# Patient Record
Sex: MALE | Race: WHITE | NOT HISPANIC OR LATINO | Employment: FULL TIME | URBAN - METROPOLITAN AREA
[De-identification: names, ages, dates, MRNs, and addresses within clinical notes are randomized per-mention and may not be internally consistent; named-entity substitution may affect disease eponyms.]

---

## 2017-09-06 ENCOUNTER — RECORDS - HEALTHEAST (OUTPATIENT)
Dept: LAB | Facility: CLINIC | Age: 57
End: 2017-09-06

## 2017-09-06 LAB — HBV SURFACE AB SERPL IA-ACNC: NEGATIVE M[IU]/ML

## 2017-10-23 ENCOUNTER — RECORDS - HEALTHEAST (OUTPATIENT)
Dept: LAB | Facility: CLINIC | Age: 57
End: 2017-10-23

## 2017-10-23 LAB — HBV SURFACE AB SERPL IA-ACNC: POSITIVE M[IU]/ML

## 2018-01-08 ENCOUNTER — OFFICE VISIT - HEALTHEAST (OUTPATIENT)
Dept: FAMILY MEDICINE | Facility: CLINIC | Age: 58
End: 2018-01-08

## 2018-01-08 ENCOUNTER — COMMUNICATION - HEALTHEAST (OUTPATIENT)
Dept: SCHEDULING | Facility: CLINIC | Age: 58
End: 2018-01-08

## 2018-01-08 DIAGNOSIS — G62.9 PERIPHERAL NEUROPATHY: ICD-10-CM

## 2018-01-08 DIAGNOSIS — Z12.11 COLON CANCER SCREENING: ICD-10-CM

## 2018-01-08 LAB
ALBUMIN SERPL-MCNC: 4.1 G/DL (ref 3.5–5)
ALP SERPL-CCNC: 95 U/L (ref 45–120)
ALT SERPL W P-5'-P-CCNC: 25 U/L (ref 0–45)
ANION GAP SERPL CALCULATED.3IONS-SCNC: 13 MMOL/L (ref 5–18)
AST SERPL W P-5'-P-CCNC: 18 U/L (ref 0–40)
BILIRUB SERPL-MCNC: 0.8 MG/DL (ref 0–1)
BUN SERPL-MCNC: 20 MG/DL (ref 8–22)
CALCIUM SERPL-MCNC: 10.1 MG/DL (ref 8.5–10.5)
CHLORIDE BLD-SCNC: 96 MMOL/L (ref 98–107)
CO2 SERPL-SCNC: 27 MMOL/L (ref 22–31)
CREAT SERPL-MCNC: 1.5 MG/DL (ref 0.7–1.3)
ERYTHROCYTE [DISTWIDTH] IN BLOOD BY AUTOMATED COUNT: 11.8 % (ref 11–14.5)
GFR SERPL CREATININE-BSD FRML MDRD: 48 ML/MIN/1.73M2
GLUCOSE BLD-MCNC: 461 MG/DL (ref 70–125)
HBA1C MFR BLD: >14 % (ref 3.5–6)
HCT VFR BLD AUTO: 50.3 % (ref 40–54)
HGB BLD-MCNC: 17.2 G/DL (ref 14–18)
MCH RBC QN AUTO: 30.4 PG (ref 27–34)
MCHC RBC AUTO-ENTMCNC: 34.1 G/DL (ref 32–36)
MCV RBC AUTO: 89 FL (ref 80–100)
PLATELET # BLD AUTO: 215 THOU/UL (ref 140–440)
PMV BLD AUTO: 8.3 FL (ref 7–10)
POTASSIUM BLD-SCNC: 4.6 MMOL/L (ref 3.5–5)
PROT SERPL-MCNC: 7.5 G/DL (ref 6–8)
RBC # BLD AUTO: 5.65 MILL/UL (ref 4.4–6.2)
SODIUM SERPL-SCNC: 136 MMOL/L (ref 136–145)
TSH SERPL DL<=0.005 MIU/L-ACNC: 0.54 UIU/ML (ref 0.3–5)
VIT B12 SERPL-MCNC: 1501 PG/ML (ref 213–816)
WBC: 6 THOU/UL (ref 4–11)

## 2018-01-08 ASSESSMENT — MIFFLIN-ST. JEOR: SCORE: 1602.32

## 2018-01-09 ENCOUNTER — COMMUNICATION - HEALTHEAST (OUTPATIENT)
Dept: NURSING | Facility: CLINIC | Age: 58
End: 2018-01-09

## 2018-01-09 ENCOUNTER — OFFICE VISIT - HEALTHEAST (OUTPATIENT)
Dept: FAMILY MEDICINE | Facility: CLINIC | Age: 58
End: 2018-01-09

## 2018-01-09 ENCOUNTER — OFFICE VISIT - HEALTHEAST (OUTPATIENT)
Dept: NURSING | Facility: CLINIC | Age: 58
End: 2018-01-09

## 2018-01-09 DIAGNOSIS — E11.22: ICD-10-CM

## 2018-01-09 LAB
CHOLEST SERPL-MCNC: 219 MG/DL
CREAT UR-MCNC: 50 MG/DL
FASTING STATUS PATIENT QL REPORTED: NO
HDLC SERPL-MCNC: 58 MG/DL
LDLC SERPL CALC-MCNC: 114 MG/DL
MICROALBUMIN UR-MCNC: 0.87 MG/DL (ref 0–1.99)
MICROALBUMIN/CREAT UR: 17.4 MG/G
TRIGL SERPL-MCNC: 237 MG/DL

## 2018-01-11 ENCOUNTER — COMMUNICATION - HEALTHEAST (OUTPATIENT)
Dept: FAMILY MEDICINE | Facility: CLINIC | Age: 58
End: 2018-01-11

## 2018-01-15 ENCOUNTER — RECORDS - HEALTHEAST (OUTPATIENT)
Dept: ADMINISTRATIVE | Facility: OTHER | Age: 58
End: 2018-01-15

## 2018-01-16 ENCOUNTER — RECORDS - HEALTHEAST (OUTPATIENT)
Dept: ADMINISTRATIVE | Facility: OTHER | Age: 58
End: 2018-01-16

## 2018-01-23 ENCOUNTER — OFFICE VISIT - HEALTHEAST (OUTPATIENT)
Dept: NURSING | Facility: CLINIC | Age: 58
End: 2018-01-23

## 2018-01-31 ENCOUNTER — COMMUNICATION - HEALTHEAST (OUTPATIENT)
Dept: NURSING | Facility: CLINIC | Age: 58
End: 2018-01-31

## 2018-02-02 ENCOUNTER — AMBULATORY - HEALTHEAST (OUTPATIENT)
Dept: LAB | Facility: CLINIC | Age: 58
End: 2018-02-02

## 2018-02-02 DIAGNOSIS — Z12.11 COLON CANCER SCREENING: ICD-10-CM

## 2018-02-05 ENCOUNTER — OFFICE VISIT - HEALTHEAST (OUTPATIENT)
Dept: EDUCATION SERVICES | Facility: CLINIC | Age: 58
End: 2018-02-05

## 2018-02-06 LAB
HEMOCCULT SP1 STL QL: NEGATIVE
HEMOCCULT SP2 STL QL: NEGATIVE
HEMOCCULT SP3 STL QL: NEGATIVE

## 2018-02-07 ENCOUNTER — COMMUNICATION - HEALTHEAST (OUTPATIENT)
Dept: NURSING | Facility: CLINIC | Age: 58
End: 2018-02-07

## 2018-02-12 ENCOUNTER — COMMUNICATION - HEALTHEAST (OUTPATIENT)
Dept: NURSING | Facility: CLINIC | Age: 58
End: 2018-02-12

## 2018-03-03 ENCOUNTER — COMMUNICATION - HEALTHEAST (OUTPATIENT)
Dept: NURSING | Facility: CLINIC | Age: 58
End: 2018-03-03

## 2018-03-07 ENCOUNTER — AMBULATORY - HEALTHEAST (OUTPATIENT)
Dept: NURSING | Facility: CLINIC | Age: 58
End: 2018-03-07

## 2018-03-18 ENCOUNTER — COMMUNICATION - HEALTHEAST (OUTPATIENT)
Dept: NURSING | Facility: CLINIC | Age: 58
End: 2018-03-18

## 2018-05-15 ENCOUNTER — COMMUNICATION - HEALTHEAST (OUTPATIENT)
Dept: FAMILY MEDICINE | Facility: CLINIC | Age: 58
End: 2018-05-15

## 2018-06-02 ENCOUNTER — COMMUNICATION - HEALTHEAST (OUTPATIENT)
Dept: NURSING | Facility: CLINIC | Age: 58
End: 2018-06-02

## 2018-06-07 ENCOUNTER — COMMUNICATION - HEALTHEAST (OUTPATIENT)
Dept: NURSING | Facility: CLINIC | Age: 58
End: 2018-06-07

## 2018-07-11 ENCOUNTER — COMMUNICATION - HEALTHEAST (OUTPATIENT)
Dept: FAMILY MEDICINE | Facility: CLINIC | Age: 58
End: 2018-07-11

## 2018-07-25 ENCOUNTER — AMBULATORY - HEALTHEAST (OUTPATIENT)
Dept: FAMILY MEDICINE | Facility: CLINIC | Age: 58
End: 2018-07-25

## 2018-07-25 ENCOUNTER — OFFICE VISIT - HEALTHEAST (OUTPATIENT)
Dept: FAMILY MEDICINE | Facility: CLINIC | Age: 58
End: 2018-07-25

## 2018-07-25 ENCOUNTER — COMMUNICATION - HEALTHEAST (OUTPATIENT)
Dept: FAMILY MEDICINE | Facility: CLINIC | Age: 58
End: 2018-07-25

## 2018-07-25 DIAGNOSIS — E11.9 DIABETES MELLITUS, TYPE 2 (H): ICD-10-CM

## 2018-07-25 DIAGNOSIS — R03.0 ELEVATED BLOOD PRESSURE READING WITHOUT DIAGNOSIS OF HYPERTENSION: ICD-10-CM

## 2018-07-25 LAB
ANION GAP SERPL CALCULATED.3IONS-SCNC: 9 MMOL/L (ref 5–18)
BUN SERPL-MCNC: 26 MG/DL (ref 8–22)
CALCIUM SERPL-MCNC: 9.8 MG/DL (ref 8.5–10.5)
CHLORIDE BLD-SCNC: 102 MMOL/L (ref 98–107)
CO2 SERPL-SCNC: 28 MMOL/L (ref 22–31)
CREAT SERPL-MCNC: 1.43 MG/DL (ref 0.7–1.3)
GFR SERPL CREATININE-BSD FRML MDRD: 51 ML/MIN/1.73M2
GLUCOSE BLD-MCNC: 136 MG/DL (ref 70–125)
HBA1C MFR BLD: 7.3 % (ref 3.5–6)
POTASSIUM BLD-SCNC: 4.4 MMOL/L (ref 3.5–5)
SODIUM SERPL-SCNC: 139 MMOL/L (ref 136–145)

## 2018-07-25 RX ORDER — GLUCOSAMINE HCL/CHONDROITIN SU 500-400 MG
CAPSULE ORAL
Qty: 200 STRIP | Refills: 11 | Status: SHIPPED | OUTPATIENT
Start: 2018-07-25 | End: 2022-07-05

## 2018-07-27 ENCOUNTER — COMMUNICATION - HEALTHEAST (OUTPATIENT)
Dept: FAMILY MEDICINE | Facility: CLINIC | Age: 58
End: 2018-07-27

## 2018-07-31 ENCOUNTER — COMMUNICATION - HEALTHEAST (OUTPATIENT)
Dept: FAMILY MEDICINE | Facility: CLINIC | Age: 58
End: 2018-07-31

## 2018-08-06 ENCOUNTER — COMMUNICATION - HEALTHEAST (OUTPATIENT)
Dept: FAMILY MEDICINE | Facility: CLINIC | Age: 58
End: 2018-08-06

## 2018-08-13 ENCOUNTER — COMMUNICATION - HEALTHEAST (OUTPATIENT)
Dept: FAMILY MEDICINE | Facility: CLINIC | Age: 58
End: 2018-08-13

## 2018-08-20 ENCOUNTER — COMMUNICATION - HEALTHEAST (OUTPATIENT)
Dept: FAMILY MEDICINE | Facility: CLINIC | Age: 58
End: 2018-08-20

## 2018-08-22 ENCOUNTER — COMMUNICATION - HEALTHEAST (OUTPATIENT)
Dept: FAMILY MEDICINE | Facility: CLINIC | Age: 58
End: 2018-08-22

## 2018-08-31 ENCOUNTER — COMMUNICATION - HEALTHEAST (OUTPATIENT)
Dept: FAMILY MEDICINE | Facility: CLINIC | Age: 58
End: 2018-08-31

## 2018-09-02 ENCOUNTER — COMMUNICATION - HEALTHEAST (OUTPATIENT)
Dept: FAMILY MEDICINE | Facility: CLINIC | Age: 58
End: 2018-09-02

## 2018-09-12 ENCOUNTER — COMMUNICATION - HEALTHEAST (OUTPATIENT)
Dept: NURSING | Facility: CLINIC | Age: 58
End: 2018-09-12

## 2018-10-03 ENCOUNTER — COMMUNICATION - HEALTHEAST (OUTPATIENT)
Dept: FAMILY MEDICINE | Facility: CLINIC | Age: 58
End: 2018-10-03

## 2018-10-03 DIAGNOSIS — E11.9 DIABETES MELLITUS, TYPE 2 (H): ICD-10-CM

## 2018-12-10 ENCOUNTER — COMMUNICATION - HEALTHEAST (OUTPATIENT)
Dept: FAMILY MEDICINE | Facility: CLINIC | Age: 58
End: 2018-12-10

## 2019-01-19 ENCOUNTER — COMMUNICATION - HEALTHEAST (OUTPATIENT)
Dept: NURSING | Facility: CLINIC | Age: 59
End: 2019-01-19

## 2019-03-23 ENCOUNTER — COMMUNICATION - HEALTHEAST (OUTPATIENT)
Dept: FAMILY MEDICINE | Facility: CLINIC | Age: 59
End: 2019-03-23

## 2019-03-23 DIAGNOSIS — E11.9 DIABETES MELLITUS, TYPE 2 (H): ICD-10-CM

## 2019-03-28 ENCOUNTER — COMMUNICATION - HEALTHEAST (OUTPATIENT)
Dept: FAMILY MEDICINE | Facility: CLINIC | Age: 59
End: 2019-03-28

## 2019-04-15 ENCOUNTER — OFFICE VISIT - HEALTHEAST (OUTPATIENT)
Dept: FAMILY MEDICINE | Facility: CLINIC | Age: 59
End: 2019-04-15

## 2019-04-15 ENCOUNTER — AMBULATORY - HEALTHEAST (OUTPATIENT)
Dept: FAMILY MEDICINE | Facility: CLINIC | Age: 59
End: 2019-04-15

## 2019-04-15 DIAGNOSIS — Z12.11 SCREEN FOR COLON CANCER: ICD-10-CM

## 2019-04-15 LAB
ALBUMIN SERPL-MCNC: 4.5 G/DL (ref 3.5–5)
ALP SERPL-CCNC: 75 U/L (ref 45–120)
ALT SERPL W P-5'-P-CCNC: 38 U/L (ref 0–45)
ANION GAP SERPL CALCULATED.3IONS-SCNC: 10 MMOL/L (ref 5–18)
AST SERPL W P-5'-P-CCNC: 29 U/L (ref 0–40)
BILIRUB SERPL-MCNC: 0.6 MG/DL (ref 0–1)
BUN SERPL-MCNC: 21 MG/DL (ref 8–22)
CALCIUM SERPL-MCNC: 10 MG/DL (ref 8.5–10.5)
CHLORIDE BLD-SCNC: 100 MMOL/L (ref 98–107)
CO2 SERPL-SCNC: 28 MMOL/L (ref 22–31)
CREAT SERPL-MCNC: 1.44 MG/DL (ref 0.7–1.3)
CREAT UR-MCNC: 147.5 MG/DL
GFR SERPL CREATININE-BSD FRML MDRD: 50 ML/MIN/1.73M2
GLUCOSE BLD-MCNC: 163 MG/DL (ref 70–125)
HBA1C MFR BLD: 7.7 % (ref 3.5–6)
MICROALBUMIN UR-MCNC: 1.4 MG/DL (ref 0–1.99)
MICROALBUMIN/CREAT UR: 9.5 MG/G
POTASSIUM BLD-SCNC: 4.4 MMOL/L (ref 3.5–5)
PROT SERPL-MCNC: 7.5 G/DL (ref 6–8)
SODIUM SERPL-SCNC: 138 MMOL/L (ref 136–145)

## 2019-04-30 ENCOUNTER — RECORDS - HEALTHEAST (OUTPATIENT)
Dept: ADMINISTRATIVE | Facility: OTHER | Age: 59
End: 2019-04-30

## 2019-05-06 ENCOUNTER — AMBULATORY - HEALTHEAST (OUTPATIENT)
Dept: LAB | Facility: CLINIC | Age: 59
End: 2019-05-06

## 2019-05-06 ENCOUNTER — RECORDS - HEALTHEAST (OUTPATIENT)
Dept: HEALTH INFORMATION MANAGEMENT | Facility: CLINIC | Age: 59
End: 2019-05-06

## 2019-05-06 DIAGNOSIS — Z12.11 SCREEN FOR COLON CANCER: ICD-10-CM

## 2019-05-07 LAB
HEMOCCULT SP1 STL QL: NEGATIVE
HEMOCCULT SP2 STL QL: NEGATIVE
HEMOCCULT SP3 STL QL: NEGATIVE

## 2019-07-02 ENCOUNTER — COMMUNICATION - HEALTHEAST (OUTPATIENT)
Dept: FAMILY MEDICINE | Facility: CLINIC | Age: 59
End: 2019-07-02

## 2019-07-27 ENCOUNTER — COMMUNICATION - HEALTHEAST (OUTPATIENT)
Dept: FAMILY MEDICINE | Facility: CLINIC | Age: 59
End: 2019-07-27

## 2019-07-27 DIAGNOSIS — E11.9 DIABETES MELLITUS, TYPE 2 (H): ICD-10-CM

## 2019-07-29 ENCOUNTER — AMBULATORY - HEALTHEAST (OUTPATIENT)
Dept: FAMILY MEDICINE | Facility: CLINIC | Age: 59
End: 2019-07-29

## 2019-07-29 ENCOUNTER — OFFICE VISIT - HEALTHEAST (OUTPATIENT)
Dept: FAMILY MEDICINE | Facility: CLINIC | Age: 59
End: 2019-07-29

## 2019-07-29 LAB
ANION GAP SERPL CALCULATED.3IONS-SCNC: 12 MMOL/L (ref 5–18)
BUN SERPL-MCNC: 24 MG/DL (ref 8–22)
CALCIUM SERPL-MCNC: 9.7 MG/DL (ref 8.5–10.5)
CHLORIDE BLD-SCNC: 102 MMOL/L (ref 98–107)
CO2 SERPL-SCNC: 24 MMOL/L (ref 22–31)
CREAT SERPL-MCNC: 1.37 MG/DL (ref 0.7–1.3)
GFR SERPL CREATININE-BSD FRML MDRD: 53 ML/MIN/1.73M2
GLUCOSE BLD-MCNC: 152 MG/DL (ref 70–125)
HBA1C MFR BLD: 8 % (ref 3.5–6)
POTASSIUM BLD-SCNC: 4.6 MMOL/L (ref 3.5–5)
SODIUM SERPL-SCNC: 138 MMOL/L (ref 136–145)

## 2019-07-29 ASSESSMENT — MIFFLIN-ST. JEOR: SCORE: 1739.53

## 2019-08-21 ENCOUNTER — COMMUNICATION - HEALTHEAST (OUTPATIENT)
Dept: FAMILY MEDICINE | Facility: CLINIC | Age: 59
End: 2019-08-21

## 2019-09-24 ENCOUNTER — COMMUNICATION - HEALTHEAST (OUTPATIENT)
Dept: FAMILY MEDICINE | Facility: CLINIC | Age: 59
End: 2019-09-24

## 2019-10-01 ENCOUNTER — COMMUNICATION - HEALTHEAST (OUTPATIENT)
Dept: FAMILY MEDICINE | Facility: CLINIC | Age: 59
End: 2019-10-01

## 2019-10-30 ENCOUNTER — OFFICE VISIT - HEALTHEAST (OUTPATIENT)
Dept: FAMILY MEDICINE | Facility: CLINIC | Age: 59
End: 2019-10-30

## 2019-10-30 DIAGNOSIS — E78.5 HYPERLIPIDEMIA LDL GOAL <70: ICD-10-CM

## 2019-10-30 DIAGNOSIS — R03.0 ELEVATED BLOOD PRESSURE READING WITHOUT DIAGNOSIS OF HYPERTENSION: ICD-10-CM

## 2019-10-30 LAB
ANION GAP SERPL CALCULATED.3IONS-SCNC: 8 MMOL/L (ref 5–18)
BUN SERPL-MCNC: 21 MG/DL (ref 8–22)
CALCIUM SERPL-MCNC: 9.6 MG/DL (ref 8.5–10.5)
CHLORIDE BLD-SCNC: 102 MMOL/L (ref 98–107)
CHOLEST SERPL-MCNC: 141 MG/DL
CO2 SERPL-SCNC: 29 MMOL/L (ref 22–31)
CREAT SERPL-MCNC: 1.39 MG/DL (ref 0.7–1.3)
FASTING STATUS PATIENT QL REPORTED: NO
GFR SERPL CREATININE-BSD FRML MDRD: 52 ML/MIN/1.73M2
GLUCOSE BLD-MCNC: 139 MG/DL (ref 70–125)
HBA1C MFR BLD: 7.1 % (ref 3.5–6)
HDLC SERPL-MCNC: 56 MG/DL
LDLC SERPL CALC-MCNC: 72 MG/DL
POTASSIUM BLD-SCNC: 4.3 MMOL/L (ref 3.5–5)
SODIUM SERPL-SCNC: 139 MMOL/L (ref 136–145)
TRIGL SERPL-MCNC: 67 MG/DL

## 2019-10-30 ASSESSMENT — MIFFLIN-ST. JEOR: SCORE: 1737.26

## 2019-11-04 ENCOUNTER — COMMUNICATION - HEALTHEAST (OUTPATIENT)
Dept: FAMILY MEDICINE | Facility: CLINIC | Age: 59
End: 2019-11-04

## 2019-11-23 ENCOUNTER — COMMUNICATION - HEALTHEAST (OUTPATIENT)
Dept: FAMILY MEDICINE | Facility: CLINIC | Age: 59
End: 2019-11-23

## 2019-11-23 RX ORDER — LANCETS
EACH MISCELLANEOUS
Qty: 300 EACH | Refills: 3 | Status: SHIPPED | OUTPATIENT
Start: 2019-11-23 | End: 2022-04-21

## 2020-02-09 ENCOUNTER — COMMUNICATION - HEALTHEAST (OUTPATIENT)
Dept: FAMILY MEDICINE | Facility: CLINIC | Age: 60
End: 2020-02-09

## 2020-02-17 ENCOUNTER — COMMUNICATION - HEALTHEAST (OUTPATIENT)
Dept: FAMILY MEDICINE | Facility: CLINIC | Age: 60
End: 2020-02-17

## 2020-03-16 ENCOUNTER — VIRTUAL VISIT (OUTPATIENT)
Dept: FAMILY MEDICINE | Facility: OTHER | Age: 60
End: 2020-03-16

## 2020-03-17 NOTE — PROGRESS NOTES
"Date: 2020 19:27:54  Clinician: Joel Wegener  Clinician NPI: [id number]  Patient: Ned Moore  Patient : 1960  Patient Address: [address] [place] [address]  Patient Phone: [phone]  Visit Protocol: URI  Patient Summary:  Ned is a 59 year old ( : 1960 ) male who initiated a Visit for COVID-19 (Coronavirus) evaluation and screening. When asked the question \"Please sign me up to receive news, health information and promotions from [employer].\", Ned responded \"No\".    Ned states his symptoms started gradually 3-6 days ago.   His symptoms consist of rhinitis and a cough.   Symptom details     Nasal secretions: The color of his mucus is clear.    Cough: Ned coughs a few times an hour and his cough is not more bothersome at night. Phlegm does not come into his throat when he coughs. He believes his cough is caused by post-nasal drip.      Ned denies having ear pain, malaise, headache, enlarged lymph nodes, facial pain or pressure, myalgias, wheezing, sore throat, nasal congestion, teeth pain, fever, and chills. He also denies having recent facial or sinus surgery in the past 60 days, double sickening (worsening symptoms after initial improvement), and taking antibiotic medication for the symptoms. He is not experiencing dyspnea.   Precipitating events  He has not recently been exposed to someone with influenza. eNd has not been in close contact with any high risk individuals.   Pertinent COVID-19 (Coronavirus) information  Ned has not traveled internationally or to the areas where COVID-19 (Coronavirus) is widespread in the last 14 days before the start of his symptoms.   Ned has not had close contact with a suspected or laboratory-confirmed COVID-19 patient within 14 days of symptom onset.   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   Pertinent medical history  Ned does not need a return to work/school note.   Weight: 180 lbs   Ned does not smoke " or use smokeless tobacco.   Weight: 180 lbs    MEDICATIONS: lisinopril-hydrochlorothiazide oral, glipizide oral, metformin oral, ALLERGIES: NKDA  Clinician Response:  Dear Ned,   Based on the information you have provided, it is recommended that you go to one of our designated Coronavirus (Covid-19) testing centers to get a test done from your car.  We are offering testing from your car in [facility], [place], [facility] and [place].   To schedule a visit at [facility], [facility] or [place], please call [phone] to find out when the next available testing window is. Testing will be done in 1 hour blocks so that you can wait at home until we are available to more quickly perform your testing from the car.   To complete testing in [place] ONLY, follow the instructions below:    Go as soon as possible during the hours noted to [facility] [address], [place] [address]. Hours: M-F 7:30am-5pm    What to expect:   When you arrive please come park in the parking lot.   Be prepared to present photo ID   Call [phone] and let them know you have arrived.    They will ask for information to get you registered for a visit and will ask for the description of your car and where you are parked.    They will add you to the queue to get your test (you will stay in your car the entire time).   You will then be met by a provider who will perform a brief assessment in your car and collect samples to test for coronavirus and possibly influenza or RSV.    Isolate yourself while traveling.  Do Not allow any visitors within 6 feet.  Do Not go to work or school.  Do Not go to Muslim,  centers, shopping, or other public places.  Do Not shake hands.  Avoid close contact with others (hugging, kissing).Protect Others:     Cover Your Mouth and Nose with a mask, disposable tissue or wash cloth  to avoid spreading germs to others.  Wash your hands and face frequently with soap and water   Fever Medicines:    For fever relief, take acetaminophen or ibuprofen.  Treat fevers above 101deg F (38.3deg C) to lower fevers and make you more comfortable.   Acetaminophen (e.g., Tylenol): Take 650 mg (two 325 mg pills) by mouth every 4-6 hours as needed of regular strength Tylenol or 1,000 mg (two 500 mg pills) every 8 hours as needed of Extra Strength Tylenol.   Ibuprofen (e.g., Motrin, Advil): Take 400 mg (two 200 mg pills) by mouth every 6 hours as needed.   Acetaminophen is thought to be safer than ibuprofen or naproxen for people over 65 years old. Acetaminophen is in many OTC and prescription medicines. It might be in more than one medicine that you are taking. You need to be careful and not take an overdose. Before taking any medicine, read all the instructions on the package.  Caution -NSAIDs (e.g., ibuprofen, naproxen): Do not take nonsteroidal anti-inflammatory drugs (NSAIDs) if you have stomach problems, kidney disease, heart failure, or other contraindications to using this type of medicine. Do not take NSAID medicines for over 7 days without consulting your PCP. Do not take NSAID medicines if you are pregnant. Do not take NSAID medicines if you are also taking blood thinners.    Call or submit a new visit if: Breathing difficulty develops or you become worse.  Thank you for limiting contact with others, wearing a simple mask to cover your cough, practice good hand hygiene habits and accessing our virtual services where possible to limit the spread of this virus.  For more information about COVID19 and options for caring for yourself at home, please visit the CDC website at https://www.cdc.gov/coronavirus/2019-ncov/about/steps-when-sick.html  For more options for care at [facility], please visit our website at [email or web address]    Diagnosis: Cough  Diagnosis ICD: R05

## 2020-06-05 ENCOUNTER — COMMUNICATION - HEALTHEAST (OUTPATIENT)
Dept: FAMILY MEDICINE | Facility: CLINIC | Age: 60
End: 2020-06-05

## 2020-06-08 ENCOUNTER — COMMUNICATION - HEALTHEAST (OUTPATIENT)
Dept: NURSING | Facility: CLINIC | Age: 60
End: 2020-06-08

## 2020-06-10 RX ORDER — GLUCOSAMINE HCL/CHONDROITIN SU 500-400 MG
CAPSULE ORAL
Qty: 200 STRIP | Refills: 3 | Status: SHIPPED | OUTPATIENT
Start: 2020-06-10

## 2020-10-03 ENCOUNTER — COMMUNICATION - HEALTHEAST (OUTPATIENT)
Dept: FAMILY MEDICINE | Facility: CLINIC | Age: 60
End: 2020-10-03

## 2020-11-13 ENCOUNTER — COMMUNICATION - HEALTHEAST (OUTPATIENT)
Dept: FAMILY MEDICINE | Facility: CLINIC | Age: 60
End: 2020-11-13

## 2020-12-13 ENCOUNTER — COMMUNICATION - HEALTHEAST (OUTPATIENT)
Dept: FAMILY MEDICINE | Facility: CLINIC | Age: 60
End: 2020-12-13

## 2021-01-05 ENCOUNTER — COMMUNICATION - HEALTHEAST (OUTPATIENT)
Dept: FAMILY MEDICINE | Facility: CLINIC | Age: 61
End: 2021-01-05

## 2021-01-27 ENCOUNTER — OFFICE VISIT - HEALTHEAST (OUTPATIENT)
Dept: FAMILY MEDICINE | Facility: CLINIC | Age: 61
End: 2021-01-27

## 2021-01-27 ENCOUNTER — HOSPITAL ENCOUNTER (OUTPATIENT)
Dept: ULTRASOUND IMAGING | Facility: CLINIC | Age: 61
Discharge: HOME | End: 2021-01-27
Attending: NURSE PRACTITIONER

## 2021-01-27 DIAGNOSIS — Z12.11 COLON CANCER SCREENING: ICD-10-CM

## 2021-01-27 DIAGNOSIS — E11.21 DIABETIC NEPHROPATHY ASSOCIATED WITH TYPE 2 DIABETES MELLITUS (H): ICD-10-CM

## 2021-01-27 DIAGNOSIS — R60.0 EDEMA OF LEFT LOWER EXTREMITY: ICD-10-CM

## 2021-01-27 DIAGNOSIS — E78.5 HYPERLIPIDEMIA LDL GOAL <70: ICD-10-CM

## 2021-01-27 DIAGNOSIS — E11.42 DIABETIC POLYNEUROPATHY ASSOCIATED WITH TYPE 2 DIABETES MELLITUS (H): ICD-10-CM

## 2021-01-27 DIAGNOSIS — N18.31 STAGE 3A CHRONIC KIDNEY DISEASE (H): ICD-10-CM

## 2021-01-27 LAB
ALBUMIN SERPL-MCNC: 4.6 G/DL (ref 3.5–5)
ALP SERPL-CCNC: 75 U/L (ref 45–120)
ALT SERPL W P-5'-P-CCNC: 31 U/L (ref 0–45)
ANION GAP SERPL CALCULATED.3IONS-SCNC: 11 MMOL/L (ref 5–18)
AST SERPL W P-5'-P-CCNC: 22 U/L (ref 0–40)
BILIRUB SERPL-MCNC: 0.7 MG/DL (ref 0–1)
BUN SERPL-MCNC: 24 MG/DL (ref 8–22)
CALCIUM SERPL-MCNC: 9.4 MG/DL (ref 8.5–10.5)
CHLORIDE BLD-SCNC: 102 MMOL/L (ref 98–107)
CHOLEST SERPL-MCNC: 156 MG/DL
CO2 SERPL-SCNC: 27 MMOL/L (ref 22–31)
CREAT SERPL-MCNC: 1.41 MG/DL (ref 0.7–1.3)
CREAT UR-MCNC: 146.5 MG/DL
FASTING STATUS PATIENT QL REPORTED: YES
GFR SERPL CREATININE-BSD FRML MDRD: 51 ML/MIN/1.73M2
GLUCOSE BLD-MCNC: 118 MG/DL (ref 70–125)
HBA1C MFR BLD: 7.9 %
HDLC SERPL-MCNC: 57 MG/DL
LDLC SERPL CALC-MCNC: 74 MG/DL
MICROALBUMIN UR-MCNC: 1.69 MG/DL (ref 0–1.99)
MICROALBUMIN/CREAT UR: 11.5 MG/G
POTASSIUM BLD-SCNC: 4.5 MMOL/L (ref 3.5–5)
PROT SERPL-MCNC: 7.6 G/DL (ref 6–8)
SODIUM SERPL-SCNC: 140 MMOL/L (ref 136–145)
TRIGL SERPL-MCNC: 123 MG/DL

## 2021-01-27 RX ORDER — GLIPIZIDE 10 MG/1
10 TABLET, FILM COATED, EXTENDED RELEASE ORAL DAILY
Qty: 90 TABLET | Refills: 1 | Status: SHIPPED | OUTPATIENT
Start: 2021-01-27 | End: 2021-07-20

## 2021-01-27 RX ORDER — ATORVASTATIN CALCIUM 20 MG/1
20 TABLET, FILM COATED ORAL EVERY EVENING
Qty: 90 TABLET | Refills: 3 | Status: SHIPPED | OUTPATIENT
Start: 2021-01-27 | End: 2022-01-09

## 2021-01-29 ENCOUNTER — COMMUNICATION - HEALTHEAST (OUTPATIENT)
Dept: FAMILY MEDICINE | Facility: CLINIC | Age: 61
End: 2021-01-29

## 2021-01-29 RX ORDER — METFORMIN HCL 500 MG
TABLET, EXTENDED RELEASE 24 HR ORAL
Qty: 360 TABLET | Refills: 3 | Status: SHIPPED | OUTPATIENT
Start: 2021-01-29 | End: 2022-02-07

## 2021-02-01 ENCOUNTER — COMMUNICATION - HEALTHEAST (OUTPATIENT)
Dept: FAMILY MEDICINE | Facility: CLINIC | Age: 61
End: 2021-02-01

## 2021-02-01 RX ORDER — LANCETS
EACH MISCELLANEOUS
Qty: 300 EACH | Refills: 3 | Status: SHIPPED | OUTPATIENT
Start: 2021-02-01 | End: 2024-07-09

## 2021-02-22 ENCOUNTER — COMMUNICATION - HEALTHEAST (OUTPATIENT)
Dept: FAMILY MEDICINE | Facility: CLINIC | Age: 61
End: 2021-02-22

## 2021-03-10 ENCOUNTER — AMBULATORY - HEALTHEAST (OUTPATIENT)
Dept: MULTI SPECIALTY CLINIC | Facility: CLINIC | Age: 61
End: 2021-03-10

## 2021-03-10 LAB — COLOGUARD-ABSTRACT: NEGATIVE

## 2021-05-06 ENCOUNTER — RECORDS - HEALTHEAST (OUTPATIENT)
Dept: TELEHEALTH | Facility: CLINIC | Age: 61
End: 2021-05-06

## 2021-05-06 ENCOUNTER — COMMUNICATION - HEALTHEAST (OUTPATIENT)
Dept: FAMILY MEDICINE | Facility: CLINIC | Age: 61
End: 2021-05-06

## 2021-05-27 NOTE — TELEPHONE ENCOUNTER
Spoke with patient and informed him that he was scheduled for his DM f/u appt incorrectly.  Cancelled the appointment that was made on the nurse only schedule and advised pt to call back to schedule an appt with Anuradha for his diabetes.

## 2021-05-27 NOTE — PROGRESS NOTES
Assessment & Plan   1. Type 2 diabetes, uncontrolled, with neuropathy (H)  A1C just above goal.  He is quite concerned about hypoglycemia and hesitant to make changes though agrees to increase metformin to 1500mg after discussion of low risk of hypoglycemia with this.  Recheck in three months and adjust as needed.  Referral for eye exam.  PPSV23.  Neuropathy quite advanced though he is not bothered by this so declines medication options.  Continue to strive for tight blood glucose control.    - Glycosylated Hemoglobin A1c  - [facility] RED  - Comprehensive Metabolic Panel  - Microalbumin, Random Urine  - Ambulatory referral to Ophthalmology  - Pneumococcal polysaccharide vaccine 23-valent 1 yo or older, subq/IM  - metFORMIN (GLUCOPHAGE-XR) 500 MG 24 hr tablet; TAKE 3 TABLETS BY MOUTH EVERY DAY WITH BREAKFAST  Dispense: 90 tablet; Refill: 2    2. Screen for colon cancer  - Occult Blood(ICT); Future    Anuradha Dunn CNP    Subjective   Chief Complaint:  Diabetes    HPI:   Ned Moore is a 58 y.o. male who presents for diabetes.     DM:  He has not been seen since last July.  At that time was on Lantus which we weaned down after starting Metformin (renal function improved).  Started glipizide as well.  Now currently on Metformin XR 1000mg and glipizide 5mg. A1c today 7.7.  Up from 7.3 at last visit.  He does not bring a log of his blood sugars the reports fasting typically 110-130.  Bedtime 140-180.  Is not checking any other postprandials.  Denies any symptoms of hypoglycemia.  Neuropathy symptoms have been stable.  Is not experiencing tingling no numbness on a daily basis.  Does daily foot exams.  Due for eye exam.      Allergies:  has No Known Allergies.    SH/FH:  Social History and Family History reviewed and updated.   Tobacco Status:  He  reports that he has never smoked. He has never used smokeless tobacco.    Review of Systems:  A complete head to toe ROS is negative unless otherwise noted in HPI    Objective      Vitals:    04/15/19 1036   BP: 144/86   Patient Site: Right Arm   Patient Position: Sitting   Cuff Size: Adult Large   Pulse: 72   Weight: 192 lb 12 oz (87.4 kg)       Physical Exam:  GENERAL: Alert, well-appearing male .   CV: Regular rate and rhythm without murmurs, rubs or gallops.  RESP: Lung sounds clear, symmetric excursion.   PV : LEs warm, pink, symmetric hair distribution. No edema.  Pedal pulses 2+  SKIN:  No ulcerations or lesions on feet  NEURO:  Sensation not intact to monofilament testing at all locations.  Is intact to light touch.

## 2021-05-27 NOTE — TELEPHONE ENCOUNTER
RN cannot approve Refill Request    RN can NOT refill this medication PCP messaged that patient is overdue for Labs.     Last office visit: 7/25/2018 Anuradha Dunn CNP Last Physical: Visit date not found Last MTM visit: Visit date not found Last visit same specialty: 7/25/2018 Anuradha Dunn CNP.  Next visit within 3 mo: Visit date not found  Next physical within 3 mo: Visit date not found      [facility] Connection Triage/Med Refill 3/23/2019    Requested Prescriptions   Pending Prescriptions Disp Refills     metFORMIN (GLUCOPHAGE-XR) 500 MG 24 hr tablet [Pharmacy Med Name: METFORMIN HCL  MG TABLET] 60 tablet 3     Sig: TAKE 2 TABLETS BY MOUTH EVERY DAY WITH BREAKFAST    Metformin Refill Protocol Failed - 3/23/2019  8:23 AM       Failed - LFT or AST or ALT in last 12 months    Albumin   Date Value Ref Range Status   01/08/2018 4.1 3.5 - 5.0 g/dL Final     Bilirubin, Total   Date Value Ref Range Status   01/08/2018 0.8 0.0 - 1.0 mg/dL Final     Alkaline Phosphatase   Date Value Ref Range Status   01/08/2018 95 45 - 120 U/L Final     AST   Date Value Ref Range Status   01/08/2018 18 0 - 40 U/L Final     ALT   Date Value Ref Range Status   01/08/2018 25 0 - 45 U/L Final     Protein, Total   Date Value Ref Range Status   01/08/2018 7.5 6.0 - 8.0 g/dL Final               Failed - Visit with PCP or prescribing provider visit in last 6 months or next 3 months    Last office visit with prescriber/PCP: Visit date not found OR same dept: 7/25/2018 Anuradha Dunn CNP OR same specialty: 7/25/2018 Anuradha Dunn CNP Last physical: Visit date not found Last MTM visit: Visit date not found         Next appt within 3 mo: Visit date not found  Next physical within 3 mo: Visit date not found  Prescriber OR PCP: Anuradha Dunn CNP  Last diagnosis associated with med order: 1. Diabetes mellitus, type 2 (H)  - metFORMIN (GLUCOPHAGE-XR) 500 MG 24 hr tablet [Pharmacy Med Name: METFORMIN HCL  MG TABLET]; TAKE 2  TABLETS BY MOUTH EVERY DAY WITH BREAKFAST  Dispense: 60 tablet; Refill: 3     If protocol passes may refill for 12 months if within 3 months of last provider visit (or a total of 15 months).          Failed - A1C in last 6 months    Hemoglobin A1c   Date Value Ref Range Status   07/25/2018 7.3 (H) 3.5 - 6.0 % Final              Failed - Microalbumin in last year     Microalbumin, Random Urine   Date Value Ref Range Status   01/09/2018 0.87 0.00 - 1.99 mg/dL Final                 Passed - Blood pressure in last 12 months    BP Readings from Last 1 Encounters:   07/25/18 138/84            Passed - GFR or Serum Creatinine in last 6 months    GFR MDRD Non Af Amer   Date Value Ref Range Status   07/25/2018 51 (L) >60 mL/min/1.73m2 Final     GFR MDRD Af Amer   Date Value Ref Range Status   07/25/2018 >60 >60 mL/min/1.73m2 Final

## 2021-05-27 NOTE — TELEPHONE ENCOUNTER
Left message to call back for: Patient  Information to relay to patient:  Please see message below and relay to pt when he calls back. Thanks!

## 2021-05-27 NOTE — TELEPHONE ENCOUNTER
Patient Returning Call  Reason for call:  Patient  Information relayed to patient:  Patient informed he is due for a DM check  Patient has additional questions:  No  If YES, what are your questions/concerns:  Transferred to scheduling to make an appointment  Okay to leave a detailed message?: No call back needed

## 2021-05-27 NOTE — PATIENT INSTRUCTIONS - HE
Recommendations from today's visit                                                       1. We will increase your metformin to 1500mg daily or three tablets in the morning.     2. I placed a referral for eye exam, I recommend doing this annually with a diagnosis of diabetes.      3. [facility] will [facility] the results of your other labs and let you know if we need to make any other changes.     Next appointment: three months     To reschedule your appointment, please call the clinic directly at [phone].   It was a pleasure seeing you today! I look forward to seeing you again.

## 2021-05-30 NOTE — TELEPHONE ENCOUNTER
Refill Approved    Rx renewed per Medication Renewal Policy. Medication was last renewed on 4/15/19.    Gladis Lucia, [facility] Triage/Med Refill 7/2/2019     Requested Prescriptions   Pending Prescriptions Disp Refills     metFORMIN (GLUCOPHAGE-XR) 500 MG 24 hr tablet [Pharmacy Med Name: METFORMIN HCL  MG TABLET] 90 tablet 2     Sig: TAKE 3 TABLETS BY MOUTH EVERY DAY WITH BREAKFAST       Metformin Refill Protocol Passed - 7/2/2019  1:19 AM        Passed - Blood pressure in last 12 months     BP Readings from Last 1 Encounters:   04/15/19 138/76             Passed - LFT or AST or ALT in last 12 months     Albumin   Date Value Ref Range Status   04/15/2019 4.5 3.5 - 5.0 g/dL Final     Bilirubin, Total   Date Value Ref Range Status   04/15/2019 0.6 0.0 - 1.0 mg/dL Final     Alkaline Phosphatase   Date Value Ref Range Status   04/15/2019 75 45 - 120 U/L Final     AST   Date Value Ref Range Status   04/15/2019 29 0 - 40 U/L Final     ALT   Date Value Ref Range Status   04/15/2019 38 0 - 45 U/L Final     Protein, Total   Date Value Ref Range Status   04/15/2019 7.5 6.0 - 8.0 g/dL Final                Passed - GFR or Serum Creatinine in last 6 months     GFR MDRD Non Af Amer   Date Value Ref Range Status   04/15/2019 50 (L) >60 mL/min/1.73m2 Final     GFR MDRD Af Amer   Date Value Ref Range Status   04/15/2019 >60 >60 mL/min/1.73m2 Final             Passed - Visit with PCP or prescribing provider visit in last 6 months or next 3 months     Last office visit with prescriber/PCP: 4/15/2019 OR same dept: 4/15/2019 Anuradha Dunn CNP OR same specialty: 4/15/2019 Anuradha Dunn CNP Last physical: Visit date not found Last MTM visit: Visit date not found         Next appt within 3 mo: Visit date not found  Next physical within 3 mo: Visit date not found  Prescriber OR PCP: Anuradha Dunn CNP  Last diagnosis associated with med order: 1. Type 2 diabetes, uncontrolled, with neuropathy (H)  - metFORMIN (GLUCOPHAGE-XR)  500 MG 24 hr tablet [Pharmacy Med Name: METFORMIN HCL  MG TABLET]; TAKE 3 TABLETS BY MOUTH EVERY DAY WITH BREAKFAST  Dispense: 90 tablet; Refill: 2     If protocol passes may refill for 12 months if within 3 months of last provider visit (or a total of 15 months).           Passed - A1C in last 6 months     Hemoglobin A1c   Date Value Ref Range Status   04/15/2019 7.7 (H) 3.5 - 6.0 % Final               Passed - Microalbumin in last year      Microalbumin, Random Urine   Date Value Ref Range Status   04/15/2019 1.40 0.00 - 1.99 mg/dL Final

## 2021-05-30 NOTE — PROGRESS NOTES
Assessment & Plan   1. Type 2 diabetes, uncontrolled, with neuropathy (H)  A1c continues to rise, now 8.0.  We discussed the relation to his diet and he has been successful at making dietary changes over the last 3 weeks that have been reflected in his morning blood sugars.  I encouraged him to continue with monitoring his blood sugar on a daily basis as this seems to set him up for a good mindset for the day.  He is able to see a direct feedback of his food choices.  He is agreeable to this.  We will also increase metformin to 2000 mg pending his renal function which we will recheck today.  Plan for med check in 3 months.  - Glycosylated Hemoglobin A1c  - [facility] RED  - Basic Metabolic Panel  - metFORMIN (GLUCOPHAGE-XR) 500 MG 24 hr tablet; TAKE 4 TABLETS BY MOUTH EVERY DAY WITH BREAKFAST  Dispense: 360 tablet; Refill: 0    Anuradha uDnn CNP    Subjective   Chief Complaint:  Diabetes    HPI:   Ned Moore is a 59 y.o. male who presents for diabetes check.      A1C elevated to 7.7 three months ago so did increase Metformin to 1500mg.  Continues on glipizide 5mg.  He states that he was not monitoring home blood sugars until approximately 3 weeks ago when he was remind appointment.  He began checking in the morning and receive numbers in the range of 170-180.  He states that he realized that he had not been watching his diet as well as he should and made some changes.  His numbers slowly started to decline to the 140s and at this morning was 124.  He is not doing any formal exercise outside of work though he is quite active in his job and does a lot of housework and yard work on the weekends and evenings.  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  He states his schedule is erratic and he does not have the opportunity to sit down for a meal most often.    Neuropathy: Continues to struggle with bilateral neuropathy though does  "not feel this has necessarily worsened.  He has not noted any changes in gait or any difficulty with falls or stumbling.  He denies any sores on the feet and does check his feet nightly.  Wears protective shoes at work.    Just got two new kittens that he is excited about.       Allergies:  has No Known Allergies.    SH/FH:  Social History and Family History reviewed and updated.   Tobacco Status:  He  reports that he has never smoked. He has never used smokeless tobacco.    Review of Systems:  A complete head to toe ROS is negative unless otherwise noted in HPI    Objective     Vitals:    07/29/19 0855   BP: 132/82   Patient Site: Right Arm   Patient Position: Sitting   Cuff Size: Adult Large   Pulse: 68   Weight: 195 lb 4 oz (88.6 kg)   Height: 6' 1\" (1.854 m)       Physical Exam:  GENERAL: Alert, well-appearing male .   PSYCH: Pleasant mood, affect appropriate.   SKIN: No lesions, erythema, edema.   CV: Regular rate and rhythm without murmurs, rubs or gallops.  RESP: Lung sounds clear  PV : LEs warm, pink, symmetric hair distribution. No edema, tenderness or varicosities. Pedal pulses 2+  NEURO: Intact to monofilament testing at heels, soles of feet bilaterally.  Sensation at toes intact only at second toes, otherwise no sensation to monofilament.             "

## 2021-05-30 NOTE — PATIENT INSTRUCTIONS - HE
Recommendations from today's visit                                                       1. You A1C is elevated today at 8.0.  Continue to check morning blood sugars and use this as motivation for your eating habits.  We will increase your Metformin to 4 tablets daily and recheck in three months.      2.  Please fast (8 hours) for your next office visit     Next appointment: three months    To reschedule your appointment, please call the clinic directly at [phone].   It was a pleasure seeing you today! I look forward to seeing you again.

## 2021-05-30 NOTE — TELEPHONE ENCOUNTER
Refill Approved    Rx renewed per Medication Renewal Policy. Medication was last renewed on 7/2/19.    Karol Gloria, [facility] Triage/Med Refill 7/27/2019     Requested Prescriptions   Pending Prescriptions Disp Refills     metFORMIN (GLUCOPHAGE-XR) 500 MG 24 hr tablet [Pharmacy Med Name: METFORMIN HCL  MG TABLET] 60 tablet 1     Sig: TAKE 2 TABLETS BY MOUTH EVERY DAY WITH BREAKFAST       Metformin Refill Protocol Passed - 7/27/2019  8:33 AM        Passed - Blood pressure in last 12 months     BP Readings from Last 1 Encounters:   04/15/19 138/76             Passed - LFT or AST or ALT in last 12 months     Albumin   Date Value Ref Range Status   04/15/2019 4.5 3.5 - 5.0 g/dL Final     Bilirubin, Total   Date Value Ref Range Status   04/15/2019 0.6 0.0 - 1.0 mg/dL Final     Alkaline Phosphatase   Date Value Ref Range Status   04/15/2019 75 45 - 120 U/L Final     AST   Date Value Ref Range Status   04/15/2019 29 0 - 40 U/L Final     ALT   Date Value Ref Range Status   04/15/2019 38 0 - 45 U/L Final     Protein, Total   Date Value Ref Range Status   04/15/2019 7.5 6.0 - 8.0 g/dL Final                Passed - GFR or Serum Creatinine in last 6 months     GFR MDRD Non Af Amer   Date Value Ref Range Status   04/15/2019 50 (L) >60 mL/min/1.73m2 Final     GFR MDRD Af Amer   Date Value Ref Range Status   04/15/2019 >60 >60 mL/min/1.73m2 Final             Passed - Visit with PCP or prescribing provider visit in last 6 months or next 3 months     Last office visit with prescriber/PCP: 4/15/2019 OR same dept: 4/15/2019 Anuradha Dunn CNP OR same specialty: 4/15/2019 Anuradha Dunn CNP Last physical: Visit date not found Last MTM visit: Visit date not found         Next appt within 3 mo: Visit date not found  Next physical within 3 mo: Visit date not found  Prescriber OR PCP: Anuradha Dunn CNP  Last diagnosis associated with med order: 1. Diabetes mellitus, type 2 (H)  - metFORMIN (GLUCOPHAGE-XR) 500 MG 24 hr  tablet [Pharmacy Med Name: METFORMIN HCL  MG TABLET]; TAKE 2 TABLETS BY MOUTH EVERY DAY WITH BREAKFAST  Dispense: 60 tablet; Refill: 1     If protocol passes may refill for 12 months if within 3 months of last provider visit (or a total of 15 months).           Passed - A1C in last 6 months     Hemoglobin A1c   Date Value Ref Range Status   04/15/2019 7.7 (H) 3.5 - 6.0 % Final               Passed - Microalbumin in last year      Microalbumin, Random Urine   Date Value Ref Range Status   04/15/2019 1.40 0.00 - 1.99 mg/dL Final

## 2021-05-31 VITALS — HEIGHT: 73 IN | BODY MASS INDEX: 21.87 KG/M2 | WEIGHT: 165 LBS

## 2021-05-31 VITALS — BODY MASS INDEX: 22.33 KG/M2 | WEIGHT: 169.25 LBS

## 2021-05-31 NOTE — TELEPHONE ENCOUNTER
Refill Approved    Rx renewed per Medication Renewal Policy. Medication was last renewed on 8/22/18.    Gladis Lucia, [facility] Triage/Med Refill 8/21/2019     Requested Prescriptions   Pending Prescriptions Disp Refills     glipiZIDE (GLUCOTROL XL) 5 MG 24 hr tablet [Pharmacy Med Name: GLIPIZIDE ER 5 MG TABLET] 30 tablet 2     Sig: TAKE 1 TABLET BY MOUTH EVERY DAY       Oral Hypoglycemics Refill Protocol Passed - 8/21/2019  1:03 AM        Passed - Visit with PCP or prescribing provider visit in last 6 months       Last office visit with prescriber/PCP: 7/29/2019 OR same dept: 7/29/2019 Anuradha Dunn CNP OR same specialty: 7/29/2019 Anuradha Dunn CNP Last physical: Visit date not found Last MTM visit: Visit date not found         Next appt within 3 mo: Visit date not found  Next physical within 3 mo: Visit date not found  Prescriber OR PCP: Anuradha Dunn CNP  Last diagnosis associated with med order: 1. Type 2 diabetes, uncontrolled, with neuropathy (H)  - glipiZIDE (GLUCOTROL XL) 5 MG 24 hr tablet [Pharmacy Med Name: GLIPIZIDE ER 5 MG TABLET]; TAKE 1 TABLET BY MOUTH EVERY DAY  Dispense: 30 tablet; Refill: 2     If protocol passes may refill for 12 months if within 3 months of last provider visit (or a total of 15 months).           Passed - A1C in last 6 months     Hemoglobin A1c   Date Value Ref Range Status   07/29/2019 8.0 (H) 3.5 - 6.0 % Final               Passed - Microalbumin in last year      Microalbumin, Random Urine   Date Value Ref Range Status   04/15/2019 1.40 0.00 - 1.99 mg/dL Final                  Passed - Blood pressure in last year     BP Readings from Last 1 Encounters:   07/29/19 132/82             Passed - Serum creatinine in last year     Creatinine   Date Value Ref Range Status   07/29/2019 1.37 (H) 0.70 - 1.30 mg/dL Final

## 2021-06-01 VITALS — WEIGHT: 170.5 LBS | BODY MASS INDEX: 22.49 KG/M2

## 2021-06-01 NOTE — TELEPHONE ENCOUNTER
Refill Approved    Rx renewed per Medication Renewal Policy. Medication was last renewed on 12/11/18.    Gladis Lucia, [facility] Triage/Med Refill 9/24/2019     Requested Prescriptions   Pending Prescriptions Disp Refills     atorvastatin (LIPITOR) 20 MG tablet [Pharmacy Med Name: ATORVASTATIN 20 MG TABLET] 90 tablet 2     Sig: TAKE 1 TABLET BY MOUTH AT BEDTIME.       Statins Refill Protocol (Hmg CoA Reductase Inhibitors) Passed - 9/24/2019  1:40 AM        Passed - PCP or prescribing provider visit in past 12 months      Last office visit with prescriber/PCP: 7/29/2019 Anuradha Dunn CNP OR same dept: 7/29/2019 Anuradha Dunn CNP OR same specialty: 7/29/2019 Anuradha Dunn CNP  Last physical: Visit date not found Last MTM visit: Visit date not found   Next visit within 3 mo: Visit date not found  Next physical within 3 mo: Visit date not found  Prescriber OR PCP: Anuradha Dunn CNP  Last diagnosis associated with med order: 1. Type 2 diabetes, uncontrolled, with neuropathy (H)  - atorvastatin (LIPITOR) 20 MG tablet [Pharmacy Med Name: ATORVASTATIN 20 MG TABLET]; TAKE 1 TABLET BY MOUTH AT BEDTIME.  Dispense: 90 tablet; Refill: 2    If protocol passes may refill for 12 months if within 3 months of last provider visit (or a total of 15 months).

## 2021-06-01 NOTE — TELEPHONE ENCOUNTER
Refill Approved    Rx renewed per Medication Renewal Policy. Medication was last renewed on 7/29/19.    Gladis Lucia, [facility] Triage/Med Refill 10/2/2019     Requested Prescriptions   Pending Prescriptions Disp Refills     metFORMIN (GLUCOPHAGE-XR) 500 MG 24 hr tablet 360 tablet 0     Sig: TAKE 4 TABLETS BY MOUTH EVERY DAY WITH BREAKFAST       Metformin Refill Protocol Passed - 10/1/2019  2:38 PM        Passed - Blood pressure in last 12 months     BP Readings from Last 1 Encounters:   07/29/19 132/82             Passed - LFT or AST or ALT in last 12 months     Albumin   Date Value Ref Range Status   04/15/2019 4.5 3.5 - 5.0 g/dL Final     Bilirubin, Total   Date Value Ref Range Status   04/15/2019 0.6 0.0 - 1.0 mg/dL Final     Alkaline Phosphatase   Date Value Ref Range Status   04/15/2019 75 45 - 120 U/L Final     AST   Date Value Ref Range Status   04/15/2019 29 0 - 40 U/L Final     ALT   Date Value Ref Range Status   04/15/2019 38 0 - 45 U/L Final     Protein, Total   Date Value Ref Range Status   04/15/2019 7.5 6.0 - 8.0 g/dL Final                Passed - GFR or Serum Creatinine in last 6 months     GFR MDRD Non Af Amer   Date Value Ref Range Status   07/29/2019 53 (L) >60 mL/min/1.73m2 Final     GFR MDRD Af Amer   Date Value Ref Range Status   07/29/2019 >60 >60 mL/min/1.73m2 Final             Passed - Visit with PCP or prescribing provider visit in last 6 months or next 3 months     Last office visit with prescriber/PCP: 7/29/2019 OR same dept: 7/29/2019 Anuradha Dunn CNP OR same specialty: 7/29/2019 Anuradha Dunn CNP Last physical: Visit date not found Last MTM visit: Visit date not found         Next appt within 3 mo: Visit date not found  Next physical within 3 mo: Visit date not found  Prescriber OR PCP: Anuradha Dunn CNP  Last diagnosis associated with med order: 1. Type 2 diabetes, uncontrolled, with neuropathy (H)  - metFORMIN (GLUCOPHAGE-XR) 500 MG 24 hr tablet; TAKE 4 TABLETS BY MOUTH  EVERY DAY WITH BREAKFAST  Dispense: 360 tablet; Refill: 0     If protocol passes may refill for 12 months if within 3 months of last provider visit (or a total of 15 months).           Passed - A1C in last 6 months     Hemoglobin A1c   Date Value Ref Range Status   07/29/2019 8.0 (H) 3.5 - 6.0 % Final               Passed - Microalbumin in last year      Microalbumin, Random Urine   Date Value Ref Range Status   04/15/2019 1.40 0.00 - 1.99 mg/dL Final

## 2021-06-02 VITALS — BODY MASS INDEX: 25.43 KG/M2 | WEIGHT: 192.75 LBS

## 2021-06-02 NOTE — PROGRESS NOTES
"Assessment & Plan   1. Type 2 diabetes, uncontrolled, with neuropathy (H)  A1C remains just above goal at 7.1.  He is not doing any regular exercise and feels there is still room for improvement in his diet so will work on these things first.  If A1C remains above goal in three months will increase glipizide dose to 10mg.  He is agreeable to this plan.   - JIC RED  - Glycosylated Hemoglobin A1c  - Lipid Cascade  - Basic Metabolic Panel    2. Hyperlipidemia LDL goal <70  - Lipid Cascade    3. Elevated blood pressure reading without diagnosis of hypertension  Blood pressure mildly elevated today though remains in goal range on home cuff readings. Recheck three months.     Anuradha Dunn CNP    Subjective   Chief Complaint:  Diabetes    HPI:   Ned Moore is a 59 y.o. male who presents for diabetes check.      A1C today 7.1 from 8.0 three months ago. At that visit Metformin increased to 2000mg. Continues on glipizide 5mg. Morning blood sugars average 124, 94-140s.  During the day 110-140.  He has not been doing any regular exercise.  He states there is still room for improvement in his diet.     Blood pressure slightly elevated today. 142/78.  He states home reading this morning 130/75 which it has been consistent when checking it weekly.        Allergies:  has No Known Allergies.    SH/FH:  Social History and Family History reviewed and updated.   Tobacco Status:  He  reports that he has never smoked. He has never used smokeless tobacco.    Review of Systems:  A complete head to toe ROS is negative unless otherwise noted in HPI    Objective     Vitals:    10/30/19 0839   BP: 142/78   Patient Site: Right Arm   Patient Position: Sitting   Cuff Size: Adult Large   Pulse: 64   Weight: 194 lb 12 oz (88.3 kg)   Height: 6' 1\" (1.854 m)       Physical Exam:  GENERAL: Alert, well-appearing male   PSYCH: Pleasant mood, affect appropriate.  SKIN: Area of erythema on left fifth toe, no ulceration.   CV: Regular rate and rhythm " without murmurs, rubs or gallops.  RESP: Lung sounds clear  PV : LEs warm, pink, symmetric hair distribution. No edema, tenderness or varicosities. Pedal pulses 2+

## 2021-06-02 NOTE — PATIENT INSTRUCTIONS - HE
Recommendations from today's visit                                                       1. Diabetes is improved but still just above our goal for you with A1C of 7.1.  Lets work on incorporating some regular exercise with a goal of at least once every 48 hours.  If your A1C is still above goal in three months we will likely adjust the glipizide    Next appointment: 3 months    To reschedule your appointment, please call the clinic directly at [phone].   It was a pleasure seeing you today! I look forward to seeing you again.

## 2021-06-03 VITALS
SYSTOLIC BLOOD PRESSURE: 142 MMHG | DIASTOLIC BLOOD PRESSURE: 78 MMHG | BODY MASS INDEX: 25.81 KG/M2 | WEIGHT: 194.75 LBS | HEIGHT: 73 IN | HEART RATE: 64 BPM

## 2021-06-03 VITALS — WEIGHT: 195.25 LBS | BODY MASS INDEX: 25.88 KG/M2 | HEIGHT: 73 IN

## 2021-06-03 NOTE — TELEPHONE ENCOUNTER
Refill Approved    Rx renewed per Medication Renewal Policy. Medication was last renewed on 7/25/18.    Karol Gloria, [facility] Triage/Med Refill 11/4/2019     Requested Prescriptions   Pending Prescriptions Disp Refills     ACCU-CHEK FASTCLIX LANCET DRUM 102 each 11     Sig: TEST BLOOD SUGARS 3 TIMES DAILY       Diabetic Supplies Refill Protocol Passed - 11/4/2019  4:17 PM        Passed - Visit with PCP or prescribing provider visit in last 6 months     Last office visit with prescriber/PCP: 10/30/2019 Anuradha Dunn CNP OR same dept: 10/30/2019 Anuradha Dunn CNP OR same specialty: 10/30/2019 Anuradha Dunn CNP  Last physical: Visit date not found Last MTM visit: Visit date not found   Next visit within 3 mo: Visit date not found  Next physical within 3 mo: Visit date not found  Prescriber OR PCP: Anuradha Dunn CNP  Last diagnosis associated with med order: 1. Type 2 diabetes, uncontrolled, with neuropathy (H)  - ACCU-CHEK FASTCLIX LANCET DRUM; TEST BLOOD SUGARS 3 TIMES DAILY  Dispense: 102 each; Refill: 11    If protocol passes may refill for 12 months if within 3 months of last provider visit (or a total of 15 months).             Passed - A1C in last 6 months     Hemoglobin A1c   Date Value Ref Range Status   10/30/2019 7.1 (H) 3.5 - 6.0 % Final

## 2021-06-03 NOTE — TELEPHONE ENCOUNTER
Refill Approved    Rx renewed per Medication Renewal Policy. Medication was last renewed on 11/4/19 .    Karol Gloria, [facility] Triage/Med Refill 11/23/2019     Requested Prescriptions   Pending Prescriptions Disp Refills     ACCU-CHEK FASTCLIX LANCET DRUM 102 each 11     Sig: TEST BLOOD SUGARS 3 TIMES DAILY       Diabetic Supplies Refill Protocol Passed - 11/23/2019  1:26 PM        Passed - Visit with PCP or prescribing provider visit in last 6 months     Last office visit with prescriber/PCP: 10/30/2019 Anuradha Dunn CNP OR same dept: 10/30/2019 Anuradha Dunn CNP OR same specialty: 10/30/2019 Anuradha Dunn CNP  Last physical: Visit date not found Last MTM visit: Visit date not found   Next visit within 3 mo: Visit date not found  Next physical within 3 mo: Visit date not found  Prescriber OR PCP: Anuradha Dunn CNP  Last diagnosis associated with med order: 1. Type 2 diabetes, uncontrolled, with neuropathy (H)  - ACCU-CHEK FASTCLIX LANCET DRUM; TEST BLOOD SUGARS 3 TIMES DAILY  Dispense: 102 each; Refill: 11    If protocol passes may refill for 12 months if within 3 months of last provider visit (or a total of 15 months).             Passed - A1C in last 6 months     Hemoglobin A1c   Date Value Ref Range Status   10/30/2019 7.1 (H) 3.5 - 6.0 % Final

## 2021-06-05 VITALS
DIASTOLIC BLOOD PRESSURE: 78 MMHG | SYSTOLIC BLOOD PRESSURE: 140 MMHG | HEART RATE: 72 BPM | BODY MASS INDEX: 25.07 KG/M2 | OXYGEN SATURATION: 97 % | WEIGHT: 190 LBS

## 2021-06-06 NOTE — TELEPHONE ENCOUNTER
Refill Approved    Rx renewed per Medication Renewal Policy. Medication was last renewed on 8/21/19.    Stephanie Freeman, [facility] Triage/Med Refill 2/13/2020     Requested Prescriptions   Pending Prescriptions Disp Refills     glipiZIDE (GLUCOTROL XL) 5 MG 24 hr tablet [Pharmacy Med Name: GLIPIZIDE ER 5 MG TABLET] 90 tablet 1     Sig: TAKE 1 TABLET BY MOUTH EVERY DAY       Oral Hypoglycemics Refill Protocol Passed - 2/9/2020  9:35 AM        Passed - Visit with PCP or prescribing provider visit in last 6 months       Last office visit with prescriber/PCP: 10/30/2019 OR same dept: 10/30/2019 Anuradha Dunn CNP OR same specialty: 10/30/2019 Anuradha Dunn CNP Last physical: Visit date not found Last MTM visit: Visit date not found         Next appt within 3 mo: Visit date not found  Next physical within 3 mo: Visit date not found  Prescriber OR PCP: Anuradha Dunn CNP  Last diagnosis associated with med order: 1. Type 2 diabetes, uncontrolled, with neuropathy (H)  - glipiZIDE (GLUCOTROL XL) 5 MG 24 hr tablet [Pharmacy Med Name: GLIPIZIDE ER 5 MG TABLET]; TAKE 1 TABLET BY MOUTH EVERY DAY  Dispense: 90 tablet; Refill: 1     If protocol passes may refill for 12 months if within 3 months of last provider visit (or a total of 15 months).           Passed - A1C in last 6 months     Hemoglobin A1c   Date Value Ref Range Status   10/30/2019 7.1 (H) 3.5 - 6.0 % Final               Passed - Microalbumin in last year      Microalbumin, Random Urine   Date Value Ref Range Status   04/15/2019 1.40 0.00 - 1.99 mg/dL Final                  Passed - Blood pressure in last year     BP Readings from Last 1 Encounters:   10/30/19 142/78             Passed - Serum creatinine in last year     Creatinine   Date Value Ref Range Status   10/30/2019 1.39 (H) 0.70 - 1.30 mg/dL Final

## 2021-06-08 NOTE — TELEPHONE ENCOUNTER
RN cannot approve Refill Request    RN can NOT refill this medication Protocol failed and NO refill given.      Gladis Lucia, [facility] Triage/Med Refill 6/9/2020    Requested Prescriptions   Pending Prescriptions Disp Refills     glipiZIDE (GLUCOTROL XL) 5 MG 24 hr tablet [Pharmacy Med Name: GLIPIZIDE ER 5 MG TABLET] 90 tablet 3     Sig: TAKE 1 TABLET BY MOUTH EVERY DAY       Oral Hypoglycemics Refill Protocol Failed - 6/5/2020 12:20 AM        Failed - Visit with PCP or prescribing provider visit in last 6 months       Last office visit with prescriber/PCP: Visit date not found OR same dept: 10/30/2019 Anuradha Dunn CNP OR same specialty: 10/30/2019 Anuradha Dunn CNP Last physical: Visit date not found Last MTM visit: Visit date not found         Next appt within 3 mo: Visit date not found  Next physical within 3 mo: Visit date not found  Prescriber OR PCP: Anuradha Dunn CNP  Last diagnosis associated with med order: 1. Type 2 diabetes, uncontrolled, with neuropathy (H)  - glipiZIDE (GLUCOTROL XL) 5 MG 24 hr tablet [Pharmacy Med Name: GLIPIZIDE ER 5 MG TABLET]; TAKE 1 TABLET BY MOUTH EVERY DAY  Dispense: 30 tablet; Refill: 2  - metFORMIN (GLUCOPHAGE-XR) 500 MG 24 hr tablet [Pharmacy Med Name: METFORMIN HCL  MG TABLET]; TAKE 4 TABLETS BY MOUTH EVERY DAY WITH BREAKFAST  Dispense: 360 tablet; Refill: 1     If protocol passes may refill for 12 months if within 3 months of last provider visit (or a total of 15 months).           Failed - A1C in last 6 months     Hemoglobin A1c   Date Value Ref Range Status   10/30/2019 7.1 (H) 3.5 - 6.0 % Final               Failed - Microalbumin in last year      Microalbumin, Random Urine   Date Value Ref Range Status   04/15/2019 1.40 0.00 - 1.99 mg/dL Final                  Passed - Blood pressure in last year     BP Readings from Last 1 Encounters:   10/30/19 142/78             Passed - Serum creatinine in last year     Creatinine   Date Value Ref Range Status    10/30/2019 1.39 (H) 0.70 - 1.30 mg/dL Final                metFORMIN (GLUCOPHAGE-XR) 500 MG 24 hr tablet [Pharmacy Med Name: METFORMIN HCL  MG TABLET] 360 tablet 3     Sig: TAKE 4 TABLETS BY MOUTH EVERY DAY WITH BREAKFAST       Metformin Refill Protocol Failed - 6/5/2020 12:20 AM        Failed - LFT or AST or ALT in last 12 months     Albumin   Date Value Ref Range Status   04/15/2019 4.5 3.5 - 5.0 g/dL Final     Bilirubin, Total   Date Value Ref Range Status   04/15/2019 0.6 0.0 - 1.0 mg/dL Final     Alkaline Phosphatase   Date Value Ref Range Status   04/15/2019 75 45 - 120 U/L Final     AST   Date Value Ref Range Status   04/15/2019 29 0 - 40 U/L Final     ALT   Date Value Ref Range Status   04/15/2019 38 0 - 45 U/L Final     Protein, Total   Date Value Ref Range Status   04/15/2019 7.5 6.0 - 8.0 g/dL Final                Failed - Visit with PCP or prescribing provider visit in last 6 months or next 3 months     Last office visit with prescriber/PCP: Visit date not found OR same dept: 10/30/2019 Anuradha Dunn CNP OR same specialty: 10/30/2019 Anuradha Dunn CNP Last physical: Visit date not found Last MT visit: Visit date not found         Next appt within 3 mo: Visit date not found  Next physical within 3 mo: Visit date not found  Prescriber OR PCP: Anuradha Dunn CNP  Last diagnosis associated with med order: 1. Type 2 diabetes, uncontrolled, with neuropathy (H)  - glipiZIDE (GLUCOTROL XL) 5 MG 24 hr tablet [Pharmacy Med Name: GLIPIZIDE ER 5 MG TABLET]; TAKE 1 TABLET BY MOUTH EVERY DAY  Dispense: 30 tablet; Refill: 2  - metFORMIN (GLUCOPHAGE-XR) 500 MG 24 hr tablet [Pharmacy Med Name: METFORMIN HCL  MG TABLET]; TAKE 4 TABLETS BY MOUTH EVERY DAY WITH BREAKFAST  Dispense: 360 tablet; Refill: 1     If protocol passes may refill for 12 months if within 3 months of last provider visit (or a total of 15 months).           Failed - A1C in last 6 months     Hemoglobin A1c   Date Value Ref Range Status    10/30/2019 7.1 (H) 3.5 - 6.0 % Final               Failed - Microalbumin in last year      Microalbumin, Random Urine   Date Value Ref Range Status   04/15/2019 1.40 0.00 - 1.99 mg/dL Final                  Passed - Blood pressure in last 12 months     BP Readings from Last 1 Encounters:   10/30/19 142/78             Passed - GFR or Serum Creatinine in last 6 months     GFR MDRD Non Af Amer   Date Value Ref Range Status   10/30/2019 52 (L) >60 mL/min/1.73m2 Final     GFR MDRD Af Amer   Date Value Ref Range Status   10/30/2019 >60 >60 mL/min/1.73m2 Final

## 2021-06-13 NOTE — TELEPHONE ENCOUNTER
RN cannot approve Refill Request    RN can NOT refill this medication PCP messaged that patient is overdue for Labs. Last office visit: 10/30/2019 Anuradha Dunn CNP Last Physical: Visit date not found Last MTM visit: Visit date not found Last visit same specialty: 10/30/2019 Anuradha Dunn CNP.  Next visit within 3 mo: Visit date not found  Next physical within 3 mo: Visit date not found      Mindy Rivers, [facility] Triage/Med Refill 12/14/2020    Requested Prescriptions   Pending Prescriptions Disp Refills     ACCU-CHEK FASTCLIX LANCET DRUM 102 each 11     Sig: TEST BLOOD SUGARS 3 TIMES DAILY       Diabetic Supplies Refill Protocol Failed - 12/13/2020  7:01 PM        Failed - Visit with PCP or prescribing provider visit in last 6 months     Last office visit with prescriber/PCP: 10/30/2019 Anuradha Dunn CNP OR same dept: Visit date not found OR same specialty: 10/30/2019 Anuradha Dunn CNP  Last physical: Visit date not found Last MTM visit: Visit date not found   Next visit within 3 mo: Visit date not found  Next physical within 3 mo: Visit date not found  Prescriber OR PCP: Anuradha Dunn CNP  Last diagnosis associated with med order: 1. Type 2 diabetes, uncontrolled, with neuropathy (H)  - ACCU-CHEK FASTCLIX LANCET DRUM; TEST BLOOD SUGARS 3 TIMES DAILY  Dispense: 102 each; Refill: 11  - atorvastatin (LIPITOR) 20 MG tablet [Pharmacy Med Name: ATORVASTATIN 20 MG TABLET]; TAKE 1 TABLET BY MOUTH EVERYDAY AT BEDTIME  Dispense: 30 tablet; Refill: 2    If protocol passes may refill for 12 months if within 3 months of last provider visit (or a total of 15 months).             Failed - A1C in last 6 months     Hemoglobin A1c   Date Value Ref Range Status   10/30/2019 7.1 (H) 3.5 - 6.0 % Final                  atorvastatin (LIPITOR) 20 MG tablet [Pharmacy Med Name: ATORVASTATIN 20 MG TABLET] 30 tablet 2     Sig: TAKE 1 TABLET BY MOUTH EVERYDAY AT BEDTIME       Statins Refill Protocol (Hmg CoA Reductase  Inhibitors) Failed - 12/13/2020  7:01 PM        Failed - PCP or prescribing provider visit in past 12 months      Last office visit with prescriber/PCP: 10/30/2019 Anuradha Dunn CNP OR same dept: Visit date not found OR same specialty: 10/30/2019 Anuradha Dunn CNP  Last physical: Visit date not found Last MTM visit: Visit date not found   Next visit within 3 mo: Visit date not found  Next physical within 3 mo: Visit date not found  Prescriber OR PCP: Anuradha Dunn CNP  Last diagnosis associated with med order: 1. Type 2 diabetes, uncontrolled, with neuropathy (H)  - ACCU-CHEK FASTCLIX LANCET DRUM; TEST BLOOD SUGARS 3 TIMES DAILY  Dispense: 102 each; Refill: 11  - atorvastatin (LIPITOR) 20 MG tablet [Pharmacy Med Name: ATORVASTATIN 20 MG TABLET]; TAKE 1 TABLET BY MOUTH EVERYDAY AT BEDTIME  Dispense: 30 tablet; Refill: 2    If protocol passes may refill for 12 months if within 3 months of last provider visit (or a total of 15 months).

## 2021-06-13 NOTE — TELEPHONE ENCOUNTER
RN cannot approve Refill Request    RN can NOT refill this medication PCP messaged that patient is overdue for Office Visit. Last office visit: 10/30/2019 Anuradha Dunn CNP Last Physical: Visit date not found Last MTM visit: Visit date not found Last visit same specialty: 10/30/2019 Anuradha Dunn CNP.  Next visit within 3 mo: Visit date not found  Next physical within 3 mo: Visit date not found      Mindy Rivers, [facility] Triage/Med Refill 11/14/2020    Requested Prescriptions   Pending Prescriptions Disp Refills     atorvastatin (LIPITOR) 20 MG tablet [Pharmacy Med Name: ATORVASTATIN 20 MG TABLET] 30 tablet 2     Sig: TAKE 1 TABLET BY MOUTH EVERYDAY AT BEDTIME       Statins Refill Protocol (Hmg CoA Reductase Inhibitors) Failed - 11/13/2020 10:39 AM        Failed - PCP or prescribing provider visit in past 12 months      Last office visit with prescriber/PCP: 10/30/2019 Anuradha Dunn CNP OR same dept: Visit date not found OR same specialty: 10/30/2019 Anuradha Dunn CNP  Last physical: Visit date not found Last MTM visit: Visit date not found   Next visit within 3 mo: Visit date not found  Next physical within 3 mo: Visit date not found  Prescriber OR PCP: Anuradha Dunn CNP  Last diagnosis associated with med order: 1. Type 2 diabetes, uncontrolled, with neuropathy (H)  - atorvastatin (LIPITOR) 20 MG tablet [Pharmacy Med Name: ATORVASTATIN 20 MG TABLET]; TAKE 1 TABLET BY MOUTH EVERYDAY AT BEDTIME  Dispense: 30 tablet; Refill: 2    If protocol passes may refill for 12 months if within 3 months of last provider visit (or a total of 15 months).

## 2021-06-14 NOTE — PROGRESS NOTES
Assessment & Plan   1. Type 2 diabetes, uncontrolled, with neuropathy (H)  A1C above goal at 7.9. Room for improvement in diet and exercise.  Discussed options for medication management. Recommended consideration for GLP1 or SGLT2 as this would offer some renal protection as well for him without the risk of hypoglycemia.  He prefers to stay on glipizide and increase to 10mg.  Counseled on risks, side effects.  He will reach out if he experiences any further lows.  Due for eye exam. Recheck three months.    - Glycosylated Hemoglobin A1c  - Comprehensive Metabolic Panel  - Microalbumin, Random Urine  - [facility] RED  - atorvastatin (LIPITOR) 20 MG tablet; Take 1 tablet (20 mg total) by mouth every evening.  Dispense: 90 tablet; Refill: 3  - glipiZIDE (GLUCOTROL XL) 10 MG 24 hr tablet; Take 1 tablet (10 mg total) by mouth daily.  Dispense: 90 tablet; Refill: 1  - metFORMIN (GLUCOPHAGE-XR) 500 MG 24 hr tablet; Keep on file  Dispense: 360 tablet; Refill: 3    2. Edema of left lower extremity  Recurrent (twice yearly) episodes of generalized edema of the foot and ankle.  No erythema, warmth, pain (though he as very little sensation in his feet).  Will check US. Likely some venous insufficiency on this side.  Consider stress fracture in the foot as well given his limited sensation, however it tends to occur when he is sedentary rather than active.    - US Venous Leg Left; Future    3. Diabetic nephropathy associated with type 2 diabetes mellitus (H)  Monitor, limit nephrotoxic meds. GFR baseline around 50. No adjustment in Metformin yet needed.     4. Diabetic polyneuropathy associated with type 2 diabetes mellitus (H)  Not bothersome to him, no change. Discussed frequent monitoring of feet    5. Colon cancer screening  - Cologuard    6. Hyperlipidemia LDL goal <70  - Lipid Cascade    7. Stage 3a chronic kidney disease      Anuradha Dunn CNP    Subjective   Chief Complaint:  Diabetes    HPI:   Ned Moore is a 60 y.o. male  who presents for diabetes check     He has been well. 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  Has avoided COVID and received both of his vaccines.      Diabetes:  A1C today 7.9. Last visit a year ago 7.1. Numbers were well controlled with fasting in the 120s. Then over the holidays creeped into 130s. This month have been 140s.  Not checking post meal.  One episode of low to 72 after taking glipizide. No identified cause for this. He eats essentially the same thing every day for breakfast and lunch to try to keep his intake very stable.  He is not doing any exercise outside of work. Daughter joined a fitness club and he is thinking of going with her.  Due for eye exam.      Neuropathy:  Fairly advanced. Very little sensation in feet.  No ulcerations, sores.  No pain with this.  It does not bother him much.     Blood pressure:  Initially elevated today.  Recheck 140/78.  He states he checks at work once every couple of weeks and is consistently 130s/70s.  No chest pain, shortness of breath.  No prior h/o HTN.      LLE:  Edema of foot, ankle.  Generalized.  No pain, redness, warmth.  States this occurs approximately twice a year when he has been more sedentary.  Lasts for a few days and then resolves.  No h/o trauma or surgery in this leg.       Allergies:  has No Known Allergies.    SH/FH:  Social History and Family History reviewed and updated.   Tobacco Status:  He  reports that he has never smoked. He has never used smokeless tobacco.    Review of Systems:  A complete head to toe ROS is negative unless otherwise noted in HPI    Objective     Vitals:    01/27/21 1012   BP: 152/80   Pulse: 72   SpO2: 97%   Weight: 190 lb (86.2 kg)       Physical Exam:  GENERAL: Alert, well-appearing  PSYCH: Pleasant mood, affect appropriate.  SKIN: No lesions. Skin is clean, dry, intact.   CV: Regular rate and rhythm without murmurs, rubs or gallops.  RESP: Lung sounds clear  PV :  Generalized 2+ non-pitting edema to left foot and ankle.  No erythema, warmth. No tenderness to palpation.    MSK:  Full ROM of ankle, foot without pain.    NEURO: Intact sensation at only one monofilament site on each foot, third toes.

## 2021-06-14 NOTE — TELEPHONE ENCOUNTER
Refill Approved    Rx renewed per Medication Renewal Policy. Medication was last renewed on 12/14/20.    Ang Roberts, [facility] Triage/Med Refill 2/1/2021     Requested Prescriptions   Pending Prescriptions Disp Refills     ACCU-CHEK FASTCLIX LANCET DRUM 102 each 0     Sig: TEST BLOOD SUGARS 3 TIMES DAILY       Diabetic Supplies Refill Protocol Passed - 2/1/2021  4:14 AM        Passed - Visit with PCP or prescribing provider visit in last 6 months     Last office visit with prescriber/PCP: 1/27/2021 Anuradha Dunn CNP OR same dept: Visit date not found OR same specialty: 1/27/2021 Anuradha Dunn CNP  Last physical: Visit date not found Last MTM visit: Visit date not found   Next visit within 3 mo: Visit date not found  Next physical within 3 mo: Visit date not found  Prescriber OR PCP: Anuradha Dunn CNP  Last diagnosis associated with med order: 1. Type 2 diabetes, uncontrolled, with neuropathy (H)  - ACCU-CHEK FASTCLIX LANCET DRUM; TEST BLOOD SUGARS 3 TIMES DAILY  Dispense: 102 each; Refill: 0    If protocol passes may refill for 12 months if within 3 months of last provider visit (or a total of 15 months).             Passed - A1C in last 6 months     Hemoglobin A1c   Date Value Ref Range Status   01/27/2021 7.9 (H) <=5.6 % Final

## 2021-06-14 NOTE — TELEPHONE ENCOUNTER
RN cannot approve Refill Request    RN can NOT refill this medication PCP messaged that patient is overdue for Office Visit and Protocol failed and NO refill given. Last office visit: 10/30/2019 Anuradha Dunn CNP Last Physical: Visit date not found Last MTM visit: Visit date not found Last visit same specialty: 10/30/2019 Anuradha Dunn CNP.  Next visit within 3 mo: Visit date not found  Next physical within 3 mo: Visit date not found      Miroslava Davis, [facility] Triage/Med Refill 1/5/2021    Requested Prescriptions   Pending Prescriptions Disp Refills     atorvastatin (LIPITOR) 20 MG tablet [Pharmacy Med Name: ATORVASTATIN 20 MG TABLET] 30 tablet 0     Sig: TAKE 1 TABLET BY MOUTH EVERYDAY AT BEDTIME       Statins Refill Protocol (Hmg CoA Reductase Inhibitors) Failed - 1/5/2021 12:25 AM        Failed - PCP or prescribing provider visit in past 12 months      Last office visit with prescriber/PCP: 10/30/2019 Anuradha Dunn CNP OR same dept: Visit date not found OR same specialty: 10/30/2019 Anuradha Dunn CNP  Last physical: Visit date not found Last MTM visit: Visit date not found   Next visit within 3 mo: Visit date not found  Next physical within 3 mo: Visit date not found  Prescriber OR PCP: Anuradha Dunn CNP  Last diagnosis associated with med order: 1. Type 2 diabetes, uncontrolled, with neuropathy (H)  - atorvastatin (LIPITOR) 20 MG tablet [Pharmacy Med Name: ATORVASTATIN 20 MG TABLET]; TAKE 1 TABLET BY MOUTH EVERYDAY AT BEDTIME  Dispense: 30 tablet; Refill: 0    If protocol passes may refill for 12 months if within 3 months of last provider visit (or a total of 15 months).

## 2021-06-14 NOTE — TELEPHONE ENCOUNTER
Please contact patient, he needs an appointment scheduled to get a 30 day supply.  Will refill for a year if he attends appointment.  He is very overdue, has not been seen since 2019.

## 2021-06-15 NOTE — PROGRESS NOTES
Assessment & Plan   1. Type 2 diabetes, uncontrolled, with neuropathy:  Reviewed labs and discussed diagnosis of diabetes including epidemiology, progression of the disease, treatment options and monitoring.  Given his high A1C will start on basal insulin and attempt to obtain coverage for GLP1 for mealtime coverage.  Metformin contraindicated given high creatinine.  He will meet with MTM today for insulin start, meter teaching and will continue to follow for titration.  Referral to diabetes education for dietary education as well.  Briefly discussed today.  Will start daily aspirin, statin.  Will schedule a follow up office visit in one month to recheck.    - Lipid Cascade; Future  - Microalbumin, Random Urine  - atorvastatin (LIPITOR) 20 MG tablet; Take 1 tablet (20 mg total) by mouth at bedtime.  Dispense: 90 tablet; Refill: 3  - aspirin 81 MG EC tablet; Take 1 tablet (81 mg total) by mouth daily.  Dispense: 90 tablet; Refill: 3  - Ambulatory referral to Diabetes Education (New Diagnosis)  - Lipid Cascade      2. Chronic kidney disease due to diabetes mellitus: Check Umar, discussed starting ACEI or ARB    Anuradha Dunn CNP     Total Time: 30 minutes.  Coordination of Care Time: 30 minutes spent including:  History, exam, discussion of possible diagnoses, testing today, next steps and follow up.    Subjective   Chief Complaint:  Diabetes (f/u for DM discuss A1c)    HPI:   Ned Moore is a 57 y.o. male who presents for new diagnosis of diabetes.      He was seen yesterday for peripheral neuropathy symptoms present for the past two years.  Initial workup returned with A1C >14.  He is here today to discuss diabetes diagnosis and management.     He has no previous diagnosis of diabetes.  Has not been told blood sugar elevated in the past.  Last labs five years ago, glucose not checked at that time.  He did experience a significant amount of weight loss two years ago, approximately sixty pounds. He denies  polydipsia, polyuria.  Wife states he has felt fatigued though also does not sleep well at night.  He otherwise feels well.  Does not exercise.  Diet is 'high sugar' according to his wife.  Typically sandwiches for lunch and dinner at work.  Frequently eats candy, baked goods.  He does take a daily aspirin.  Prior history of moderate hyperlipidemia though no treatment.     PMH:   Patient Active Problem List   Diagnosis     Diabetic nephropathy associated with type 2 diabetes mellitus     Type 2 diabetes, uncontrolled, with neuropathy       No past medical history on file.    Current Medications:   No current outpatient prescriptions on file prior to visit.     No current facility-administered medications on file prior to visit.        Allergies:  has No Known Allergies.    SH/FH:  Social History and Family History reviewed and updated.   Tobacco Status:  He  reports that he has never smoked. He has never used smokeless tobacco.    Review of Systems:  A complete head to toe ROS is negative unless otherwise noted in HPI    Objective     Vitals:    01/09/18 1050   BP: 118/82   Patient Site: Left Arm   Patient Position: Sitting   Cuff Size: Adult Regular   Pulse: 100   Resp: 16   SpO2: 100%   Weight: 169 lb 4 oz (76.8 kg)     Wt Readings from Last 3 Encounters:   01/09/18 169 lb 4 oz (76.8 kg)   01/08/18 165 lb (74.8 kg)       Physical Exam:  GENERAL: Alert, well-appearing male.   CV: Regular rate and rhythm without murmurs, rubs or gallops.  RESP: Lung sounds clear    Labs:    Recent Results (from the past 168 hour(s))   Thyroid Cascade   Result Value Ref Range    TSH 0.54 0.30 - 5.00 uIU/mL   HM2(CBC w/o Differential)   Result Value Ref Range    WBC 6.0 4.0 - 11.0 thou/uL    RBC 5.65 4.40 - 6.20 mill/uL    Hemoglobin 17.2 14.0 - 18.0 g/dL    Hematocrit 50.3 40.0 - 54.0 %    MCV 89 80 - 100 fL    MCH 30.4 27.0 - 34.0 pg    MCHC 34.1 32.0 - 36.0 g/dL    RDW 11.8 11.0 - 14.5 %    Platelets 215 140 - 440 thou/uL    MPV  8.3 7.0 - 10.0 fL   Vitamin B12   Result Value Ref Range    Vitamin B-12 1501 (H) 213 - 816 pg/mL   Glycosylated Hemoglobin A1c   Result Value Ref Range    Hemoglobin A1c >14.0 (H) 3.5 - 6.0 %   Comprehensive Metabolic Panel   Result Value Ref Range    Sodium 136 136 - 145 mmol/L    Potassium 4.6 3.5 - 5.0 mmol/L    Chloride 96 (L) 98 - 107 mmol/L    CO2 27 22 - 31 mmol/L    Anion Gap, Calculation 13 5 - 18 mmol/L    Glucose 461 (HH) 70 - 125 mg/dL    BUN 20 8 - 22 mg/dL    Creatinine 1.50 (H) 0.70 - 1.30 mg/dL    GFR MDRD Af Amer 58 (L) >60 mL/min/1.73m2    GFR MDRD Non Af Amer 48 (L) >60 mL/min/1.73m2    Bilirubin, Total 0.8 0.0 - 1.0 mg/dL    Calcium 10.1 8.5 - 10.5 mg/dL    Protein, Total 7.5 6.0 - 8.0 g/dL    Albumin 4.1 3.5 - 5.0 g/dL    Alkaline Phosphatase 95 45 - 120 U/L    AST 18 0 - 40 U/L    ALT 25 0 - 45 U/L

## 2021-06-15 NOTE — PROGRESS NOTES
Assessment & Plan   1. Peripheral neuropathy : Notably decreased sensation on exam though strength intact.  Initial workup for neuropathy. If no cause established, referral to neurology for further evaluation.    - Thyroid Cascade  - HM2(CBC w/o Differential)  - Vitamin B12  - Glycosylated Hemoglobin A1c  - Comprehensive Metabolic Panel    2. Colon cancer screening  - Occult Blood(ICT); Future    Anuradha Dunn CNP    Subjective   Chief Complaint:  Cold Extremity (Tingling bilateral legs x 1.5 years worse when feet are cold-still has some sensation) and Establish Care    HPI:   Ned Moore is a 57 y.o. male who presents for establish care.    He has been seen at this clinic in the distant past, not for many years.  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  Wife is also a patient.     He states his main concern is decreased sensation in his lower extremities which began 1.5 years ago.  First noted bilaterally in ankles and has spread proximally to just below knees.  Typically not uncomfortable though when exposed to cold does have some discomfort.  Has noted weakness though no difficulty with gait or performing his very physical job.  He denies h/o low back pain or injury.  No decreased sensation elsewhere on body.      He notes two years ago his weight was 230.  He dropped down to 165 within the course of a year though has been stable at this weight for the past year.  He attributes his weight loss to not eating regularly at work.  He began eating regularly again and weight stabilized.  He denies abdominal discomfort, diarrhea.  He has felt well.  No fevers, night sweats.  No fatigue.      FMH notable for diabetes and CHF in mother, otherwise negative.      He drinks one glass of wine a week at most.  Does not exercise outside of his job.  Admits not particularly watchful of healthy diet.      PMH:   There is no problem list on file for this patient.      No past medical history on file.    Current Medications:   No current  "outpatient prescriptions on file prior to visit.     No current facility-administered medications on file prior to visit.        Allergies:  has No Known Allergies.    SH/FH:  Social History and Family History reviewed and updated.   Tobacco Status:  He  reports that he has never smoked. He has never used smokeless tobacco.    Review of Systems:  A complete head to toe ROS is negative unless otherwise noted in HPI    Objective     Vitals:    01/08/18 1024   BP: 120/78   Pulse: 76   Weight: 165 lb (74.8 kg)   Height: 6' 1\" (1.854 m)     Wt Readings from Last 3 Encounters:   01/08/18 165 lb (74.8 kg)       Physical Exam:  GENERAL: Alert, well-appearing male  CV: Regular rate and rhythm without murmurs, rubs or gallops.  RESP: Lung sounds clear  PV : LEs warm, pink, symmetric hair distribution. No edema. Superficial varicosities.  Pedal and dorsalis tibial pulses 2+.  Cap refill < 3s  NEURO: DTRs 2/4. Monofilament testing with sensation in feet at 1/5 sites, no sensation in ankle, distal calf  MSK:  5/5 knee extension and flexion, ankle dorsiflexion, plantar flexion and great toe dorsiflexion    Labs:    No results found for this or any previous visit (from the past 168 hour(s)).        "

## 2021-06-15 NOTE — PROGRESS NOTES
MTM Initial Encounter  ASSESSMENT AND PLAN  Type 2 Diabetes:  porly controlled, untreated, newly diagnosed. A1C significantly elevated. Will start patient on insulin today since it will lower blood sugars the fastest. Lantus Tier 3 per his insurance (Tresiba Tier 4). Will start at 20 units and titrate up -- I will contact him via [email or web address] in 1 week and he will RTC in 2 weeks. Reviewed how to administer -- he was able to demonstrate back and was comfortable. He is aware of hypoglycemia symptoms.   Anuradha recommended a GLP-1 agonist for meal time coverage, but his insurance requires step therapy for Byetta, Bydureon, Trulicity, and Victoza. Will contact his insurance to verify what step therapy involves.   Demonstrated how to use AccuChek Mayela meter. Recommended to check fasting AM and 2 hours post prandial/bedtime/before meals alternating.   Renal function is currently borderline -- if improves and remains stable, could consider metformin in the future.   PLAN:   1. Start Lantus 20 units QHS.   2. Check blood sugars twice daily using AccuChek Mayela meter    Per [employer], step therapy for GLP-1 is:   History of ONE of the following: metformin, metformin ER, glipizidemetformin,  glyburide-metformin, OR pioglitazone-metformin.  Will contact insurance and let them know about his renal dysfunction.     MTM FOLLOW UP  1 week [facility]  2 weeks in person     SUBJECTIVE AND OBJECTIVE  Ned Moore is a 57 y.o. male here for an initial medication therapy management (MTM) appointment. Referred from Anuradha Dunn CNP for diabetes medication initiation. Ned's wife joins us today. 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     Type 2 Diabetes: Currently taking no medication - was diagnosed yesterday after having an A1c of >14%.   Hyperglycemia symptoms: denies polyuria or polydipsia. Reports being a little tired, but he has had other things going on.   Microalbumin checked never  Started on atorvastatin 20 mg and aspirin 81 today by  Anuradha.   Has assisted in administering an insulin pen to someone else.    Is somewhat familiar with checking his blood sugars.   BG today = 353 (6 hours after eating breakfast)          Ned's medication list was reviewed with them, discussing reason for use, directions for use, and potential side effects of each medication as needed. Indication, safety, efficacy, and convenience was assessed for all medications addressed above.  No environmental factors were noted currently affecting patient.  This care plan was communicated via EMR with his primary care provider, No Primary Care Provider, who is the authorizing prescriber for this visit.  Direct supervision was available by either the patient's PCP or other available provider.    Time and complexity billing metrics are included in the docflowsheet linked to this visit    Time spent: 30 min  Analy Shepherd, Pharm.D., BCACP  MTM Pharmacist at [facility]

## 2021-06-15 NOTE — PROGRESS NOTES
MTM Follow Up Encounter  ASSESSMENT AND PLAN  Type 2 Diabetes:  improved. A1C was not at goal of <7%, but will likely improve when rechecked in 3 months.  FBP at goal  mg/dL therefore will continue Lantus at 20 units. Evening blood sugars are higher. Reviewed to target post meal blood sugars, Trulicity could be an option, but at this point he would like to gain weight and overeating does not seem like an issue. Therefore, will start low dose glipizide - discussed mechanism of action. Reviewed that we will likely need to reduce his insulin dose therefore he will send me his blood sugars in the next few weeks. If GFR improves in the future, could consider addition of metformin and possibly eliminate Lantus.   Discussed that if neuropathy does not improve after evening blood sugars are lowering, will initiate bedtime gabapentin.   PLAN:   1. Start glipizide XL 5 mg QAM.     MTM FOLLOW UP  1-2 weeks MyChart -- send blood sugars  A1c + Scr in 3 months    SUBJECTIVE AND OBJECTIVE  Ned Moore is a 57 y.o. male here for a follow-up medication therapy management (MTM) appointment.     Medication Concern(s)/Question(s): Diabetes follow up    Type 2 Diabetes: Currently taking Lantus 20 units HS. Overall feels less tired. Continues to have neuropathy every 2-3 days, not nightly. Wonders if he needs to start on a medication, but would prefer as needed. Neuropathy only bothersome at night.   PA for Trulicity was approved.   Tests BG 2 times daily - Fasting AM and bedtime. Second BG check is about 1 hour after eating. Reports blood sugars:   Fasting AM = 143 (morning after starting Lantus), 143, 125, 180, 93, 108, 93, 144, 110, 121, 98, 131, 106, 102.  HS (~1 hour after eating) = 425, 310, 175, 316, 193, 157, 292, 199, 262, 202, 197, 250, 251, 188   Last A1c checked 1/8/18 >14%.   Hypoglycemia none    Microalbumin checked 1/9/18  Last GFR 48 ml/min on 1/9/18  Planning on meeting with DME. Started to eat less sugar,  pastries. Plans on starting to exercise again. Would like to be off insulin at some point.   Repots that he does not eat much. Stomach doesnt need a lot to feel full. Does not eat large portions.  Would like to gain weight.           Ned's medication list was reviewed with them, discussing reason for use, directions for use, and potential side effects of each medication as needed. Indication, safety, efficacy, and convenience was assessed for all medications addressed above.  No environmental factors were noted currently affecting patient.  This care plan was communicated via EMR with his primary care provider, No Primary Care Provider, who is the authorizing prescriber for this visit.  Direct supervision was available by either the patient's PCP or other available provider.    Time and complexity billing metrics are included in the docflowsheet linked to this visit    Time spent: 15 min  Analy Shepherd, Pharm.D., BCACP   MTM Pharmacist at [facility]

## 2021-06-16 PROBLEM — E11.42 DIABETIC POLYNEUROPATHY ASSOCIATED WITH TYPE 2 DIABETES MELLITUS (H): Status: ACTIVE | Noted: 2021-01-28

## 2021-06-16 PROBLEM — N18.30 CHRONIC KIDNEY DISEASE, STAGE 3 (H): Status: ACTIVE | Noted: 2021-01-26

## 2021-06-16 PROBLEM — E11.21 DIABETIC NEPHROPATHY ASSOCIATED WITH TYPE 2 DIABETES MELLITUS (H): Status: ACTIVE | Noted: 2018-01-09

## 2021-06-16 PROBLEM — E78.5 HYPERLIPIDEMIA LDL GOAL <70: Status: ACTIVE | Noted: 2019-10-30

## 2021-06-17 NOTE — TELEPHONE ENCOUNTER
Telephone Encounter by Julieta Phillips MD at 5/6/2021  1:34 PM     Author: Julieta Phillips MD Service: -- Author Type: Physician    Filed: 5/6/2021  1:34 PM Encounter Date: 5/6/2021 Status: Signed    : Julieta Phillips MD (Physician)    From: Ned Moore  Sent: 4/23/2021  5:15 AM CDT  To: Julieta Phillips MD  Subject: RE:schedule your covid vaccination appointment now    Hi Dr Phillips. I have had my Covid vaccine shots through work. Can my record   be updated or do you need the dates as well? Thanks    BP

## 2021-06-19 NOTE — PROGRESS NOTES
Assessment & Plan   1. Type 2 diabetes, uncontrolled, with neuropathy (H)  Great improvement in A1c over the past 6 months from greater than 12 to 7.3 today.  Congratulated on improvement in diet.  He continues to experience a.m. lows despite only taking his Lantus 50% of the time.  Plan to discontinue Lantus.  Will recheck renal function today to determine if metformin is now an option for him.  If not consider GLP-1.  Will follow up with results and advise on next steps.  Neuropathy symptoms improved however poor sensation on monofilament testing today.  Advised on risks of diabetic foot with his poor sensation.  Will recheck in 3 months.  At this point he does not wish to consider medication for neuropathy.  - Glycosylated Hemoglobin A1c  - [facility] RED  - blood glucose test (ACCU-CHEK SMARTVIEW TEST STRIP) strips; Use to check blood sugars three times daily. Dispense brand per patient's insurance at pharmacy discretion.  Dispense: 200 strip; Refill: 11  - generic lancets (ACCU-CHEK FASTCLIX LANCING DEV); Use to check blood sugars three times daily. Dispense brand per patient's insurance at pharmacy discretion.  Dispense: 102 each; Refill: 11  - Basic Metabolic Panel    2. Elevated blood pressure reading without diagnosis of hypertension  Mildly elevated today though has previously been well controlled.  He will check at home consistently and if blood pressures greater than 140/90 return for recheck earlier otherwise plan for 3 months.      Anuradha Dunn CNP    Subjective   Chief Complaint:  Diabetes    HPI:   Ned Moore is a 58 y.o. male who presents for diabetes check.      He has not been seen since his diagnosis in January of this year. A1C >12 at that time. He was started on Lantus and glipizide. Held off on Metformin due to renal function at that time.  He was wary of a GLP1 as he did not want to lose weight.      A1C today 7.3.  Currently doing Lantus 12u at bedtime.  He brings in his log of blood sugars  from home.  A.m. readings from the past week: 67 112 107 69 98 65.  P.m. readings from the past week: 108, 184, 178, 221, 167.  In reviewing the past month he does have several p.m. readings over 200.  He states that he is taking the Lantus less than 50% of the time.  On nights when blood sugar is lower in the evening he is admitting this due to fear of a.m. hypoglycemia.  Blood sugar this morning 67 after not taking Lantus last evening.  When blood sugars are below 80 he does experience shaking, sweating.  He does have glucose tabs at home and at work that he is able to take.    Has made significant dietary modification since meeting with the diabetes educator.  He states he feels as long as he is working it will be difficult for him to eat regularly however.  Breakfast typically consists of yogurt or whole grain muffin.  Additionally eats a boiled egg at work.  Lunch is typically a sandwich.  Dinner the largest meal of the day and variable.  He acknowledges that this is typically the medial where he overeats carbohydrates.    Neuropathy: He feels that this has improved slightly since blood sugar has been more controlled.  Seems to be more intermittent.  Described a sensation of burning on the skin.  Not necessarily painful but very sensitive.  No ulcers or skin lesions.      Allergies:  has No Known Allergies.    SH/FH:  Social History and Family History reviewed and updated.   Tobacco Status:  He  reports that he has never smoked. He has never used smokeless tobacco.    Review of Systems:  A complete head to toe ROS is negative unless otherwise noted in HPI    Objective     Vitals:    07/25/18 1442   BP: 144/88   Patient Site: Right Arm   Patient Position: Sitting   Cuff Size: Adult Regular   Pulse: 80   SpO2: 99%   Weight: 170 lb 8 oz (77.3 kg)       Physical Exam:  GENERAL: Alert, well-appearing male  SKIN: No lesions, ulcerations   CV: Regular rate and rhythm without murmurs, rubs or gallops.  RESP: Lung  sounds clear. No pedal edema  NEURO: Monofilament testing with sensation intact at one location on ball of feet bilaterally.  No recognition of sensation on toes.

## 2021-06-23 ENCOUNTER — COMMUNICATION - HEALTHEAST (OUTPATIENT)
Dept: NURSING | Facility: CLINIC | Age: 61
End: 2021-06-23

## 2021-06-23 RX ORDER — BLOOD SUGAR DIAGNOSTIC
STRIP MISCELLANEOUS
Qty: 200 STRIP | Refills: 3 | Status: SHIPPED | OUTPATIENT
Start: 2021-06-23 | End: 2022-07-05

## 2021-06-23 NOTE — TELEPHONE ENCOUNTER
Refill Approved    Rx renewed per Medication Renewal Policy. Medication was last renewed on 7/25/18.    Gladis Lucia, [facility] Triage/Med Refill 1/21/2019     Requested Prescriptions   Pending Prescriptions Disp Refills     blood glucose test (ACCU-CHEK SMARTVIEW TEST STRIP) strips [Pharmacy Med Name: ACCU-CHEK SMARTVIEW TEST STRIP] 100 strip 7     Sig: USE TO CHECK BLOOD SUGARS TWICE DAILY.    Diabetic Supplies Refill Protocol Passed - 1/19/2019  8:36 AM       Passed - Visit with PCP or prescribing provider visit in last 6 months    Last office visit with prescriber/PCP: 7/25/2018 Anuradha Dunn CNP OR same dept: Visit date not found OR same specialty: Visit date not found  Last physical: Visit date not found Last MTM visit: 1/23/2018 Analy Shepherd, PharmD   Next visit within 3 mo: Visit date not found  Next physical within 3 mo: Visit date not found  Prescriber OR PCP: Anuradha Dunn CNP  Last diagnosis associated with med order: There are no diagnoses linked to this encounter.  If protocol passes may refill for 12 months if within 3 months of last provider visit (or a total of 15 months).            Passed - A1C in last 6 months    Hemoglobin A1c   Date Value Ref Range Status   07/25/2018 7.3 (H) 3.5 - 6.0 % Final

## 2021-06-26 NOTE — PROGRESS NOTES
Progress Notes by Maria T Nieves RN at 2/5/2018 11:00 AM     Author: Maria T Nieves RN Service: -- Author Type: Registered Nurse    Filed: 2/5/2018  9:14 PM Encounter Date: 2/5/2018 Status: Attested    : Maria T Nieves RN (Registered Nurse) Cosigner: Anuradha Dunn CNP at 2/10/2018  9:03 PM    Attestation signed by Anuradha Dunn CNP at 2/10/2018  9:03 PM    Agree with plan                Assessment: Ned is here today for education regarding newly diagnosed diabetes and for nutritional counseling. He arrived today unaccompanied and did not have his meter or a log book with him. Since he received diagnosis in early January of this year, Ned has met with [facility] PharmD- Analy to assist him with medications to achieve better glycemic control. Writer learned today Ned has been communicating blood sugars to Analy via My Chart for review and dose adjustments- this seems to be working well. Per his report Ned currently has been checking his blood sugars twice daily : am fasting and post dinner/ HS. Based upon his recall fasting am blood sugars have become more stable - typically in the 80's to low 100's and his pm numbers are in the upper 100's. Overall glycemic control has been very good.  Reviewed basic pathophysiology of diabetes with Ned and education today was focused mostly on nutrition. Topics covered included: carbohydrate identification, portions and ADA recommendations for intake for meals/ snacks. Label reading and meal planning. Alcohol, fats and benefits of fiber in diet. Together we reviewed Ned's current intake - suggestions and recommendations were given to help with better glycemic control- also provided to him today were several pieces of literature for him to use as a resource at home.  Recommended to Ned that he work on creating balance in all meals and to make sure he is not having too few carbs in his meals    Plan: Recommend Ned continue to check his blood sugars  2-3 times each day. Monitor carbohydrate intake for all meals and snacks following parameters discussed today from ADA guidelines. Analy stated she will be continuing to follow Ned , monitor his BG and adjust medications as needed. He was encouraged to remain active daily and call with any questions/concerns.    Subjective and Objective:      Ned MARIO Moore is referred by Anuradha Dunn CNP for Diabetes Education.     Lab Results   Component Value Date    HGBA1C >14.0 (H) 01/08/2018         Current diabetes medications:  Glipizide XL 5 mg PO QD, Lantus 18 units subq QD    Goals     ? Medications            Take diabetes medications as prescribed   2/5/2018          ? Nutrition             Eat 3 balanced meals each day - Monitor carb intake and limit to 4-5 choices (60-75 grams) per meal    Do not wait longer than 4-5 hours to eat something  Snacks  1-2 choices  ( 15 - 30 grams)   2/5/2018              Follow up:   Primary care visit for A1c check in April      Education:     Monitoring   Meter (per above goals): Assessed and Discussed  Monitoring: Assessed, Discussed and Competent  BG goals: Assessed and Discussed    Nutrition Management  Nutrition Management: Assessed, Discussed and Literature provided  Weight: Assessed and Discussed  Portions/Balance: Assessed, Discussed and Literature provided  Carb ID/Count: Assessed, Discussed and Literature provided  Label Reading: Assessed, Discussed and Literature provided  Heart Healthy Fats: Assessed, Discussed and Literature provided  Menu Planning: Assessed, Discussed and Literature provided  Dining Out: Discussed  Physical Activity: Assessed and Discussed  Medications: Assessed and Discussed  Orals: Assessed and Discussed  Injected Medications: Assessed, Discussed and Needs instruction/review at follow-up   Storage/Exp:Discussed   Site Rotation: Discussed   Sites Assessed: no    Diabetes Disease Process: Assessed and Discussed    Acute Complications: Prevent, Detect,  Treat:  Hypoglycemia: Assessed and Discussed  Hyperglycemia: Assessed and Discussed  Sick Days: Discussed  Driving: Needs instruction/review at follow-up    Chronic Complications  Foot Care:Not addressed  Skin Care: Not addressed  Eye: Discussed  ABC: Discussed  Teeth:Discussed  Goal Setting and Problem Solving: Assessed and Discussed  Barriers: Assessed and Discussed  Psychosocial Adjustments: Assessed and Discussed      Time spent with the patient: 60 minutes for diabetes education and counseling.   Previous Education: has seen MTM  Visit Type:DSMT      Maria T Nieves RN CDE  Diabetes education  2/5/2018

## 2021-06-26 NOTE — TELEPHONE ENCOUNTER
Refill Approved    Rx renewed per Medication Renewal Policy. Medication was last renewed on 07/25/2018    Leela Pedersen, [facility] Triage/Med Refill 6/23/2021     Requested Prescriptions   Pending Prescriptions Disp Refills     blood glucose test (ACCU-CHEK SMARTVIEW TEST STRIP) strips [Pharmacy Med Name: ACCU-CHEK SMARTVIEW TEST STRIP] 200 strip 3     Sig: USE TO CHECK BLOOD SUGARS TWICE DAILY.       Diabetic Supplies Refill Protocol Passed - 6/22/2021  2:43 PM        Passed - Visit with PCP or prescribing provider visit in last 6 months     Last office visit with prescriber/PCP: 1/27/2021 Anuradha Dunn CNP OR same dept: Visit date not found OR same specialty: Visit date not found  Last physical: Visit date not found Last MTM visit: 1/23/2018 Analy Shepherd, PharmD   Next visit within 3 mo: Visit date not found  Next physical within 3 mo: Visit date not found  Prescriber OR PCP: Anuradha Dunn CNP  Last diagnosis associated with med order: 1. Type 2 diabetes, uncontrolled, with neuropathy (H)  - ACCU-CHEK SMARTVIEW TEST STRIP strips [Pharmacy Med Name: ACCU-CHEK SMARTVIEW TEST STRIP]; USE TO CHECK BLOOD SUGARS TWICE DAILY.  Dispense: 200 strip; Refill: 3    If protocol passes may refill for 12 months if within 3 months of last provider visit (or a total of 15 months).             Passed - A1C in last 6 months     Hemoglobin A1c   Date Value Ref Range Status   01/27/2021 7.9 (H) <=5.6 % Final

## 2021-06-27 ENCOUNTER — HEALTH MAINTENANCE LETTER (OUTPATIENT)
Age: 61
End: 2021-06-27

## 2021-08-16 RX ORDER — GLIPIZIDE 10 MG/1
TABLET, FILM COATED, EXTENDED RELEASE ORAL
Qty: 30 TABLET | Refills: 0 | Status: SHIPPED | OUTPATIENT
Start: 2021-08-16 | End: 2021-09-11

## 2021-08-22 ENCOUNTER — HEALTH MAINTENANCE LETTER (OUTPATIENT)
Age: 61
End: 2021-08-22

## 2021-08-23 ENCOUNTER — OFFICE VISIT (OUTPATIENT)
Dept: FAMILY MEDICINE | Facility: CLINIC | Age: 61
End: 2021-08-23
Payer: COMMERCIAL

## 2021-08-23 VITALS
DIASTOLIC BLOOD PRESSURE: 70 MMHG | SYSTOLIC BLOOD PRESSURE: 144 MMHG | WEIGHT: 186 LBS | OXYGEN SATURATION: 98 % | HEART RATE: 74 BPM | BODY MASS INDEX: 24.54 KG/M2

## 2021-08-23 DIAGNOSIS — E11.42 DIABETIC POLYNEUROPATHY ASSOCIATED WITH TYPE 2 DIABETES MELLITUS (H): Primary | ICD-10-CM

## 2021-08-23 DIAGNOSIS — E11.21 DIABETIC NEPHROPATHY ASSOCIATED WITH TYPE 2 DIABETES MELLITUS (H): ICD-10-CM

## 2021-08-23 LAB
ANION GAP SERPL CALCULATED.3IONS-SCNC: 12 MMOL/L (ref 5–18)
BUN SERPL-MCNC: 23 MG/DL (ref 8–22)
CALCIUM SERPL-MCNC: 10.2 MG/DL (ref 8.5–10.5)
CHLORIDE BLD-SCNC: 103 MMOL/L (ref 98–107)
CO2 SERPL-SCNC: 26 MMOL/L (ref 22–31)
CREAT SERPL-MCNC: 1.37 MG/DL (ref 0.7–1.3)
CREAT UR-MCNC: 228 MG/DL
GFR SERPL CREATININE-BSD FRML MDRD: 55 ML/MIN/1.73M2
GLUCOSE BLD-MCNC: 85 MG/DL (ref 70–125)
HBA1C MFR BLD: 7.4 % (ref 0–5.6)
MICROALBUMIN UR-MCNC: 2.78 MG/DL (ref 0–1.99)
MICROALBUMIN/CREAT UR: 12.2 MG/G CR
POTASSIUM BLD-SCNC: 4.3 MMOL/L (ref 3.5–5)
SODIUM SERPL-SCNC: 141 MMOL/L (ref 136–145)

## 2021-08-23 PROCEDURE — 36415 COLL VENOUS BLD VENIPUNCTURE: CPT | Performed by: NURSE PRACTITIONER

## 2021-08-23 PROCEDURE — 82043 UR ALBUMIN QUANTITATIVE: CPT | Performed by: NURSE PRACTITIONER

## 2021-08-23 PROCEDURE — 99214 OFFICE O/P EST MOD 30 MIN: CPT | Performed by: NURSE PRACTITIONER

## 2021-08-23 PROCEDURE — 80048 BASIC METABOLIC PNL TOTAL CA: CPT | Performed by: NURSE PRACTITIONER

## 2021-08-23 PROCEDURE — 83036 HEMOGLOBIN GLYCOSYLATED A1C: CPT | Performed by: NURSE PRACTITIONER

## 2021-08-23 RX ORDER — DAPAGLIFLOZIN 5 MG/1
5 TABLET, FILM COATED ORAL DAILY
Qty: 90 TABLET | Refills: 0 | Status: SHIPPED | OUTPATIENT
Start: 2021-08-23 | End: 2021-09-20

## 2021-08-23 NOTE — PROGRESS NOTES
Assessment & Plan   1. Type 2 diabetes, uncontrolled, with neuropathy (H)  A1C remains uncontrolled at 7.4.  He is tolerating the higher dose of glipizide.  Still averse to any injectable medications.  We discussed SGLT2s which would have the added benefit of renal protection for him. Counseled on side effects and risks.  Will start on Farxiga at 5mg and recheck one month with labs at that time.   - Albumin Random Urine Quantitative with Creat Ratio  - Basic metabolic panel  (Ca, Cl, CO2, Creat, Gluc, K, Na, BUN)  - Hemoglobin A1c  - dapagliflozin (FARXIGA) 5 MG TABS tablet; Take 1 tablet (5 mg) by mouth daily  Dispense: 90 tablet; Refill: 0    2. Diabetic polyneuropathy associated with type 2 diabetes mellitus (H)  Very little sensation in feet.  He is not bothered by this. Wearing protective shoewear and checks feet nightly.      3. Diabetic nephropathy associated with type 2 diabetes mellitus (H)  Renal dosing.     Anuradha Dunn, CNP    Subjective   Chief Complaint:  Diabetes check     HPI:   Ned Moore is a 61 year old male who presents for follow up.     A1C elevated in January at 7.9.  glipizide increased to 10mg.  Today 7.4. He states he has been monitoring fasting blood sugars and for the most part  though occasionally up to 130 if he ate more the night before. He has not monitored post meal or bedtime.  He denies any episodes of hypoglycemia or symptoms of hypoglycemia.      Neuropathy:  Fairly advanced.  Little feeling in feet or ankles.  Not bothered by this. Checking feet nightly.       Allergies:  has No Known Allergies.    SH/FH:  Social History and Family History reviewed and updated.   Tobacco Status:  He  reports that he has never smoked. He has never used smokeless tobacco.    Review of Systems:  A complete head to toe ROS is negative unless otherwise noted in HPI    Objective     Vitals:    08/23/21 1143 08/23/21 1144   BP:  (!) 144/70   Pulse:  74   SpO2:  98%   Weight: 84.4 kg  (186 lb)        Physical Exam:  GENERAL: Alert, well-appearing    SKIN: No lesions or ulcerations  CV: Regular rate and rhythm without murmurs, rubs or gallops.  RESP: Lung sounds clear  PV : LEs warm. No edema.  Pedal pulses 2+  NEURO:  Intact to monofilament testing at lateral side of foot bilaterally, otherwise no sensation.

## 2021-09-11 RX ORDER — GLIPIZIDE 10 MG/1
TABLET, FILM COATED, EXTENDED RELEASE ORAL
Qty: 90 TABLET | Refills: 3 | Status: SHIPPED | OUTPATIENT
Start: 2021-09-11 | End: 2022-09-09

## 2021-09-12 NOTE — TELEPHONE ENCOUNTER
"Routing refill request to provider for review/approval because:  Labs not current:  Cr    Last Written Prescription Date:  8/16/21  Last Fill Quantity: 30,  # refills: 0   Last office visit provider:  1/27/21     Requested Prescriptions   Pending Prescriptions Disp Refills     glipiZIDE (GLUCOTROL XL) 10 MG 24 hr tablet [Pharmacy Med Name: GLIPIZIDE ER 10 MG TABLET] 30 tablet 0     Sig: TAKE 1 TABLET BY MOUTH EVERY DAY       Sulfonylurea Agents Failed - 9/11/2021  7:35 AM        Failed - Patient has a recent creatinine (normal) within the past 12 mos.     Recent Labs   Lab Test 08/23/21  1152   CR 1.37*       Ok to refill medication if creatinine is low          Passed - Patient has documented A1c within the specified period of time.     If HgbA1C is 8 or greater, it needs to be on file within the past 3 months.  If less than 8, must be on file within the past 6 months.     Recent Labs   Lab Test 08/23/21  1152   A1C 7.4*             Passed - Medication is active on med list        Passed - Patient is age 18 or older        Passed - Recent (6 mo) or future (30 days) visit within the authorizing provider's specialty     Patient had office visit in the last 6 months or has a visit in the next 30 days with authorizing provider or within the authorizing provider's specialty.  See \"Patient Info\" tab in inbasket, or \"Choose Columns\" in Meds & Orders section of the refill encounter.                 lazara tong RN 09/11/21 8:40 PM  "

## 2021-09-20 ENCOUNTER — OFFICE VISIT (OUTPATIENT)
Dept: FAMILY MEDICINE | Facility: CLINIC | Age: 61
End: 2021-09-20
Payer: COMMERCIAL

## 2021-09-20 VITALS
HEART RATE: 67 BPM | DIASTOLIC BLOOD PRESSURE: 68 MMHG | OXYGEN SATURATION: 98 % | WEIGHT: 184 LBS | BODY MASS INDEX: 24.28 KG/M2 | SYSTOLIC BLOOD PRESSURE: 120 MMHG

## 2021-09-20 DIAGNOSIS — Z11.4 SCREENING FOR HIV (HUMAN IMMUNODEFICIENCY VIRUS): ICD-10-CM

## 2021-09-20 DIAGNOSIS — Z11.59 NEED FOR HEPATITIS C SCREENING TEST: ICD-10-CM

## 2021-09-20 LAB
ANION GAP SERPL CALCULATED.3IONS-SCNC: 14 MMOL/L (ref 5–18)
BUN SERPL-MCNC: 21 MG/DL (ref 8–22)
CALCIUM SERPL-MCNC: 9.7 MG/DL (ref 8.5–10.5)
CHLORIDE BLD-SCNC: 103 MMOL/L (ref 98–107)
CO2 SERPL-SCNC: 25 MMOL/L (ref 22–31)
CREAT SERPL-MCNC: 1.35 MG/DL (ref 0.7–1.3)
GFR SERPL CREATININE-BSD FRML MDRD: 56 ML/MIN/1.73M2
GLUCOSE BLD-MCNC: 74 MG/DL (ref 70–125)
HIV 1+2 AB+HIV1 P24 AG SERPL QL IA: NEGATIVE
POTASSIUM BLD-SCNC: 4.6 MMOL/L (ref 3.5–5)
SODIUM SERPL-SCNC: 142 MMOL/L (ref 136–145)

## 2021-09-20 PROCEDURE — 36415 COLL VENOUS BLD VENIPUNCTURE: CPT | Performed by: NURSE PRACTITIONER

## 2021-09-20 PROCEDURE — 99213 OFFICE O/P EST LOW 20 MIN: CPT | Performed by: NURSE PRACTITIONER

## 2021-09-20 PROCEDURE — 87389 HIV-1 AG W/HIV-1&-2 AB AG IA: CPT | Performed by: NURSE PRACTITIONER

## 2021-09-20 PROCEDURE — 80048 BASIC METABOLIC PNL TOTAL CA: CPT | Performed by: NURSE PRACTITIONER

## 2021-09-20 PROCEDURE — 86803 HEPATITIS C AB TEST: CPT | Performed by: NURSE PRACTITIONER

## 2021-09-20 RX ORDER — DAPAGLIFLOZIN 5 MG/1
5 TABLET, FILM COATED ORAL DAILY
Qty: 90 TABLET | Refills: 1 | Status: SHIPPED | OUTPATIENT
Start: 2021-09-20 | End: 2022-05-19

## 2021-09-20 NOTE — PROGRESS NOTES
Assessment & Plan   1. Type 2 diabetes, uncontrolled, with neuropathy (H)  Last A1C just above goal at 7.2.  Farxiga added and he is tolerating this well.  Recheck renal function today.  If normal will plan to continue and repeat A1C two months.   - Basic metabolic panel  (Ca, Cl, CO2, Creat, Gluc, K, Na, BUN); Future  - dapagliflozin (FARXIGA) 5 MG TABS tablet; Take 1 tablet (5 mg) by mouth daily  Dispense: 90 tablet; Refill: 1    2. Screening for HIV (human immunodeficiency virus)  - HIV Antigen Antibody Combo; Future    3. Need for hepatitis C screening test  - Hepatitis C Screen Reflex to HCV RNA Quant and Genotype; Future    Anuradha Dunn CNP    Subjective   Chief Complaint:  followup    HPI:   Ned Moore is a 61 year old male who presents for followup    Following up today for diabetes.  Last visit A1C 7.4 despite Metformin and glipizide.  He was started on Farxiga 5mg. He states he has been tolerating this well.  AM blood sugars today almost all within goal range.  Two elevated though he states this was after his daughter's birthday party (chinese food). He has not been checking post meal blood sugars.  He denies side effects.       Allergies:  has No Known Allergies.    SH/FH:  Social History and Family History reviewed and updated.   Tobacco Status:  He  reports that he has never smoked. He has never used smokeless tobacco.    Review of Systems:  A complete head to toe ROS is negative unless otherwise noted in HPI    Objective     Vitals:    09/20/21 1011   BP: 120/68   Pulse: 67   SpO2: 98%   Weight: 83.5 kg (184 lb)       Physical Exam:  GENERAL: Alert, well-appearing  PSYCH: Pleasant mood, affect appropriate.

## 2021-09-21 LAB — HCV AB SERPL QL IA: NONREACTIVE

## 2021-10-17 ENCOUNTER — HEALTH MAINTENANCE LETTER (OUTPATIENT)
Age: 61
End: 2021-10-17

## 2022-01-09 RX ORDER — ATORVASTATIN CALCIUM 20 MG/1
TABLET, FILM COATED ORAL
Qty: 90 TABLET | Refills: 2 | Status: SHIPPED | OUTPATIENT
Start: 2022-01-09 | End: 2022-10-06

## 2022-01-09 NOTE — TELEPHONE ENCOUNTER
"Last Written Prescription Date:  01/27/2021   Last Fill Quantity: 90,  # refills: 3   Last office visit provider:   09/20/2021 with Anuradha Dunn CNP    Requested Prescriptions   Pending Prescriptions Disp Refills     atorvastatin (LIPITOR) 20 MG tablet [Pharmacy Med Name: ATORVASTATIN 20 MG TABLET] 30 tablet 11     Sig: TAKE 1 TABLET BY MOUTH EVERY DAY IN THE EVENING       Statins Protocol Passed - 1/6/2022 12:38 AM        Passed - LDL on file in past 12 months     Recent Labs   Lab Test 01/27/21  1047   LDL 74             Passed - No abnormal creatine kinase in past 12 months     No lab results found.             Passed - Recent (12 mo) or future (30 days) visit within the authorizing provider's specialty     Patient has had an office visit with the authorizing provider or a provider within the authorizing providers department within the previous 12 mos or has a future within next 30 days. See \"Patient Info\" tab in inbasket, or \"Choose Columns\" in Meds & Orders section of the refill encounter.              Passed - Medication is active on med list        Passed - Patient is age 18 or older             Miroslava Davis 01/09/22 12:34 AM  "

## 2022-01-23 ENCOUNTER — APPOINTMENT (OUTPATIENT)
Dept: URGENT CARE | Facility: CLINIC | Age: 62
End: 2022-01-23
Payer: COMMERCIAL

## 2022-01-24 ENCOUNTER — VIRTUAL VISIT (OUTPATIENT)
Dept: FAMILY MEDICINE | Facility: CLINIC | Age: 62
End: 2022-01-24
Payer: COMMERCIAL

## 2022-01-24 DIAGNOSIS — R06.2 WHEEZING: Primary | ICD-10-CM

## 2022-01-24 DIAGNOSIS — N18.31 STAGE 3A CHRONIC KIDNEY DISEASE (H): ICD-10-CM

## 2022-01-24 PROCEDURE — 99214 OFFICE O/P EST MOD 30 MIN: CPT | Mod: 95 | Performed by: FAMILY MEDICINE

## 2022-01-24 RX ORDER — AZITHROMYCIN 250 MG/1
TABLET, FILM COATED ORAL
Qty: 6 TABLET | Refills: 0 | Status: SHIPPED | OUTPATIENT
Start: 2022-01-24 | End: 2022-01-29

## 2022-01-24 RX ORDER — PREDNISONE 20 MG/1
40 TABLET ORAL DAILY
Qty: 20 TABLET | Refills: 0 | Status: SHIPPED | OUTPATIENT
Start: 2022-01-24 | End: 2022-05-30

## 2022-01-24 NOTE — PROGRESS NOTES
"Ned is a 61 year old who is being evaluated via a billable telephone visit.      What phone number would you like to be contacted at? [phone]  How would you like to obtain your AVS? [facility]    Assessment & Plan   Problem List Items Addressed This Visit     Chronic kidney disease, stage 3 (H)     GFR Estimate   Date Value Ref Range Status   09/20/2021 56 (L) >60 mL/min/1.73m2 Final     Comment:     As of July 11, 2021, eGFR is calculated by the CKD-EPI creatinine equation, without race adjustment. eGFR can be influenced by muscle mass, exercise, and diet. The reported eGFR is an estimation only and is only applicable if the renal function is stable.   01/27/2021 51 (L) >60 mL/min/1.73m2 Final              Type 2 diabetes, uncontrolled, with neuropathy (H)     Hemoglobin A1C   Date Value Ref Range Status   08/23/2021 7.4 (H) 0.0 - 5.6 % Final     Comment:     Normal <5.7%   Prediabetes 5.7-6.4%    Diabetes 6.5% or higher     Note: Adopted from ADA consensus guidelines.     Due for assessment Feb         Relevant Medications    predniSONE (DELTASONE) 20 MG tablet    Wheezing - Primary     Pos FHx, Dx (X1) in childhood. Every winter \"cold\" lasts 1 mo. This is longer, wheezing is new. No fevers         Relevant Medications    azithromycin (ZITHROMAX) 250 MG tablet    predniSONE (DELTASONE) 20 MG tablet             Disadvise decongestants.  Dextromethorphan OK  Neg COVID tests           Return in about 4 weeks (around 2/21/2022) for PCP.    Declan Chacon MD  [facility]    Lupe Marino is a 61 year old who presents for the following health issues     History of Present Illness   He consumes 0 sweetened beverage(s) daily.He exercises with enough effort to increase his heart rate 10 to 19 minutes per day.  He exercises with enough effort to increase his heart rate 3 or less days per week.   He is taking medications regularly.       Acute Illness  Acute illness concerns: " "Congestion  Onset/Duration: around ángela  Symptoms:  Fever: no  Chills/Sweats: no  Headache (location?): no  Sinus Pressure: no  Conjunctivitis:  no  Ear Pain: no  Rhinorrhea: YES  Congestion: YES  Sore Throat: no  Cough: YES-non-productive  Wheeze: YES  Decreased Appetite: no  Nausea: no  Vomiting: no  Diarrhea: no  Dysuria/Freq.: no  Dysuria or Hematuria: no  Fatigue/Achiness: no  Sick/Strep Exposure: no  Therapies tried and outcome: otc cold medicine      Review of Systems   No HB fever CP      Objective    Vitals - Patient Reported  Weight (Patient Reported): 83.9 kg (185 lb)  Height (Patient Reported): 185.4 cm (6' 1\")  BMI (Based on Pt Reported Ht/Wt): 24.41      Vitals:  No vitals were obtained today due to virtual visit.    Physical Exam     PSYCH: Alert and oriented times 3; coherent speech, normal   rate and volume, able to articulate logical thoughts, able   to abstract reason, no tangential thoughts, no hallucinations   or delusions  His affect is   RESP: No cough, no audible wheezing, able to talk in full sentences  Remainder of exam unable to be completed due to telephone visits                Phone call duration:6 minutes    Declan Chacon MD    "

## 2022-01-24 NOTE — ASSESSMENT & PLAN NOTE
Hemoglobin A1C   Date Value Ref Range Status   08/23/2021 7.4 (H) 0.0 - 5.6 % Final     Comment:     Normal <5.7%   Prediabetes 5.7-6.4%    Diabetes 6.5% or higher     Note: Adopted from ADA consensus guidelines.     Due for assessment Feb

## 2022-01-24 NOTE — ASSESSMENT & PLAN NOTE
"Pos FHx, Dx (X1) in childhood. Every winter \"cold\" lasts 1 mo. This is longer, wheezing is new. No fevers  "

## 2022-01-24 NOTE — ASSESSMENT & PLAN NOTE
GFR Estimate   Date Value Ref Range Status   09/20/2021 56 (L) >60 mL/min/1.73m2 Final     Comment:     As of July 11, 2021, eGFR is calculated by the CKD-EPI creatinine equation, without race adjustment. eGFR can be influenced by muscle mass, exercise, and diet. The reported eGFR is an estimation only and is only applicable if the renal function is stable.   01/27/2021 51 (L) >60 mL/min/1.73m2 Final

## 2022-02-07 RX ORDER — METFORMIN HCL 500 MG
TABLET, EXTENDED RELEASE 24 HR ORAL
Qty: 360 TABLET | Refills: 1 | Status: SHIPPED | OUTPATIENT
Start: 2022-02-07 | End: 2022-08-08

## 2022-02-07 NOTE — TELEPHONE ENCOUNTER
"Last Written Prescription Date:  1/29/21  Last Fill Quantity: 360,  # refills: 3   Last office visit provider:  1/24/22     Requested Prescriptions   Pending Prescriptions Disp Refills     metFORMIN (GLUCOPHAGE-XR) 500 MG 24 hr tablet [Pharmacy Med Name: METFORMIN HCL  MG TABLET] 120 tablet 11     Sig: TAKE FOUR TABLETS BY MOUTH EVERY DAY WITH BREAKFAST       Biguanide Agents Passed - 2/6/2022  7:28 AM        Passed - Patient is age 10 or older        Passed - Patient has documented A1c within the specified period of time.     If HgbA1C is 8 or greater, it needs to be on file within the past 3 months.  If less than 8, must be on file within the past 6 months.     Recent Labs   Lab Test 08/23/21  1152   A1C 7.4*             Passed - Patient's CR is NOT>1.4 OR Patient's EGFR is NOT<45 within past 12 mos.     Recent Labs   Lab Test 09/20/21  1044 08/23/21  1152 01/27/21  1047   GFRESTIMATED 56*   < > 51*   GFRESTBLACK  --   --  >60    < > = values in this interval not displayed.       Recent Labs   Lab Test 09/20/21  1044   CR 1.35*             Passed - Patient does NOT have a diagnosis of CHF.        Passed - Medication is active on med list        Passed - Recent (6 mo) or future (30 days) visit within the authorizing provider's specialty     Patient had office visit in the last 6 months or has a visit in the next 30 days with authorizing provider or within the authorizing provider's specialty.  See \"Patient Info\" tab in inbasket, or \"Choose Columns\" in Meds & Orders section of the refill encounter.                 Shama De León RN 02/07/22 2:41 PM  "

## 2022-02-22 ENCOUNTER — VIRTUAL VISIT (OUTPATIENT)
Dept: FAMILY MEDICINE | Facility: CLINIC | Age: 62
End: 2022-02-22
Payer: COMMERCIAL

## 2022-02-22 ENCOUNTER — E-VISIT (OUTPATIENT)
Dept: FAMILY MEDICINE | Facility: CLINIC | Age: 62
End: 2022-02-22
Payer: COMMERCIAL

## 2022-02-22 DIAGNOSIS — R60.0 EDEMA OF BOTH FEET: ICD-10-CM

## 2022-02-22 DIAGNOSIS — Z53.9 ERRONEOUS ENCOUNTER--DISREGARD: Primary | ICD-10-CM

## 2022-02-22 DIAGNOSIS — T25.222A PARTIAL THICKNESS BURN OF LEFT FOOT, INITIAL ENCOUNTER: ICD-10-CM

## 2022-02-22 DIAGNOSIS — T25.221A PARTIAL THICKNESS BURN OF RIGHT FOOT, INITIAL ENCOUNTER: ICD-10-CM

## 2022-02-22 PROCEDURE — 99214 OFFICE O/P EST MOD 30 MIN: CPT | Mod: 95 | Performed by: NURSE PRACTITIONER

## 2022-02-22 RX ORDER — SILVER SULFADIAZINE 10 MG/G
CREAM TOPICAL DAILY
Qty: 400 G | Refills: 1 | Status: SHIPPED | OUTPATIENT
Start: 2022-02-22 | End: 2022-05-30

## 2022-02-22 NOTE — PROGRESS NOTES
Ned is a 61 year old who is being evaluated via a billable video visit.      How would you like to obtain your AVS? MyChart  If the video visit is dropped, the invitation should be resent by: Text to cell phone: [phone]-Use [employer]  Will anyone else be joining your video visit? No      Video Start Time: 4:49 PM    Assessment & Plan     Type 2 diabetes, uncontrolled, with neuropathy (H)  Discussed complications for foot wounds and close monitoring.     Partial thickness burn of left foot, initial encounter  Partial thickness burn of right foot, initial encounter  Appearing 2nd degree burns per video. Discussed wound healing, complications, infection. 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 Will plan for two times per day Silvadene with Telfa and Ace wraps. Elevate feet as much as possible. Leave open to air with silvadene when able to not be on feet.   Will plan to trial work; will follow-up for note if not able to mange wounds at work.   Follow-up 1 week; sooner as needed.   - silver sulfADIAZINE (SILVADENE) 1 % external cream  Dispense: 400 g; Refill: 1    Edema of both feet  As above; consider furosemide trial if edema not controlled with compression and elevation.                    Return in about 6 days (around 2/28/2022) for wound follow up, Video Visit.    ROSA Lora [facility]    Lupe Marino is a 61 year old who presents for the following health issues     HPI     Concern - Wound on bilateral feet  Onset: 2/13/22  Description: bilateral feet heel and toes, from heating pad, has been cleaning and drying, no redness, weeping in the beginning, does have swelling in the feet  Intensity: moderate  Progression of Symptoms:  improving  Accompanying Signs & Symptoms: look like blisters, does not look infected  Previous history of similar problem: is a diabetic  Precipitating factors:        Worsened by: did  wrap in coban not sure if this made the wounds worse  Therapies tried and outcome: antibiotic ointment      Has a history of intermittent with edema to left foot. Has been worked up and assumed as dependent edema.   Works as a Paramedic.     Review of Systems   Constitutional, HEENT, cardiovascular, pulmonary, gi and gu systems are negative, except as otherwise noted.      Objective           Vitals:  No vitals were obtained today due to virtual visit.    Physical Exam   GENERAL: Healthy, alert and no distress  EYES: Eyes grossly normal to inspection.  No discharge or erythema, or obvious scleral/conjunctival abnormalities.  RESP: No audible wheeze, cough, or visible cyanosis.  No visible retractions or increased work of breathing.    SKIN: bilateral lateral forefoot with 2nd degree appearing wound. Wound bed appearing pink and moist.   NEURO: Cranial nerves grossly intact.  Mentation and speech appropriate for age.  PSYCH: Mentation appears normal, affect normal/bright, judgement and insight intact, normal speech and appearance well-groomed.                Video-Visit Details    Type of service:  Video Visit    Video End Time:5:09 PM    Originating Location (pt. Location): Home    Distant Location (provider location):  [facility] [place]     Platform used for Video Visit: WIB

## 2022-02-22 NOTE — TELEPHONE ENCOUNTER
Please call patient to help schedule telephone visit today with me.   Thank you.  ROSA Lora CNP on 2/22/2022 at 1:28 PM

## 2022-02-28 ENCOUNTER — VIRTUAL VISIT (OUTPATIENT)
Dept: FAMILY MEDICINE | Facility: CLINIC | Age: 62
End: 2022-02-28
Payer: COMMERCIAL

## 2022-02-28 DIAGNOSIS — T25.222D PARTIAL THICKNESS BURN OF LEFT FOOT, SUBSEQUENT ENCOUNTER: Primary | ICD-10-CM

## 2022-02-28 DIAGNOSIS — T25.221D PARTIAL THICKNESS BURN OF RIGHT FOOT, SUBSEQUENT ENCOUNTER: ICD-10-CM

## 2022-02-28 PROCEDURE — 99213 OFFICE O/P EST LOW 20 MIN: CPT | Mod: 95 | Performed by: NURSE PRACTITIONER

## 2022-02-28 NOTE — PROGRESS NOTES
Ned is a 61 year old who is being evaluated via a billable video visit.      How would you like to obtain your AVS? MyChart  If the video visit is dropped, the invitation should be resent by: Text to cell phone: [phone]  Will anyone else be joining your video visit? No      Video Start Time: 11:14 AM    Assessment & Plan     Partial thickness burn of left foot, subsequent encounter  Partial thickness burn of right foot, subsequent encounter  Improving subjectively per patient, as well as appearance per video quality.   Will have patient continue with silvadene one additional week.   Plan to follow-up with PCP for diabetic follow-up visit and recheck of foot wounds.   Follow-up sooner as needed.       Return in about 2 weeks (around 3/14/2022) for Diabetes, Follow-up foot burns.    ROSA Lora CNP  [facility]    Subjective   Ned is a 61 year old who presents for the following health issues     HPI     Follow-up to bilateral foot wounds.   History of DMII.   Has been treating with silvadene and protective bandaging since visit 2/22/2022.   Has had improvements of wounds.   Mild discomfort, manageable.     Review of Systems   Constitutional, HEENT, cardiovascular, pulmonary, gi and gu systems are negative, except as otherwise noted.      Objective           Vitals:  No vitals were obtained today due to virtual visit.    Physical Exam   GENERAL: Healthy, alert and no distress  SKIN: bilateral forefoot and heel with 2nd degree appearing wounds. Wound edges and wound bed pink and moist  PSYCH: Mentation appears normal, affect normal/bright, judgement and insight intact, normal speech and appearance well-groomed.                Video-Visit Details    Type of service:  Video Visit    Video End Time:11:20 AM    Originating Location (pt. Location): Home    Distant Location (provider location):  [facility]     Platform used for Video Visit:  AmWell    Answers for HPI/ROS submitted by the patient on 2/28/2022  On average, how many sweetened beverages do you drink each day (Examples: soda, juice, sweet tea, etc.  Do NOT count diet or artificially sweetened beverages)?: 0  How many minutes a day do you exercise enough to make your heart beat faster?: 10 to 19  How many days a week do you exercise enough to make your heart beat faster?: 4  How many days per week do you miss taking your medication?: 0  What is the reason for your visit today?: minor foot burns  When did your symptoms begin?: 3-7 days ago  What are your symptoms?: none  How would you describe these symptoms?: Mild  Is there anything that makes you feel worse?: no  Is there anything that makes you feel better?: no

## 2022-03-02 ENCOUNTER — E-VISIT (OUTPATIENT)
Dept: FAMILY MEDICINE | Facility: CLINIC | Age: 62
End: 2022-03-02
Payer: COMMERCIAL

## 2022-03-02 DIAGNOSIS — R05.9 COUGH: Primary | ICD-10-CM

## 2022-03-02 DIAGNOSIS — R06.2 WHEEZE: ICD-10-CM

## 2022-03-02 PROCEDURE — 99421 OL DIG E/M SVC 5-10 MIN: CPT | Performed by: NURSE PRACTITIONER

## 2022-03-02 RX ORDER — PREDNISONE 20 MG/1
40 TABLET ORAL DAILY
Qty: 10 TABLET | Refills: 0 | Status: SHIPPED | OUTPATIENT
Start: 2022-03-02 | End: 2022-03-07

## 2022-03-02 RX ORDER — LEVOFLOXACIN 750 MG/1
750 TABLET, FILM COATED ORAL DAILY
Qty: 5 TABLET | Refills: 0 | Status: SHIPPED | OUTPATIENT
Start: 2022-03-02 | End: 2022-03-07

## 2022-03-19 ENCOUNTER — MYC MEDICAL ADVICE (OUTPATIENT)
Dept: FAMILY MEDICINE | Facility: CLINIC | Age: 62
End: 2022-03-19

## 2022-03-19 ENCOUNTER — E-VISIT (OUTPATIENT)
Dept: FAMILY MEDICINE | Facility: CLINIC | Age: 62
End: 2022-03-19
Payer: COMMERCIAL

## 2022-03-19 DIAGNOSIS — R06.2 WHEEZING: Primary | ICD-10-CM

## 2022-03-19 PROCEDURE — 99421 OL DIG E/M SVC 5-10 MIN: CPT | Performed by: FAMILY MEDICINE

## 2022-03-21 ENCOUNTER — ANCILLARY PROCEDURE (OUTPATIENT)
Dept: GENERAL RADIOLOGY | Facility: CLINIC | Age: 62
End: 2022-03-21
Attending: INTERNAL MEDICINE
Payer: COMMERCIAL

## 2022-03-21 ENCOUNTER — OFFICE VISIT (OUTPATIENT)
Dept: INTERNAL MEDICINE | Facility: CLINIC | Age: 62
End: 2022-03-21
Payer: COMMERCIAL

## 2022-03-21 VITALS
WEIGHT: 172.9 LBS | BODY MASS INDEX: 22.81 KG/M2 | DIASTOLIC BLOOD PRESSURE: 87 MMHG | OXYGEN SATURATION: 95 % | HEART RATE: 94 BPM | SYSTOLIC BLOOD PRESSURE: 150 MMHG

## 2022-03-21 DIAGNOSIS — E11.42 DIABETIC POLYNEUROPATHY ASSOCIATED WITH TYPE 2 DIABETES MELLITUS (H): ICD-10-CM

## 2022-03-21 DIAGNOSIS — R06.02 SOB (SHORTNESS OF BREATH): ICD-10-CM

## 2022-03-21 DIAGNOSIS — R06.02 SOB (SHORTNESS OF BREATH): Primary | ICD-10-CM

## 2022-03-21 DIAGNOSIS — J45.41 MODERATE PERSISTENT REACTIVE AIRWAY DISEASE WITH ACUTE EXACERBATION: ICD-10-CM

## 2022-03-21 LAB
ALBUMIN SERPL-MCNC: 3.9 G/DL (ref 3.5–5)
ALP SERPL-CCNC: 98 U/L (ref 45–120)
ALT SERPL W P-5'-P-CCNC: 19 U/L (ref 0–45)
ANION GAP SERPL CALCULATED.3IONS-SCNC: 13 MMOL/L (ref 5–18)
AST SERPL W P-5'-P-CCNC: 17 U/L (ref 0–40)
BASOPHILS # BLD AUTO: 0.1 10E3/UL (ref 0–0.2)
BASOPHILS NFR BLD AUTO: 2 %
BILIRUB SERPL-MCNC: 0.5 MG/DL (ref 0–1)
BUN SERPL-MCNC: 18 MG/DL (ref 8–22)
CALCIUM SERPL-MCNC: 9.9 MG/DL (ref 8.5–10.5)
CHLORIDE BLD-SCNC: 102 MMOL/L (ref 98–107)
CO2 SERPL-SCNC: 25 MMOL/L (ref 22–31)
CREAT SERPL-MCNC: 1.51 MG/DL (ref 0.7–1.3)
EOSINOPHIL # BLD AUTO: 1.7 10E3/UL (ref 0–0.7)
EOSINOPHIL NFR BLD AUTO: 21 %
ERYTHROCYTE [DISTWIDTH] IN BLOOD BY AUTOMATED COUNT: 12.7 % (ref 10–15)
GFR SERPL CREATININE-BSD FRML MDRD: 52 ML/MIN/1.73M2
GLUCOSE BLD-MCNC: 193 MG/DL (ref 70–125)
HBA1C MFR BLD: 8.2 % (ref 0–5.6)
HCT VFR BLD AUTO: 50.7 % (ref 40–53)
HGB BLD-MCNC: 16.7 G/DL (ref 13.3–17.7)
IMM GRANULOCYTES # BLD: 0 10E3/UL
IMM GRANULOCYTES NFR BLD: 0 %
LYMPHOCYTES # BLD AUTO: 1.4 10E3/UL (ref 0.8–5.3)
LYMPHOCYTES NFR BLD AUTO: 18 %
MCH RBC QN AUTO: 28.7 PG (ref 26.5–33)
MCHC RBC AUTO-ENTMCNC: 32.9 G/DL (ref 31.5–36.5)
MCV RBC AUTO: 87 FL (ref 78–100)
MONOCYTES # BLD AUTO: 0.7 10E3/UL (ref 0–1.3)
MONOCYTES NFR BLD AUTO: 8 %
NEUTROPHILS # BLD AUTO: 4 10E3/UL (ref 1.6–8.3)
NEUTROPHILS NFR BLD AUTO: 51 %
NT-PROBNP SERPL-MCNC: 61 PG/ML (ref 0–125)
PLATELET # BLD AUTO: 216 10E3/UL (ref 150–450)
POTASSIUM BLD-SCNC: 4.5 MMOL/L (ref 3.5–5)
PROT SERPL-MCNC: 7.1 G/DL (ref 6–8)
RBC # BLD AUTO: 5.81 10E6/UL (ref 4.4–5.9)
SODIUM SERPL-SCNC: 140 MMOL/L (ref 136–145)
TSH SERPL DL<=0.005 MIU/L-ACNC: 0.61 UIU/ML (ref 0.3–5)
WBC # BLD AUTO: 7.9 10E3/UL (ref 4–11)

## 2022-03-21 PROCEDURE — 36415 COLL VENOUS BLD VENIPUNCTURE: CPT | Performed by: INTERNAL MEDICINE

## 2022-03-21 PROCEDURE — 93010 ELECTROCARDIOGRAM REPORT: CPT | Performed by: INTERNAL MEDICINE

## 2022-03-21 PROCEDURE — 93005 ELECTROCARDIOGRAM TRACING: CPT | Performed by: INTERNAL MEDICINE

## 2022-03-21 PROCEDURE — 99214 OFFICE O/P EST MOD 30 MIN: CPT | Performed by: INTERNAL MEDICINE

## 2022-03-21 PROCEDURE — 83880 ASSAY OF NATRIURETIC PEPTIDE: CPT | Performed by: INTERNAL MEDICINE

## 2022-03-21 PROCEDURE — 80050 GENERAL HEALTH PANEL: CPT | Performed by: INTERNAL MEDICINE

## 2022-03-21 PROCEDURE — 83036 HEMOGLOBIN GLYCOSYLATED A1C: CPT | Performed by: INTERNAL MEDICINE

## 2022-03-21 PROCEDURE — 71046 X-RAY EXAM CHEST 2 VIEWS: CPT | Mod: TC | Performed by: RADIOLOGY

## 2022-03-21 RX ORDER — PREDNISONE 10 MG/1
TABLET ORAL
Qty: 18 TABLET | Refills: 0 | Status: SHIPPED | OUTPATIENT
Start: 2022-03-21 | End: 2022-05-30

## 2022-03-21 RX ORDER — ALBUTEROL SULFATE 90 UG/1
2 AEROSOL, METERED RESPIRATORY (INHALATION) EVERY 6 HOURS
Qty: 18 G | Refills: 3 | Status: SHIPPED | OUTPATIENT
Start: 2022-03-21 | End: 2023-03-21

## 2022-03-21 RX ORDER — FLUTICASONE PROPIONATE 50 MCG
1 SPRAY, SUSPENSION (ML) NASAL DAILY
Qty: 16 G | Refills: 3 | Status: SHIPPED | OUTPATIENT
Start: 2022-03-21 | End: 2022-05-14

## 2022-03-21 NOTE — PATIENT INSTRUCTIONS
1. Labs today    2. Chest XR and EKG today    3. Sounds like ongoing asthma condition: Restart Prdnisone , increase inhaler to 2 puffs a day.   Albuterol    4. Follow up with PCP in 2 weeks

## 2022-03-21 NOTE — PROGRESS NOTES
Answers for HPI/ROS submitted by the patient on 3/21/2022  How many servings of fruits and vegetables do you eat daily?: 0-1  On average, how many sweetened beverages do you drink each day (Examples: soda, juice, sweet tea, etc.  Do NOT count diet or artificially sweetened beverages)?: 0  How many minutes a day do you exercise enough to make your heart beat faster?: 20 to 29  How many days a week do you exercise enough to make your heart beat faster?: 3 or less  How many days per week do you miss taking your medication?: 0  What is the reason for your visit today?: recurring asthma like shortness of breath, each time complete resolution of symptoms after antibiotics and prednisone but this is the third reappearance.  When did your symptoms begin?: 3-7 days ago  What are your symptoms?: SOB on exertion with stridor, no fever  How would you describe these symptoms?: Moderate  Have you had these symptoms before?: Yes  Is there anything that makes you feel worse?: any exertion  Is there anything that makes you feel better?: stop whatever I was doing and the meds cleared up everything

## 2022-03-21 NOTE — TELEPHONE ENCOUNTER
See E-Visit.  Mikki Tinoco, RN    March 20, 2022    Declan Chacon MD  to Ned Moore      6:54 AM  Yes, I think an in person visit is appropriate. In the meantime, I have sent an inhaler to the [facility] in [employer] [address].  Use this once a day.  It is automatic, so all you need to do is suck it into your lungs and the valves take care of the rest.  Once a day.  Rinse your mouth out with water and take a drink after you use it, or you will get a sore throat.  I will have my staff assist you in getting an appointment.  They come in Monday morning  Declan Chacon MD    Last read by Ned Moore at 4:32 PM on 3/20/2022.

## 2022-03-21 NOTE — PROGRESS NOTES
[facility] Follow Up Note    Assessment/Plan:    1. SOB (shortness of breath) with exertion.  Based on exam suspect ongoing bronchospasm.  Patient does not have any history of asthma and symptoms started in January of this year, possibly after a viral infection at that time.    Due to persistent symptoms, will check chest x-ray.    Given his diabetic history will check BNP, EKG today did not show any acute changes.    She did improve with Brio Ellipta inhaler but may need a little bit more of a kick start with an oral prednisone still.  We will do a more gradual prednisone taper.  He will continue Breo Ellipta 2 puffs a day and will follow-up with his primary provider in 2 weeks.    - XR Chest 2 Views; Future  -- EKG 12-lead, tracing only  - fluticasone (FLONASE) 50 MCG/ACT nasal spray; Spray 1 spray into both nostrils daily  Dispense: 16 g; Refill: 3  - predniSONE (DELTASONE) 10 MG tablet; Take 3 tabs by mouth for 3 days, then 2 tabs for 3 days, the 1 tab for 3 days then stop.  Dispense: 18 tablet; Refill: 0  - albuterol (PROAIR HFA/PROVENTIL HFA/VENTOLIN HFA) 108 (90 Base) MCG/ACT inhaler; Inhale 2 puffs into the lungs every 6 hours  Dispense: 18 g; Refill: 3  - BNP-N terminal pro  - CBC with platelets and differential  - TSH with free T4 reflex  - Comprehensive metabolic panel    2. Moderate persistent reactive airway disease with acute exacerbation  As above  - fluticasone (FLONASE) 50 MCG/ACT nasal spray; Spray 1 spray into both nostrils daily  Dispense: 16 g; Refill: 3  - predniSONE (DELTASONE) 10 MG tablet; Take 3 tabs by mouth for 3 days, then 2 tabs for 3 days, the 1 tab for 3 days then stop.  Dispense: 18 tablet; Refill: 0  - albuterol (PROAIR HFA/PROVENTIL HFA/VENTOLIN HFA) 108 (90 Base) MCG/ACT inhaler; Inhale 2 puffs into the lungs every 6 hours  Dispense: 18 g; Refill: 3    3. Diabetic polyneuropathy associated with type 2 diabetes mellitus (H)  Previous A1c was around 7.7.  He is on  glipizide and Farxiga.  He will monitor his sugars while on prednisone.  - HEMOGLOBIN A1C  - CBC with platelets and differential  - TSH with free T4 reflex  - Comprehensive metabolic panel      Patient Instructions   1. Labs today    2. Chest XR and EKG today    3. Sounds like ongoing asthma condition: Restart Prdnisone , increase inhaler to 2 puffs a day.   Albuterol    4. Follow up with PCP in 2 weeks        Eli Crabtree MD, MD    Chief Complaint:    Chief Complaint   Patient presents with     Clinic Care Coordination - Follow-up     breathing concerns       History of Present Illness:  Ned is a 61 year old male with h/o DM, HTN, hyperlipidemia who is currently here for follow-up due to recurrent episodes of wheezing, shortness of breath with exertion and cough.    Patient reports that his symptoms started in January after a mild cold.  At that time he was treated with antibiotic and prednisone and symptoms promptly resolve but in February symptoms recurred again.  Once again he was treated with antibiotic and prednisone taper and symptoms resolved for 2 weeks only to recur again.  His PCP did order Breo Ellipta inhaler and he started using it yesterday.  Currently he feels 50% improvement in his breathing but still has significant shortness of breath with exertion and persistent cough.    He denies any fevers, chills or recent upper respiratory infections this year.  He does have postnasal drainage.  He does not come up with any triggers which could have triggered this bronchospasm.  She has no history of smoking or asthma.  She does work as a paramedic.    He does not have any heart history.  No chest pains, occasionally he has palpitations but not persistent, no leg swelling.    He denies any blood in the stool or urine.    No pain on deep inspiration, no calf tenderness.    Review of Systems:  A comprehensive review of systems was performed and was otherwise negative    PFSH:  Social History:  Reviewed  History   Smoking Status     Never Smoker   Smokeless Tobacco     Never Used     Social History     Social History Narrative     Not on file       Past History: Reviewed  Current Outpatient Medications   Medication Sig Dispense Refill     ACCU-CHEK FASTCLIX LANCET DRUM [ACCU-CHEK FASTCLIX LANCET DRUM] TEST BLOOD SUGARS 3 TIMES DAILY 300 each 3     ACCU-CHEK FASTCLIX LANCET DRUM [ACCU-CHEK FASTCLIX LANCET DRUM] TEST BLOOD SUGARS 3 TIMES DAILY 300 each 3     albuterol (PROAIR HFA/PROVENTIL HFA/VENTOLIN HFA) 108 (90 Base) MCG/ACT inhaler Inhale 2 puffs into the lungs every 6 hours 18 g 3     aspirin 81 MG EC tablet [ASPIRIN 81 MG EC TABLET] Take 1 tablet (81 mg total) by mouth daily. 90 tablet 3     atorvastatin (LIPITOR) 20 MG tablet TAKE 1 TABLET BY MOUTH EVERY DAY IN THE EVENING 90 tablet 2     blood glucose test (ACCU-CHEK SMARTVIEW TEST STRIP) strips [BLOOD GLUCOSE TEST (ACCU-CHEK SMARTVIEW TEST STRIP) STRIPS] USE TO CHECK BLOOD SUGARS TWICE DAILY. 200 strip 3     blood glucose test (ACCU-CHEK SMARTVIEW TEST STRIP) strips [BLOOD GLUCOSE TEST (ACCU-CHEK SMARTVIEW TEST STRIP) STRIPS] USE TO CHECK BLOOD SUGARS TWICE DAILY. 200 strip 3     blood glucose test (ACCU-CHEK SMARTVIEW TEST STRIP) strips [BLOOD GLUCOSE TEST (ACCU-CHEK SMARTVIEW TEST STRIP) STRIPS] Use to check blood sugars three times daily. Dispense brand per patient's insurance at pharmacy discretion. 200 strip 11     dapagliflozin (FARXIGA) 5 MG TABS tablet Take 1 tablet (5 mg) by mouth daily 90 tablet 1     fluticasone (FLONASE) 50 MCG/ACT nasal spray Spray 1 spray into both nostrils daily 16 g 3     fluticasone-vilanterol (BREO ELLIPTA) 100-25 MCG/INH inhaler Inhale 1 puff into the lungs daily 1 each 1     glipiZIDE (GLUCOTROL XL) 10 MG 24 hr tablet TAKE 1 TABLET BY MOUTH EVERY DAY 90 tablet 3     metFORMIN (GLUCOPHAGE-XR) 500 MG 24 hr tablet TAKE FOUR TABLETS BY MOUTH EVERY DAY WITH BREAKFAST 360 tablet 1     predniSONE (DELTASONE) 10 MG  tablet Take 3 tabs by mouth for 3 days, then 2 tabs for 3 days, the 1 tab for 3 days then stop. 18 tablet 0     predniSONE (DELTASONE) 20 MG tablet Take 2 tablets (40 mg) by mouth daily 20 tablet 0     silver sulfADIAZINE (SILVADENE) 1 % external cream Apply topically daily 400 g 1       Family History: Reviewed    Physical Exam:    Vitals:    03/21/22 1017   BP: (!) 150/87   BP Location: Left arm   Patient Position: Sitting   Cuff Size: Adult Regular   Pulse: 94   SpO2: 95%   Weight: 78.4 kg (172 lb 14.4 oz)     Wt Readings from Last 3 Encounters:   03/21/22 78.4 kg (172 lb 14.4 oz)   09/20/21 83.5 kg (184 lb)   08/23/21 84.4 kg (186 lb)     Body mass index is 22.81 kg/m .    Constitutional:  Reveals a pleasant male in no acute distress, no wheezing or shortness of breath at rest.  After walking around, his oxygen continues to be at 95%, he denies any shortness of breath while walking on a flat surface..  Vitals:  Per nursing notes.  HEENT:No cervical LAD, no thyromegaly,  conjunctiva is pink, no scleral icterus, TMs are visualized and normal bl, oropharynx is clear, no exudates,   Cardiac:  Regular rate and rhythm,no murmurs, rubs, or gallops.  Legs without edema. Palpation of the radial pulse regular.  Lungs:   Respiratory effort normal.  He does have mild rhonchi/wheezing at the end of inspiration, when he coughs there is an audible bronchospasm and tightness in his airway with mild mucus impaction.  Abdomen:positive BS, soft, nontender, nondistended.  No hepato-splenomagaly  Skin:   Without rash, bruise, or palpable lesions.  Rheumatologic: Normal joints and nails of the hands.  Neurologic:  Cranial nerves II-XII intact.     Psychiatric: affect appropriate, memory intact.     Data Review:    Analysis and Summary of Old Records (2): yes      Records Requested (1): no      Other History Summarized (from other people in the room) (2): no    Radiology Tests Summarized (XRAY/CT/MRI/DXA) (1): no    Labs Reviewed (1):  yes    Medicine Tests Reviewed (EKG/ECHO/COLONOSCOPY/EGD) (1): no    Independent Review of EKG or X-RAY (2): ekg      Answers for HPI/ROS submitted by the patient on 3/21/2022  How many servings of fruits and vegetables do you eat daily?: 0-1  On average, how many sweetened beverages do you drink each day (Examples: soda, juice, sweet tea, etc.  Do NOT count diet or artificially sweetened beverages)?: 0  How many minutes a day do you exercise enough to make your heart beat faster?: 20 to 29  How many days a week do you exercise enough to make your heart beat faster?: 3 or less  How many days per week do you miss taking your medication?: 0  What is the reason for your visit today?: recurring asthma like shortness of breath, each time complete resolution of symptoms after antibiotics and prednisone but this is the third reappearance.  When did your symptoms begin?: 3-7 days ago  What are your symptoms?: SOB on exertion with stridor, no fever  How would you describe these symptoms?: Moderate  Have you had these symptoms before?: Yes  Is there anything that makes you feel worse?: any exertion  Is there anything that makes you feel better?: stop whatever I was doing and the meds cleared up everything

## 2022-03-22 LAB
ATRIAL RATE - MUSE: 87 BPM
DIASTOLIC BLOOD PRESSURE - MUSE: NORMAL MMHG
INTERPRETATION ECG - MUSE: NORMAL
P AXIS - MUSE: 69 DEGREES
PR INTERVAL - MUSE: 158 MS
QRS DURATION - MUSE: 80 MS
QT - MUSE: 370 MS
QTC - MUSE: 445 MS
R AXIS - MUSE: 24 DEGREES
SYSTOLIC BLOOD PRESSURE - MUSE: NORMAL MMHG
T AXIS - MUSE: 80 DEGREES
VENTRICULAR RATE- MUSE: 87 BPM

## 2022-03-23 DIAGNOSIS — R06.2 WHEEZING: ICD-10-CM

## 2022-03-23 DIAGNOSIS — R06.2 WHEEZE: ICD-10-CM

## 2022-03-23 DIAGNOSIS — R05.9 COUGH: ICD-10-CM

## 2022-03-24 RX ORDER — PREDNISONE 20 MG/1
40 TABLET ORAL DAILY
Qty: 20 TABLET | Refills: 0 | OUTPATIENT
Start: 2022-03-24

## 2022-03-24 RX ORDER — LEVOFLOXACIN 750 MG/1
750 TABLET, FILM COATED ORAL DAILY
Qty: 5 TABLET | Refills: 0 | OUTPATIENT
Start: 2022-03-24

## 2022-03-24 NOTE — TELEPHONE ENCOUNTER
Routing refill request to provider for review/approval because:  Drug not on the [facility] refill protocol     Gladis Kelly RN on 3/24/2022 at 7:47 AM

## 2022-03-25 NOTE — TELEPHONE ENCOUNTER
Unable to reach pt,     Please see who is requesting refills on Prednisone and Levaquin? Pharmacy or pt?    I prescribed prednisone recently for asthma exacerbation. Chest XR was negative for PNA. Pt was instructed to f/u with PCP in 2 weeks.

## 2022-03-25 NOTE — TELEPHONE ENCOUNTER
Seen 3/21/22:    1. SOB (shortness of breath) with exertion.  Based on exam suspect ongoing bronchospasm.  Patient does not have any history of asthma and symptoms started in January of this year, possibly after a viral infection at that time.     Due to persistent symptoms, will check chest x-ray.     Given his diabetic history will check BNP, EKG today did not show any acute changes.     She did improve with Brio Ellipta inhaler but may need a little bit more of a kick start with an oral prednisone still.  We will do a more gradual prednisone taper.  He will continue Breo Ellipta 2 puffs a day and will follow-up with his primary provider in 2 weeks.     - XR Chest 2 Views; Future  -- EKG 12-lead, tracing only  - fluticasone (FLONASE) 50 MCG/ACT nasal spray; Spray 1 spray into both nostrils daily  Dispense: 16 g; Refill: 3  - predniSONE (DELTASONE) 10 MG tablet; Take 3 tabs by mouth for 3 days, then 2 tabs for 3 days, the 1 tab for 3 days then stop.  Dispense: 18 tablet; Refill: 0  - albuterol (PROAIR HFA/PROVENTIL HFA/VENTOLIN HFA) 108 (90 Base) MCG/ACT inhaler; Inhale 2 puffs into the lungs every 6 hours  Dispense: 18 g; Refill: 3  - BNP-N terminal pro  - CBC with platelets and differential  - TSH with free T4 reflex  - Comprehensive metabolic panel    ---    I do not feel comfortable refilling this given that he was just seen and had a good plan above - will route to MD who saw him for perspective

## 2022-04-21 NOTE — TELEPHONE ENCOUNTER
Refill Request  Medication name:DANIELA-PERI FASTCLIX LANCET DRUM   Who prescribed the medication: PCP  Last refill on medication: 2/1/21  Requested Pharmacy: [facility]  Last appointment with PCP: 3/21/22  Next appointment: Not due

## 2022-04-24 RX ORDER — LANCETS
EACH MISCELLANEOUS
Qty: 300 EACH | Refills: 3 | Status: SHIPPED | OUTPATIENT
Start: 2022-04-24 | End: 2022-07-05

## 2022-04-24 NOTE — TELEPHONE ENCOUNTER
"Last Written Prescription Date:  11/23/19  Last Fill Quantity: 300,  # refills: 3   Last office visit provider:  3/21/22     Requested Prescriptions   Pending Prescriptions Disp Refills     blood glucose monitoring (ACCU-CHEK FASTCLIX) lancets 300 each 3     Sig: Use to test blood sugar  times daily or as directed.       Diabetic Supplies Protocol Passed - 4/21/2022  4:28 PM        Passed - Medication is active on med list        Passed - Patient is 18 years of age or older        Passed - Recent (6 mo) or future (30 days) visit within the authorizing provider's specialty     Patient had office visit in the last 6 months or has a visit in the next 30 days with authorizing provider.  See \"Patient Info\" tab in inbasket, or \"Choose Columns\" in Meds & Orders section of the refill encounter.                 Mindy Rivers 04/24/22 3:48 PM  "

## 2022-05-12 DIAGNOSIS — R06.02 SOB (SHORTNESS OF BREATH): ICD-10-CM

## 2022-05-12 DIAGNOSIS — J45.41 MODERATE PERSISTENT REACTIVE AIRWAY DISEASE WITH ACUTE EXACERBATION: ICD-10-CM

## 2022-05-14 DIAGNOSIS — R06.2 WHEEZING: ICD-10-CM

## 2022-05-14 RX ORDER — FLUTICASONE PROPIONATE 50 MCG
SPRAY, SUSPENSION (ML) NASAL
Qty: 16 ML | Refills: 3 | Status: SHIPPED | OUTPATIENT
Start: 2022-05-14 | End: 2022-05-26

## 2022-05-14 NOTE — TELEPHONE ENCOUNTER
"Last Written Prescription Date:  3/21/22  Last Fill Quantity: 16 g,  # refills: 3   Last office visit provider:  3/21/22     Requested Prescriptions   Pending Prescriptions Disp Refills     fluticasone (FLONASE) 50 MCG/ACT nasal spray [Pharmacy Med Name: FLUTICASONE PROP 50 MCG SPRAY] 16 mL 3     Sig: INSTILL 1 SPRAY INTO BOTH NOSTRILS DAILY       Nasal Allergy Protocol Passed - 5/12/2022  7:31 AM        Passed - Patient is age 12 or older        Passed - Recent (12 mo) or future (30 days) visit within the authorizing provider's specialty     Patient has had an office visit with the authorizing provider or a provider within the authorizing providers department within the previous 12 mos or has a future within next 30 days. See \"Patient Info\" tab in inbasket, or \"Choose Columns\" in Meds & Orders section of the refill encounter.              Passed - Medication is active on med list             Mindy Rivers 05/14/22 1:28 PM  "

## 2022-05-19 RX ORDER — DAPAGLIFLOZIN 5 MG/1
TABLET, FILM COATED ORAL
Qty: 90 TABLET | Refills: 1 | Status: SHIPPED | OUTPATIENT
Start: 2022-05-19 | End: 2022-05-25

## 2022-05-20 NOTE — TELEPHONE ENCOUNTER
"Last Written Prescription Date:  9/20/2021  Last Fill Quantity: 90,  # refills: 1   Last office visit provider:  9/20/2021     Requested Prescriptions   Pending Prescriptions Disp Refills     FARXIGA 5 MG TABS tablet [Pharmacy Med Name: FARXIGA 5 MG TABLET] 90 tablet 1     Sig: TAKE 1 TABLET BY MOUTH EVERY DAY       Sodium Glucose Co-Transport Inhibitor Agents Passed - 5/19/2022  3:46 AM        Passed - Patient has documented A1c within the specified period of time.     If HgbA1C is 8 or greater, it needs to be on file within the past 3 months.  If less than 8, must be on file within the past 6 months.     Recent Labs   Lab Test 03/21/22  1112   A1C 8.2*             Passed - No creatinine >1.4 or GFR <45 within the past 12 mos     Recent Labs   Lab Test 03/21/22  1112 08/23/21  1152 01/27/21  1047   GFRESTIMATED 52*   < > 51*   GFRESTBLACK  --   --  >60    < > = values in this interval not displayed.       Recent Labs   Lab Test 03/21/22  1112   CR 1.51*             Passed - Medication is active on med list        Passed - Patient is age 18 or older        Passed - Patient has documented normal Potassium within the last 12 mos.     Recent Labs   Lab Test 03/21/22  1112   POTASSIUM 4.5             Passed - Recent (6 mo) or future (30 days) visit within the authorizing provider's specialty     Patient had office visit in the last 6 months or has a visit in the next 30 days with authorizing provider or within the authorizing provider's specialty.  See \"Patient Info\" tab in inbasket, or \"Choose Columns\" in Meds & Orders section of the refill encounter.                 Roseann Kevin RN 05/19/22 11:57 PM  "

## 2022-05-24 ASSESSMENT — ENCOUNTER SYMPTOMS
NERVOUS/ANXIOUS: 0
EYE PAIN: 0
NAUSEA: 0
WEAKNESS: 0
JOINT SWELLING: 0
CHILLS: 0
SHORTNESS OF BREATH: 0
ARTHRALGIAS: 0
HEARTBURN: 0
MYALGIAS: 0
ABDOMINAL PAIN: 0
HEADACHES: 0
HEMATOCHEZIA: 0
CONSTIPATION: 0
PARESTHESIAS: 0
DYSURIA: 0
COUGH: 1
FREQUENCY: 0
DIARRHEA: 0
HEMATURIA: 0
SORE THROAT: 0
FEVER: 0
PALPITATIONS: 0
DIZZINESS: 0

## 2022-05-25 ENCOUNTER — OFFICE VISIT (OUTPATIENT)
Dept: FAMILY MEDICINE | Facility: CLINIC | Age: 62
End: 2022-05-25
Payer: COMMERCIAL

## 2022-05-25 VITALS
WEIGHT: 174.3 LBS | BODY MASS INDEX: 23.1 KG/M2 | RESPIRATION RATE: 15 BRPM | HEIGHT: 73 IN | SYSTOLIC BLOOD PRESSURE: 136 MMHG | DIASTOLIC BLOOD PRESSURE: 78 MMHG | OXYGEN SATURATION: 98 % | HEART RATE: 78 BPM

## 2022-05-25 DIAGNOSIS — Z13.220 SCREENING FOR HYPERLIPIDEMIA: ICD-10-CM

## 2022-05-25 DIAGNOSIS — Z00.00 ROUTINE GENERAL MEDICAL EXAMINATION AT A HEALTH CARE FACILITY: Primary | ICD-10-CM

## 2022-05-25 DIAGNOSIS — R06.2 WHEEZING: ICD-10-CM

## 2022-05-25 PROCEDURE — 99396 PREV VISIT EST AGE 40-64: CPT | Mod: 25 | Performed by: NURSE PRACTITIONER

## 2022-05-25 PROCEDURE — 99214 OFFICE O/P EST MOD 30 MIN: CPT | Mod: 25 | Performed by: NURSE PRACTITIONER

## 2022-05-25 PROCEDURE — 0054A COVID-19,PF,PFIZER (12+ YRS): CPT | Performed by: NURSE PRACTITIONER

## 2022-05-25 PROCEDURE — 91305 COVID-19,PF,PFIZER (12+ YRS): CPT | Performed by: NURSE PRACTITIONER

## 2022-05-25 RX ORDER — DAPAGLIFLOZIN 10 MG/1
10 TABLET, FILM COATED ORAL DAILY
Qty: 90 TABLET | Refills: 0 | Status: SHIPPED | OUTPATIENT
Start: 2022-05-25 | End: 2022-08-30

## 2022-05-25 ASSESSMENT — ASTHMA QUESTIONNAIRES
QUESTION_2 LAST FOUR WEEKS HOW OFTEN HAVE YOU HAD SHORTNESS OF BREATH: NOT AT ALL
QUESTION_1 LAST FOUR WEEKS HOW MUCH OF THE TIME DID YOUR ASTHMA KEEP YOU FROM GETTING AS MUCH DONE AT WORK, SCHOOL OR AT HOME: NONE OF THE TIME
QUESTION_4 LAST FOUR WEEKS HOW OFTEN HAVE YOU USED YOUR RESCUE INHALER OR NEBULIZER MEDICATION (SUCH AS ALBUTEROL): NOT AT ALL
QUESTION_3 LAST FOUR WEEKS HOW OFTEN DID YOUR ASTHMA SYMPTOMS (WHEEZING, COUGHING, SHORTNESS OF BREATH, CHEST TIGHTNESS OR PAIN) WAKE YOU UP AT NIGHT OR EARLIER THAN USUAL IN THE MORNING: NOT AT ALL
ACT_TOTALSCORE: 25
ACT_TOTALSCORE: 25
QUESTION_5 LAST FOUR WEEKS HOW WOULD YOU RATE YOUR ASTHMA CONTROL: COMPLETELY CONTROLLED

## 2022-05-25 ASSESSMENT — ENCOUNTER SYMPTOMS
CONSTIPATION: 0
DYSURIA: 0
NAUSEA: 0
ARTHRALGIAS: 0
JOINT SWELLING: 0
DIZZINESS: 0
COUGH: 1
CHILLS: 0
ABDOMINAL PAIN: 0
NERVOUS/ANXIOUS: 0
HEARTBURN: 0
MYALGIAS: 0
PARESTHESIAS: 0
FREQUENCY: 0
SORE THROAT: 0
DIARRHEA: 0
WEAKNESS: 0
HEMATOCHEZIA: 0
HEADACHES: 0
PALPITATIONS: 0
HEMATURIA: 0
SHORTNESS OF BREATH: 0
EYE PAIN: 0
FEVER: 0

## 2022-05-25 ASSESSMENT — PAIN SCALES - GENERAL: PAINLEVEL: NO PAIN (0)

## 2022-05-25 NOTE — PROGRESS NOTES
SUBJECTIVE:   CC: Ned Moore is an 62 year old male who presents for preventative health visit.       DM:  Continues on glipizide and Farxiga.  A1C two months ago 8.2. He brings in his home fasting glucose readings which are largely in goal range.  Two readings in the 70s. A handful > 130. Does not test postprandial. No regular exercise though very active at work.     Dypsnea:  Ongoing wheezing this spring.  Treated with prednisone burst and Breo Ellipta.  No known h/o asthma.  Three separate episodes.  Cleared quickly.  He feels fine now though is still unsure of cause.  Has continued Breo Ellipta.     Patient has been advised of split billing requirements and indicates understanding: Yes  Healthy Habits:     Getting at least 3 servings of Calcium per day:  NO    Bi-annual eye exam:  Yes    Dental care twice a year:  Yes    Sleep apnea or symptoms of sleep apnea:  None    Diet:  Diabetic and Carbohydrate counting    Frequency of exercise:  1 day/week    Duration of exercise:  15-30 minutes    Taking medications regularly:  Yes    Medication side effects:  None    PHQ-2 Total Score: 0    Additional concerns today:  No              Today's PHQ-2 Score:   PHQ-2 ( 1999 [employer]) 5/24/2022   Q1: Little interest or pleasure in doing things 0   Q2: Feeling down, depressed or hopeless 0   PHQ-2 Score 0   PHQ-2 Total Score (12-17 Years)- Positive if 3 or more points; Administer PHQ-A if positive -   Q1: Little interest or pleasure in doing things Not at all   Q2: Feeling down, depressed or hopeless Not at all   PHQ-2 Score 0       Abuse: Current or Past(Physical, Sexual or Emotional)- No  Do you feel safe in your environment? Yes    H Yes, advance care planning is on file.    Social History     Tobacco Use     Smoking status: Never Smoker     Smokeless tobacco: Never Used   Substance Use Topics     Alcohol use: Yes     Comment: a beer or wine every few months         Alcohol Use 5/24/2022   Prescreen: >3 drinks/day or >7  "drinks/week? No       Last PSA: No results found for: PSA    Reviewed orders with patient. Reviewed health maintenance and updated orders accordingly - Yes      Reviewed and updated as needed this visit by clinical staff   Tobacco  Allergies  Meds   Med Hx  Surg Hx  Fam Hx  Soc Hx          Reviewed and updated as needed this visit by Provider                       Review of Systems   Constitutional: Negative for chills and fever.   HENT: Negative for congestion, ear pain, hearing loss and sore throat.    Eyes: Negative for pain and visual disturbance.   Respiratory: Positive for cough. Negative for shortness of breath.    Cardiovascular: Negative for chest pain, palpitations and peripheral edema.   Gastrointestinal: Negative for abdominal pain, constipation, diarrhea, heartburn, hematochezia and nausea.   Genitourinary: Negative for dysuria, frequency, genital sores, hematuria, impotence, penile discharge and urgency.   Musculoskeletal: Negative for arthralgias, joint swelling and myalgias.   Skin: Negative for rash.   Neurological: Negative for dizziness, weakness, headaches and paresthesias.   Psychiatric/Behavioral: Negative for mood changes. The patient is not nervous/anxious.          OBJECTIVE:   /78 (BP Location: Left arm, Patient Position: Sitting, Cuff Size: Adult Large)   Pulse 78   Resp 15   Ht 1.854 m (6' 1\")   Wt 79.1 kg (174 lb 4.8 oz)   SpO2 98%   BMI 23.00 kg/m      Physical Exam  GENERAL: healthy, alert and no distress  EYES: Eyes grossly normal to inspection, PERRL and conjunctivae and sclerae normal  HENT: ear canals and TM's normal, nose and mouth without ulcers or lesions  NECK: no adenopathy, no asymmetry, masses, or scars and thyroid normal to palpation  RESP: lungs clear to auscultation - no rales, rhonchi or wheezes  CV: regular rate and rhythm, normal S1 S2, no S3 or S4, no murmur, click or rub, no peripheral edema and peripheral pulses strong  ABDOMEN: soft, nontender, no " "hepatosplenomegaly, no masses and bowel sounds normal  MS: no gross musculoskeletal defects noted, no edema  SKIN: no suspicious lesions or rashes  NEURO: Sensation is not intact to monofilament test at all sites except fifth toes bilaterally.   PSYCH: mentation appears normal, affect normal/bright    Diagnostic Test Results:  Labs reviewed in Epic    ASSESSMENT/PLAN:   1. Routine general medical examination at a health care facility  Reviewed recent fasting labs. Up to date on CRC screening.  Declines PSA.     2. Type 2 diabetes, uncontrolled, with neuropathy (H)  Uncontrolled with recent A1C 8.2.  Fasting glucoses are within range for the most part though is not measuring post meals. Will increase Farxiga to 10mg and have him return in one month to recheck renal function.  If stable, continue and recheck A1C in three months.    - dapagliflozin (FARXIGA) 10 MG TABS tablet; Take 1 tablet (10 mg) by mouth daily  Dispense: 90 tablet; Refill: 0  - Renal panel (Alb, BUN, Ca, Cl, CO2, Creat, Gluc, Phos, K, Na); Future    3. Wheezing  Three distinct episodes of wheezing this spring that required treatment with inhaled corticosteroids and prednisone.  No prior h/o asthma.  Chest xray negative.  Will check PFTs and follow up with results.   - General PFT Lab (Please always keep checked); Future    4. Screening for hyperlipidemia  Not fasting    Patient has been advised of split billing requirements and indicates understanding: Yes    COUNSELING:   Reviewed preventive health counseling, as reflected in patient instructions    Estimated body mass index is 23 kg/m  as calculated from the following:    Height as of this encounter: 1.854 m (6' 1\").    Weight as of this encounter: 79.1 kg (174 lb 4.8 oz).         He reports that he has never smoked. He has never used smokeless tobacco.      Counseling Resources:  ATP IV Guidelines  Pooled Cohorts Equation Calculator  FRAX Risk Assessment  ICSI Preventive Guidelines  Dietary " Guidelines for Americans, 2010  USDA's MyPlate  ASA Prophylaxis  Lung CA Screening    Anuradha Dunn, CNP  [facility]

## 2022-05-26 DIAGNOSIS — J45.41 MODERATE PERSISTENT REACTIVE AIRWAY DISEASE WITH ACUTE EXACERBATION: ICD-10-CM

## 2022-05-26 DIAGNOSIS — R06.02 SOB (SHORTNESS OF BREATH): ICD-10-CM

## 2022-05-26 RX ORDER — FLUTICASONE PROPIONATE 50 MCG
SPRAY, SUSPENSION (ML) NASAL
Qty: 16 ML | Refills: 3 | Status: SHIPPED | OUTPATIENT
Start: 2022-05-26 | End: 2023-08-21

## 2022-05-26 NOTE — TELEPHONE ENCOUNTER
Pharm is requesting a 90 day supply.          fluticasone (FLONASE) 50 MCG/ACT nasal spray      Pharm is requesting a 90 day supply.

## 2022-07-05 ENCOUNTER — TELEPHONE (OUTPATIENT)
Dept: FAMILY MEDICINE | Facility: CLINIC | Age: 62
End: 2022-07-05

## 2022-07-05 ENCOUNTER — ALLIED HEALTH/NURSE VISIT (OUTPATIENT)
Dept: FAMILY MEDICINE | Facility: CLINIC | Age: 62
End: 2022-07-05
Payer: COMMERCIAL

## 2022-07-05 ENCOUNTER — LAB (OUTPATIENT)
Dept: LAB | Facility: CLINIC | Age: 62
End: 2022-07-05
Payer: COMMERCIAL

## 2022-07-05 DIAGNOSIS — Z23 IMMUNIZATION DUE: Primary | ICD-10-CM

## 2022-07-05 DIAGNOSIS — Z00.00 ROUTINE HEALTH MAINTENANCE: Primary | ICD-10-CM

## 2022-07-05 LAB
ALBUMIN SERPL BCG-MCNC: 4.2 G/DL (ref 3.5–5.2)
ANION GAP SERPL CALCULATED.3IONS-SCNC: 12 MMOL/L (ref 7–15)
BUN SERPL-MCNC: 23.2 MG/DL (ref 8–23)
CALCIUM SERPL-MCNC: 9.5 MG/DL (ref 8.8–10.2)
CHLORIDE SERPL-SCNC: 103 MMOL/L (ref 98–107)
CREAT SERPL-MCNC: 1.37 MG/DL (ref 0.67–1.17)
DEPRECATED HCO3 PLAS-SCNC: 25 MMOL/L (ref 22–29)
GFR SERPL CREATININE-BSD FRML MDRD: 58 ML/MIN/1.73M2
GLUCOSE SERPL-MCNC: 103 MG/DL (ref 70–99)
PHOSPHATE SERPL-MCNC: 3.9 MG/DL (ref 2.5–4.5)
POTASSIUM SERPL-SCNC: 4.6 MMOL/L (ref 3.4–5.3)
SODIUM SERPL-SCNC: 140 MMOL/L (ref 136–145)

## 2022-07-05 PROCEDURE — 82565 ASSAY OF CREATININE: CPT

## 2022-07-05 PROCEDURE — 84100 ASSAY OF PHOSPHORUS: CPT

## 2022-07-05 PROCEDURE — 90471 IMMUNIZATION ADMIN: CPT

## 2022-07-05 PROCEDURE — 90750 HZV VACC RECOMBINANT IM: CPT

## 2022-07-05 PROCEDURE — 99207 PR NO CHARGE NURSE ONLY: CPT

## 2022-07-05 PROCEDURE — 80051 ELECTROLYTE PANEL: CPT

## 2022-07-05 PROCEDURE — 82040 ASSAY OF SERUM ALBUMIN: CPT

## 2022-07-05 PROCEDURE — 36415 COLL VENOUS BLD VENIPUNCTURE: CPT

## 2022-07-05 PROCEDURE — 82310 ASSAY OF CALCIUM: CPT

## 2022-07-05 PROCEDURE — 84520 ASSAY OF UREA NITROGEN: CPT

## 2022-08-16 DIAGNOSIS — R06.2 WHEEZING: ICD-10-CM

## 2022-08-30 DIAGNOSIS — R06.2 WHEEZING: ICD-10-CM

## 2022-08-30 NOTE — TELEPHONE ENCOUNTER
"Former patient of [facility] & has not established care with another provider.  Please assign refill request to covering provider per clinic standard process.    Routing refill request to provider for review/approval because:  Labs not current:  A1C  No PCP    Last Written Prescription Date:  5/25/22  Last Fill Quantity: 90,  # refills: 0   Last office visit provider:  5/25/22     Requested Prescriptions   Pending Prescriptions Disp Refills     FARXIGA 10 MG TABS tablet [Pharmacy Med Name: FARXIGA 10 MG TABLET] 30 tablet 2     Sig: TAKE 1 TABLET (10 MG) BY MOUTH DAILY.       Sodium Glucose Co-Transport Inhibitor Agents Failed - 8/30/2022  1:51 PM        Failed - Patient has documented A1c within the specified period of time.     If HgbA1C is 8 or greater, it needs to be on file within the past 3 months.  If less than 8, must be on file within the past 6 months.     Recent Labs   Lab Test 03/21/22  1112   A1C 8.2*             Passed - No creatinine >1.4 or GFR <45 within the past 12 mos     Recent Labs   Lab Test 07/05/22  0933 08/23/21  1152 01/27/21  1047   GFRESTIMATED 58*   < > 51*   GFRESTBLACK  --   --  >60    < > = values in this interval not displayed.       Recent Labs   Lab Test 07/05/22  0933   CR 1.37*             Passed - Medication is active on med list        Passed - Patient is age 18 or older        Passed - Patient has documented normal Potassium within the last 12 mos.     Recent Labs   Lab Test 07/05/22  0933   POTASSIUM 4.6             Passed - Recent (6 mo) or future (30 days) visit within the authorizing provider's specialty     Patient had office visit in the last 6 months or has a visit in the next 30 days with authorizing provider or within the authorizing provider's specialty.  See \"Patient Info\" tab in inbasket, or \"Choose Columns\" in Meds & Orders section of the refill encounter.                 Ang Roberts RN 08/30/22 1:52 PM  "

## 2022-09-02 RX ORDER — DAPAGLIFLOZIN 10 MG/1
TABLET, FILM COATED ORAL
Qty: 90 TABLET | Refills: 0 | Status: SHIPPED | OUTPATIENT
Start: 2022-09-02 | End: 2022-11-25

## 2022-09-07 NOTE — TELEPHONE ENCOUNTER
"Former patient of [facility] & has not established care with another provider.  Please assign refill request to covering provider per clinic standard process.      Routing refill request to provider for review/approval because:  Labs out of range:  Cr a1c    Last Written Prescription Date:  9/11/21  Last Fill Quantity: 90,  # refills: 3   Last office visit provider:  5/25/22     Requested Prescriptions   Pending Prescriptions Disp Refills     glipiZIDE (GLUCOTROL XL) 10 MG 24 hr tablet [Pharmacy Med Name: GLIPIZIDE ER 10 MG TABLET] 30 tablet 11     Sig: TAKE 1 TABLET BY MOUTH EVERY DAY       Sulfonylurea Agents Failed - 9/7/2022 12:33 AM        Failed - Patient has documented A1c within the specified period of time.     If HgbA1C is 8 or greater, it needs to be on file within the past 3 months.  If less than 8, must be on file within the past 6 months.     Recent Labs   Lab Test 03/21/22  1112   A1C 8.2*             Failed - Patient has a recent creatinine (normal) within the past 12 mos.     Recent Labs   Lab Test 07/05/22  0933   CR 1.37*       Ok to refill medication if creatinine is low          Passed - Medication is active on med list        Passed - Patient is age 18 or older        Passed - Recent (6 mo) or future (30 days) visit within the authorizing provider's specialty     Patient had office visit in the last 6 months or has a visit in the next 30 days with authorizing provider or within the authorizing provider's specialty.  See \"Patient Info\" tab in inbasket, or \"Choose Columns\" in Meds & Orders section of the refill encounter.                 Gladis Lucia RN 09/07/22 2:52 PM  "

## 2022-09-08 ENCOUNTER — E-VISIT (OUTPATIENT)
Dept: URGENT CARE | Facility: CLINIC | Age: 62
End: 2022-09-08
Payer: COMMERCIAL

## 2022-09-08 DIAGNOSIS — B96.89 ACUTE BACTERIAL SINUSITIS: Primary | ICD-10-CM

## 2022-09-08 DIAGNOSIS — J01.90 ACUTE BACTERIAL SINUSITIS: Primary | ICD-10-CM

## 2022-09-08 PROCEDURE — 99421 OL DIG E/M SVC 5-10 MIN: CPT | Performed by: FAMILY MEDICINE

## 2022-09-08 NOTE — PATIENT INSTRUCTIONS
Dear Ned Moore    After reviewing your responses, I've been able to diagnose you with?a sinus infection caused by bacteria.?     Based on your responses and diagnosis, I have prescribed Augmentin to treat your symptoms. I have sent this to your pharmacy.?     It is also important to stay well hydrated, get lots of rest and take over-the-counter decongestants,?tylenol?or ibuprofen if you?are able to?take those medications per your primary care provider to help relieve discomfort.?     It is important that you take?all of?your prescribed medication even if your symptoms are improving after a few doses.? Taking?all of?your medicine helps prevent the symptoms from returning.?     If your symptoms worsen, you develop severe headache, vomiting, high fever (>102), or are not improving in 7 days, please contact your primary care provider for an appointment or visit any of our convenient Walk-in Care or Urgent Care Centers to be seen which can be found on our website?here.?     Thanks again for choosing?us?as your health care partner,?   ?  Angeli Fragoso MD?

## 2022-09-09 RX ORDER — GLIPIZIDE 10 MG/1
TABLET, FILM COATED, EXTENDED RELEASE ORAL
Qty: 30 TABLET | Refills: 11 | Status: SHIPPED | OUTPATIENT
Start: 2022-09-09 | End: 2023-01-30 | Stop reason: ALTCHOICE

## 2022-10-01 ENCOUNTER — HEALTH MAINTENANCE LETTER (OUTPATIENT)
Age: 62
End: 2022-10-01

## 2022-10-05 DIAGNOSIS — E11.40 TYPE 2 DIABETES MELLITUS WITH DIABETIC NEUROPATHY, UNSPECIFIED (H): ICD-10-CM

## 2022-10-05 NOTE — TELEPHONE ENCOUNTER
"Former patient of [facility] & has not established care with another provider.  Please assign refill request to covering provider per clinic standard process.    Routing refill request to provider for review/approval because:  Labs not current:  LDL  No PCP    Last Written Prescription Date:  1/9/22  Last Fill Quantity: 90,  # refills: 2   Last office visit provider:  5/25/22     Requested Prescriptions   Pending Prescriptions Disp Refills     atorvastatin (LIPITOR) 20 MG tablet [Pharmacy Med Name: ATORVASTATIN 20 MG TABLET] 30 tablet 8     Sig: TAKE 1 TABLET BY MOUTH EVERY DAY IN THE EVENING       Statins Protocol Failed - 10/5/2022 11:27 AM        Failed - LDL on file in past 12 months     Recent Labs   Lab Test 01/27/21  1047   LDL 74             Passed - No abnormal creatine kinase in past 12 months     No lab results found.             Passed - Recent (12 mo) or future (30 days) visit within the authorizing provider's specialty     Patient has had an office visit with the authorizing provider or a provider within the authorizing providers department within the previous 12 mos or has a future within next 30 days. See \"Patient Info\" tab in inbasket, or \"Choose Columns\" in Meds & Orders section of the refill encounter.              Passed - Medication is active on med list        Passed - Patient is age 18 or older             Ang Roberts RN 10/05/22 11:27 AM  "

## 2022-10-06 RX ORDER — ATORVASTATIN CALCIUM 20 MG/1
TABLET, FILM COATED ORAL
Qty: 90 TABLET | Refills: 3 | Status: SHIPPED | OUTPATIENT
Start: 2022-10-06 | End: 2023-10-17

## 2022-10-09 DIAGNOSIS — N18.31 TYPE 2 DIABETES MELLITUS WITH STAGE 3A CHRONIC KIDNEY DISEASE, WITHOUT LONG-TERM CURRENT USE OF INSULIN (H): Primary | ICD-10-CM

## 2022-10-09 DIAGNOSIS — E11.22 TYPE 2 DIABETES MELLITUS WITH STAGE 3A CHRONIC KIDNEY DISEASE, WITHOUT LONG-TERM CURRENT USE OF INSULIN (H): Primary | ICD-10-CM

## 2022-10-09 NOTE — PROGRESS NOTES
This patient is on our lab schedule for Monday. Former [facility] patient, establishing with Dr. Briggs in December. It says he needs an INR, but not really sure. We have no lab orders, please advise/enter orders, thank you.

## 2022-10-10 ENCOUNTER — LAB (OUTPATIENT)
Dept: LAB | Facility: CLINIC | Age: 62
End: 2022-10-10
Payer: COMMERCIAL

## 2022-10-10 ENCOUNTER — ALLIED HEALTH/NURSE VISIT (OUTPATIENT)
Dept: FAMILY MEDICINE | Facility: CLINIC | Age: 62
End: 2022-10-10
Payer: COMMERCIAL

## 2022-10-10 DIAGNOSIS — Z23 NEED FOR VACCINATION: Primary | ICD-10-CM

## 2022-10-10 DIAGNOSIS — N18.31 TYPE 2 DIABETES MELLITUS WITH STAGE 3A CHRONIC KIDNEY DISEASE, WITHOUT LONG-TERM CURRENT USE OF INSULIN (H): ICD-10-CM

## 2022-10-10 DIAGNOSIS — E11.22 TYPE 2 DIABETES MELLITUS WITH STAGE 3A CHRONIC KIDNEY DISEASE, WITHOUT LONG-TERM CURRENT USE OF INSULIN (H): ICD-10-CM

## 2022-10-10 LAB — HBA1C MFR BLD: 8 % (ref 0–5.6)

## 2022-10-10 PROCEDURE — 83036 HEMOGLOBIN GLYCOSYLATED A1C: CPT

## 2022-10-10 PROCEDURE — 36415 COLL VENOUS BLD VENIPUNCTURE: CPT

## 2022-10-10 PROCEDURE — 99207 PR NO CHARGE NURSE ONLY: CPT

## 2022-10-10 PROCEDURE — 90471 IMMUNIZATION ADMIN: CPT

## 2022-10-10 PROCEDURE — 90750 HZV VACC RECOMBINANT IM: CPT

## 2022-10-10 NOTE — PROGRESS NOTES
Don't know why they would need an INR  I ordered some diabetic labs    Mark Briggs MD on 10/10/2022 at 10:47 AM

## 2022-10-10 NOTE — PROGRESS NOTES
Ned came in today for his 2nd shingles shot see immunizations    Susie Bernabe CMA ([facility])

## 2022-10-27 DIAGNOSIS — R06.2 WHEEZING: ICD-10-CM

## 2022-10-27 NOTE — TELEPHONE ENCOUNTER
Prescription approved per [facility] Refill Protocol.  Alaina Adorno RN on 10/27/2022 at 11:41 AM

## 2022-11-01 ENCOUNTER — E-VISIT (OUTPATIENT)
Dept: URGENT CARE | Facility: CLINIC | Age: 62
End: 2022-11-01
Payer: COMMERCIAL

## 2022-11-01 DIAGNOSIS — R05.1 ACUTE COUGH: Primary | ICD-10-CM

## 2022-11-01 PROCEDURE — 99421 OL DIG E/M SVC 5-10 MIN: CPT | Performed by: EMERGENCY MEDICINE

## 2022-11-01 RX ORDER — PREDNISONE 20 MG/1
20 TABLET ORAL 2 TIMES DAILY
Qty: 10 TABLET | Refills: 0 | Status: SHIPPED | OUTPATIENT
Start: 2022-11-01 | End: 2022-11-06

## 2022-11-01 RX ORDER — AZITHROMYCIN 250 MG/1
TABLET, FILM COATED ORAL
Qty: 6 TABLET | Refills: 0 | Status: SHIPPED | OUTPATIENT
Start: 2022-11-01 | End: 2022-11-06

## 2022-11-01 NOTE — PATIENT INSTRUCTIONS
"    Dear Ned Moore    After reviewing your responses, I've been able to diagnose you with \"Bronchitis\" which is a common infection of your lungs. While this is most commonly caused by a virus, the symptoms you have given suggest you should be treated with antibiotics.     I have sent zpak and prednisone to your pharmacy to treat this infection.     It is important that you take all of your prescribed medication even if your symptoms are improving after a few doses. Taking all of your medicine helps prevent the symptoms from returning.     If your symptoms worsen, you develop chest pain or shortness of breath, fevers over 101, or are not improving in 5 days, please contact your primary care provider for an appointment or visit any of our convenient Walk-in Care or Urgent Care Centers to be seen which can be found on our website here.    Thanks again for choosing us as your health care partner,    Sergio Valenzuela MD    "

## 2022-11-09 DIAGNOSIS — E11.40 TYPE 2 DIABETES MELLITUS WITH DIABETIC NEUROPATHY, UNSPECIFIED (H): ICD-10-CM

## 2022-11-11 NOTE — TELEPHONE ENCOUNTER
"Former patient of [facility] & has not established care with another provider.  Please assign refill request to covering provider per clinic standard process.      Last Written Prescription Date:  8/8/22  Last Fill Quantity: 120,  # refills: 2   Last office visit provider:  5/25/22     Requested Prescriptions   Pending Prescriptions Disp Refills     metFORMIN (GLUCOPHAGE XR) 500 MG 24 hr tablet [Pharmacy Med Name: METFORMIN HCL  MG TABLET] 120 tablet 2     Sig: TAKE FOUR TABLETS BY MOUTH EVERY DAY WITH BREAKFAST       Biguanide Agents Passed - 11/9/2022 10:41 AM        Passed - Patient is age 10 or older        Passed - Patient has documented A1c within the specified period of time.     If HgbA1C is 8 or greater, it needs to be on file within the past 3 months.  If less than 8, must be on file within the past 6 months.     Recent Labs   Lab Test 10/10/22  1447   A1C 8.0*             Passed - Patient's CR is NOT>1.4 OR Patient's EGFR is NOT<45 within past 12 mos.     Recent Labs   Lab Test 07/05/22  0933 08/23/21  1152 01/27/21  1047   GFRESTIMATED 58*   < > 51*   GFRESTBLACK  --   --  >60    < > = values in this interval not displayed.       Recent Labs   Lab Test 07/05/22  0933   CR 1.37*             Passed - Patient does NOT have a diagnosis of CHF.        Passed - Medication is active on med list        Passed - Recent (6 mo) or future (30 days) visit within the authorizing provider's specialty     Patient had office visit in the last 6 months or has a visit in the next 30 days with authorizing provider or within the authorizing provider's specialty.  See \"Patient Info\" tab in inbasket, or \"Choose Columns\" in Meds & Orders section of the refill encounter.                 Ayaka Worthington RN 11/11/22 1:45 PM  "

## 2022-11-14 RX ORDER — METFORMIN HCL 500 MG
TABLET, EXTENDED RELEASE 24 HR ORAL
Qty: 120 TABLET | Refills: 0 | Status: SHIPPED | OUTPATIENT
Start: 2022-11-14 | End: 2022-12-08

## 2022-11-22 DIAGNOSIS — E11.40 TYPE 2 DIABETES MELLITUS WITH DIABETIC NEUROPATHY, UNSPECIFIED (H): ICD-10-CM

## 2022-11-22 NOTE — TELEPHONE ENCOUNTER
"Former patient of [facility] & has not established care with another provider.  Please assign refill request to covering provider per clinic standard process.        Last Written Prescription Date:  9/2/22  Last Fill Quantity: 90,  # refills: 0   Last office visit provider:  5/25/22     Requested Prescriptions   Pending Prescriptions Disp Refills     FARXIGA 10 MG TABS tablet [Pharmacy Med Name: FARXIGA 10 MG TABLET] 30 tablet 2     Sig: TAKE 1 TABLET (10 MG) BY MOUTH DAILY.       Sodium Glucose Co-Transport Inhibitor Agents Passed - 11/22/2022 12:37 AM        Passed - Patient has documented A1c within the specified period of time.     If HgbA1C is 8 or greater, it needs to be on file within the past 3 months.  If less than 8, must be on file within the past 6 months.     Recent Labs   Lab Test 10/10/22  1447   A1C 8.0*             Passed - No creatinine >1.4 or GFR <45 within the past 12 mos     Recent Labs   Lab Test 07/05/22  0933 08/23/21  1152 01/27/21  1047   GFRESTIMATED 58*   < > 51*   GFRESTBLACK  --   --  >60    < > = values in this interval not displayed.       Recent Labs   Lab Test 07/05/22  0933   CR 1.37*             Passed - Medication is active on med list        Passed - Patient is age 18 or older        Passed - Patient has documented normal Potassium within the last 12 mos.     Recent Labs   Lab Test 07/05/22  0933   POTASSIUM 4.6             Passed - Recent (6 mo) or future (30 days) visit within the authorizing provider's specialty     Patient had office visit in the last 6 months or has a visit in the next 30 days with authorizing provider or within the authorizing provider's specialty.  See \"Patient Info\" tab in inbasket, or \"Choose Columns\" in Meds & Orders section of the refill encounter.                 Gladis Lucia RN 11/22/22 4:58 PM  "

## 2022-11-25 RX ORDER — DAPAGLIFLOZIN 10 MG/1
TABLET, FILM COATED ORAL
Qty: 30 TABLET | Refills: 2 | Status: SHIPPED | OUTPATIENT
Start: 2022-11-25 | End: 2023-02-25

## 2022-11-30 ASSESSMENT — ASTHMA QUESTIONNAIRES
QUESTION_3 LAST FOUR WEEKS HOW OFTEN DID YOUR ASTHMA SYMPTOMS (WHEEZING, COUGHING, SHORTNESS OF BREATH, CHEST TIGHTNESS OR PAIN) WAKE YOU UP AT NIGHT OR EARLIER THAN USUAL IN THE MORNING: NOT AT ALL
QUESTION_1 LAST FOUR WEEKS HOW MUCH OF THE TIME DID YOUR ASTHMA KEEP YOU FROM GETTING AS MUCH DONE AT WORK, SCHOOL OR AT HOME: A LITTLE OF THE TIME
QUESTION_4 LAST FOUR WEEKS HOW OFTEN HAVE YOU USED YOUR RESCUE INHALER OR NEBULIZER MEDICATION (SUCH AS ALBUTEROL): ONCE A WEEK OR LESS
QUESTION_5 LAST FOUR WEEKS HOW WOULD YOU RATE YOUR ASTHMA CONTROL: POORLY CONTROLLED
ACT_TOTALSCORE: 16
ACT_TOTALSCORE: 16
QUESTION_2 LAST FOUR WEEKS HOW OFTEN HAVE YOU HAD SHORTNESS OF BREATH: MORE THAN ONCE A DAY

## 2022-12-01 ENCOUNTER — OFFICE VISIT (OUTPATIENT)
Dept: INTERNAL MEDICINE | Facility: CLINIC | Age: 62
End: 2022-12-01
Payer: COMMERCIAL

## 2022-12-01 VITALS
BODY MASS INDEX: 21.69 KG/M2 | WEIGHT: 169 LBS | OXYGEN SATURATION: 96 % | SYSTOLIC BLOOD PRESSURE: 135 MMHG | RESPIRATION RATE: 12 BRPM | TEMPERATURE: 98.1 F | HEART RATE: 98 BPM | HEIGHT: 74 IN | DIASTOLIC BLOOD PRESSURE: 75 MMHG

## 2022-12-01 DIAGNOSIS — E11.42 DIABETIC POLYNEUROPATHY ASSOCIATED WITH TYPE 2 DIABETES MELLITUS (H): ICD-10-CM

## 2022-12-01 DIAGNOSIS — E78.5 HYPERLIPIDEMIA LDL GOAL <70: ICD-10-CM

## 2022-12-01 DIAGNOSIS — R06.2 WHEEZING: ICD-10-CM

## 2022-12-01 DIAGNOSIS — N18.31 TYPE 2 DIABETES MELLITUS WITH STAGE 3A CHRONIC KIDNEY DISEASE, WITHOUT LONG-TERM CURRENT USE OF INSULIN (H): Primary | ICD-10-CM

## 2022-12-01 DIAGNOSIS — E11.22 TYPE 2 DIABETES MELLITUS WITH STAGE 3A CHRONIC KIDNEY DISEASE, WITHOUT LONG-TERM CURRENT USE OF INSULIN (H): Primary | ICD-10-CM

## 2022-12-01 DIAGNOSIS — Z13.220 SCREENING FOR HYPERLIPIDEMIA: ICD-10-CM

## 2022-12-01 LAB
CREAT UR-MCNC: 109 MG/DL
MICROALBUMIN UR-MCNC: 26.2 MG/L
MICROALBUMIN/CREAT UR: 24.04 MG/G CR (ref 0–17)

## 2022-12-01 PROCEDURE — 99214 OFFICE O/P EST MOD 30 MIN: CPT | Performed by: INTERNAL MEDICINE

## 2022-12-01 PROCEDURE — 82043 UR ALBUMIN QUANTITATIVE: CPT | Performed by: INTERNAL MEDICINE

## 2022-12-01 RX ORDER — AZITHROMYCIN 250 MG/1
TABLET, FILM COATED ORAL
Qty: 6 TABLET | Refills: 0 | Status: SHIPPED | OUTPATIENT
Start: 2022-12-01 | End: 2022-12-21

## 2022-12-01 RX ORDER — PREDNISONE 20 MG/1
40 TABLET ORAL DAILY
Qty: 10 TABLET | Refills: 0 | Status: SHIPPED | OUTPATIENT
Start: 2022-12-01 | End: 2022-12-21

## 2022-12-01 NOTE — PROGRESS NOTES
Assessment & Plan     (E11.22,  N18.31) Type 2 diabetes mellitus with stage 3a chronic kidney disease, without long-term current use of insulin (H)  (primary encounter diagnosis)  Comment: uncontrolled   Plan: Albumin Random Urine Quantitative with Creat         Ratio, AMB Adult Diabetes Educator Referral        One three oral medications, essentially at max dosing. He has a fear of dropping too low. His AM sugars are reasonable but likely goes high during day. It is not practical for him to check during his work and thus is unsure of what in his diet is causing the problem. Granola and small amounts of pasta during the day are common. He is wondering about a CGM.   Due to the need to assess his diet and figure out possible monitors, will have him see DM education. Referral placed.  Eye exam needed, referral placed.  See me again in 3 months with virtual visit. Will get preclinic a1c.    (E78.5) Hyperlipidemia LDL goal <70  Comment: on statin  Plan: Will plan to continue on previous medication without changes     (R06.2) Wheezing  Comment: recurrent. Xray normal in March 2022. Non-smoker  Plan: General PFT Lab (Please always keep checked),         Pulmonary Function Test, azithromycin         (ZITHROMAX) 250 MG tablet, predniSONE         (DELTASONE) 20 MG tablet        ?environmental vs other. Combination of oral steroid and zpak has been quite helpful. Will use again though would like formal PFTs. Cannot rule out upper airway as well. Hopefully, the PFTs will narrow things down.                   Return in about 3 months (around 3/1/2023) for Follow up, using a video visit.    Mark Briggs MD  [facility]    Lupe Marino is a 62 year old accompanied by his self, presenting for the following health issues:  Office Visit (Establishing care and PT has had Bronchitis about 4 days.) and Recheck Medication    Establish care  Dm  Htn  Recurrent wheezing.  Unclear cause.  Another  "flare just started  Zpak/prednisone helps.    History of Present Illness       Reason for visit:  Bronchitis    He eats 2-3 servings of fruits and vegetables daily.He consumes 0 sweetened beverage(s) daily.He exercises with enough effort to increase his heart rate 10 to 19 minutes per day.  He exercises with enough effort to increase his heart rate 4 days per week.   He is taking medications regularly.             Review of Systems         Objective    /75   Pulse 98   Temp 98.1  F (36.7  C) (Oral)   Resp 12   Ht 1.88 m (6' 2\")   Wt 76.7 kg (169 lb)   SpO2 96%   BMI 21.70 kg/m    Body mass index is 21.7 kg/m .  Physical Exam                       "

## 2022-12-07 DIAGNOSIS — E11.40 TYPE 2 DIABETES MELLITUS WITH DIABETIC NEUROPATHY, UNSPECIFIED (H): ICD-10-CM

## 2022-12-08 RX ORDER — METFORMIN HCL 500 MG
TABLET, EXTENDED RELEASE 24 HR ORAL
Qty: 120 TABLET | Refills: 0 | Status: SHIPPED | OUTPATIENT
Start: 2022-12-08 | End: 2023-01-10

## 2022-12-08 NOTE — TELEPHONE ENCOUNTER
"Last Written Prescription Date:  11/14/22  Last Fill Quantity: 120,  # refills: 0   Last office visit provider:   12/1/22    Requested Prescriptions   Pending Prescriptions Disp Refills     metFORMIN (GLUCOPHAGE XR) 500 MG 24 hr tablet [Pharmacy Med Name: METFORMIN HCL  MG TABLET] 120 tablet 0     Sig: TAKE FOUR TABLETS BY MOUTH EVERY DAY WITH BREAKFAST       Biguanide Agents Passed - 12/7/2022  1:36 PM        Passed - Patient is age 10 or older        Passed - Patient has documented A1c within the specified period of time.     If HgbA1C is 8 or greater, it needs to be on file within the past 3 months.  If less than 8, must be on file within the past 6 months.     Recent Labs   Lab Test 10/10/22  1447   A1C 8.0*             Passed - Patient's CR is NOT>1.4 OR Patient's EGFR is NOT<45 within past 12 mos.     Recent Labs   Lab Test 07/05/22  0933 08/23/21  1152 01/27/21  1047   GFRESTIMATED 58*   < > 51*   GFRESTBLACK  --   --  >60    < > = values in this interval not displayed.       Recent Labs   Lab Test 07/05/22  0933   CR 1.37*             Passed - Patient does NOT have a diagnosis of CHF.        Passed - Medication is active on med list        Passed - Recent (6 mo) or future (30 days) visit within the authorizing provider's specialty     Patient had office visit in the last 6 months or has a visit in the next 30 days with authorizing provider or within the authorizing provider's specialty.  See \"Patient Info\" tab in inbasket, or \"Choose Columns\" in Meds & Orders section of the refill encounter.                 Audra Nunez RN 12/08/22 2:01 PM  "

## 2022-12-21 ENCOUNTER — E-VISIT (OUTPATIENT)
Dept: INTERNAL MEDICINE | Facility: CLINIC | Age: 62
End: 2022-12-21
Payer: COMMERCIAL

## 2022-12-21 DIAGNOSIS — R06.2 WHEEZING: ICD-10-CM

## 2022-12-21 PROCEDURE — 99421 OL DIG E/M SVC 5-10 MIN: CPT | Performed by: INTERNAL MEDICINE

## 2022-12-21 RX ORDER — AZITHROMYCIN 250 MG/1
TABLET, FILM COATED ORAL
Qty: 6 TABLET | Refills: 0 | Status: SHIPPED | OUTPATIENT
Start: 2022-12-21 | End: 2022-12-26

## 2022-12-21 RX ORDER — PREDNISONE 20 MG/1
40 TABLET ORAL DAILY
Qty: 10 TABLET | Refills: 0 | Status: SHIPPED | OUTPATIENT
Start: 2022-12-21 | End: 2023-01-04

## 2022-12-21 NOTE — PATIENT INSTRUCTIONS
Thank you for choosing us for your care. I have placed an order for a prescription so that you can start treatment. View your full visit summary for details by clicking on the link below. Your pharmacist will able to address any questions you may have about the medication.     If you're not feeling better within 5-7 days, please schedule an appointment.  You can schedule an appointment right here in [facility], or call [phone]  If the visit is for the same symptoms as your eVisit, we'll refund the cost of your eVisit if seen within seven days.

## 2022-12-27 ENCOUNTER — ALLIED HEALTH/NURSE VISIT (OUTPATIENT)
Dept: EDUCATION SERVICES | Facility: CLINIC | Age: 62
End: 2022-12-27
Payer: COMMERCIAL

## 2022-12-27 DIAGNOSIS — N18.31 TYPE 2 DIABETES MELLITUS WITH STAGE 3A CHRONIC KIDNEY DISEASE, WITHOUT LONG-TERM CURRENT USE OF INSULIN (H): Primary | ICD-10-CM

## 2022-12-27 DIAGNOSIS — E11.22 TYPE 2 DIABETES MELLITUS WITH STAGE 3A CHRONIC KIDNEY DISEASE, WITHOUT LONG-TERM CURRENT USE OF INSULIN (H): Primary | ICD-10-CM

## 2022-12-27 PROCEDURE — G0108 DIAB MANAGE TRN  PER INDIV: HCPCS | Mod: AE

## 2022-12-27 RX ORDER — BLOOD-GLUCOSE SENSOR
1 EACH MISCELLANEOUS CONTINUOUS
Qty: 2 EACH | Refills: 11 | Status: SHIPPED | OUTPATIENT
Start: 2022-12-27 | End: 2023-01-30 | Stop reason: ALTCHOICE

## 2022-12-27 NOTE — PROGRESS NOTES
Diabetes Self-Management Education & Support    Presents for: Professional CGM Start    Type of Service: In Person Visit    Assessment Type:   Patient completed homework (online training and/or Getting started with CGM guide)? : No  Sensor started:: Started today in clinic  CGM Initial Settings: Freestyle Agustín  Freestyle Agustín Target Glucose Range (mg/dL):     Sensor was inserted with no resistance or bleeding at insertion site.    CGM-specific education:   Freestyle Agustín sensor: insertion technique, sensor site location and rotation, sensor wear, reasons to remove sensor (MRI, CT, diathermy), Vitamin C & Aspirin effects on sensor, Agustín phone Jbphh: frequency of scanning sensor, length of time data is visible,Use of trends and graphs for pattern management and problem solving and [employer] software         ASSESSMENT:  Patient is a paramedic,and has challenges with consistency in meal planning. He reports he chooses quick foods over healthy foods when he is busy at work. He also does not have time to test his blood sugar when he is busy at work. He tests his blood sugars fasting and reports readings in the 80's    Patient is interested in personal CGM, his phone is not compatible wit the Agustín 3 sensor but it is compatible with the agustín 2 sensor . He is aware of the cost and is still interested in moving forward on it.      Discussed pathophysiology of diabetes, the difference between Type 1 and 2 DM.  Reviewed carbohydrate sources, portion control and the benefit of spreading intake throughout the day and planning for quick health snacks.  Reviewed benefits of exercise to help with better blood glucose utilization.  Discussed the benefit of alternating checking times to help identify patterns, how often to check BG and reviewed target BG levels.      Pt verbalized understanding of concepts discussed and recommendations provided today.    PLAN  Meal Plan Recommendation: eat 3 meals a day, have small  "snacks between meals, if needed, use plate planning method, and refer to the snack list for ideas.  Try packing quick-easy snacks for work    Follow up:  Send a [employer] message in 1 week with an update on how you are doing, and what you see on your sensor data. Ask that we look at your maría report.   Follow-up diabetes education appointment scheduled on 1/30/23 at 9:00am.     See Care Plan for co-developed, patient-state behavior change goals.  AVS provided for patient today.    Education Materials Provided:  [facility] Understanding Diabetes Booklet, My Plate Planner and snack ideas      SUBJECTIVE/OBJECTIVE:  Presents for: Professional CGM Start  Accompanied by: Self  Diabetes education in the past 24mo: No  Focus of Visit: CGM  Type of CGM visit: Personal CGM Start  Diabetes type: Type 2  Disease course: Stable  How confident are you filling out medical forms by yourself:: Extremely  Diabetes management related comments/concerns: thats why i'm having this consult  Cultural Influences/Ethnic Background:  Not  or     Diabetes Symptoms & Complications:  Fatigue: No  Neuropathy: No  Polydipsia: No  Polyphagia: No  Polyuria: No  Visual change: No  Slow healing wounds: No  Complications assessed today?: Yes  Autonomic neuropathy: No  CVA: No  Heart disease: No  Nephropathy: Yes  Peripheral neuropathy: Yes  Peripheral Vascular Disease: No  Retinopathy: No  Sexual dysfunction: No    Patient Problem List and Family Medical History reviewed for relevant medical history, current medical status, and diabetes risk factors.    Vitals:  There were no vitals taken for this visit.  Estimated body mass index is 21.7 kg/m  as calculated from the following:    Height as of 12/1/22: 1.88 m (6' 2\").    Weight as of 12/1/22: 76.7 kg (169 lb).   Last 3 BP:   BP Readings from Last 3 Encounters:   12/01/22 135/75   05/25/22 136/78   03/21/22 (!) 150/87       History   Smoking Status     Never   Smokeless Tobacco     " Never       Labs:  Lab Results   Component Value Date    A1C 8.0 10/10/2022     Lab Results   Component Value Date     07/05/2022     03/21/2022     Lab Results   Component Value Date    LDL 74 01/27/2021     Direct Measure HDL   Date Value Ref Range Status   01/27/2021 57 >=40 mg/dL Final   ]  GFR Estimate   Date Value Ref Range Status   07/05/2022 58 (L) >60 mL/min/1.73m2 Final     Comment:     Effective December 21, 2021 eGFRcr in adults is calculated using the 2021 CKD-EPI creatinine equation which includes age and gender (Damir et al., NEJ, DOI: 10.1056/ZLSRdx0142946)   01/27/2021 51 (L) >60 mL/min/1.73m2 Final     GFR Estimate If Black   Date Value Ref Range Status   01/27/2021 >60 >60 mL/min/1.73m2 Final     Lab Results   Component Value Date    CR 1.37 07/05/2022     No results found for: MICROALBUMIN    Healthy Eating:  Healthy Eating Assessed Today: Yes  Cultural/Yarsani diet restrictions?: No  Meal planning/habits: Avoiding sweets, Carb counting, Heart healthy, Smaller portions  How many times a week on average do you eat food made away from home (restaurant/take-out)?: 5+  Meals include: Breakfast, Dinner, Afternoon Snack  Breakfast: coffee, 1/2 muffin/bagel  Lunch: hospital meal- 1/2 the meal- chicken pasta, salad  Dinner: the other half of lunch or oatmeal with splenda, yogurt  Snacks: afternoon snack- protein bar, cheese and crackers, or snack from the ER such as chips, oatmeal  Other: mid morning- breakfast sandwich  Beverages: Water, Tea, Coffee, Milk  Has patient met with a dietitian in the past?: No    Being Active:  Barrier to exercise: Time, Access    Monitoring:  Monitoring Assessed Today: Yes  Blood Glucose Meter: Accu-chek  Times checking blood sugar at home (number): 2  Times checking blood sugar at home (per): Week  Blood glucose trend: No change      Taking Medications:  Diabetes Medication(s)     Biguanides       metFORMIN (GLUCOPHAGE XR) 500 MG 24 hr tablet    TAKE FOUR  TABLETS BY MOUTH EVERY DAY WITH BREAKFAST    Sodium-Glucose Co-Transporter 2 (SGLT2) Inhibitors       FARXIGA 10 MG TABS tablet    TAKE 1 TABLET (10 MG) BY MOUTH DAILY.    Sulfonylureas       glipiZIDE (GLUCOTROL XL) 10 MG 24 hr tablet    TAKE 1 TABLET BY MOUTH EVERY DAY          Current Treatments: Diet, Oral Medication (taken by mouth)    Problem Solving:  Problem Solving Assessed Today: Yes  Is the patient at risk for hypoglycemia?: Yes  Hypoglycemia Frequency: Rarely  Hypoglycemia Treatment: Other food (cookies)    Reducing Risks:  Reducing Risks Assessed Today: No  CAD Risks: Diabetes Mellitus  Has dilated eye exam at least once a year?: No  Sees dentist every 6 months?: Yes  Feet checked by healthcare provider in the last year?: Yes    Healthy Coping:  Healthy Coping Assessed Today: Yes  Emotional response to diabetes: Ready to learn  Informal Support system:: Children, Family, Spouse  Stage of change: PREPARATION (Decided to change - considering how)  Patient Activation Measure Survey Score:  No flowsheet data found.      Care Plan and Education Provided:  Care Plan: Diabetes   Updates made by Gayathri Kelley RN since 12/27/2022 12:00 AM      Problem: HbA1C Not In Goal       Goal: Establish Regular Follow-Ups with PCP       Task: Discuss with PCP the recommended timing for patient's next follow up visit(s)    Responsible User: Gayathri Kelley RN      Task: Discuss schedule for PCP visits with patient    Responsible User: Gayathri Kelley RN      Goal: Get HbA1C Level in Goal       Task: Educate patient on diabetes education self-management topics    Responsible User: Gayathri Kelley RN      Task: Educate patient on benefits of regular glucose monitoring    Responsible User: Gayathri Kelley RN      Task: Refer patient to appropriate extended care team member, as needed (Medication Therapy Management, Behavioral Health, Physical Therapy, etc.)    Responsible User: Gayathri Kelley RN      Task:  Discuss diabetes treatment plan with patient    Responsible User: Gayathri Kelley RN      Problem: Diabetes Self-Management Education Needed to Optimize Self-Care Behaviors       Goal: Understand diabetes pathophysiology and disease progression       Task: Provide education on diabetes pathophysiology and disease progression specfic to patient's diabetes type Completed 12/27/2022   Responsible User: Gayathri Kelley RN      Goal: Healthy Eating - follow a healthy eating pattern for diabetes       Task: Provide education on portion control and consistency in amount, composition and timing of food intake Completed 12/27/2022   Responsible User: Gayathri Kelley RN      Task: Provide education on managing carbohydrate intake (carbohydrate counting, plate planning method, etc.) Completed 12/27/2022   Responsible User: Gayathri Kelley RN      Task: Provide education on weight management    Responsible User: Gayathri Kelley RN      Task: Provide education on heart healthy eating    Responsible User: Gayathri Kelley RN      Task: Provide education on eating out    Responsible User: Gayathri Kelley RN      Task: Develop individualized healthy eating plan with patient    Responsible User: Gayathri Kelley RN      Goal: Being Active - get regular physical activity, working up to at least 150 minutes per week       Task: Provide education on relationship of activity to glucose and precautions to take if at risk for low glucose    Responsible User: Gayathri Kelley RN      Task: Discuss barriers to physical activity with patient    Responsible User: Gayathri Kelley RN      Task: Develop physical activity plan with patient    Responsible User: Gayathri Kelley RN      Task: Explore community resources including walking groups, assistance programs, and home videos    Responsible User: Gayathri Kelley RN      Goal: Monitoring - monitor glucose and ketones as directed       Task: Provide education on blood  glucose monitoring (purpose, proper technique, frequency, glucose targets, interpreting results, when to use glucose control solution, sharps disposal)    Responsible User: Gayathri Kelley RN      Task: Provide education on continuous glucose monitoring (sensor placement, use of [place] or /reader, understanding glucose trends, alerts and alarms, differences between sensor glucose and blood glucose) Completed 12/27/2022   Responsible User: Gayathri Kelley RN      Task: Provide education on ketone monitoring (when to monitor, frequency, etc.)    Responsible User: Gayathri Kelley RN      Goal: Taking Medication - patient is consistently taking medications as directed       Task: Provide education on action of prescribed medication, including when to take and possible side effects Completed 12/27/2022   Responsible User: Gayathri Kelley RN      Task: Provide education on insulin and injectable diabetes medications, including administration, storage, site selection and rotation for injection sites    Responsible User: Gayathri Kelley RN      Task: Discuss barriers to medication adherence with patient and provide management technique ideas as appropriate    Responsible User: Gayathri Kelley RN      Task: Provide education on frequency and refill details of medications    Responsible User: Gayathri Kellye RN      Goal: Problem Solving - know how to prevent and manage short-term diabetes complications       Task: Provide education on high blood glucose - causes, signs/symptoms, prevention and treatment Completed 12/27/2022   Responsible User: Gayathri Kelley RN      Task: Provide education on low blood glucose - causes, signs/symptoms, prevention, treatment, carrying a carbohydrate source at all times, and medical identification Completed 12/27/2022   Responsible User: Gayathri Kelley RN      Task: Provide education on safe travel with diabetes    Responsible User: Gayathri Kelley RN      Task:  Provide education on how to care for diabetes on sick days    Responsible User: Gayathri Kelley RN      Task: Provide education on when to call a health care provider    Responsible User: Gayathri Kelley RN      Goal: Reducing Risks - know how to prevent and treat long-term diabetes complications       Task: Provide education on major complications of diabetes, prevention, early diagnostic measures and treatment of complications    Responsible User: Gayathri Kelley RN      Task: Provide education on recommended care for dental, eye and foot health    Responsible User: Gayathri Kelley RN      Task: Provide education on Hemoglobin A1c - goals and relationship to blood glucose levels    Responsible User: Gayathri Kelley RN      Task: Provide education on recommendations for heart health - lipid levels and goals, blood pressure and goals, and aspirin therapy, if indicated    Responsible User: Gayathri Kelley RN      Task: Provide education on tobacco cessation    Responsible User: Gayathri Kelley RN      Goal: Healthy Coping - use available resources to cope with the challenges of managing diabetes       Task: Discuss recognizing feelings about having diabetes    Responsible User: Gayathri Kelley RN      Task: Provide education on the benefits of making appropriate lifestyle changes    Responsible User: Gayathri Kelley RN      Task: Provide education on benefits of utilizing support systems    Responsible User: Gayathri Kelley RN      Task: Discuss methods for coping with stress    Responsible User: Gayathri Kelley RN      Task: Provide education on when to seek professional counseling    Responsible User: Gayathri Kelley RN Vickie Baeyen BSN, RN, PHN, [facility]     Time Spent: 75 minutes  Encounter Type: Individual    Any diabetes medication dose changes were made via the CDE Protocol per the patient's referring provider. A copy of this encounter was shared with the provider.

## 2022-12-27 NOTE — PATIENT INSTRUCTIONS
Care Plan:  Meal Plan Recommendation: eat 3 meals a day, have small snacks between meals, if needed, use plate planning method, and refer to the snack list for ideas.  Try packing quick-easy snacks for work    Follow up:  Send a [employer] message in 1 week with an update on how you are doing, and what you see on your sensor data. Ask that we look at your agustín report.   Follow-up diabetes education appointment scheduled on 1/30/23 at 9:00am.     Agustín sensor Instructions:  1. The first hour after you place the sensor is the warm up period (black out period).  You will need to carry your blood glucose meter and check blood glucose during this time.    2. Check blood sugar if feeling symptoms of hypoglycemia but meter is not displaying a low number- may be some variability between sensor and meter at times.    3. Change sensor every 14 days.    4. Read/scan blood sugars every 8 hours to ensure entirety of data is available.  Tip: Try to scan minimum before each meal and bed.    5. Watch trend arrows- will let you know if blood sugars are rising, falling or stable at the time you check.    6. Agustín 2 has alarms. You can adjust both the high and low blood glucose alarm (when they will go off) or turn off high blood glucose alarm if alarming too much.  You cannot turn off the critical low blood glucose alarm.    7. It is okay to shower, bathe, and swim (up to 3 feet deep for 30 minutes) with sensor. Do not get reader wet.    8. Remove the sensor if you need to have an MRI or CT scan.    9. Do not cover the sensor with extra adhesive (the small hole in the center of the sensor must remain uncovered).  If you have trouble with sensor falling off, these are approved products to help with sensor adhesion: [employer] Skin Tac, SKIN-PREP Protective Barrier Wipe and Mastisol Liquid Adhesive    10. Check the dose if you take a multivitamin or Vitamin C (ascorbic acid). You want to limit daily intake of ascorbic acid to 500 mg/day  or less, or it may falsely raise sensor glucose readings. This is more of a concern if you are on insulin or medication that can cause low blood glucose as you may miss a severe low glucose event.    Support:   If you have any trouble with use or if the sensor falls off early, call the customer service number for Agustín located on the sensor's box. They can often help troubleshoot or replace sensor if needed.  Agustín Customer Service: [phone]      Discount Programs:  [employer] Program ([email or web address])    To save on sensors, if told cost is more than $75: call [employer] line at [phone]     Software:    [employer] (www.[employer]View.com)    Apps:  Agustín 2 (Lets you scan blood glucose with your phone)  LibreLinkUp (Lets you share blood glucose with others)    [facility] for the [facility]:  For Your Diabetes Education and Nutrition Appointments Call:  [phone]   For Diabetes Education or Nutrition Related Questions:   Phone: [phone]  Send [employer] Message   If you need a medication refill please contact your pharmacy. Please allow 3 business days for your refills to be completed.

## 2022-12-27 NOTE — LETTER
12/27/2022         RE: Ned Moore  [address]  [place] [address]        Dear Colleague,    Thank you for referring your patient, Ned Moore, to the [facility]. Please see a copy of my visit note below.    Diabetes Self-Management Education & Support    Presents for: Professional CGM Start    Type of Service: In Person Visit    Assessment Type:   Patient completed homework (online training and/or Getting started with CGM guide)? : No  Sensor started:: Started today in clinic  CGM Initial Settings: Freestyle Agustín  Freestyle Agustín Target Glucose Range (mg/dL):     Sensor was inserted with no resistance or bleeding at insertion site.    CGM-specific education:   Freestyle Agustín sensor: insertion technique, sensor site location and rotation, sensor wear, reasons to remove sensor (MRI, CT, diathermy), Vitamin C & Aspirin effects on sensor, Agustín phone [place]: frequency of scanning sensor, length of time data is visible,Use of trends and graphs for pattern management and problem solving and [employer] software         ASSESSMENT:  Patient is a paramedic,and has challenges with consistency in meal planning. He reports he chooses quick foods over healthy foods when he is busy at work. He also does not have time to test his blood sugar when he is busy at work. He tests his blood sugars fasting and reports readings in the 80's    Patient is interested in personal CGM, his phone is not compatible wit the Agustín 3 sensor but it is compatible with the agustín 2 sensor . He is aware of the cost and is still interested in moving forward on it.      Discussed pathophysiology of diabetes, the difference between Type 1 and 2 DM.  Reviewed carbohydrate sources, portion control and the benefit of spreading intake throughout the day and planning for quick health snacks.  Reviewed benefits of exercise to help with better blood glucose utilization.  Discussed the benefit of alternating  checking times to help identify patterns, how often to check BG and reviewed target BG levels.      Pt verbalized understanding of concepts discussed and recommendations provided today.    PLAN  Meal Plan Recommendation: eat 3 meals a day, have small snacks between meals, if needed, use plate planning method, and refer to the snack list for ideas.  Try packing quick-easy snacks for work    Follow up:  Send a HomeSav message in 1 week with an update on how you are doing, and what you see on your sensor data. Ask that we look at your maría report.   Follow-up diabetes education appointment scheduled on 1/30/23 at 9:00am.     See Care Plan for co-developed, patient-state behavior change goals.  AVS provided for patient today.    Education Materials Provided:  [employer] Understanding Diabetes Booklet, My Plate Planner and snack ideas      SUBJECTIVE/OBJECTIVE:  Presents for: Professional CGM Start  Accompanied by: Self  Diabetes education in the past 24mo: No  Focus of Visit: CGM  Type of CGM visit: Personal CGM Start  Diabetes type: Type 2  Disease course: Stable  How confident are you filling out medical forms by yourself:: Extremely  Diabetes management related comments/concerns: thats why i'm having this consult  Cultural Influences/Ethnic Background:  Not  or     Diabetes Symptoms & Complications:  Fatigue: No  Neuropathy: No  Polydipsia: No  Polyphagia: No  Polyuria: No  Visual change: No  Slow healing wounds: No  Complications assessed today?: Yes  Autonomic neuropathy: No  CVA: No  Heart disease: No  Nephropathy: Yes  Peripheral neuropathy: Yes  Peripheral Vascular Disease: No  Retinopathy: No  Sexual dysfunction: No    Patient Problem List and Family Medical History reviewed for relevant medical history, current medical status, and diabetes risk factors.    Vitals:  There were no vitals taken for this visit.  Estimated body mass index is 21.7 kg/m  as calculated from the following:     "Height as of 12/1/22: 1.88 m (6' 2\").    Weight as of 12/1/22: 76.7 kg (169 lb).   Last 3 BP:   BP Readings from Last 3 Encounters:   12/01/22 135/75   05/25/22 136/78   03/21/22 (!) 150/87       History   Smoking Status     Never   Smokeless Tobacco     Never       Labs:  Lab Results   Component Value Date    A1C 8.0 10/10/2022     Lab Results   Component Value Date     07/05/2022     03/21/2022     Lab Results   Component Value Date    LDL 74 01/27/2021     Direct Measure HDL   Date Value Ref Range Status   01/27/2021 57 >=40 mg/dL Final   ]  GFR Estimate   Date Value Ref Range Status   07/05/2022 58 (L) >60 mL/min/1.73m2 Final     Comment:     Effective December 21, 2021 eGFRcr in adults is calculated using the 2021 CKD-EPI creatinine equation which includes age and gender (Damir et al., [place], DOI: 10.1056/OBJGqd6538571)   01/27/2021 51 (L) >60 mL/min/1.73m2 Final     GFR Estimate If Black   Date Value Ref Range Status   01/27/2021 >60 >60 mL/min/1.73m2 Final     Lab Results   Component Value Date    CR 1.37 07/05/2022     No results found for: MICROALBUMIN    Healthy Eating:  Healthy Eating Assessed Today: Yes  Cultural/Confucianism diet restrictions?: No  Meal planning/habits: Avoiding sweets, Carb counting, Heart healthy, Smaller portions  How many times a week on average do you eat food made away from home (restaurant/take-out)?: 5+  Meals include: Breakfast, Dinner, Afternoon Snack  Breakfast: coffee, 1/2 muffin/bagel  Lunch: hospital meal- 1/2 the meal- chicken pasta, salad  Dinner: the other half of lunch or oatmeal with splenda, yogurt  Snacks: afternoon snack- protein bar, cheese and crackers, or snack from the ER such as chips, oatmeal  Other: mid morning- breakfast sandwich  Beverages: Water, Tea, Coffee, Milk  Has patient met with a dietitian in the past?: No    Being Active:  Barrier to exercise: Time, Access    Monitoring:  Monitoring Assessed Today: Yes  Blood Glucose Meter: " Accu-chek  Times checking blood sugar at home (number): 2  Times checking blood sugar at home (per): Week  Blood glucose trend: No change      Taking Medications:  Diabetes Medication(s)     Biguanides       metFORMIN (GLUCOPHAGE XR) 500 MG 24 hr tablet    TAKE FOUR TABLETS BY MOUTH EVERY DAY WITH BREAKFAST    Sodium-Glucose Co-Transporter 2 (SGLT2) Inhibitors       FARXIGA 10 MG TABS tablet    TAKE 1 TABLET (10 MG) BY MOUTH DAILY.    Sulfonylureas       glipiZIDE (GLUCOTROL XL) 10 MG 24 hr tablet    TAKE 1 TABLET BY MOUTH EVERY DAY          Current Treatments: Diet, Oral Medication (taken by mouth)    Problem Solving:  Problem Solving Assessed Today: Yes  Is the patient at risk for hypoglycemia?: Yes  Hypoglycemia Frequency: Rarely  Hypoglycemia Treatment: Other food (cookies)    Reducing Risks:  Reducing Risks Assessed Today: No  CAD Risks: Diabetes Mellitus  Has dilated eye exam at least once a year?: No  Sees dentist every 6 months?: Yes  Feet checked by healthcare provider in the last year?: Yes    Healthy Coping:  Healthy Coping Assessed Today: Yes  Emotional response to diabetes: Ready to learn  Informal Support system:: Children, Family, Spouse  Stage of change: PREPARATION (Decided to change - considering how)  Patient Activation Measure Survey Score:  No flowsheet data found.      Care Plan and Education Provided:  Care Plan: Diabetes   Updates made by Gayathri Kelley RN since 12/27/2022 12:00 AM      Problem: HbA1C Not In Goal       Goal: Establish Regular Follow-Ups with PCP       Task: Discuss with PCP the recommended timing for patient's next follow up visit(s)    Responsible User: Gayathri Kelley RN      Task: Discuss schedule for PCP visits with patient    Responsible User: Gayathri Kelley RN      Goal: Get HbA1C Level in Goal       Task: Educate patient on diabetes education self-management topics    Responsible User: Gayathri Kelley RN      Task: Educate patient on benefits of regular  glucose monitoring    Responsible User: Gayathri Kelley RN      Task: Refer patient to appropriate extended care team member, as needed (Medication Therapy Management, Behavioral Health, Physical Therapy, etc.)    Responsible User: Gayathri Kelley RN      Task: Discuss diabetes treatment plan with patient    Responsible User: Gayathri Kelley RN      Problem: Diabetes Self-Management Education Needed to Optimize Self-Care Behaviors       Goal: Understand diabetes pathophysiology and disease progression       Task: Provide education on diabetes pathophysiology and disease progression specfic to patient's diabetes type Completed 12/27/2022   Responsible User: Gayathri Kelley RN      Goal: Healthy Eating - follow a healthy eating pattern for diabetes       Task: Provide education on portion control and consistency in amount, composition and timing of food intake Completed 12/27/2022   Responsible User: Gayathri Kelley RN      Task: Provide education on managing carbohydrate intake (carbohydrate counting, plate planning method, etc.) Completed 12/27/2022   Responsible User: Gayathri Kelley RN      Task: Provide education on weight management    Responsible User: Gayathri Kelley RN      Task: Provide education on heart healthy eating    Responsible User: Gayathri Kelley RN      Task: Provide education on eating out    Responsible User: Gayathri Kelley RN      Task: Develop individualized healthy eating plan with patient    Responsible User: Gayathri Kelley RN      Goal: Being Active - get regular physical activity, working up to at least 150 minutes per week       Task: Provide education on relationship of activity to glucose and precautions to take if at risk for low glucose    Responsible User: Gayathri Kelley RN      Task: Discuss barriers to physical activity with patient    Responsible User: Gayathri Kelley RN      Task: Develop physical activity plan with patient    Responsible User: Warren  Gayathri QUICK RN      Task: Explore community resources including walking groups, assistance programs, and home videos    Responsible User: Gayathri Kelley RN      Goal: Monitoring - monitor glucose and ketones as directed       Task: Provide education on blood glucose monitoring (purpose, proper technique, frequency, glucose targets, interpreting results, when to use glucose control solution, sharps disposal)    Responsible User: Gayathri Kelley RN      Task: Provide education on continuous glucose monitoring (sensor placement, use of [place] or /reader, understanding glucose trends, alerts and alarms, differences between sensor glucose and blood glucose) Completed 12/27/2022   Responsible User: Gayathri Kelley RN      Task: Provide education on ketone monitoring (when to monitor, frequency, etc.)    Responsible User: Gayathri Kelley RN      Goal: Taking Medication - patient is consistently taking medications as directed       Task: Provide education on action of prescribed medication, including when to take and possible side effects Completed 12/27/2022   Responsible User: Gayathri Kelley RN      Task: Provide education on insulin and injectable diabetes medications, including administration, storage, site selection and rotation for injection sites    Responsible User: Gayathri Kelley RN      Task: Discuss barriers to medication adherence with patient and provide management technique ideas as appropriate    Responsible User: Gayathri Kelley RN      Task: Provide education on frequency and refill details of medications    Responsible User: Gayathri Kelley RN      Goal: Problem Solving - know how to prevent and manage short-term diabetes complications       Task: Provide education on high blood glucose - causes, signs/symptoms, prevention and treatment Completed 12/27/2022   Responsible User: Gayathri Kelley RN      Task: Provide education on low blood glucose - causes, signs/symptoms, prevention,  treatment, carrying a carbohydrate source at all times, and medical identification Completed 12/27/2022   Responsible User: Gayathri Kelley RN      Task: Provide education on safe travel with diabetes    Responsible User: Gayathri Kelley RN      Task: Provide education on how to care for diabetes on sick days    Responsible User: Gayathri Kelley RN      Task: Provide education on when to call a health care provider    Responsible User: Gayathri Kelley RN      Goal: Reducing Risks - know how to prevent and treat long-term diabetes complications       Task: Provide education on major complications of diabetes, prevention, early diagnostic measures and treatment of complications    Responsible User: Gayathri Kelley RN      Task: Provide education on recommended care for dental, eye and foot health    Responsible User: Gayathri Kelley RN      Task: Provide education on Hemoglobin A1c - goals and relationship to blood glucose levels    Responsible User: Gayathri Kelley RN      Task: Provide education on recommendations for heart health - lipid levels and goals, blood pressure and goals, and aspirin therapy, if indicated    Responsible User: Gayathri Kelley RN      Task: Provide education on tobacco cessation    Responsible User: Gayathri Kelley RN      Goal: Healthy Coping - use available resources to cope with the challenges of managing diabetes       Task: Discuss recognizing feelings about having diabetes    Responsible User: Gayathri Kelley RN      Task: Provide education on the benefits of making appropriate lifestyle changes    Responsible User: Gayathri Kelley RN      Task: Provide education on benefits of utilizing support systems    Responsible User: Gayathri Kelley RN      Task: Discuss methods for coping with stress    Responsible User: Gayathri Kelley RN      Task: Provide education on when to seek professional counseling    Responsible User: Gayathri Kelley RN Vickie  Warren CANO, RN, PHN, [facility]     Time Spent: 75 minutes  Encounter Type: Individual    Any diabetes medication dose changes were made via the CDE Protocol per the patient's referring provider. A copy of this encounter was shared with the provider.

## 2023-01-04 ENCOUNTER — E-VISIT (OUTPATIENT)
Dept: INTERNAL MEDICINE | Facility: CLINIC | Age: 63
End: 2023-01-04

## 2023-01-04 DIAGNOSIS — R06.2 WHEEZING: Primary | ICD-10-CM

## 2023-01-04 PROCEDURE — 99421 OL DIG E/M SVC 5-10 MIN: CPT | Performed by: INTERNAL MEDICINE

## 2023-01-04 RX ORDER — PREDNISONE 20 MG/1
40 TABLET ORAL DAILY
Qty: 10 TABLET | Refills: 0 | Status: SHIPPED | OUTPATIENT
Start: 2023-01-04 | End: 2023-02-02

## 2023-01-08 DIAGNOSIS — E11.40 TYPE 2 DIABETES MELLITUS WITH DIABETIC NEUROPATHY, UNSPECIFIED (H): ICD-10-CM

## 2023-01-10 ENCOUNTER — MYC MEDICAL ADVICE (OUTPATIENT)
Dept: EDUCATION SERVICES | Facility: CLINIC | Age: 63
End: 2023-01-10

## 2023-01-10 DIAGNOSIS — E11.22 TYPE 2 DIABETES MELLITUS WITH STAGE 3A CHRONIC KIDNEY DISEASE, WITHOUT LONG-TERM CURRENT USE OF INSULIN (H): Primary | ICD-10-CM

## 2023-01-10 DIAGNOSIS — N18.31 TYPE 2 DIABETES MELLITUS WITH STAGE 3A CHRONIC KIDNEY DISEASE, WITHOUT LONG-TERM CURRENT USE OF INSULIN (H): Primary | ICD-10-CM

## 2023-01-10 RX ORDER — METFORMIN HCL 500 MG
TABLET, EXTENDED RELEASE 24 HR ORAL
Qty: 360 TABLET | Refills: 3 | Status: SHIPPED | OUTPATIENT
Start: 2023-01-10 | End: 2023-08-21

## 2023-01-10 NOTE — TELEPHONE ENCOUNTER
"Last Written Prescription Date:  12/8/2022  Last Fill Quantity: 120,  # refills: 0   Last office visit provider:  12/1/2022     Requested Prescriptions   Pending Prescriptions Disp Refills     metFORMIN (GLUCOPHAGE XR) 500 MG 24 hr tablet [Pharmacy Med Name: METFORMIN HCL  MG TABLET] 120 tablet 0     Sig: TAKE FOUR TABLETS BY MOUTH EVERY DAY WITH BREAKFAST       Biguanide Agents Passed - 1/8/2023  8:30 AM        Passed - Patient is age 10 or older        Passed - Patient has documented A1c within the specified period of time.     If HgbA1C is 8 or greater, it needs to be on file within the past 3 months.  If less than 8, must be on file within the past 6 months.     Recent Labs   Lab Test 10/10/22  1447   A1C 8.0*             Passed - Patient's CR is NOT>1.4 OR Patient's EGFR is NOT<45 within past 12 mos.     Recent Labs   Lab Test 07/05/22  0933 08/23/21  1152 01/27/21  1047   GFRESTIMATED 58*   < > 51*   GFRESTBLACK  --   --  >60    < > = values in this interval not displayed.       Recent Labs   Lab Test 07/05/22  0933   CR 1.37*             Passed - Patient does NOT have a diagnosis of CHF.        Passed - Medication is active on med list        Passed - Recent (6 mo) or future (30 days) visit within the authorizing provider's specialty     Patient had office visit in the last 6 months or has a visit in the next 30 days with authorizing provider or within the authorizing provider's specialty.  See \"Patient Info\" tab in inbasket, or \"Choose Columns\" in Meds & Orders section of the refill encounter.                 Lucita Zarate RN 01/10/23 9:32 AM  "

## 2023-01-10 NOTE — TELEPHONE ENCOUNTER
Diabetes Follow-up    Subjective/Objective:    Ned Moore downloaded or sent in blood glucose report for review.   Comments/concerns:   Ross Trinh. So it's been a useful tool so far showing what I expected, too high readings after a meal, 200s and 300s. Considering how good my fasting numbers have been there had to be a culprit for my A1c>8. I started trying to keep track of specific foods and how they affected me but overall didn't want to change much of anything until I got a good baseline. So in what i might call the next phase I'm going to try and push those day numbers down, unfortunately that might mean eating less which i don't want to do as I have already noted i am under a healthy weight already. Anyway, this is a process that will take some time and tweaking so i'm not discouraged. Maybe some med alterations can help. Can you tell me how to send the Agustín data? Is it simply by email? Thanks.    Diabetes is being managed with   Diabetes Medications   Diabetes Medication(s)     Biguanides       metFORMIN (GLUCOPHAGE XR) 500 MG 24 hr tablet    TAKE FOUR TABLETS BY MOUTH EVERY DAY WITH BREAKFAST    Sodium-Glucose Co-Transporter 2 (SGLT2) Inhibitors       FARXIGA 10 MG TABS tablet    TAKE 1 TABLET (10 MG) BY MOUTH DAILY.    Sulfonylureas       glipiZIDE (GLUCOTROL XL) 10 MG 24 hr tablet    TAKE 1 TABLET BY MOUTH EVERY DAY          BG/Food log or report:           Assessment/Plan:    Review of CGM report. Glucose overall average 237mg/dl,  in target 20%, above target 80% and below target 0% of the time.       Response to patient:    Ross Marino,    Thank you for sending in your update. I'm glad you are able to see the data and identify some things that may be contributing to your higher blood sugars. I agree you don't necessarily need to eat less, but maybe choose some healthier food options as we discussed at your appointment. Either way, I don't think you can control your blood sugars with diet and your  current medication plan.  I would like you to consider taking a once a week NON- insulin injectable medication called Ozempic. It works well on those big spikes in your blood sugars, and the risk of low blood sugar is minimal. If you do decided to try the Ozempic, I would like to get you off the glipizide, since that is the medication that puts you at the biggest risk for low blood sugars. You would not be able to take Ozempic if you have history of pancreatitis or family history of medullary thyroid cancer. Also if there is a cost concern, you can download a savings coupon from their website.   https://www.[employer]/savings-and-resources/save-on-ozempic.html     Let me know how you would like to proceed.    Gayathri CANO, RN, PHN, [facility]        Any diabetes medication dose changes were made via the CDE Protocol and Collaborative Practice Agreement with the patient's referring provider. A copy of this encounter was shared with the provider.

## 2023-01-11 NOTE — TELEPHONE ENCOUNTER
,  please see my notes and mychart messages with patient. I would recommend patient focus on healthy food choices as I discussed with him at his apt, stop glipizide and start Ozempic 0.25mg weekly for 4 weeks, then increase to 0.5mg weekly for 4 weeks.      If you agree please accept the prescription in  and route back to me to contact patient, or indicate alternate plan.    Thank you,    Gayathri JACOBON, RN, PHN, [facility]

## 2023-01-21 ENCOUNTER — E-VISIT (OUTPATIENT)
Dept: INTERNAL MEDICINE | Facility: CLINIC | Age: 63
End: 2023-01-21
Payer: COMMERCIAL

## 2023-01-21 DIAGNOSIS — R06.2 WHEEZING: Primary | ICD-10-CM

## 2023-01-21 PROCEDURE — 99421 OL DIG E/M SVC 5-10 MIN: CPT | Performed by: INTERNAL MEDICINE

## 2023-01-22 RX ORDER — METHYLPREDNISOLONE 4 MG
TABLET, DOSE PACK ORAL
Qty: 21 TABLET | Refills: 0 | Status: SHIPPED | OUTPATIENT
Start: 2023-01-22 | End: 2023-03-15

## 2023-01-22 NOTE — PATIENT INSTRUCTIONS
Thank you for choosing us for your care. I have placed an order for a prescription so that you can start treatment. View your full visit summary for details by clicking on the link below. Your pharmacist will able to address any questions you may have about the medication.     If you're not feeling better within 5-7 days, please schedule an appointment.  You can schedule an appointment right here in [facility], or call [phone]  If the visit is for the same symptoms as your eVisit, we'll refund the cost of your eVisit if seen within seven days.

## 2023-01-30 ENCOUNTER — E-VISIT (OUTPATIENT)
Dept: INTERNAL MEDICINE | Facility: CLINIC | Age: 63
End: 2023-01-30
Payer: COMMERCIAL

## 2023-01-30 ENCOUNTER — ALLIED HEALTH/NURSE VISIT (OUTPATIENT)
Dept: PULMONOLOGY | Facility: CLINIC | Age: 63
End: 2023-01-30
Attending: INTERNAL MEDICINE
Payer: COMMERCIAL

## 2023-01-30 ENCOUNTER — ALLIED HEALTH/NURSE VISIT (OUTPATIENT)
Dept: EDUCATION SERVICES | Facility: CLINIC | Age: 63
End: 2023-01-30
Payer: COMMERCIAL

## 2023-01-30 DIAGNOSIS — E11.22 TYPE 2 DIABETES MELLITUS WITH STAGE 3A CHRONIC KIDNEY DISEASE, WITHOUT LONG-TERM CURRENT USE OF INSULIN (H): ICD-10-CM

## 2023-01-30 DIAGNOSIS — R06.00 DYSPNEA, UNSPECIFIED TYPE: Primary | ICD-10-CM

## 2023-01-30 DIAGNOSIS — N18.31 TYPE 2 DIABETES MELLITUS WITH STAGE 3A CHRONIC KIDNEY DISEASE, WITHOUT LONG-TERM CURRENT USE OF INSULIN (H): ICD-10-CM

## 2023-01-30 DIAGNOSIS — R06.2 WHEEZING: ICD-10-CM

## 2023-01-30 LAB
DLCOCOR-%PRED-PRE: 70 %
DLCOCOR-PRE: 20.52 ML/MIN/MMHG
DLCOUNC-%PRED-PRE: 76 %
DLCOUNC-PRE: 22.21 ML/MIN/MMHG
DLCOUNC-PRED: 28.98 ML/MIN/MMHG
ERV-%PRED-PRE: 63 %
ERV-PRE: 1.07 L
ERV-PRED: 1.69 L
EXPTIME-PRE: 17.11 SEC
FEF2575-%PRED-POST: 13 %
FEF2575-%PRED-PRE: 11 %
FEF2575-POST: 0.39 L/SEC
FEF2575-PRE: 0.33 L/SEC
FEF2575-PRED: 2.82 L/SEC
FEFMAX-%PRED-PRE: 35 %
FEFMAX-PRE: 3.4 L/SEC
FEFMAX-PRED: 9.49 L/SEC
FEV1-%PRED-PRE: 36 %
FEV1-PRE: 1.26 L
FEV1FEV6-PRE: 47 %
FEV1FEV6-PRED: 79 %
FEV1FVC-PRE: 35 %
FEV1FVC-PRED: 77 %
FEV1SVC-PRE: 36 %
FEV1SVC-PRED: 65 %
FIFMAX-PRE: 4.15 L/SEC
FRCPLETH-%PRED-PRE: 157 %
FRCPLETH-PRE: 5.92 L
FRCPLETH-PRED: 3.77 L
FVC-%PRED-PRE: 80 %
FVC-PRE: 3.6 L
FVC-PRED: 4.49 L
HGB BLD-MCNC: 17.9 G/DL
IC-%PRED-PRE: 65 %
IC-PRE: 2.36 L
IC-PRED: 3.62 L
RVPLETH-%PRED-PRE: 187 %
RVPLETH-PRE: 4.8 L
RVPLETH-PRED: 2.56 L
TLCPLETH-%PRED-PRE: 109 %
TLCPLETH-PRE: 8.29 L
TLCPLETH-PRED: 7.57 L
VA-%PRED-PRE: 72 %
VA-PRE: 5.06 L
VC-%PRED-PRE: 65 %
VC-PRE: 3.49 L
VC-PRED: 5.31 L

## 2023-01-30 PROCEDURE — 99207 PR NON-BILLABLE SERV PER CHARTING: CPT | Performed by: INTERNAL MEDICINE

## 2023-01-30 PROCEDURE — 94726 PLETHYSMOGRAPHY LUNG VOLUMES: CPT | Performed by: INTERNAL MEDICINE

## 2023-01-30 PROCEDURE — 94729 DIFFUSING CAPACITY: CPT | Performed by: INTERNAL MEDICINE

## 2023-01-30 PROCEDURE — 85018 HEMOGLOBIN: CPT

## 2023-01-30 PROCEDURE — G0108 DIAB MANAGE TRN  PER INDIV: HCPCS | Mod: AE

## 2023-01-30 PROCEDURE — 94060 EVALUATION OF WHEEZING: CPT | Performed by: INTERNAL MEDICINE

## 2023-01-30 NOTE — PATIENT INSTRUCTIONS
Care Plan:  Meal Plan Recommendation: continue to work on healthy eating  -try some protein with your morning muffin  -try a snack before bed  Exercise / activity plan: try adding some activity on the days you are not working  Check blood sugars -scan your sensor at least every 8 hours.   Recommend change to medication regimen - Next week- Increase Ozempic to 0.5mg weekly    Send a mychart message in 1 month-ask that we look at your Agustín report to make sure your glucose continues to improve. We will consider increasing the Ozempic to 1mg at that time.       Follow up:  Follow-up for annual diabetes education review in 1 year or sooner, if needed.     Bring blood glucose meter and logbook with you to all doctor and follow-up appointments.     [facility] the [facility]:  For Your Diabetes or Nutrition Education Appointments Call:  [phone]   For Diabetes or Nutrition Related Questions:   [phone]  Send [employer] Message   If you need a medication refill please contact your pharmacy. Please allow 3 business days for your refills to be completed.

## 2023-01-30 NOTE — PROGRESS NOTES
Diabetes Self-Management Education & Support    Presents for: Individual review    Type of Service: In Person Visit    Assessment Type:   REPORTS:                Pt verbalized understanding of concepts discussed and recommendations provided today.       ASSESSMENT:    Review of CGM report. Glucose overall average 184mg/dl,  in target 49%, above target 51% and below target 0% of the time.   Pattern of post meal and daytime hyperglycemia.     Patient states he is working on making better food choices. The CGM has been a beneficial tool in helping him make better food choices, but still make less healthy choices when busy at work and needing to find something quick. He was on prednisone January 23rdt - 27th.    Patient would benefit from continue to work on healthy, add activity on non work days and increase Ozempic.    PLAN  Meal Plan Recommendation: continue to work on healthy eating  -try some protein with your morning muffin  -try a snack before bed  Exercise / activity plan: try adding some activity on the days you are not working  Check blood sugars -scan your sensor at least every 8 hours.   Recommend change to medication regimen - Next week- Increase Ozempic to 0.5mg weekly    Send a [employer] message in 1 month-ask that we look at your Agustín report to make sure your glucose continues to improve. We will consider increasing the Ozempic to 1mg at that time.       Follow up:  Follow-up for annual diabetes education review in 1 year or sooner, if needed.    See Care Plan for co-developed, patient-state behavior change goals.  AVS provided for patient today.    Education Materials Provided:  No new materials provided today      SUBJECTIVE/OBJECTIVE:  Presents for: Individual review  Accompanied by: Self  Diabetes education in the past 24mo: Yes  Focus of Visit: Taking Medication, Monitoring  Diabetes type: Type 2  Disease course: Stable  How confident are you filling out medical forms by yourself::  "Extremely  Transportation concerns: No  Difficulty affording diabetes medication?: No  Difficulty affording diabetes testing supplies?: No  Other concerns:: None  Cultural Influences/Ethnic Background:  Not  or     Diabetes Symptoms & Complications:  Fatigue: No  Neuropathy: No  Polydipsia: No  Polyphagia: No  Polyuria: No  Visual change: No  Slow healing wounds: No  Complications assessed today?: Yes  Autonomic neuropathy: No  CVA: No  Heart disease: No  Nephropathy: Yes  Peripheral neuropathy: Yes  Peripheral Vascular Disease: No  Retinopathy: No  Sexual dysfunction: No    Patient Problem List and Family Medical History reviewed for relevant medical history, current medical status, and diabetes risk factors.    Vitals:  There were no vitals taken for this visit.  Estimated body mass index is 21.7 kg/m  as calculated from the following:    Height as of 12/1/22: 1.88 m (6' 2\").    Weight as of 12/1/22: 76.7 kg (169 lb).   Last 3 BP:   BP Readings from Last 3 Encounters:   12/01/22 135/75   05/25/22 136/78   03/21/22 (!) 150/87       History   Smoking Status     Never   Smokeless Tobacco     Never       Labs:  Lab Results   Component Value Date    A1C 8.0 10/10/2022     Lab Results   Component Value Date     07/05/2022     03/21/2022     Lab Results   Component Value Date    LDL 74 01/27/2021     Direct Measure HDL   Date Value Ref Range Status   01/27/2021 57 >=40 mg/dL Final   ]  GFR Estimate   Date Value Ref Range Status   07/05/2022 58 (L) >60 mL/min/1.73m2 Final     Comment:     Effective December 21, 2021 eGFRcr in adults is calculated using the 2021 CKD-EPI creatinine equation which includes age and gender (Damir et al., NEJM, DOI: 10.1056/YNLVpw5872749)   01/27/2021 51 (L) >60 mL/min/1.73m2 Final     GFR Estimate If Black   Date Value Ref Range Status   01/27/2021 >60 >60 mL/min/1.73m2 Final     Lab Results   Component Value Date    CR 1.37 07/05/2022     No results found for: " MICROALBUMIN    Healthy Eating:  Healthy Eating Assessed Today: Yes  Cultural/Orthodox diet restrictions?: No  Meal planning/habits: Avoiding sweets, Carb counting, Heart healthy, Smaller portions  How many times a week on average do you eat food made away from home (restaurant/take-out)?: 5+  Meals include: Breakfast, Dinner, Afternoon Snack  Breakfast: coffee, 1/2 muffin/bagel  Lunch: hospital meal- 1/2 the meal- chicken pasta, salad  Dinner: the other half of lunch or oatmeal with splenda, yogurt  Snacks: afternoon snack- protein bar, cheese and crackers, or snack from the ER such as chips, oatmeal trail mix, yogurt,  Other: mid morning- breakfast sandwich  Beverages: Water, Tea, Coffee, Milk  Has patient met with a dietitian in the past?: No    Being Active:  Being Active Assessed Today: Yes  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 : Yes  Blood Glucose Meter: CGM, FreeStyle  Times checking blood sugar at home (number): Other  Times checking blood sugar at home (per): Week  Blood glucose trend: No change    Taking Medications:  Diabetes Medication(s)     Biguanides       metFORMIN (GLUCOPHAGE XR) 500 MG 24 hr tablet    TAKE FOUR TABLETS BY MOUTH EVERY DAY WITH BREAKFAST    Sodium-Glucose Co-Transporter 2 (SGLT2) Inhibitors       FARXIGA 10 MG TABS tablet    TAKE 1 TABLET (10 MG) BY MOUTH DAILY.    Incretin Mimetic Agents       semaglutide (OZEMPIC) 2 MG/1.5ML SOPN pen    Inject 0.5 mg Subcutaneous every 7 days For 4 weeks then increase to 0.5mg weekly.          Taking Medication Assessed Today: Yes  Current Treatments: Diet, Oral Medication (taken by mouth)  Problems taking diabetes medications regularly?: No  Diabetes medication side effects?: No    Problem Solving:  Problem Solving Assessed Today: Yes  Is the patient at risk for hypoglycemia?:  Yes  Hypoglycemia Frequency: Rarely  Hypoglycemia Treatment: Other food (cookies)    Hypoglycemia symptoms  Confusion: No  Dizziness or Light-Headedness: No  Headaches: No  Hunger: No  Mood changes: No  Nervousness/Anxiety: No  Sleepiness: No  Speech difficulty: No  Sweats: No  Feeling shaky: Yes    Hypoglycemia Complications  Blackouts: No  Hospitalization: No  Nocturnal hypoglycemia: No  Required assistance: No  Required glucagon injection: No  Seizures: No    Reducing Risks:  Reducing Risks Assessed Today: Yes  Diabetes Risks: Age over 45 years, Family History  CAD Risks: Diabetes Mellitus, Male sex  Has dilated eye exam at least once a year?: No  Sees dentist every 6 months?: Yes  Feet checked by healthcare provider in the last year?: Yes    Healthy Coping:  Healthy Coping Assessed Today: Yes  Emotional response to diabetes: Ready to learn  Informal Support system:: Children, Family, Spouse  Stage of change: ACTION (Actively working towards change)  Patient Activation Measure Survey Score:  No flowsheet data found.      Care Plan and Education Provided:  Care Plan: Diabetes   Updates made by Gayathri Kelley RN since 1/30/2023 12:00 AM      Problem: Diabetes Self-Management Education Needed to Optimize Self-Care Behaviors       Goal: Healthy Eating - follow a healthy eating pattern for diabetes       Task: Provide education on heart healthy eating    Responsible User: Gayathri Kelley RN      Task: Provide education on eating out Completed 1/30/2023   Responsible User: Gayathri Kelley RN      Task: Develop individualized healthy eating plan with patient    Responsible User: Gayathri Kelley RN      Goal: Being Active - get regular physical activity, working up to at least 150 minutes per week       Task: Provide education on relationship of activity to glucose and precautions to take if at risk for low glucose Completed 1/30/2023   Responsible User: Gayathri Kelley RN      Goal: Taking Medication -  patient is consistently taking medications as directed       Task: Provide education on frequency and refill details of medications Completed 1/30/2023   Responsible User: Gayathri Kelley RN      Goal: Problem Solving - know how to prevent and manage short-term diabetes complications       Task: Provide education on how to care for diabetes on sick days Completed 1/30/2023   Responsible User: Gayathri Kelley RN      Task: Provide education on when to call a health care provider Completed 1/30/2023   Responsible User: Gayathri Kelley RN      Goal: Reducing Risks - know how to prevent and treat long-term diabetes complications       Task: Provide education on major complications of diabetes, prevention, early diagnostic measures and treatment of complications Completed 1/30/2023   Responsible User: Gayathri Kelley RN      Task: Provide education on recommended care for dental, eye and foot health Completed 1/30/2023   Responsible User: Gayathri Kelley RN      Task: Provide education on Hemoglobin A1c - goals and relationship to blood glucose levels Completed 1/30/2023   Responsible User: Gayathri Kelley RN      Task: Provide education on recommendations for heart health - lipid levels and goals, blood pressure and goals, and aspirin therapy, if indicated Completed 1/30/2023   Responsible User: Gayathri Kelley RN      Goal: Healthy Coping - use available resources to cope with the challenges of managing diabetes       Task: Discuss recognizing feelings about having diabetes Completed 1/30/2023   Responsible User: Gayathri Kelley RN      Task: Provide education on the benefits of making appropriate lifestyle changes Completed 1/30/2023   Responsible User: Gayathri Kelley RN      Task: Provide education on benefits of utilizing support systems Completed 1/30/2023   Responsible User: Gayathri Kelley RN Vickie Baeyen BSN, RN, PHN, [facility]     Time Spent: 45 minutes  Encounter Type:  Individual    Any diabetes medication dose changes were made via the CDE Protocol per the patient's referring provider. A copy of this encounter was shared with the provider.

## 2023-01-30 NOTE — LETTER
1/30/2023         RE: Ned Moore  [address]  [place] [address]        Dear Colleague,    Thank you for referring your patient, Ned Moore, to the [facility]. Please see a copy of my visit note below.    Diabetes Self-Management Education & Support    Presents for: Individual review    Type of Service: In Person Visit    Assessment Type:   REPORTS:                Pt verbalized understanding of concepts discussed and recommendations provided today.       ASSESSMENT:    Review of CGM report. Glucose overall average 184mg/dl,  in target 49%, above target 51% and below target 0% of the time.   Pattern of post meal and daytime hyperglycemia.     Patient states he is working on making better food choices. The CGM has been a beneficial tool in helping him make better food choices, but still make less healthy choices when busy at work and needing to find something quick. He was on prednisone January 23rdt - 27th.    Patient would benefit from continue to work on healthy, add activity on non work days and increase Ozempic.    PLAN  Meal Plan Recommendation: continue to work on healthy eating  -try some protein with your morning muffin  -try a snack before bed  Exercise / activity plan: try adding some activity on the days you are not working  Check blood sugars -scan your sensor at least every 8 hours.   Recommend change to medication regimen - Next week- Increase Ozempic to 0.5mg weekly    Send a [employer] message in 1 month-ask that we look at your Agustín report to make sure your glucose continues to improve. We will consider increasing the Ozempic to 1mg at that time.       Follow up:  Follow-up for annual diabetes education review in 1 year or sooner, if needed.    See Care Plan for co-developed, patient-state behavior change goals.  AVS provided for patient today.    Education Materials Provided:  No new materials provided today      SUBJECTIVE/OBJECTIVE:  Presents for:  "Individual review  Accompanied by: Self  Diabetes education in the past 24mo: Yes  Focus of Visit: Taking Medication, Monitoring  Diabetes type: Type 2  Disease course: Stable  How confident are you filling out medical forms by yourself:: Extremely  Transportation concerns: No  Difficulty affording diabetes medication?: No  Difficulty affording diabetes testing supplies?: No  Other concerns:: None  Cultural Influences/Ethnic Background:  Not  or     Diabetes Symptoms & Complications:  Fatigue: No  Neuropathy: No  Polydipsia: No  Polyphagia: No  Polyuria: No  Visual change: No  Slow healing wounds: No  Complications assessed today?: Yes  Autonomic neuropathy: No  CVA: No  Heart disease: No  Nephropathy: Yes  Peripheral neuropathy: Yes  Peripheral Vascular Disease: No  Retinopathy: No  Sexual dysfunction: No    Patient Problem List and Family Medical History reviewed for relevant medical history, current medical status, and diabetes risk factors.    Vitals:  There were no vitals taken for this visit.  Estimated body mass index is 21.7 kg/m  as calculated from the following:    Height as of 12/1/22: 1.88 m (6' 2\").    Weight as of 12/1/22: 76.7 kg (169 lb).   Last 3 BP:   BP Readings from Last 3 Encounters:   12/01/22 135/75   05/25/22 136/78   03/21/22 (!) 150/87       History   Smoking Status     Never   Smokeless Tobacco     Never       Labs:  Lab Results   Component Value Date    A1C 8.0 10/10/2022     Lab Results   Component Value Date     07/05/2022     03/21/2022     Lab Results   Component Value Date    LDL 74 01/27/2021     Direct Measure HDL   Date Value Ref Range Status   01/27/2021 57 >=40 mg/dL Final   ]  GFR Estimate   Date Value Ref Range Status   07/05/2022 58 (L) >60 mL/min/1.73m2 Final     Comment:     Effective December 21, 2021 eGFRcr in adults is calculated using the 2021 CKD-EPI creatinine equation which includes age and gender (Damir et al., NEJM, DOI: " 10.1056/DSLEee7127199)   01/27/2021 51 (L) >60 mL/min/1.73m2 Final     GFR Estimate If Black   Date Value Ref Range Status   01/27/2021 >60 >60 mL/min/1.73m2 Final     Lab Results   Component Value Date    CR 1.37 07/05/2022     No results found for: MICROALBUMIN    Healthy Eating:  Healthy Eating Assessed Today: Yes  Cultural/Buddhism diet restrictions?: No  Meal planning/habits: Avoiding sweets, Carb counting, Heart healthy, Smaller portions  How many times a week on average do you eat food made away from home (restaurant/take-out)?: 5+  Meals include: Breakfast, Dinner, Afternoon Snack  Breakfast: coffee, 1/2 muffin/bagel  Lunch: hospital meal- 1/2 the meal- chicken pasta, salad  Dinner: the other half of lunch or oatmeal with splenda, yogurt  Snacks: afternoon snack- protein bar, cheese and crackers, or snack from the ER such as chips, oatmeal trail mix, yogurt,  Other: mid morning- breakfast sandwich  Beverages: Water, Tea, Coffee, Milk  Has patient met with a dietitian in the past?: No    Being Active:  Being Active Assessed Today: Yes  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 : Yes  Blood Glucose Meter: CGM, FreeStyle  Times checking blood sugar at home (number): Other  Times checking blood sugar at home (per): Week  Blood glucose trend: No change    Taking Medications:  Diabetes Medication(s)     Biguanides       metFORMIN (GLUCOPHAGE XR) 500 MG 24 hr tablet    TAKE FOUR TABLETS BY MOUTH EVERY DAY WITH BREAKFAST    Sodium-Glucose Co-Transporter 2 (SGLT2) Inhibitors       FARXIGA 10 MG TABS tablet    TAKE 1 TABLET (10 MG) BY MOUTH DAILY.    Incretin Mimetic Agents       semaglutide (OZEMPIC) 2 MG/1.5ML SOPN pen    Inject 0.5 mg Subcutaneous every 7 days For 4 weeks then increase to 0.5mg weekly.          Taking Medication Assessed Today:  Yes  Current Treatments: Diet, Oral Medication (taken by mouth)  Problems taking diabetes medications regularly?: No  Diabetes medication side effects?: No    Problem Solving:  Problem Solving Assessed Today: Yes  Is the patient at risk for hypoglycemia?: Yes  Hypoglycemia Frequency: Rarely  Hypoglycemia Treatment: Other food (cookies)    Hypoglycemia symptoms  Confusion: No  Dizziness or Light-Headedness: No  Headaches: No  Hunger: No  Mood changes: No  Nervousness/Anxiety: No  Sleepiness: No  Speech difficulty: No  Sweats: No  Feeling shaky: Yes    Hypoglycemia Complications  Blackouts: No  Hospitalization: No  Nocturnal hypoglycemia: No  Required assistance: No  Required glucagon injection: No  Seizures: No    Reducing Risks:  Reducing Risks Assessed Today: Yes  Diabetes Risks: Age over 45 years, Family History  CAD Risks: Diabetes Mellitus, Male sex  Has dilated eye exam at least once a year?: No  Sees dentist every 6 months?: Yes  Feet checked by healthcare provider in the last year?: Yes    Healthy Coping:  Healthy Coping Assessed Today: Yes  Emotional response to diabetes: Ready to learn  Informal Support system:: Children, Family, Spouse  Stage of change: ACTION (Actively working towards change)  Patient Activation Measure Survey Score:  No flowsheet data found.      Care Plan and Education Provided:  Care Plan: Diabetes   Updates made by Gayathri Kelley RN since 1/30/2023 12:00 AM      Problem: Diabetes Self-Management Education Needed to Optimize Self-Care Behaviors       Goal: Healthy Eating - follow a healthy eating pattern for diabetes       Task: Provide education on heart healthy eating    Responsible User: Gayathri Kelley RN      Task: Provide education on eating out Completed 1/30/2023   Responsible User: Gayathri Kelley RN      Task: Develop individualized healthy eating plan with patient    Responsible User: Gayathri Kelley RN      Goal: Being Active - get regular physical activity,  working up to at least 150 minutes per week       Task: Provide education on relationship of activity to glucose and precautions to take if at risk for low glucose Completed 1/30/2023   Responsible User: Gayathri Kelley RN      Goal: Taking Medication - patient is consistently taking medications as directed       Task: Provide education on frequency and refill details of medications Completed 1/30/2023   Responsible User: Gayathri Kelley RN      Goal: Problem Solving - know how to prevent and manage short-term diabetes complications       Task: Provide education on how to care for diabetes on sick days Completed 1/30/2023   Responsible User: Gayathri Kelley RN      Task: Provide education on when to call a health care provider Completed 1/30/2023   Responsible User: Gayathri Kelley RN      Goal: Reducing Risks - know how to prevent and treat long-term diabetes complications       Task: Provide education on major complications of diabetes, prevention, early diagnostic measures and treatment of complications Completed 1/30/2023   Responsible User: Gayathri Kelley RN      Task: Provide education on recommended care for dental, eye and foot health Completed 1/30/2023   Responsible User: Gayathri Kelley RN      Task: Provide education on Hemoglobin A1c - goals and relationship to blood glucose levels Completed 1/30/2023   Responsible User: Gayathri Kelley RN      Task: Provide education on recommendations for heart health - lipid levels and goals, blood pressure and goals, and aspirin therapy, if indicated Completed 1/30/2023   Responsible User: Gayathri Kelley RN      Goal: Healthy Coping - use available resources to cope with the challenges of managing diabetes       Task: Discuss recognizing feelings about having diabetes Completed 1/30/2023   Responsible User: Gayathri Kelley RN      Task: Provide education on the benefits of making appropriate lifestyle changes Completed 1/30/2023   Responsible  User: Gayathri Kelley RN      Task: Provide education on benefits of utilizing support systems Completed 1/30/2023   Responsible User: Gayathri Kelley, RN          Gayathri JACOBON, RN, PHN, [facility]     Time Spent: 45 minutes  Encounter Type: Individual    Any diabetes medication dose changes were made via the CDE Protocol per the patient's referring provider. A copy of this encounter was shared with the provider.

## 2023-01-31 ENCOUNTER — OFFICE VISIT (OUTPATIENT)
Dept: PULMONOLOGY | Facility: CLINIC | Age: 63
End: 2023-01-31
Payer: COMMERCIAL

## 2023-01-31 VITALS
DIASTOLIC BLOOD PRESSURE: 72 MMHG | HEIGHT: 73 IN | OXYGEN SATURATION: 94 % | HEART RATE: 88 BPM | WEIGHT: 161 LBS | BODY MASS INDEX: 21.34 KG/M2 | SYSTOLIC BLOOD PRESSURE: 132 MMHG

## 2023-01-31 DIAGNOSIS — R06.2 WHEEZING: ICD-10-CM

## 2023-01-31 DIAGNOSIS — J44.9 CHRONIC OBSTRUCTIVE PULMONARY DISEASE, UNSPECIFIED COPD TYPE (H): Primary | ICD-10-CM

## 2023-01-31 PROCEDURE — 99204 OFFICE O/P NEW MOD 45 MIN: CPT | Performed by: INTERNAL MEDICINE

## 2023-01-31 RX ORDER — FLUTICASONE FUROATE AND VILANTEROL TRIFENATATE 100; 25 UG/1; UG/1
POWDER RESPIRATORY (INHALATION)
Refills: 1 | OUTPATIENT
Start: 2023-01-31

## 2023-01-31 RX ORDER — UMECLIDINIUM BROMIDE AND VILANTEROL TRIFENATATE 62.5; 25 UG/1; UG/1
1 POWDER RESPIRATORY (INHALATION) DAILY
Qty: 30 EACH | Refills: 11 | Status: SHIPPED | OUTPATIENT
Start: 2023-01-31 | End: 2023-02-20 | Stop reason: ALTCHOICE

## 2023-01-31 NOTE — PATIENT INSTRUCTIONS
It was good to meet you in clinic today. This is what we discussed:    You have severe airflow obstruction.  The obstruction may be in the upper airway.  Try loratadine (Claritin) one pill daily.  You can use the nasal fluticasone one spray in each nostril daily.  Stop Breo.  Start Anoro one inhalation daily.  We will get a CT of the neck and chest and I will contact you with results.  I will see you in 3 months.  Contact me with questions or concerns.    Jose Biswas MD  Pulmonary and Critical Care Medicine  [facility]  Office [phone]

## 2023-01-31 NOTE — PROGRESS NOTES
Pulmonary Clinic Outpatient Consultation    Assessment and Plan:   62 year old male never smoker with a history of DM2, CKD3, dyslipidemia, presenting for evaluation of wheezing and dyspnea.    Wheezing and dyspnea: The DDx remains broad. I am concerned about the degree of airflow obstruction in this never smoker. He is quite symptomatic, with dyspnea on minimal exertion, and resting SpO2 on room air is 94%. 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 However, his recent symptoms have been present for over a year, with no clear precipitating infectious symptoms or other exposures. He has severe fixed obstructive physiology on pulmonary function testing, with FEV1 1.26 L (36%), air trapping without hyperinflation, and mild DLCO reduction. CXR in March 2022 was unremarkable. He does have flattening of the inspiratory limb on most efforts (but one effort had normal inspiratory flow), and on exam has most prominent inspiratory and expiratory wheezing on auscultation of the neck. Does endorse significant postnasal drip, suggesting chronic rhinosinusitis, but this should not cause this degree of airflow obstruction. Will treat the rhinosinusitis and change from ICS-LABA to LAMA-LABA, but also will obtain neck CT and high-resolution chest CT and see him back in 3 months.    Plan:  - neck CT  - high-resolution chest CT  - loratadine 10 mg daily  - nasal fluticasone one spray in each nostril daily  - stop fluticasone-vilanterol  - start umeclidinium-vilanterol one inhalation daily  - follow up in 3 months  - encouraged him to contact us with questions or concerning symptoms    Jose Biswas MD  [facility]  Office [phone]  Pager [phone]  he/him    CCx: wheezing and dyspnea    HPI: 62 year old male never smoker with a history of DM2, CKD3, dyslipidemia, presenting for evaluation of  "wheezing and dyspnea. States that starting one year ago he developed wheezing and dyspnea \"out of the blue.\" No fever or obvious viral URI symptoms. Vaccinated against COVID-19 and has not had covid infection that he is aware of. Has needed prednisone or methylprednisolone about 6 times, which has cleared up symptoms each time. Notes postnasal drip and rhinorrhea, no seasonal allergies. With the drip feels like he cannot clear it from his throat. Feels some tightening in the throat, \"lungs feel fine.\" However, shoveling snow for 5 minutes leads to dyspnea. Worked in his 20s at a [employer] school, was exposed to fiberglass dust and resin, but wore a mask and there was a powerful vacuum ventilation system. 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 No childhood asthma. No classic reflux symptoms. No dysphagia or regurgitation. Cold air makes it worse. History of asthma in mother. Low-dose fluticasone-salmeterol for several months, has not helped. He teaches EMS.    ROS:  A 12-system review was obtained and was negative with the exception of the symptoms endorsed in the history of present illness.    PMH:  DM2  CKD3  dyslipidemia    PSH:  No past surgical history on file.    Allergies:  No Known Allergies    Family HX:  Family History   Problem Relation Age of Onset     Heart Disease Mother      Diabetes Mother      Asthma Mother      Alzheimer Disease Father      No Known Problems Brother        Social Hx:  Social History     Socioeconomic History     Marital status:      Spouse name: Not on file     Number of children: Not on file     Years of education: Not on file     Highest education level: Not on file   Occupational History     Not on file   Tobacco Use     Smoking status: Never     Smokeless tobacco: Never   Vaping Use     Vaping Use: Never used   Substance and Sexual Activity     Alcohol use: Yes     Comment: a beer or wine every few months     Drug use: Never     Sexual activity: Not " Currently     Partners: Female   Other Topics Concern     Parent/sibling w/ CABG, MI or angioplasty before 65F 55M? No   Social History Narrative     Not on file     Social Determinants of Health     Financial Resource Strain: Not on file   Food Insecurity: Not on file   Transportation Needs: Not on file   Physical Activity: Not on file   Stress: Not on file   Social Connections: Not on file   Intimate Partner Violence: Not on file   Housing Stability: Not on file       Current Meds:  Current Outpatient Medications   Medication Sig Dispense Refill     ACCU-CHEK FASTCLIX LANCET DRUM [ACCU-CHEK FASTCLIX LANCET DRUM] TEST BLOOD SUGARS 3 TIMES DAILY 300 each 3     albuterol (PROAIR HFA/PROVENTIL HFA/VENTOLIN HFA) 108 (90 Base) MCG/ACT inhaler Inhale 2 puffs into the lungs every 6 hours 18 g 3     aspirin 81 MG EC tablet [ASPIRIN 81 MG EC TABLET] Take 1 tablet (81 mg total) by mouth daily. 90 tablet 3     atorvastatin (LIPITOR) 20 MG tablet TAKE 1 TABLET BY MOUTH EVERY DAY IN THE EVENING 90 tablet 3     blood glucose test (ACCU-CHEK SMARTVIEW TEST STRIP) strips [BLOOD GLUCOSE TEST (ACCU-CHEK SMARTVIEW TEST STRIP) STRIPS] USE TO CHECK BLOOD SUGARS TWICE DAILY. 200 strip 3     Continuous Blood Gluc Sensor (FREESTYLE LIVIA 2 SENSOR) MISC 1 each every 14 days 2 each 11     FARXIGA 10 MG TABS tablet TAKE 1 TABLET (10 MG) BY MOUTH DAILY. 30 tablet 2     metFORMIN (GLUCOPHAGE XR) 500 MG 24 hr tablet TAKE FOUR TABLETS BY MOUTH EVERY DAY WITH BREAKFAST 360 tablet 3     methylPREDNISolone (MEDROL DOSEPAK) 4 MG tablet therapy pack Follow Package Directions 21 tablet 0     predniSONE (DELTASONE) 20 MG tablet Take 2 tablets (40 mg) by mouth daily 10 tablet 0     semaglutide (OZEMPIC) 2 MG/1.5ML SOPN pen Inject 0.5 mg Subcutaneous every 7 days For 4 weeks then increase to 0.5mg weekly. 3 mL 1     umeclidinium-vilanterol (ANORO ELLIPTA) 62.5-25 MCG/ACT oral inhaler Inhale 1 puff into the lungs daily 30 each 11     fluticasone (FLONASE)  "50 MCG/ACT nasal spray INSTILL 1 SPRAY INTO BOTH NOSTRILS DAILY (Patient not taking: Reported on 12/1/2022) 16 mL 3       Physical Exam:  /72 (BP Location: Left arm)   Pulse 88   Ht 1.842 m (6' 0.5\")   Wt 73 kg (161 lb)   SpO2 94%   BMI 21.54 kg/m    Gen: alert, oriented, no distress  HEENT: nasal mucosa shows moderate amount of mucus, no oropharyngeal lesions, no cervical or supraclavicular lymphadenopathy, has inspiratory and expiratory wheezing on neck auscultation  CV: RRR, no M/G/R  Resp: wheezing in the second half of the expiratory cycle, prolonged expiratory phase, wheeze is quieter listening to lower airways compared to the neck, no focal crackles  Skin: no apparent rashes  Ext: no cyanosis, clubbing or edema  Neuro: alert, nonfocal    Labs:  reviewed    Imaging studies:  CXR (March 2022):  - images directly reviewed, formal interpretation follows:  IMPRESSION: Negative chest.    Pulmonary Function Testing  January 2023:  FVC 3.60 (80%)  FEV1 1.26 (36%)  FEV1/FVC 0.35  No significant bronchodilator response  RV 4.80 (187%)  TLC 8.29 (109%)  DLCOc 70%  Most inspiratory efforts lead to flattening of the inspiratory limb; one does not.    "

## 2023-01-31 NOTE — LETTER
1/31/2023         RE: Ned Moore  [address]  [place] [address]        Dear Colleague,    Thank you for referring your patient, Ned Moore, to the [facility]. Please see a copy of my visit note below.    Pulmonary Clinic Outpatient Consultation    Assessment and Plan:   62 year old male never smoker with a history of DM2, CKD3, dyslipidemia, presenting for evaluation of wheezing and dyspnea.    Wheezing and dyspnea: The DDx remains broad. I am concerned about the degree of airflow obstruction in this never smoker. He is quite symptomatic, with dyspnea on minimal exertion, and resting SpO2 on room air is 94%. 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 However, his recent symptoms have been present for over a year, with no clear precipitating infectious symptoms or other exposures. He has severe fixed obstructive physiology on pulmonary function testing, with FEV1 1.26 L (36%), air trapping without hyperinflation, and mild DLCO reduction. CXR in March 2022 was unremarkable. He does have flattening of the inspiratory limb on most efforts (but one effort had normal inspiratory flow), and on exam has most prominent inspiratory and expiratory wheezing on auscultation of the neck. Does endorse significant postnasal drip, suggesting chronic rhinosinusitis, but this should not cause this degree of airflow obstruction. Will treat the rhinosinusitis and change from ICS-LABA to LAMA-LABA, but also will obtain neck CT and high-resolution chest CT and see him back in 3 months.    Plan:  - neck CT  - high-resolution chest CT  - loratadine 10 mg daily  - nasal fluticasone one spray in each nostril daily  - stop fluticasone-vilanterol  - start umeclidinium-vilanterol one inhalation daily  - follow up in 3 months  - encouraged him to contact us with questions or concerning  "symptoms    Jose Biswas MD  [facility]  Office [phone]  Pager [phone]  he/him    CCx: wheezing and dyspnea    HPI: 62 year old male never smoker with a history of DM2, CKD3, dyslipidemia, presenting for evaluation of wheezing and dyspnea. States that starting one year ago he developed wheezing and dyspnea \"out of the blue.\" No fever or obvious viral URI symptoms. Vaccinated against COVID-19 and has not had covid infection that he is aware of. Has needed prednisone or methylprednisolone about 6 times, which has cleared up symptoms each time. Notes postnasal drip and rhinorrhea, no seasonal allergies. With the drip feels like he cannot clear it from his throat. Feels some tightening in the throat, \"lungs feel fine.\" However, shoveling snow for 5 minutes leads to dyspnea. Worked in his 20s at a [employer] school, was exposed to fiberglass dust and resin, but wore a mask and there was a powerful vacuum ventilation system. 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 No childhood asthma. No classic reflux symptoms. No dysphagia or regurgitation. Cold air makes it worse. History of asthma in mother. Low-dose fluticasone-salmeterol for several months, has not helped. He teaches EMS.    ROS:  A 12-system review was obtained and was negative with the exception of the symptoms endorsed in the history of present illness.    PMH:  DM2  CKD3  dyslipidemia    PSH:  No past surgical history on file.    Allergies:  No Known Allergies    Family HX:  Family History   Problem Relation Age of Onset     Heart Disease Mother      Diabetes Mother      Asthma Mother      Alzheimer Disease Father      No Known Problems Brother        Social Hx:  Social History     Socioeconomic History     Marital status:      Spouse name: Not on file     Number of children: Not on file     Years of education: Not on file     Highest education level: Not on file   Occupational History     Not on file "   Tobacco Use     Smoking status: Never     Smokeless tobacco: Never   Vaping Use     Vaping Use: Never used   Substance and Sexual Activity     Alcohol use: Yes     Comment: a beer or wine every few months     Drug use: Never     Sexual activity: Not Currently     Partners: Female   Other Topics Concern     Parent/sibling w/ CABG, MI or angioplasty before 65F 55M? No   Social History Narrative     Not on file     Social Determinants of Health     Financial Resource Strain: Not on file   Food Insecurity: Not on file   Transportation Needs: Not on file   Physical Activity: Not on file   Stress: Not on file   Social Connections: Not on file   Intimate Partner Violence: Not on file   Housing Stability: Not on file       Current Meds:  Current Outpatient Medications   Medication Sig Dispense Refill     ACCU-CHEK FASTCLIX LANCET DRUM [ACCU-CHEK FASTCLIX LANCET DRUM] TEST BLOOD SUGARS 3 TIMES DAILY 300 each 3     albuterol (PROAIR HFA/PROVENTIL HFA/VENTOLIN HFA) 108 (90 Base) MCG/ACT inhaler Inhale 2 puffs into the lungs every 6 hours 18 g 3     aspirin 81 MG EC tablet [ASPIRIN 81 MG EC TABLET] Take 1 tablet (81 mg total) by mouth daily. 90 tablet 3     atorvastatin (LIPITOR) 20 MG tablet TAKE 1 TABLET BY MOUTH EVERY DAY IN THE EVENING 90 tablet 3     blood glucose test (ACCU-CHEK SMARTVIEW TEST STRIP) strips [BLOOD GLUCOSE TEST (ACCU-CHEK SMARTVIEW TEST STRIP) STRIPS] USE TO CHECK BLOOD SUGARS TWICE DAILY. 200 strip 3     Continuous Blood Gluc Sensor (FREESTYLE LIVIA 2 SENSOR) MISC 1 each every 14 days 2 each 11     FARXIGA 10 MG TABS tablet TAKE 1 TABLET (10 MG) BY MOUTH DAILY. 30 tablet 2     metFORMIN (GLUCOPHAGE XR) 500 MG 24 hr tablet TAKE FOUR TABLETS BY MOUTH EVERY DAY WITH BREAKFAST 360 tablet 3     methylPREDNISolone (MEDROL DOSEPAK) 4 MG tablet therapy pack Follow Package Directions 21 tablet 0     predniSONE (DELTASONE) 20 MG tablet Take 2 tablets (40 mg) by mouth daily 10 tablet 0     semaglutide (OZEMPIC) 2  "MG/1.5ML SOPN pen Inject 0.5 mg Subcutaneous every 7 days For 4 weeks then increase to 0.5mg weekly. 3 mL 1     umeclidinium-vilanterol (ANORO ELLIPTA) 62.5-25 MCG/ACT oral inhaler Inhale 1 puff into the lungs daily 30 each 11     fluticasone (FLONASE) 50 MCG/ACT nasal spray INSTILL 1 SPRAY INTO BOTH NOSTRILS DAILY (Patient not taking: Reported on 12/1/2022) 16 mL 3       Physical Exam:  /72 (BP Location: Left arm)   Pulse 88   Ht 1.842 m (6' 0.5\")   Wt 73 kg (161 lb)   SpO2 94%   BMI 21.54 kg/m    Gen: alert, oriented, no distress  HEENT: nasal mucosa shows moderate amount of mucus, no oropharyngeal lesions, no cervical or supraclavicular lymphadenopathy, has inspiratory and expiratory wheezing on neck auscultation  CV: RRR, no M/G/R  Resp: wheezing in the second half of the expiratory cycle, prolonged expiratory phase, wheeze is quieter listening to lower airways compared to the neck, no focal crackles  Skin: no apparent rashes  Ext: no cyanosis, clubbing or edema  Neuro: alert, nonfocal    Labs:  reviewed    Imaging studies:  CXR (March 2022):  - images directly reviewed, formal interpretation follows:  IMPRESSION: Negative chest.    Pulmonary Function Testing  January 2023:  FVC 3.60 (80%)  FEV1 1.26 (36%)  FEV1/FVC 0.35  No significant bronchodilator response  RV 4.80 (187%)  TLC 8.29 (109%)  DLCOc 70%  Most inspiratory efforts lead to flattening of the inspiratory limb; one does not.        Again, thank you for allowing me to participate in the care of your patient.        Sincerely,        Jose Biswas MD  "

## 2023-01-31 NOTE — TELEPHONE ENCOUNTER
The original prescription was discontinued on 1/31/2023 by Jose Biswas MD for the following reason: Alternate therapy.    Rosa King RN   PAL (Patient Advocate Liason)  [facility]

## 2023-02-02 ENCOUNTER — DOCUMENTATION ONLY (OUTPATIENT)
Dept: PULMONOLOGY | Facility: OTHER | Age: 63
End: 2023-02-02
Payer: COMMERCIAL

## 2023-02-02 DIAGNOSIS — R06.2 WHEEZING: ICD-10-CM

## 2023-02-02 RX ORDER — PREDNISONE 20 MG/1
40 TABLET ORAL DAILY
Qty: 10 TABLET | Refills: 0 | Status: SHIPPED | OUTPATIENT
Start: 2023-02-02 | End: 2023-03-15

## 2023-02-02 NOTE — LETTER
2023    Dear Dr. Briggs,    See below for documentation regarding Ned Oscar ( 1960). Please feel free to call me at any time if needed.    Sincerely,    Jose Biswas MD  Pulmonary and Critical Care Medicine  [facility]  Cell [phone]  Office [phone]  Pager [phone]  he/him    Pulmonary Documentation    I received the following MyChart message from Ned:    Ross Biswas. After seeing you Tuesday I tried going to work today but only made it half way through and had to come home. The shortness of breath with wheezing was too much for ordinary tasks and I expect the symptoms to worsen with out Prednisone. Dr Briggs deferred my last request for this med to you as the new physician for my case  Thanks for your consideration.    I replied:    Ross Marino,    I placed a prescription for a 5-day prednisone burst to your pharmacy. However, we really need to get to the bottom of this as the obstruction seems to be in the upper airway. I think the CT scans will be helpful. Let me know how things go with the prednisone.    Sincerely,  Jose Luis Biswas

## 2023-02-02 NOTE — PROGRESS NOTES
Pulmonary Documentation    I received the following MyChart message from Ned:    Ross Biswas. After seeing you Tuesday I tried going to work today but only made it half way through and had to come home. The shortness of breath with wheezing was too much for ordinary tasks and I expect the symptoms to worsen with out Prednisone. Dr Briggs deferred my last request for this med to you as the new physician for my case  Thanks for your consideration.    I replied:    Ross Marino,    I placed a prescription for a 5-day prednisone burst to your pharmacy. However, we really need to get to the bottom of this as the obstruction seems to be in the upper airway. I think the CT scans will be helpful. Let me know how things go with the prednisone.    Sincerely,  Jose Luis Biswas MD  Pulmonary and Critical Care Medicine  [facility]  Office [phone]

## 2023-02-06 ENCOUNTER — TELEPHONE (OUTPATIENT)
Dept: EDUCATION SERVICES | Facility: CLINIC | Age: 63
End: 2023-02-06
Payer: COMMERCIAL

## 2023-02-06 NOTE — TELEPHONE ENCOUNTER
Ned genao, he has been working with Gayathri EMERY on diabetes care.     He reports that he has been getting Agustín 2 sensors from [facility] and was notified that they will not give this any more.     He is requesting advice on how to get sensors or what change might need to be made.  Justine Collado RD, LD, [facility]

## 2023-02-07 ENCOUNTER — HOSPITAL ENCOUNTER (OUTPATIENT)
Dept: CT IMAGING | Facility: HOSPITAL | Age: 63
Discharge: HOME | End: 2023-02-07
Attending: INTERNAL MEDICINE
Payer: COMMERCIAL

## 2023-02-07 DIAGNOSIS — J44.9 CHRONIC OBSTRUCTIVE PULMONARY DISEASE, UNSPECIFIED COPD TYPE (H): ICD-10-CM

## 2023-02-07 DIAGNOSIS — R06.2 WHEEZING: ICD-10-CM

## 2023-02-07 LAB
CREAT BLD-MCNC: 1.5 MG/DL (ref 0.7–1.3)
GFR SERPL CREATININE-BSD FRML MDRD: 52 ML/MIN/1.73M2

## 2023-02-07 PROCEDURE — 250N000011 HC RX IP 250 OP 636: Performed by: INTERNAL MEDICINE

## 2023-02-07 PROCEDURE — 70491 CT SOFT TISSUE NECK W/DYE: CPT

## 2023-02-07 PROCEDURE — 82565 ASSAY OF CREATININE: CPT

## 2023-02-07 PROCEDURE — 71250 CT THORAX DX C-: CPT

## 2023-02-07 RX ORDER — IOPAMIDOL 755 MG/ML
75 INJECTION, SOLUTION INTRAVASCULAR ONCE
Status: COMPLETED | OUTPATIENT
Start: 2023-02-07 | End: 2023-02-07

## 2023-02-07 RX ADMIN — IOPAMIDOL 75 ML: 755 INJECTION, SOLUTION INTRAVENOUS at 09:17

## 2023-02-07 NOTE — TELEPHONE ENCOUNTER
Called pt, he answered then the call was dropped. Attempted to call pt again, no answer, unable to LM as voicemail is full. [employer] message sent.

## 2023-02-08 ENCOUNTER — TELEPHONE (OUTPATIENT)
Dept: PULMONOLOGY | Facility: OTHER | Age: 63
End: 2023-02-08
Payer: COMMERCIAL

## 2023-02-08 NOTE — TELEPHONE ENCOUNTER
Pulmonary Telephone Note    Neck CT is unremarkable. High-resolution chest CT shows diffuse bronchial wall thickening. Chronic bronchitic phenotype of COPD is likely, and patient has severe fixed obstruction on PFT. 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 He needed a recent prednisone burst. Started on LAMA-LABA in late January at my consultation with him, will see how that goes, can add back ICS if needed. Called Ned to discuss and reached voicemail, with full voice mailbox so unable to leave a message. Will try him again another day.    Jose Biswas MD  Pulmonary and Critical Care Medicine  [facility]  Office [phone]  Pager [phone]  he/him

## 2023-02-09 ENCOUNTER — DOCUMENTATION ONLY (OUTPATIENT)
Dept: PULMONOLOGY | Facility: OTHER | Age: 63
End: 2023-02-09
Payer: COMMERCIAL

## 2023-02-09 NOTE — LETTER
2023    Dear Dr. Briggs,    See below for documentation regarding Ned Oscar ( 1960). Please feel free to call me at any time if needed.    Sincerely,    Jose Biswas MD  Pulmonary and Critical Care Medicine  [facility]  Cell [phone]  Office [phone]  Pager [phone]  he/him    Pulmonary Documentation    Sent Ned the following [employer] - innovate | mentor | invest message:    Ross Marino,    I am writing to let you know the results of your recent CT scans. Your neck CT looks fine. Your chest CT does show thickening of the airways in both lungs, which is suggestive of chronic bronchitis. This is likely contributing to your symptoms, and with your lack of tobacco use, could stem from prior exposures when you were working at . It is hard to know for sure. Regardless, then inhaler we started should be appropriate, and we can add to it in follow-up if needed. I hope that the prednisone helped as well. If you have questions or concerns, please be in touch with me.    Sincerely,  Jose Luis Biswas

## 2023-02-19 ENCOUNTER — DOCUMENTATION ONLY (OUTPATIENT)
Dept: PULMONOLOGY | Facility: OTHER | Age: 63
End: 2023-02-19
Payer: COMMERCIAL

## 2023-02-20 ENCOUNTER — DOCUMENTATION ONLY (OUTPATIENT)
Dept: PULMONOLOGY | Facility: OTHER | Age: 63
End: 2023-02-20
Payer: COMMERCIAL

## 2023-02-20 DIAGNOSIS — J44.9 CHRONIC OBSTRUCTIVE PULMONARY DISEASE, UNSPECIFIED COPD TYPE (H): Primary | ICD-10-CM

## 2023-02-20 RX ORDER — FLUTICASONE FUROATE, UMECLIDINIUM BROMIDE AND VILANTEROL TRIFENATATE 200; 62.5; 25 UG/1; UG/1; UG/1
1 POWDER RESPIRATORY (INHALATION) DAILY
Qty: 30 EACH | Refills: 11 | Status: SHIPPED | OUTPATIENT
Start: 2023-02-20 | End: 2023-07-20

## 2023-02-20 NOTE — PROGRESS NOTES
"Pulmonary Documentation    Received the following MyChart message from Ned:      Ross Frederick. After the last course of Prednisone I had no breathing problems for ten days. After that the symptoms returned over 3-4 days and though I feel the Anoro did better than Breo at delaying symptoms they still progressed until I had to call in sick today. At this juncture I have to consider the possibility that I will not be able to return to full time work or do any exertional activities until a more effective long term treatment is available, if there is one at all. I hope there are more options to try so let me know what might be next, thanks.    I replied:    Ross Marino,    Based on your airway thickening and airflow obstruction, and your persistent symptoms, I think we should use a \"triple inhaler\" that has an inhaled steroid with two long-acting bronchodilators. This would involve changing from Anoro to an inhaler called Trelegy, which would be the same type of device, and have the medicines that are in Anoro plus a high-dose steroid.    If you are open to this, let me know and I will place the prescription.    Sincerely,  Jose Luis Biswas    Will await his reply.    Jose Biswas MD  Pulmonary and Critical Care Medicine  [facility]  Office [phone]  "

## 2023-02-20 NOTE — PROGRESS NOTES
I heard back from Ned, and he is open to trying Trelegy. Will place the order.    Jose Biswas MD  Pulmonary and Critical Care Medicine  [facility]

## 2023-02-24 DIAGNOSIS — E11.40 TYPE 2 DIABETES MELLITUS WITH DIABETIC NEUROPATHY, UNSPECIFIED (H): ICD-10-CM

## 2023-02-25 RX ORDER — DAPAGLIFLOZIN 10 MG/1
TABLET, FILM COATED ORAL
Qty: 30 TABLET | Refills: 2 | Status: SHIPPED | OUTPATIENT
Start: 2023-02-25 | End: 2023-05-26

## 2023-02-25 NOTE — TELEPHONE ENCOUNTER
"Last Written Prescription Date:  11/25/22  Last Fill Quantity: 30,  # refills: 2   Last office visit provider:  12/1/22     Requested Prescriptions   Pending Prescriptions Disp Refills     FARXIGA 10 MG TABS tablet [Pharmacy Med Name: FARXIGA 10 MG TABLET] 30 tablet 2     Sig: TAKE 1 TABLET (10 MG) BY MOUTH DAILY.       Sodium Glucose Co-Transport Inhibitor Agents Passed - 2/24/2023  4:31 AM        Passed - Patient has documented A1c within the specified period of time.     If HgbA1C is 8 or greater, it needs to be on file within the past 3 months.  If less than 8, must be on file within the past 6 months.     Recent Labs   Lab Test 10/10/22  1447   A1C 8.0*             Passed - No creatinine >1.4 or GFR <45 within the past 12 mos     Recent Labs   Lab Test 02/07/23  0915 08/23/21  1152 01/27/21  1047   GFRESTIMATED 52*   < > 51*   GFRESTBLACK  --   --  >60    < > = values in this interval not displayed.       Recent Labs   Lab Test 02/07/23  0915   CR 1.5*             Passed - Medication is active on med list        Passed - Patient is age 18 or older        Passed - Patient has documented normal Potassium within the last 12 mos.     Recent Labs   Lab Test 07/05/22  0933   POTASSIUM 4.6             Passed - Recent (6 mo) or future (30 days) visit within the authorizing provider's specialty     Patient had office visit in the last 6 months or has a visit in the next 30 days with authorizing provider or within the authorizing provider's specialty.  See \"Patient Info\" tab in inbasket, or \"Choose Columns\" in Meds & Orders section of the refill encounter.                 Gladis Lucia, RN 02/25/23 4:55 PM  "

## 2023-03-01 ENCOUNTER — MYC MEDICAL ADVICE (OUTPATIENT)
Dept: INTERNAL MEDICINE | Facility: CLINIC | Age: 63
End: 2023-03-01

## 2023-03-02 ENCOUNTER — TELEPHONE (OUTPATIENT)
Dept: INTERNAL MEDICINE | Facility: CLINIC | Age: 63
End: 2023-03-02
Payer: COMMERCIAL

## 2023-03-02 ENCOUNTER — MYC MEDICAL ADVICE (OUTPATIENT)
Dept: INTERNAL MEDICINE | Facility: CLINIC | Age: 63
End: 2023-03-02
Payer: COMMERCIAL

## 2023-03-02 NOTE — TELEPHONE ENCOUNTER
Tried calling patient to reschedule missed virtual appointment with Dr. Briggs on 3/1. Patient did not answer and his VM is full. Will send a message through [employer].

## 2023-03-03 NOTE — TELEPHONE ENCOUNTER
Looks like he has an appointment with me on the 15th.  Let me know if he was hoping for something different    Mark Briggs MD on 3/3/2023 at 4:49 PM

## 2023-03-11 ENCOUNTER — MYC MEDICAL ADVICE (OUTPATIENT)
Dept: INTERNAL MEDICINE | Facility: CLINIC | Age: 63
End: 2023-03-11
Payer: COMMERCIAL

## 2023-03-15 ENCOUNTER — VIRTUAL VISIT (OUTPATIENT)
Dept: INTERNAL MEDICINE | Facility: CLINIC | Age: 63
End: 2023-03-15
Payer: COMMERCIAL

## 2023-03-15 ENCOUNTER — MYC MEDICAL ADVICE (OUTPATIENT)
Dept: INTERNAL MEDICINE | Facility: CLINIC | Age: 63
End: 2023-03-15

## 2023-03-15 DIAGNOSIS — N18.31 TYPE 2 DIABETES MELLITUS WITH STAGE 3A CHRONIC KIDNEY DISEASE, WITHOUT LONG-TERM CURRENT USE OF INSULIN (H): Primary | ICD-10-CM

## 2023-03-15 DIAGNOSIS — E78.5 HYPERLIPIDEMIA LDL GOAL <70: ICD-10-CM

## 2023-03-15 DIAGNOSIS — J44.9 CHRONIC OBSTRUCTIVE PULMONARY DISEASE, UNSPECIFIED COPD TYPE (H): ICD-10-CM

## 2023-03-15 DIAGNOSIS — E11.22 TYPE 2 DIABETES MELLITUS WITH STAGE 3A CHRONIC KIDNEY DISEASE, WITHOUT LONG-TERM CURRENT USE OF INSULIN (H): Primary | ICD-10-CM

## 2023-03-15 PROCEDURE — 99214 OFFICE O/P EST MOD 30 MIN: CPT | Mod: VID | Performed by: INTERNAL MEDICINE

## 2023-03-15 NOTE — PROGRESS NOTES
"Ned is a 62 year old who is being evaluated via a billable video visit.      How would you like to obtain your AVS? MyChart  If the video visit is dropped, the invitation should be resent by: Send to e-mail at: [email or web address]  Will anyone else be joining your video visit? No          Assessment & Plan       (J44.9) Chronic obstructive pulmonary disease, unspecified COPD type (H)  Comment: nonspecific, somewhat unusual PFT pattern, CT without clear diagnosis. Working with pulmonology. On \"triple inhaler\" with some improvement. Oral steroids still the only treatment that he received significant subjective benefit from.   Plan: FMLA paperwork to be filled. Tentative return to work in the coming week or so. May need longer though asked him to reach out for additional comment or recommendation with his pulmonologist if that is the case.      (E11.22,  N18.31) Type 2 diabetes mellitus with stage 3a chronic kidney disease, without long-term current use of insulin (H)  (primary encounter diagnosis)  Comment: suboptimal control, in part, related to the oral steroids over the last 4-5 months.  Plan: Hemoglobin A1c        Recheck when he is feeling well enough to return to clinic in the coming 4-6 weeks possibly                 No follow-ups on file.    Mark Briggs MD  [facility]    Subjective   Ned is a 62 year old, presenting for the following health issues:  Forms (Discuss FMLA forms/paperwork )      History of Present Illness     Asthma:  He presents for follow up of asthma.  He has some cough, some wheezing, and some shortness of breath. He is using a relief medication a few times a week. He does not miss any doses of his controller medication throughout the week.Patient is aware of the following triggers: unaware of any triggers. The patient has not had a visit to the Emergency Room, Urgent Care or Hospital due to asthma since the last clinic visit.     COPD:  He presents for " follow up of COPD.  Overall, COPD symptoms are better since last visit. He has more than usual fatigue or shortness of breath with exertion and no shortness of breath at rest.He sometimes coughs and does have change in sputum. No recent fever. He can walk less than 1 block without stopping to rest. He can walk 2 flights of stairs without resting.The patient has had no ED, urgent care, or hospital admissions because of COPD since the last visit. He consumes 0 sweetened beverage(s) daily. He exercises with enough effort to increase his heart rate 7 days per week.   He is taking medications regularly.             Review of Systems         Objective           Vitals:  No vitals were obtained today due to virtual visit.    Physical Exam   GENERAL: Healthy, alert and no distress  EYES: Eyes grossly normal to inspection.  No discharge or erythema, or obvious scleral/conjunctival abnormalities.  RESP: No audible wheeze, cough, or visible cyanosis.  No visible retractions or increased work of breathing.    SKIN: Visible skin clear. No significant rash, abnormal pigmentation or lesions.  NEURO: Cranial nerves grossly intact.  Mentation and speech appropriate for age.  PSYCH: Mentation appears normal, affect normal/bright, judgement and insight intact, normal speech and appearance well-groomed.                Video-Visit Details    Type of service:  Video Visit     Originating Location (pt. Location): Home    Distant Location (provider location):  On-site  Platform used for Video Visit: Rapid RMS

## 2023-03-17 ENCOUNTER — TELEPHONE (OUTPATIENT)
Dept: INTERNAL MEDICINE | Facility: CLINIC | Age: 63
End: 2023-03-17
Payer: COMMERCIAL

## 2023-03-17 NOTE — TELEPHONE ENCOUNTER
March 17, 2023    FMLA form was picked up from outbox of Dr. Briggs.  Paperwork has been reviewed and is complete.  Per initial initial request, this was sent via fax to [phone].     Da Barragan III

## 2023-03-22 ENCOUNTER — TELEPHONE (OUTPATIENT)
Dept: INTERNAL MEDICINE | Facility: CLINIC | Age: 63
End: 2023-03-22
Payer: COMMERCIAL

## 2023-03-22 NOTE — TELEPHONE ENCOUNTER
Please start Prior Authorization for Freestyle Agustín as request by pharmacy/patient.    Please notify MD of denial or approval, thank you.

## 2023-03-22 NOTE — TELEPHONE ENCOUNTER
Central Prior Authorization Team   Phone: [phone]    PA Initiation    Medication:   Insurance Company:    Pharmacy Filling the Rx: [id number] [place], [place] - [address] [place] [address]  Filling Pharmacy Phone: [phone]  Filling Pharmacy Fax: [phone]  Start Date: 3/22/2023

## 2023-03-24 ENCOUNTER — MYC MEDICAL ADVICE (OUTPATIENT)
Dept: INTERNAL MEDICINE | Facility: CLINIC | Age: 63
End: 2023-03-24
Payer: COMMERCIAL

## 2023-03-25 DIAGNOSIS — R06.2 WHEEZING: ICD-10-CM

## 2023-03-27 NOTE — TELEPHONE ENCOUNTER
Insurance rep states PA is still under review.  We should have a determination by Tuesday 3/28/2023.

## 2023-03-27 NOTE — TELEPHONE ENCOUNTER
Okay to put in new information  Please print necessary attachments and place in my inbox  Thank you    Mark Briggs MD on 3/27/2023 at 4:04 PM

## 2023-03-28 RX ORDER — FLUTICASONE FUROATE AND VILANTEROL TRIFENATATE 100; 25 UG/1; UG/1
POWDER RESPIRATORY (INHALATION)
Refills: 1 | OUTPATIENT
Start: 2023-03-28

## 2023-03-28 NOTE — TELEPHONE ENCOUNTER
Routing refill request to provider for review/approval because:  Drug not active on patient's medication list  Patient needs to be seen because it has been more than 1 year since last office visit.    Ashanti ROSARIO RN, BSN, PHN  [facility]  [phone]

## 2023-03-28 NOTE — TELEPHONE ENCOUNTER
PRIOR AUTHORIZATION DENIED    Medication: FREESTYLE LIVIA 2 - DENIED    Denial Date: 3/27/2023    Denial Rational: INSURANCE STATES PATIENT DID NOT MEET COVERAGE CRITERIA -          Appeal Information:  IF YOU WOULD LIKE TO APPEAL PLEASE SUPPLY PA TEAM WITH A LETTER OF MEDICAL NECESSITY WITH CLINICAL REASON.

## 2023-03-28 NOTE — TELEPHONE ENCOUNTER
March 28, 2023    Patient paperwork was received via [employer] and requires a signature.  Patient label was attached to paperwork and placed in the inbox for Dr. Briggs to FILL OUT AND  sign.  A response was sent to the patient acknowledging receipt of the paperwork and advising the patient that processing documents can take 5-7 business days to complete.    BREE LAY

## 2023-03-31 ENCOUNTER — TELEPHONE (OUTPATIENT)
Dept: PULMONOLOGY | Facility: OTHER | Age: 63
End: 2023-03-31
Payer: COMMERCIAL

## 2023-03-31 ENCOUNTER — DOCUMENTATION ONLY (OUTPATIENT)
Dept: PULMONOLOGY | Facility: OTHER | Age: 63
End: 2023-03-31
Payer: COMMERCIAL

## 2023-03-31 DIAGNOSIS — J44.9 CHRONIC OBSTRUCTIVE PULMONARY DISEASE, UNSPECIFIED COPD TYPE (H): Primary | ICD-10-CM

## 2023-03-31 NOTE — PROGRESS NOTES
Pulmonary Documentation    I received a message from Dr. Briggs regarding Ned's ongoing symptoms and inability to work. I replied to him and sent the patient the following [employer] message:    Ross Marino,    Dr. Briggs has been in touch with me. I know that you have been unable to work despite the treatments we have tried. As we discussed, you have severe airflow obstruction (COPD) and thickened airways (chronic bronchitis) despite never having smoked. If this continues to affect your daily life and ability to work, we should get you into pulmonary rehabilitation (twice weekly for about an hour, lasts for 8 weeks) and also get a more involved stress test called a cardiopulmonary exercise test at the [facility] to support any future disability applications. You will be contacted to help schedule these. Let me know if you have questions. I am sorry you are struggling with this.    Sincerely  Jose Luis True Biswas MD  Pulmonary and Critical Care Medicine  [facility]  Office [phone]

## 2023-04-02 ENCOUNTER — DOCUMENTATION ONLY (OUTPATIENT)
Dept: PULMONOLOGY | Facility: OTHER | Age: 63
End: 2023-04-02
Payer: COMMERCIAL

## 2023-04-02 NOTE — PROGRESS NOTES
Pulmonary Documentation    Ned sent me the following [employer] message:    Ross Biswas. A struggle for sure but with some recent milestones as well. I have been walking about a mile every day for a week or so without need for rescue inhaler and only mild SOB on a hill. Before that were my first ventures out for groceries, at first needing the inhaler once but none on second and third. To me one of the confounding aspects of this process was being late getting official documentation in for the FMLA so we are now working to get them done while at the same time I feel close to being able to return to work on a limited basis. We tentatively agreed to proceed with the forms because as of last night I could not have returned to normal duties and provided the expected level of patient care. Today I requested some time to meet with a supervisor next week to discuss such a plan. Dr Briggs brought up a good point about defining trouble breathing from this ailment versus problems from lying around too long and getting out of shape. I can only say that I know the   difference and believe while there is some general loss of energy which will come back with activity, the particular symptoms of the disease have been resolving fairly steadily and can only hope that continues. So I wouldn't say the Ellipta has been failing it's just slow. Let me know how you thing we should proceed.  Ned Moore    I replied:    Ross Marino,    Thank you for the update. I hope that the inhaler continues to provide benefit, but given the severity of your airflow obstruction and current functional limitations, I believe it would be best to also enroll you in pulmonary rehabilitation and schedule you for a formal cardiopulmonary exercise test.    Please contact me with any questions or concerns.    Sincerely,  Jose Luis Biswas MD  Pulmonary and Critical Care Medicine  [facility]  Office [phone]

## 2023-04-04 ENCOUNTER — HOSPITAL ENCOUNTER (OUTPATIENT)
Dept: CARDIOLOGY | Facility: CLINIC | Age: 63
Discharge: HOME | End: 2023-04-04
Attending: INTERNAL MEDICINE | Admitting: INTERNAL MEDICINE
Payer: COMMERCIAL

## 2023-04-04 DIAGNOSIS — J44.9 CHRONIC OBSTRUCTIVE PULMONARY DISEASE, UNSPECIFIED COPD TYPE (H): ICD-10-CM

## 2023-04-04 PROCEDURE — 94621 CARDIOPULM EXERCISE TESTING: CPT

## 2023-04-04 PROCEDURE — 94621 CARDIOPULM EXERCISE TESTING: CPT | Mod: 26 | Performed by: INTERNAL MEDICINE

## 2023-04-05 LAB
CARDIOPULMONARY ANAEROBIC THRESHOLD PREDICTED PEAK: 64 %
CARDIOPULMONARY ANAEROBIC THRESHOLD VO2: 19.3 ML/KG/MIN
CARDIOPULMONARY BLOOD PRESSURE REST: NORMAL MMHG
CARDIOPULMONARY BREATHING RESERVE REST: 69.7
CARDIOPULMONARY BREATHING RESERVE V02MAX: -9
CARDIOPULMONARY CO2 OUTPUT REST: 296 ML/MIN
CARDIOPULMONARY CO2 OUTPUT VO2MAX: 1490 ML/MIN
CARDIOPULMONARY FEV 1.0 (L) ACTUAL: 1.33
CARDIOPULMONARY FEV 1.0 (L) PRECENT: 35 %
CARDIOPULMONARY FEV 1.0 (L) PREDICTED: 3.82
CARDIOPULMONARY FEV 1.0 FVC (%) ACTUAL: 46
CARDIOPULMONARY FEV 1.0 FVC (%) PERCENT: 61 %
CARDIOPULMONARY FEV 1.0 FVC (%) PREDICTED: 75
CARDIOPULMONARY FUNCTIONAL CAPACITY MAX ML/KG/MIN: 20.5 ML/KG/MIN
CARDIOPULMONARY FUNCTIONAL CAPACITY PERCENT: 68 %
CARDIOPULMONARY FUNCTIONAL CAPACITY PREDICTED: 30.1 ML/KG/MIN
CARDIOPULMONARY FVC (L) ACTUAL: 2.94
CARDIOPULMONARY FVC (L) PERCENT: 58 %
CARDIOPULMONARY FVC (L) PREDICTED: 5.09
CARDIOPULMONARY HEART RATE REST: 114 BPM
CARDIOPULMONARY MET'S REST: 1.4
CARDIOPULMONARY MINUTE VENTILATION REST: 14.6 L/MIN
CARDIOPULMONARY MINUTE VENTILATION VO2MAX: 60.6 L/MIN
CARDIOPULMONARY MYOCARDIAC O2 DEMAND MAX: NORMAL
CARDIOPULMONARY OXYGEN CONSUMPTION REST: 5.1 ML/KG/MIN
CARDIOPULMONARY OXYGEN CONSUMPTION VO2MAX: 20.5 ML/KG/MIN
CARDIOPULMONARY OXYGEN PULSE REST: 3 ML/BEAT
CARDIOPULMONARY OXYGEN PULSE VO2MAX: 10 ML/BEAT
CARDIOPULMONARY OXYGEN SATURATION- OXIMETRY REST: 96 %
CARDIOPULMONARY OXYGEN SATURATION- OXIMETRY VO2MAX: 95 %
CARDIOPULMONARY PET C02 REST: 28
CARDIOPULMONARY PET C02 VO2MAX: 31
CARDIOPULMONARY PET02 REST: 108
CARDIOPULMONARY PET02 V02 MAX: 112
CARDIOPULMONARY RER: 1.03
CARDIOPULMONARY RESPIRALORY EXCHANGE RATIO VO2MAX: 1.03
CARDIOPULMONARY RESPIRALORY EXCHANGE RATIO: 0.83
CARDIOPULMONARY RESPIRATORY RATE REST: 15 BR/MIN
CARDIOPULMONARY RESPIRATORY RATE VO2MAX: 42 BR/MIN
CARDIOPULMONARY STRESS BASE 1 BP MMHG: NORMAL MMHG
CARDIOPULMONARY STRESS BASE 1 BPA: 143 BPM
CARDIOPULMONARY STRESS BASE 1 SPO2: 94 % SPO2
CARDIOPULMONARY STRESS BASE 1 TIME SEC: 0 SEC
CARDIOPULMONARY STRESS BASE 1 TIME: 1 MINS
CARDIOPULMONARY STRESS BASE 2 BP MMHG: NORMAL MMHG
CARDIOPULMONARY STRESS BASE 2 BPA: 123 BPM
CARDIOPULMONARY STRESS BASE 2 SPO2: 95 % SPO2
CARDIOPULMONARY STRESS BASE 2 TIME SEC: 0 SEC
CARDIOPULMONARY STRESS BASE 2 TIME: 3 MINS
CARDIOPULMONARY STRESS BASE 3 BP MMHG: NORMAL MMHG
CARDIOPULMONARY STRESS BASE 3 BPA: 116 BPM
CARDIOPULMONARY STRESS BASE 3 SPO2: 95 % SPO2
CARDIOPULMONARY STRESS BASE 3 TIME SEC: 0 SEC
CARDIOPULMONARY STRESS BASE 3 TIME: 7 MINS
CARDIOPULMONARY STRESS PHASE 1 BP MMHG: NORMAL MMHG
CARDIOPULMONARY STRESS PHASE 1 BPM: 123 BPM
CARDIOPULMONARY STRESS PHASE 1 SPO2: 95 % SPO2
CARDIOPULMONARY STRESS PHASE 1 TIME SEC: 0 SEC
CARDIOPULMONARY STRESS PHASE 1 TIME: 2 MINS
CARDIOPULMONARY STRESS PHASE 2 BP MMHG: NORMAL MMHG
CARDIOPULMONARY STRESS PHASE 2 BPM: 128 BPM
CARDIOPULMONARY STRESS PHASE 2 SPO2: 95 % SPO2
CARDIOPULMONARY STRESS PHASE 2 TIME SEC: 0 SEC
CARDIOPULMONARY STRESS PHASE 2 TIME: 4 MINS
CARDIOPULMONARY STRESS PHASE 3 BP MMHG: NORMAL MMHG
CARDIOPULMONARY STRESS PHASE 3 BPM: 136 BPM
CARDIOPULMONARY STRESS PHASE 3 SPO2: 97 % SPO2
CARDIOPULMONARY STRESS PHASE 3 TIME SEC: 0 SEC
CARDIOPULMONARY STRESS PHASE 3 TIME: 6 MINS
CARDIOPULMONARY STRESS PHASE 4 BP MMHG: NORMAL MMHG
CARDIOPULMONARY STRESS PHASE 4 BPM: 144 BPM
CARDIOPULMONARY STRESS PHASE 4 SPO2: 96 % SPO2
CARDIOPULMONARY STRESS PHASE 4 TIME SEC: 0 SEC
CARDIOPULMONARY STRESS PHASE 4 TIME: 8 MINS
CARDIOPULMONARY STRESS PHASE 5 BP MMHG: NORMAL MMHG
CARDIOPULMONARY STRESS PHASE 5 BPM: 147 BPM
CARDIOPULMONARY STRESS PHASE 5 SPO2: 95 % SPO2
CARDIOPULMONARY STRESS PHASE 5 TIME SEC: 0 SEC
CARDIOPULMONARY STRESS PHASE 5 TIME: 9 MINS
CARDIOPULMONARY SVC (L) ACTUAL: 3.3
CARDIOPULMONARY SVC (L) PERCENT: 65 %
CARDIOPULMONARY SVC (L) PREDICTED: 5.09
CARDIOPULMONARY TIDAL VOLUME REST: 924 ML
CARDIOPULMONARY TIDAL VOLUME VO2MAX: 1458 ML
CARDIOPULMONARY VE/VCO2 SLOPE: 39.1
CARDIOPULMONARY VENTILATORY EQUIVALENT 02 REST: 41
CARDIOPULMONARY VENTILATORY EQUIVALENT 02 V02: 41
CARDIOPULMONARY VENTILATORY EQUIVALENT C02 REST: 49
CARDIOPULMONARY VENTILATORY EQUIVALENT C02 SLOPE VO2MAX: 39.09
CARDIOPULMONARY VENTILATORY EQUIVALENT C02 VO2MAX: 41
CV STRESS MAX HR HE: 147
PREDICTED VO2MAX: 20.5
RATED PERCEIVED EXERTION: 18
STRESS ECHO BASELINE BP: NORMAL MMHG
STRESS ECHO BASELINE HR: 99 BPM
STRESS ECHO CALCULATED PERCENT HR: 94 %
STRESS ECHO LAST STRESS BP: NORMAL MMHG
STRESS ECHO POST ESTIMATED WORKLOAD: 5.9 METS
STRESS ECHO POST EXERCISE DUR MIN: 9 MIN
STRESS ECHO POST EXERCISE DUR SEC: 0 SEC
STRESS ECHO TARGET HR: 157

## 2023-04-06 ENCOUNTER — DOCUMENTATION ONLY (OUTPATIENT)
Dept: PULMONOLOGY | Facility: OTHER | Age: 63
End: 2023-04-06
Payer: COMMERCIAL

## 2023-04-06 NOTE — PROGRESS NOTES
Pulmonary Note    CPET shows respiratory limitation to exercise, VO2max 20.5 (68% predicted), a moderate impairment. Sent Ned the following [employer] message:    Hi Ned,    Your cardiopulmonary exercise test shows limitation due to your lung disease. This confirms that you need pulmonary rehabilitation as well as the inhaled therapy that we initiated, and can be included in any paperwork need for work absences or disability applications. Let me know if you have questions.    Sincerely,  Jose Luis Biswas MD  Pulmonary and Critical Care Medicine  [facility]  Office [phone]

## 2023-04-06 NOTE — LETTER
2023    Dear Dr. Briggs,    See below for documentation regarding Ned Moore ( 1960). Please feel free to call me at any time if needed.    Sincerely,    Jose Biswas MD  Pulmonary and Critical Care Medicine  [facility]  Cell [phone]  Office [phone]  Pager [phone]  he/him    Pulmonary Note     CPET shows respiratory limitation to exercise, VO2max 20.5 (68% predicted), a moderate impairment. Sent Ned the following [email or web address] message:    Ross Marino,    Your cardiopulmonary exercise test shows limitation due to your lung disease. This confirms that you need pulmonary rehabilitation as well as the inhaled therapy that we initiated, and can be included in any paperwork need for work absences or disability applications. Let me know if you have questions.    Sincerely,  Jose Luis Biswas MD  Pulmonary and Critical Care Medicine  [facility]  Office [phone]

## 2023-04-10 ENCOUNTER — MYC MEDICAL ADVICE (OUTPATIENT)
Dept: PULMONOLOGY | Facility: CLINIC | Age: 63
End: 2023-04-10
Payer: COMMERCIAL

## 2023-04-10 DIAGNOSIS — J44.1 COPD EXACERBATION (H): ICD-10-CM

## 2023-04-10 DIAGNOSIS — J44.9 CHRONIC OBSTRUCTIVE PULMONARY DISEASE, UNSPECIFIED COPD TYPE (H): Primary | ICD-10-CM

## 2023-04-10 RX ORDER — AZITHROMYCIN 250 MG/1
TABLET, FILM COATED ORAL
Qty: 6 TABLET | Refills: 0 | Status: SHIPPED | OUTPATIENT
Start: 2023-04-10 | End: 2023-04-15

## 2023-04-10 RX ORDER — PREDNISONE 20 MG/1
TABLET ORAL
Qty: 10 TABLET | Refills: 0 | Status: SHIPPED | OUTPATIENT
Start: 2023-04-10 | End: 2023-07-20

## 2023-04-17 DIAGNOSIS — J45.41 MODERATE PERSISTENT REACTIVE AIRWAY DISEASE WITH ACUTE EXACERBATION: ICD-10-CM

## 2023-04-17 DIAGNOSIS — R06.02 SOB (SHORTNESS OF BREATH): ICD-10-CM

## 2023-04-17 RX ORDER — ALBUTEROL SULFATE 90 UG/1
2 AEROSOL, METERED RESPIRATORY (INHALATION) EVERY 6 HOURS
Qty: 8.5 G | Refills: 1 | Status: SHIPPED | OUTPATIENT
Start: 2023-04-17 | End: 2023-12-07

## 2023-04-17 NOTE — TELEPHONE ENCOUNTER
"Routing refill request to provider for review/approval because:  ACT 11    Last Written Prescription Date:  3/21/2023  Last Fill Quantity: 8.5,  # refills: 0   Last office visit provider:  3/15/2023     Requested Prescriptions   Pending Prescriptions Disp Refills     albuterol (PROAIR HFA/PROVENTIL HFA/VENTOLIN HFA) 108 (90 Base) MCG/ACT inhaler [Pharmacy Med Name: ALBUTEROL HFA (PROAIR) INHALER]       Sig: INHALE 2 PUFFS INTO THE LUNGS EVERY 6 HOURS       Asthma Maintenance Inhalers - Anticholinergics Failed - 4/17/2023  1:05 AM        Failed - Asthma control assessment score within normal limits in last 6 months     Please review ACT score.           Passed - Patient is age 12 years or older        Passed - Medication is active on med list        Passed - Recent (6 mo) or future (30 days) visit within the authorizing provider's specialty     Patient had office visit in the last 6 months or has a visit in the next 30 days with authorizing provider or within the authorizing provider's specialty.  See \"Patient Info\" tab in inbasket, or \"Choose Columns\" in Meds & Orders section of the refill encounter.           Short-Acting Beta Agonist Inhalers Protocol  Failed - 4/17/2023  1:05 AM        Failed - Asthma control assessment score within normal limits in last 6 months     Please review ACT score.           Passed - Patient is age 12 or older        Passed - Medication is active on med list        Passed - Recent (6 mo) or future (30 days) visit within the authorizing provider's specialty     Patient had office visit in the last 6 months or has a visit in the next 30 days with authorizing provider or within the authorizing provider's specialty.  See \"Patient Info\" tab in inbasket, or \"Choose Columns\" in Meds & Orders section of the refill encounter.                 Lucy Domínguez RN 04/17/23 1:52 AM  "

## 2023-04-24 ENCOUNTER — MYC MEDICAL ADVICE (OUTPATIENT)
Dept: INTERNAL MEDICINE | Facility: CLINIC | Age: 63
End: 2023-04-24
Payer: COMMERCIAL

## 2023-04-25 ENCOUNTER — PATIENT OUTREACH (OUTPATIENT)
Dept: CARE COORDINATION | Facility: CLINIC | Age: 63
End: 2023-04-25
Payer: COMMERCIAL

## 2023-04-26 ENCOUNTER — LAB (OUTPATIENT)
Dept: LAB | Facility: CLINIC | Age: 63
End: 2023-04-26
Payer: COMMERCIAL

## 2023-04-26 DIAGNOSIS — E11.22 TYPE 2 DIABETES MELLITUS WITH STAGE 3A CHRONIC KIDNEY DISEASE, WITHOUT LONG-TERM CURRENT USE OF INSULIN (H): ICD-10-CM

## 2023-04-26 DIAGNOSIS — E78.5 HYPERLIPIDEMIA LDL GOAL <70: ICD-10-CM

## 2023-04-26 DIAGNOSIS — N18.31 TYPE 2 DIABETES MELLITUS WITH STAGE 3A CHRONIC KIDNEY DISEASE, WITHOUT LONG-TERM CURRENT USE OF INSULIN (H): ICD-10-CM

## 2023-04-26 DIAGNOSIS — N18.30 CHRONIC KIDNEY DISEASE, STAGE 3 (H): Primary | ICD-10-CM

## 2023-04-26 LAB
ALBUMIN SERPL BCG-MCNC: 4.1 G/DL (ref 3.5–5.2)
ALP SERPL-CCNC: 103 U/L (ref 40–129)
ALT SERPL W P-5'-P-CCNC: 23 U/L (ref 10–50)
ANION GAP SERPL CALCULATED.3IONS-SCNC: 13 MMOL/L (ref 7–15)
AST SERPL W P-5'-P-CCNC: 24 U/L (ref 10–50)
BILIRUB SERPL-MCNC: 0.3 MG/DL
BUN SERPL-MCNC: 22.5 MG/DL (ref 8–23)
CALCIUM SERPL-MCNC: 9.4 MG/DL (ref 8.8–10.2)
CHLORIDE SERPL-SCNC: 99 MMOL/L (ref 98–107)
CHOLEST SERPL-MCNC: 152 MG/DL
CREAT SERPL-MCNC: 1.35 MG/DL (ref 0.67–1.17)
DEPRECATED HCO3 PLAS-SCNC: 27 MMOL/L (ref 22–29)
GFR SERPL CREATININE-BSD FRML MDRD: 59 ML/MIN/1.73M2
GLUCOSE SERPL-MCNC: 173 MG/DL (ref 70–99)
HBA1C MFR BLD: 8.2 % (ref 0–5.6)
HDLC SERPL-MCNC: 64 MG/DL
HGB BLD-MCNC: 15.4 G/DL (ref 13.3–17.7)
LDLC SERPL CALC-MCNC: 69 MG/DL
NONHDLC SERPL-MCNC: 88 MG/DL
POTASSIUM SERPL-SCNC: 4.9 MMOL/L (ref 3.4–5.3)
PROT SERPL-MCNC: 7.2 G/DL (ref 6.4–8.3)
SODIUM SERPL-SCNC: 139 MMOL/L (ref 136–145)
TRIGL SERPL-MCNC: 97 MG/DL

## 2023-04-26 PROCEDURE — 36415 COLL VENOUS BLD VENIPUNCTURE: CPT

## 2023-04-26 PROCEDURE — 83036 HEMOGLOBIN GLYCOSYLATED A1C: CPT

## 2023-04-26 PROCEDURE — 85018 HEMOGLOBIN: CPT

## 2023-04-26 PROCEDURE — 80061 LIPID PANEL: CPT

## 2023-04-26 PROCEDURE — 80053 COMPREHEN METABOLIC PANEL: CPT

## 2023-05-09 ENCOUNTER — PATIENT OUTREACH (OUTPATIENT)
Dept: CARE COORDINATION | Facility: CLINIC | Age: 63
End: 2023-05-09
Payer: COMMERCIAL

## 2023-05-24 ENCOUNTER — TELEPHONE (OUTPATIENT)
Dept: INTERNAL MEDICINE | Facility: CLINIC | Age: 63
End: 2023-05-24
Payer: COMMERCIAL

## 2023-05-24 NOTE — TELEPHONE ENCOUNTER
May 24, 2023    Workability form was received via fax for Dr. Briggs to sign.  Patient label was attached to paperwork and placed in provider's inbox to be signed.    Pam J. Behr

## 2023-05-24 NOTE — TELEPHONE ENCOUNTER
See last mychart note from patient on 5/12. He has not asked for any work release but there is a form in my in-basket with a note from his absence manager stating he has not returned to work as scheduled on 5/8/2023  He will need an appointment with someone to help with this. I have written a number of extensions though no exam opportunities. I am not able to write more extensions.  Form is in my outbox    Mark Briggs MD on 5/24/2023 at 6:27 PM

## 2023-05-26 DIAGNOSIS — E11.40 TYPE 2 DIABETES MELLITUS WITH DIABETIC NEUROPATHY, UNSPECIFIED (H): ICD-10-CM

## 2023-05-26 RX ORDER — DAPAGLIFLOZIN 10 MG/1
TABLET, FILM COATED ORAL
Qty: 30 TABLET | Refills: 2 | Status: SHIPPED | OUTPATIENT
Start: 2023-05-26 | End: 2023-08-10

## 2023-05-26 NOTE — TELEPHONE ENCOUNTER
"    Last Written Prescription Date:  2/25/23  Last Fill Quantity: 30,  # refills: 2   Last office visit provider:  3/15/23     Requested Prescriptions   Pending Prescriptions Disp Refills     FARXIGA 10 MG TABS tablet [Pharmacy Med Name: FARXIGA 10 MG TABLET] 30 tablet 2     Sig: TAKE 1 TABLET (10 MG) BY MOUTH DAILY.       Sodium Glucose Co-Transport Inhibitor Agents Failed - 5/26/2023  1:38 AM        Failed - Recent (6 mo) or future (30 days) visit within the authorizing provider's specialty     Patient had office visit in the last 6 months or has a visit in the next 30 days with authorizing provider or within the authorizing provider's specialty.  See \"Patient Info\" tab in inbasket, or \"Choose Columns\" in Meds & Orders section of the refill encounter.            Passed - Patient has documented A1c within the specified period of time.     If HgbA1C is 8 or greater, it needs to be on file within the past 3 months.  If less than 8, must be on file within the past 6 months.     Recent Labs   Lab Test 04/26/23 0824   A1C 8.2*             Passed - No creatinine >1.4 or GFR <45 within the past 12 mos     Recent Labs   Lab Test 04/26/23  0824 08/23/21  1152 01/27/21  1047   GFRESTIMATED 59*   < > 51*   GFRESTBLACK  --   --  >60    < > = values in this interval not displayed.       Recent Labs   Lab Test 04/26/23 0824   CR 1.35*             Passed - Medication is active on med list        Passed - Patient is age 18 or older        Passed - Patient has documented normal Potassium within the last 12 mos.     Recent Labs   Lab Test 04/26/23 0824   POTASSIUM 4.9                  Gladis Lucia, RN 05/26/23 6:25 PM  "

## 2023-05-30 NOTE — TELEPHONE ENCOUNTER
[place] workability form was received but not signed. Patient will need appointment to complete the form.

## 2023-05-31 NOTE — TELEPHONE ENCOUNTER
5/31 sent pt a [employer] message since voicemail is full, to call back and schedule an in-person OV with any provider at [facility] to get his workability forms completed and signed.

## 2023-06-08 ENCOUNTER — TRANSFERRED RECORDS (OUTPATIENT)
Dept: HEALTH INFORMATION MANAGEMENT | Facility: CLINIC | Age: 63
End: 2023-06-08

## 2023-06-12 NOTE — TELEPHONE ENCOUNTER
06/12 Sent another my chart message to pt that he needs to schedule an appt for paperwork to be completed

## 2023-06-22 DIAGNOSIS — M30.1 EOSINOPHILIC GRANULOMATOSIS WITH POLYANGIITIS (EGPA) (H): Primary | ICD-10-CM

## 2023-06-22 DIAGNOSIS — D72.18 EOSINOPHILIC GRANULOMATOSIS WITH POLYANGIITIS (EGPA) (H): Primary | ICD-10-CM

## 2023-06-30 ENCOUNTER — TELEPHONE (OUTPATIENT)
Dept: INTERNAL MEDICINE | Facility: CLINIC | Age: 63
End: 2023-06-30
Payer: COMMERCIAL

## 2023-06-30 NOTE — TELEPHONE ENCOUNTER
Reason for Call:  Appointment Request    Patient requesting this type of appt:  Hospital/ED Follow-Up     Requested provider: Mark Briggs    Reason patient unable to be scheduled: Not within requested timeframe    When does patient want to be seen/preferred time: 1-2 weeks    Comments: Pt is being discharged 6/30 for multiple strokes, soonest opening is 7/20    Could we send this information to you in [facility] or would you prefer to receive a phone call?:   Patient would prefer a phone call   Okay to leave a detailed message?: Yes at Home number on file [phone] (home)    Call taken on 6/30/2023 at 8:59 AM by Kaylyn Brock

## 2023-06-30 NOTE — TELEPHONE ENCOUNTER
Patient returned missed call, transferred to FNA Triage. Medication from PCP Care Team as noted below, relayed to patient. No additional concerns, warm transferred to scheduling.   Mariama Gorman RN on 6/30/2023 at 1:23 PM

## 2023-07-06 ENCOUNTER — MYC MEDICAL ADVICE (OUTPATIENT)
Dept: PULMONOLOGY | Facility: CLINIC | Age: 63
End: 2023-07-06
Payer: COMMERCIAL

## 2023-07-20 ENCOUNTER — OFFICE VISIT (OUTPATIENT)
Dept: INTERNAL MEDICINE | Facility: CLINIC | Age: 63
End: 2023-07-20
Payer: COMMERCIAL

## 2023-07-20 VITALS
SYSTOLIC BLOOD PRESSURE: 117 MMHG | HEART RATE: 86 BPM | DIASTOLIC BLOOD PRESSURE: 68 MMHG | HEIGHT: 74 IN | BODY MASS INDEX: 22.96 KG/M2 | TEMPERATURE: 97.9 F | RESPIRATION RATE: 16 BRPM | WEIGHT: 178.9 LBS | OXYGEN SATURATION: 97 %

## 2023-07-20 DIAGNOSIS — D72.18 EOSINOPHILIC GRANULOMATOSIS WITH POLYANGIITIS (EGPA) (H): Primary | ICD-10-CM

## 2023-07-20 DIAGNOSIS — E78.5 HYPERLIPIDEMIA LDL GOAL <70: ICD-10-CM

## 2023-07-20 DIAGNOSIS — Z86.73 HISTORY OF CVA (CEREBROVASCULAR ACCIDENT): ICD-10-CM

## 2023-07-20 DIAGNOSIS — N18.31 TYPE 2 DIABETES MELLITUS WITH STAGE 3A CHRONIC KIDNEY DISEASE, WITHOUT LONG-TERM CURRENT USE OF INSULIN (H): ICD-10-CM

## 2023-07-20 DIAGNOSIS — E11.22 TYPE 2 DIABETES MELLITUS WITH STAGE 3A CHRONIC KIDNEY DISEASE, WITHOUT LONG-TERM CURRENT USE OF INSULIN (H): ICD-10-CM

## 2023-07-20 DIAGNOSIS — M30.1 EOSINOPHILIC GRANULOMATOSIS WITH POLYANGIITIS (EGPA) (H): Primary | ICD-10-CM

## 2023-07-20 DIAGNOSIS — R79.89 ELEVATED LFTS: ICD-10-CM

## 2023-07-20 PROBLEM — E11.21 DIABETIC NEPHROPATHY ASSOCIATED WITH TYPE 2 DIABETES MELLITUS (H): Status: RESOLVED | Noted: 2018-01-09 | Resolved: 2023-07-20

## 2023-07-20 PROBLEM — N18.30 CHRONIC KIDNEY DISEASE, STAGE 3 (H): Status: RESOLVED | Noted: 2021-01-26 | Resolved: 2023-07-20

## 2023-07-20 PROCEDURE — 99215 OFFICE O/P EST HI 40 MIN: CPT | Performed by: INTERNAL MEDICINE

## 2023-07-20 RX ORDER — TIOTROPIUM BROMIDE 18 UG/1
18 CAPSULE ORAL; RESPIRATORY (INHALATION) DAILY
COMMUNITY
Start: 2023-06-29 | End: 2023-12-13

## 2023-07-20 RX ORDER — FLUTICASONE FUROATE AND VILANTEROL TRIFENATATE 100; 25 UG/1; UG/1
POWDER RESPIRATORY (INHALATION)
COMMUNITY
Start: 2023-06-29 | End: 2023-08-21

## 2023-07-20 RX ORDER — INSULIN HUMAN 100 [IU]/ML
15 INJECTION, SUSPENSION SUBCUTANEOUS DAILY
COMMUNITY
Start: 2023-06-29 | End: 2023-09-05

## 2023-07-20 RX ORDER — TIOTROPIUM BROMIDE 18 UG/1
CAPSULE ORAL; RESPIRATORY (INHALATION)
COMMUNITY
Start: 2023-06-30 | End: 2023-08-21

## 2023-07-20 RX ORDER — FLUTICASONE FUROATE AND VILANTEROL 100; 25 UG/1; UG/1
1 POWDER RESPIRATORY (INHALATION) DAILY
COMMUNITY
Start: 2023-06-29 | End: 2023-11-06

## 2023-07-20 RX ORDER — PEN NEEDLE, DIABETIC 32GX 5/32"
NEEDLE, DISPOSABLE MISCELLANEOUS
COMMUNITY
Start: 2023-06-29

## 2023-07-20 RX ORDER — B-COMPLEX WITH VITAMIN C
1 TABLET ORAL
COMMUNITY
Start: 2023-06-29 | End: 2023-08-21

## 2023-07-20 RX ORDER — TRAZODONE HYDROCHLORIDE 50 MG/1
TABLET, FILM COATED ORAL
COMMUNITY
Start: 2023-06-29 | End: 2023-08-21

## 2023-07-20 RX ORDER — METOPROLOL SUCCINATE 25 MG/1
TABLET, EXTENDED RELEASE ORAL
COMMUNITY
Start: 2023-06-29 | End: 2023-07-20

## 2023-07-20 RX ORDER — GABAPENTIN 300 MG/1
300 CAPSULE ORAL AT BEDTIME
COMMUNITY
Start: 2023-06-29 | End: 2024-06-03

## 2023-07-20 RX ORDER — SULFAMETHOXAZOLE/TRIMETHOPRIM 800-160 MG
1 TABLET ORAL
COMMUNITY
Start: 2023-06-30 | End: 2023-09-04

## 2023-07-20 RX ORDER — DAPAGLIFLOZIN 5 MG/1
TABLET, FILM COATED ORAL
COMMUNITY
Start: 2023-03-25 | End: 2023-08-10

## 2023-07-20 RX ORDER — METOPROLOL SUCCINATE 25 MG/1
12.5 TABLET, EXTENDED RELEASE ORAL EVERY MORNING
COMMUNITY
Start: 2023-06-29 | End: 2023-11-06

## 2023-07-20 RX ORDER — LANOLIN ALCOHOL/MO/W.PET/CERES
9 CREAM (GRAM) TOPICAL
COMMUNITY
Start: 2023-06-30 | End: 2024-08-22

## 2023-07-20 RX ORDER — GABAPENTIN 300 MG/1
CAPSULE ORAL
COMMUNITY
Start: 2023-06-29 | End: 2023-07-20

## 2023-07-20 RX ORDER — SULFAMETHOXAZOLE/TRIMETHOPRIM 800-160 MG
TABLET ORAL
COMMUNITY
Start: 2023-06-29 | End: 2023-07-20

## 2023-07-20 RX ORDER — ASPIRIN 81 MG
81 TABLET,CHEWABLE ORAL PRN
COMMUNITY
Start: 2023-06-29

## 2023-07-20 RX ORDER — TRAZODONE HYDROCHLORIDE 50 MG/1
50 TABLET, FILM COATED ORAL DAILY PRN
COMMUNITY
Start: 2023-06-29 | End: 2024-08-22

## 2023-07-20 ASSESSMENT — PAIN SCALES - GENERAL: PAINLEVEL: NO PAIN (0)

## 2023-07-20 NOTE — PROGRESS NOTES
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 Complications including CVA with some LE weakness and very mild cognitive effects as well as eosinophilic mycarditis . Now s/p Mepolizumab times 1 at the time of discharge with a steroid taper. This has resulted in some improvement. Now back at home, family support and also receiving physical, speech and occupational therapy.  Plan:   Will follow up with pulmonology regarding the Mepolizumab and steroid taper  Rheumatology referral placed by inpatient team--family to help set this up.  On PJP prophylaxis, bactrim three times a week    (E11.22,  N18.31) Type 2 diabetes mellitus with stage 3a chronic kidney disease, without long-term current use of insulin (H)  Comment: complicated by steroid use  Plan: has been reasonably well controlled. Will have him see me again in 4 months  Eye doctor appointment tomorrow--asked them to have records sent to me.     (Z86.73) History of CVA (cerebrovascular accident)  Comment: multifocal noted on MRI, acute event in hospital--?hypotensive related vs other.   Plan: on asa alone per neurology. Follow referral placed by inpatient team. Cognitive impairment continues to improve. He does have some mild LE weakness, right a bit more then left today. He is able to walk without assistance though.  Continue rehab as outpatient    (E78.5) Hyperlipidemia LDL goal <70  Comment: on statin  Plan: Will plan to continue on previous medication without changes     (R79.89) Elevated LFTs  Comment: noted along with some focal abnormalities, incidentally noted during hospital stay  Plan: MRI liver planned    Total time used in face-to-face, coordination or care and documentation is equal to 48 minutes.            MED REC REQUIRED  Post Medication Reconciliation Status:    "    MEDICATIONS:  Continue current medications without change    Mark Briggs MD  [facility]    Lupe Marino is a 63 year old, presenting for the following health issues:  Recheck Medication and Hospital F/U (Pt is here for hospital follow up )        7/20/2023     1:19 PM   Additional Questions   Roomed by tavo   Accompanied by ciera / daughter         7/20/2023     1:19 PM   Patient Reported Additional Medications   Patient reports taking the following new medications no     HPI       Hospital Follow-up Visit:    Hospital/Nursing Home/IP Rehab Facility: [facility] (including rehab stay)  Date of Admission: 5/14  Date of Discharge: 6/30  Reason(s) for Admission: stroke,respiratory infections    Was your hospitalization related to COVID-19? No   Problems taking medications regularly:  None  Medication changes since discharge: yes  Problems adhering to non-medication therapy:  None    Summary of hospitalization:  See outside records, reviewed and scanned  Diagnostic Tests/Treatments reviewed.  Follow up needed: none  Other Healthcare Providers Involved in Patient s Care:         None  Update since discharge: improved.         Plan of care communicated with patient and family                   Review of Systems         Objective    /68 (BP Location: Left arm, Patient Position: Sitting, Cuff Size: Adult Regular)   Pulse 86   Temp 97.9  F (36.6  C) (Oral)   Resp 16   Ht 1.87 m (6' 1.62\")   Wt 81.1 kg (178 lb 14.4 oz)   SpO2 97%   BMI 23.21 kg/m    Body mass index is 23.21 kg/m .  Physical Exam   Gen:nad  More frail today post hospital as expected  Neuro: cognition near normal  Some LE weakness though generally gait is steady.                    "

## 2023-07-21 ENCOUNTER — TRANSFERRED RECORDS (OUTPATIENT)
Dept: HEALTH INFORMATION MANAGEMENT | Facility: CLINIC | Age: 63
End: 2023-07-21
Payer: COMMERCIAL

## 2023-07-21 LAB — RETINOPATHY: NEGATIVE

## 2023-07-27 ENCOUNTER — ALLIED HEALTH/NURSE VISIT (OUTPATIENT)
Dept: PULMONOLOGY | Facility: CLINIC | Age: 63
End: 2023-07-27
Payer: COMMERCIAL

## 2023-07-27 DIAGNOSIS — M30.1 EOSINOPHILIC GRANULOMATOSIS WITH POLYANGIITIS (EGPA) (H): Primary | ICD-10-CM

## 2023-07-27 DIAGNOSIS — D72.18 EOSINOPHILIC GRANULOMATOSIS WITH POLYANGIITIS (EGPA) (H): Primary | ICD-10-CM

## 2023-07-27 PROCEDURE — 99207 PR NO CHARGE LOS: CPT | Performed by: INTERNAL MEDICINE

## 2023-07-27 NOTE — PROGRESS NOTES
Went over instructions for self administration of Nucala injections.  Explained dosage instructions to patient.  Went over correct storage of Nucala syringes and materials needed for injection.  Discussed inspection of syringe before use, and wait time before administration.  Taught proper subcutaneous injection site selection, including staying at least 2 inches away from navel, rotating injection sites, and not injecting in skin that is tender, damaged or scarred.  Discussed site preparation.  Patient was taught how to pinch a fold of skin at injection site, to insert needle completely into fold at a 45 degree angle, and to push plunger callie slowly and steadily as far as it will go to empty syringe.  Discussed proper site care, and how to dispose of used syringe.     Patient has our phone number to call if any further questions.  Patient will call [facility] and make sure that the future injections are delivered directly to his home. (This dose went to [facility] and someone from there delivered to his home).

## 2023-08-09 ENCOUNTER — TELEPHONE (OUTPATIENT)
Dept: INTERNAL MEDICINE | Facility: CLINIC | Age: 63
End: 2023-08-09
Payer: COMMERCIAL

## 2023-08-09 NOTE — TELEPHONE ENCOUNTER
Corina from [facility] calling clinic to get PCP ok to go ahead with single tooth extraction.  Corina states need ok with patient's recent history of CVA (6/8/23).

## 2023-08-10 ENCOUNTER — OFFICE VISIT (OUTPATIENT)
Dept: INTERNAL MEDICINE | Facility: CLINIC | Age: 63
End: 2023-08-10
Payer: COMMERCIAL

## 2023-08-10 ENCOUNTER — TELEPHONE (OUTPATIENT)
Dept: INTERNAL MEDICINE | Facility: CLINIC | Age: 63
End: 2023-08-10

## 2023-08-10 VITALS
OXYGEN SATURATION: 97 % | RESPIRATION RATE: 18 BRPM | TEMPERATURE: 98 F | BODY MASS INDEX: 23.11 KG/M2 | HEIGHT: 74 IN | HEART RATE: 94 BPM | SYSTOLIC BLOOD PRESSURE: 137 MMHG | DIASTOLIC BLOOD PRESSURE: 77 MMHG | WEIGHT: 180.1 LBS

## 2023-08-10 DIAGNOSIS — N18.31 TYPE 2 DIABETES MELLITUS WITH STAGE 3A CHRONIC KIDNEY DISEASE, WITHOUT LONG-TERM CURRENT USE OF INSULIN (H): Primary | ICD-10-CM

## 2023-08-10 DIAGNOSIS — E11.22 TYPE 2 DIABETES MELLITUS WITH STAGE 3A CHRONIC KIDNEY DISEASE, WITHOUT LONG-TERM CURRENT USE OF INSULIN (H): Primary | ICD-10-CM

## 2023-08-10 PROCEDURE — 99214 OFFICE O/P EST MOD 30 MIN: CPT | Performed by: INTERNAL MEDICINE

## 2023-08-10 NOTE — PROGRESS NOTES
Assessment & Plan     (E11.22,  N18.31) Type 2 diabetes mellitus with stage 3a chronic kidney disease, without long-term current use of insulin (H)  (primary encounter diagnosis)  Comment: some higher numbers recently, coming down on his oral steroids. He is somewhat unfamiliar with his medications--unclear if this is because of the changes around the time of his recent prolonged hospitalization or this may be a mild residual cognitive impact from his acute illness.  Plan: Med Therapy Management Referral        Regardless, his last A1c was 7.2% but I suspect he is running higher now. Home readings avg near 200.   Needs comprehensive medication review and reconciliation as we found a number of inaccuracies in his list today (was hospitalized at [facility])  Referral to MTM is appropriate. Further adjustments in insulin can wait until that time of discussion.    He is hopeful to return to work in the coming month or two though currently he continues to have physical disability from his recent CVA. Close follow up planned.  Ongoing work with physical therapy as outpatient. Fairly independent otherwise except for driving. Wife and daughter are major care team currently.                 Mark Briggs MD  [facility]   Ned is a 63 year old, presenting for the following health issues:  office visit and Recheck Medication (Pt reports that he is here to discuss his blood sugars that has been up a bit.)      8/10/2023     3:54 PM   Additional Questions   Roomed by camilla   Accompanied by monica         8/10/2023     3:54 PM   Patient Reported Additional Medications   Patient reports taking the following new medications none       History of Present Illness       Diabetes:   He presents for follow up of diabetes.  He is checking home blood glucose three times daily.   He checks blood glucose before and after meals.  Blood glucose is sometimes over 200 and never under 70. He is  "aware of hypoglycemia symptoms including shakiness.   He is concerned about blood sugar frequently over 200.   He is having numbness in feet.            He eats 0-1 servings of fruits and vegetables daily.He consumes 0 sweetened beverage(s) daily.He exercises with enough effort to increase his heart rate 10 to 19 minutes per day.    He is taking medications regularly.       Pt reports that he is here to discuss concern of his blood sugar levels.        Review of Systems         Objective    /77   Pulse 94   Temp 98  F (36.7  C) (Tympanic)   Resp 18   Ht 1.88 m (6' 2\")   Wt 81.7 kg (180 lb 1.6 oz)   SpO2 97%   BMI 23.12 kg/m    Body mass index is 23.12 kg/m .  Physical Exam                         "

## 2023-08-10 NOTE — TELEPHONE ENCOUNTER
Spoke with Corina from [facility] and relayed verbal orders from Dr Briggs. Corina requests the order they faxed over to be signed and faxed back. Writer explained that there does not seem to be any faxes present for this patient. Alternate fax number provided and requested the orders be faxed again. Once received, Writer will have Dr Briggs sign and fax back.

## 2023-08-10 NOTE — TELEPHONE ENCOUNTER
August 10, 2023    Medical Clearance Letter for Dentist Appt was received via fax for Dr. Briggs to sign.  Patient label was attached to paperwork and placed in provider's inbox to be signed.    Melanie Rawls

## 2023-08-12 ENCOUNTER — HEALTH MAINTENANCE LETTER (OUTPATIENT)
Age: 63
End: 2023-08-12

## 2023-08-17 ENCOUNTER — MYC MEDICAL ADVICE (OUTPATIENT)
Dept: INTERNAL MEDICINE | Facility: CLINIC | Age: 63
End: 2023-08-17
Payer: COMMERCIAL

## 2023-08-21 ENCOUNTER — VIRTUAL VISIT (OUTPATIENT)
Dept: PHARMACY | Facility: CLINIC | Age: 63
End: 2023-08-21
Attending: INTERNAL MEDICINE
Payer: COMMERCIAL

## 2023-08-21 DIAGNOSIS — Z86.73 HISTORY OF CVA (CEREBROVASCULAR ACCIDENT): ICD-10-CM

## 2023-08-21 DIAGNOSIS — G56.10: ICD-10-CM

## 2023-08-21 DIAGNOSIS — G56.10 MEDIAN NERVE NEUROPATHY, UNSPECIFIED LATERALITY: ICD-10-CM

## 2023-08-21 DIAGNOSIS — Z78.9 TAKES DIETARY SUPPLEMENTS: ICD-10-CM

## 2023-08-21 DIAGNOSIS — G47.00 INSOMNIA, UNSPECIFIED TYPE: ICD-10-CM

## 2023-08-21 DIAGNOSIS — E11.40 TYPE 2 DIABETES MELLITUS WITH DIABETIC NEUROPATHY, UNSPECIFIED WHETHER LONG TERM INSULIN USE (H): ICD-10-CM

## 2023-08-21 DIAGNOSIS — E78.5 HYPERLIPIDEMIA LDL GOAL <70: ICD-10-CM

## 2023-08-21 DIAGNOSIS — J44.9 CHRONIC OBSTRUCTIVE PULMONARY DISEASE, UNSPECIFIED COPD TYPE (H): Primary | ICD-10-CM

## 2023-08-21 DIAGNOSIS — E11.40 TYPE 2 DIABETES MELLITUS WITH DIABETIC NEUROPATHY, UNSPECIFIED (H): ICD-10-CM

## 2023-08-21 PROCEDURE — 99605 MTMS BY PHARM NP 15 MIN: CPT

## 2023-08-21 RX ORDER — PREDNISONE 5 MG/1
10 TABLET ORAL DAILY
COMMUNITY
Start: 2023-07-27 | End: 2023-09-04

## 2023-08-21 RX ORDER — METFORMIN HCL 500 MG
500 TABLET, EXTENDED RELEASE 24 HR ORAL
Qty: 360 TABLET | Refills: 3 | COMMUNITY
Start: 2023-08-21 | End: 2023-11-06

## 2023-08-21 RX ORDER — CALCIUM CARBONATE/VITAMIN D3 500 MG-10
1 TABLET ORAL 2 TIMES DAILY WITH MEALS
COMMUNITY
Start: 2023-07-24 | End: 2023-11-15

## 2023-08-21 NOTE — PROGRESS NOTES
Medication Therapy Management (MTM) Encounter    ASSESSMENT:                            Medication Adherence/Access: No issues identified    Type 2 Diabetes:   Patient is not meeting A1c goal of < 7%.  Appropriate to continue NPH while patient is on steroid considering both medication have similar pharmacokinetics. Most of the CGM readings are within the very high ( > 250) 54%. Target range is (70 -180) 22%. Will start working on the sugar control once steroid treatment is over.     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     Insomnia: Stable on  trazodone 50 mg at bedtime as needed, and melatonin 9  mg daily as needed.    Myocarditis: Stable on metoprolol succinate 12.5 mg daily.     Hyperlipidemia: Stable on atorvastatin 20 mg daily.     Neuropathic nerve leg pain:  Stable on gabapentin 300 mg daily.    Dietary supplement: Continue  taking Arianna Shell calcium+D3 500 - 10 mg,and takes twice daily,     PLAN:                            Continue taking current medications as prescribed   Consider adjusting diabetes medications after steroid discontinuation     Follow-up:Due on 09/05/2023    SUBJECTIVE/OBJECTIVE:                          Ned Moore is a 63 year old male called for an initial visit. He was referred to me from Dr. Briggs.      Reason for visit: Medication Review Initial.    Allergies/ADRs: Reviewed in chart  Past Medical History: Reviewed in chart  Tobacco: He reports that he has never smoked. He has never used smokeless tobacco.    He got discharged on June 30th.     Medication Adherence/Access: no issues reported      Type 2 Diabetes: Currently taking metformin 500 mg XR with supper and metformin 1000 mg with breakfast. Patient is taking 1500 mg metformin due to his renal function, and takes four tablets once  daily in the evening, patient taking NPH injecting 15 units daily. PCP wanted to wait to adjust insulin until prednisone is discontinued. NPH was ordered  due to taking steroid.   Metformin 1000  mg 1 tablet and in the evening he takes one of the 500 mg XR.  We wanted weight until patient is done with NPH.   Aspirin 81mg daily  Blood sugar monitoring: Continuous Glucose Monitor See below   Current diabetes symptoms: Fatigue. When low last time in the hospital around 45 he did not feel anything.   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 Currently taping off prednisone from 6 tablets to two tablets daily. After stoppage they wanted patient to continue taking steroids inhalers.  He was not taking Breo Ellipta and was without it for a month, but now he is taking. He is also taking Mepolizumab every three month for his lungs.     --> Using Bactrim DS with steroid for PJP protection. Omeprazole used with steroids.     Insomnia: Currently taking trazodone 50 mg at bedtime as needed, and melatonin 9  mg daily as needed. The medication is effective sometime, and patient said e has not use dit recently. He said he is not a good sleeper. He said he sleeps most of the nights and he might not use the melatonin.     Myocarditis: Currently taking metoprolol succinate 12.5 mg daily. Patient said he is using this medication for myocarditis.     Hyperlipidemia: Currently taking atorvastatin 20 mg daily. No reported side effects.     LDL Cholesterol Calculated   Date Value Ref Range Status   04/26/2023 69 <=100 mg/dL Final     Direct Measure HDL   Date  Value Ref Range Status   04/26/2023 64 >=40 mg/dL Final     Neuropathic nerve leg pain: currently taking gabapentin 300 mg daily. Patient it helps.      Dietary supplement: Currently taking Arianna Shell calcium+D3 500 - 10 mg,and takes it twice daily,       Today's Vitals: There were no vitals taken for this visit.  ----------------    I spent 34 minutes with this patient today. All changes were made via collaborative practice agreement with Mark Briggs MD. A copy of the visit note was provided to the patient's provider(s).    A summary of these recommendations was sent via [employer].      Telemedicine Visit Details  Type of service:  Telephone visit  Start Time:  10:00 AM  End Time:  10:34 AM     Medication Therapy Recommendations  No medication therapy recommendations to display       Syed Borjas, PharmD     Medication Therapy Management (MTM) Pharmacist     [phone]     [email or web address]

## 2023-08-23 NOTE — PATIENT INSTRUCTIONS
"Recommendations from today's MTM visit:                                                    MTM (medication therapy management) is a service provided by a clinical pharmacist designed to help you get the most of out of your medicines.   Today we reviewed what your medicines are for, how to know if they are working, that your medicines are safe and how to make your medicine regimen as easy as possible.      Continue taking current medications as prescribed   Consider adjusting diabetes medications after steroid discontinuation     Follow-up:Due on 09/05/2023    It was great speaking with you today.  I value your experience and would be very thankful for your time in providing feedback in our clinic survey. In the next few days, you may receive an email or text message from [facility] with a link to a survey related to your  clinical pharmacist.\"     To schedule another MTM appointment, please call the clinic directly or you may call the MTM scheduling line at [phone] or toll-free at [phone].     My Clinical Pharmacist's contact information:                                                      Please feel free to contact me with any questions or concerns you have.        Syed Borjas, PharmD     Medication Therapy Management (MTM) Pharmacist     [phone]     [email or web address]    "

## 2023-08-24 ENCOUNTER — OFFICE VISIT (OUTPATIENT)
Dept: PULMONOLOGY | Facility: CLINIC | Age: 63
End: 2023-08-24
Payer: COMMERCIAL

## 2023-08-24 VITALS
WEIGHT: 187 LBS | HEART RATE: 86 BPM | SYSTOLIC BLOOD PRESSURE: 118 MMHG | DIASTOLIC BLOOD PRESSURE: 64 MMHG | BODY MASS INDEX: 24.01 KG/M2 | OXYGEN SATURATION: 99 %

## 2023-08-24 DIAGNOSIS — M30.1 EOSINOPHILIC GRANULOMATOSIS WITH POLYANGIITIS (EGPA) (H): Primary | ICD-10-CM

## 2023-08-24 DIAGNOSIS — D72.18 EOSINOPHILIC GRANULOMATOSIS WITH POLYANGIITIS (EGPA) (H): Primary | ICD-10-CM

## 2023-08-24 DIAGNOSIS — Z86.73 HISTORY OF CVA (CEREBROVASCULAR ACCIDENT): ICD-10-CM

## 2023-08-24 PROCEDURE — 99214 OFFICE O/P EST MOD 30 MIN: CPT | Performed by: NURSE PRACTITIONER

## 2023-08-24 NOTE — PATIENT INSTRUCTIONS
- continue doing what you are doing. I'm glad you are feeling well!     If you have worsening symptoms, questions, or need to speak with the nurse please call [phone].

## 2023-08-24 NOTE — PROGRESS NOTES
Pulmonary Clinic Outpatient Follow Up  August 25, 2023      Assessment and Plan:   Ned Moore is a 63 year old male never smoker with a history of newly diagnosed eosinophilic granulomatosis with polyangiitis (EGPA), and biopsy confirmed eosinophilic myocarditis, DM2, CKD3, dyslipidemia, presenting for evaluation of wheezing and dyspnea.    Wheezing and dyspnea:   Significant airflow obstruction in this never smoker. Was previously quite symptomatic, with dyspnea on minimal exertion, Did have some past occupation exposures including to fiberglass dust and resin in his 20s, later worked as a tech aide at [employer] and was sensitized to acetate. Increased symptoms have been present for over a year, with no clear precipitating infectious symptoms or other exposures. He has severe fixed obstructive physiology on pulmonary function testing, with FEV1 1.26 L (36%), air trapping without hyperinflation, and mild DLCO reduction. CXR in March 2022 was unremarkable. He does have flattening of the inspiratory limb on most efforts (but one effort had normal inspiratory flow), and on exam has most prominent inspiratory and expiratory wheezing on auscultation of the neck. Does endorse significant postnasal drip, suggesting chronic rhinosinusitis.   Will continue treat the rhinosinusitis and continue triple therapy with his inhalers until follow up with Dr. Biswas.      Plan:  - continue Breo, 1 puff daily and Spiriva 1 capsule inhaled daily  - finish prednisone taper and Bactrim as prescribed by rheumatology   - nasal fluticasone one spray in each nostril daily  - continue follow up with rheumatology and cardiology   - follow up in 3 months  - encouraged him to contact us with questions or concerning symptoms    Anuradha Eisenberg CNP  Pulmonary Medicine  [facility]   [phone]    CCx:   Chief Complaint   Patient presents with    Follow Up     COPD      HPI:     Hospitalized in 05/2023 and was found to have eosinophilic  "granulomatosis with polyangiitis (extended prednisone taper). Started Nucala on 6/30 with extended prednisone taper, he will finish this in the next week. Currently on 10mg prednisone daily. Has his next rheumatology appointment on Sept 8th with [facility]. He has been on prophylactic bactrim as well.   Eosinophilic myocarditis was confirmed with biopsy. He will need repeat echo and cardiac MRI outpatient, following with Cardiology.   Has been using Breo and Spiriva with good results.   Last action plan given on Action plan 04/2023, prior to hospitalization.   Denies any breathing complaints today. Reports his breathing is \"great\" and is hoping to work on being able to drive and get back to work soon.     He was unfortunately hospitalized again in 06/2023 with CVA. Has been recovering nicely. He is working with OT for decreased strength and functionality in his RUE and some cognition impairments.     CPET in 04/2023 shows respiratory limitation to exercise, VO2max 20.5 (68% predicted), a moderate impairment.     ROS:  A 10-system review was obtained and was negative with the exception of the symptoms endorsed in the history of present illness.    PMH:  DM2  CKD3  dyslipidemia    PSH:  No past surgical history on file.    Allergies:  No Known Allergies    Family HX:  Family History   Problem Relation Age of Onset    Heart Disease Mother     Diabetes Mother     Asthma Mother     Alzheimer Disease Father     No Known Problems Brother        Social Hx:  Social History     Socioeconomic History    Marital status:      Spouse name: Not on file    Number of children: Not on file    Years of education: Not on file    Highest education level: Not on file   Occupational History    Not on file   Tobacco Use    Smoking status: Never    Smokeless tobacco: Never   Vaping Use    Vaping Use: Never used   Substance and Sexual Activity    Alcohol use: Yes     Comment: a beer or wine every few months    Drug use: Never    " Sexual activity: Not Currently     Partners: Female   Other Topics Concern    Parent/sibling w/ CABG, MI or angioplasty before 65F 55M? No   Social History Narrative    Not on file     Social Determinants of Health     Financial Resource Strain: Not on file   Food Insecurity: Not on file   Transportation Needs: Not on file   Physical Activity: Not on file   Stress: Not on file   Social Connections: Not on file   Intimate Partner Violence: Not on file   Housing Stability: Not on file       Current Meds:  Current Outpatient Medications   Medication Sig Dispense Refill    ACCU-CHEK FASTCLIX LANCET DRUM [ACCU-CHEK FASTCLIX LANCET DRUM] TEST BLOOD SUGARS 3 TIMES DAILY 300 each 3    albuterol (PROAIR HFA/PROVENTIL HFA/VENTOLIN HFA) 108 (90 Base) MCG/ACT inhaler INHALE 2 PUFFS INTO THE LUNGS EVERY 6 HOURS 8.5 g 1    ASPIRIN LOW DOSE 81 MG chewable tablet Take 81 mg by mouth daily      atorvastatin (LIPITOR) 20 MG tablet TAKE 1 TABLET BY MOUTH EVERY DAY IN THE EVENING 90 tablet 3    BD ALEXANDRA U/F 32G X 4 MM insulin pen needle       blood glucose test (ACCU-CHEK SMARTVIEW TEST STRIP) strips [BLOOD GLUCOSE TEST (ACCU-CHEK SMARTVIEW TEST STRIP) STRIPS] USE TO CHECK BLOOD SUGARS TWICE DAILY. 200 strip 3    Continuous Blood Gluc Sensor (FREESTYLE LIVIA 2 SENSOR) MISC 1 each every 14 days 2 each 11    fluticasone-vilanterol (BREO ELLIPTA) 100-25 MCG/ACT inhaler Inhale 1 puff into the lungs daily      gabapentin (NEURONTIN) 300 MG capsule Take 300 mg by mouth At Bedtime      HUMULIN N KWIKPEN 100 UNIT/ML susp Inject 15 Units Subcutaneous daily      melatonin 3 MG tablet Take 9 mg by mouth nightly as needed for sleep      Mepolizumab 100 MG/ML SOSY Inject 3 mLs (300 mg) Subcutaneous every 30 days 3 mL 11    metFORMIN (GLUCOPHAGE XR) 500 MG 24 hr tablet Take 1 tablet (500 mg) by mouth daily (with dinner) 360 tablet 3    metFORMIN (GLUCOPHAGE) 1000 MG tablet Take 1,000 mg by mouth daily (with breakfast)      metoprolol succinate ER  (TOPROL XL) 25 MG 24 hr tablet Take 12.5 mg by mouth every morning      omeprazole (PRILOSEC) 20 MG DR capsule Take 20 mg by mouth 2 times daily      OYSTER SHELL CALCIUM + D3 500-10 MG-MCG TABS Take 1 tablet by mouth 2 times daily (with meals)      predniSONE (DELTASONE) 5 MG tablet Take 10 mg by mouth daily . Tapering off from 6 tablets daily. Now taking 2 tablets daily for another 11 days.      sulfamethoxazole-trimethoprim (BACTRIM DS) 800-160 MG tablet Take 1 tablet by mouth three times a week      tiotropium (SPIRIVA) 18 MCG inhaled capsule Inhale 18 mcg into the lungs daily      traZODone (DESYREL) 50 MG tablet Take 50 mg by mouth daily as needed for sleep         Physical Exam:  /64 (BP Location: Left arm, Patient Position: Chair, Cuff Size: Adult Regular)   Pulse 86   Wt 84.8 kg (187 lb)   SpO2 99%   BMI 24.01 kg/m      Physical Exam  Constitutional:       General: He is not in acute distress.     Appearance: He is not ill-appearing or diaphoretic.   Cardiovascular:      Rate and Rhythm: Normal rate and regular rhythm.      Pulses: Normal pulses.      Heart sounds: Normal heart sounds.   Pulmonary:      Effort: Pulmonary effort is normal. No respiratory distress.      Breath sounds: No wheezing or rhonchi.   Musculoskeletal:      Right lower leg: No edema.      Left lower leg: No edema.   Skin:     General: Skin is warm and dry.      Findings: No rash.   Neurological:      Mental Status: He is alert.   Psychiatric:         Behavior: Behavior normal.       Labs:  reviewed    Imaging studies:    CT ANGIO CHEST AND CT ABD PELVIS W IV CONT, DATE/TIME: 5/14/2023   INDICATION: Pulmonary embolism (pe) suspected, high prob; sepsis   COMPARISON: High-resolution chest CT 07/20/2023   FINDINGS:   ANGIOGRAM CHEST: Normal caliber thoracic aorta. Conventional arch anatomy. Normal caliber pulmonary artery. No pulmonary artery filling defects. No vessel wall thickening. No findings to suggest acute aortic  "syndrome.   LUNGS AND PLEURA: There are symmetric patchy peripheral centrilobular distribution nodules with indistinct borders. In the lateral left lower lobe opacities are slightly bandlike and confluent consistent with a mixture of inflammation and atelectasis. Trachea and central airways are patent and normal caliber. The subsegmental airways in the peripheral 3 cm of the lung have wall thickening and/or visible endoluminal material. No pleural effusion or thickening.  MEDIASTINUM: Cardiac chambers are normal in size. Physiologic pericardial fluid. Symmetric mildly enlarged hilar and subcarinal lymph nodes are likely reactive. Prevascular, paratracheal, and paraesophageal nodes are normal in size. Esophagus is decompressed. Imaged thyroid gland is normal.  CORONARY ARTERY CALCIFICATION: Moderate.   IMPRESSION:   1. No acute pulmonary embolism or aortopathy.   2.  Peripheral airway centric lung opacities in both lungs consistent with bronchopneumonia.   3.  Reactive lymph nodes in the angelica and subcarinal mediastinum.   4.  No focal inflammatory process in the abdomen or pelvis.   5.  Scattered hypoattenuating liver lesions, incompletely characterized. These most likely represent benign cysts or small hemangioma.     CXR (March 2022):  - images directly reviewed, formal interpretation follows:    CPET 4/4/2023  IMPRESSION: Negative chest.    A cardiopulmonary stress test was performed following a modified Linda protocol with the patient exercising for 9 minutes and 0 seconds under the supervision of Dr. Radha Ocononr.  Blood pressure and heart rate demonstrated a normal response to exercise.    The stress electrocardiogram is negative for inducible ischemic EKG changes.    There is no prior study for comparison.    The patient's exercise capacity is moderately impaired.    The patient technically gave a \"submaximal effort.\"  There is likely a pulmonary limitation to exercise with the breathing reserve being " negative at peak, the lack of doubling ot the tidal volume at max and abnormal baseline spirometry. A cardiac limitation to exercise cannot be ruled out.  The peak VO2 and RPP were lower than expected.  The Ve/VCO2 was also elevated at 39.  The blood pressure at rest revealed an orthostatic response to change in positions (no symptoms).  In recovery the blood pressure remained elevated.    Pulmonary Function Testing  January 2023:  FVC 3.60 (80%)  FEV1 1.26 (36%)  FEV1/FVC 0.35  No significant bronchodilator response  RV 4.80 (187%)  TLC 8.29 (109%)  DLCOc 70%  Most inspiratory efforts lead to flattening of the inspiratory limb; one does not.

## 2023-08-28 ENCOUNTER — MYC MEDICAL ADVICE (OUTPATIENT)
Dept: INTERNAL MEDICINE | Facility: CLINIC | Age: 63
End: 2023-08-28
Payer: COMMERCIAL

## 2023-08-29 NOTE — TELEPHONE ENCOUNTER
PLAN:                             Continue taking current medications as prescribed   Consider adjusting diabetes medications after steroid discontinuation      Follow-up:Due on 09/05/2023

## 2023-09-05 ENCOUNTER — VIRTUAL VISIT (OUTPATIENT)
Dept: PHARMACY | Facility: CLINIC | Age: 63
End: 2023-09-05
Payer: COMMERCIAL

## 2023-09-05 DIAGNOSIS — E11.40 TYPE 2 DIABETES MELLITUS WITH DIABETIC NEUROPATHY, UNSPECIFIED WHETHER LONG TERM INSULIN USE (H): Primary | ICD-10-CM

## 2023-09-05 DIAGNOSIS — J44.9 CHRONIC OBSTRUCTIVE PULMONARY DISEASE, UNSPECIFIED COPD TYPE (H): ICD-10-CM

## 2023-09-05 PROCEDURE — 99607 MTMS BY PHARM ADDL 15 MIN: CPT

## 2023-09-05 PROCEDURE — 99606 MTMS BY PHARM EST 15 MIN: CPT

## 2023-09-05 NOTE — PATIENT INSTRUCTIONS
"Recommendations from today's MTM visit:                                                    MTM (medication therapy management) is a service provided by a clinical pharmacist designed to help you get the most of out of your medicines.   Today we reviewed what your medicines are for, how to know if they are working, that your medicines are safe and how to make your medicine regimen as easy as possible.      Switch NPH to Lantus for better sugar control. Start taking Lantus 15 units daily in the evening. Please increase Lantus dose  by 2 - 3 units every 3-4 days, and stop increasing the dose when your fasting sugar is less than 150 or maximum dose is 20 units.  Advised to eat more healthy food; less carbohydrates and more proteins and veggies  Start taking trulicity 0.75 mg weekly.   Start tapering off omeprazole slowly. Start by taking it once daily for 4 days, then every other day, and then two days ----- stop if you do not develop any heartburn or acid reflex or stomach pain.     Follow-up: Due on 10/05/2023    It was great speaking with you today.  I value your experience and would be very thankful for your time in providing feedback in our clinic survey. In the next few days, you may receive an email or text message from [facility] [employer] with a link to a survey related to your  clinical pharmacist.\"     To schedule another MTM appointment, please call the clinic directly or you may call the MTM scheduling line at [phone] or toll-free at [phone].     My Clinical Pharmacist's contact information:                                                      Please feel free to contact me with any questions or concerns you have.        Syed Borjas, PharmD     Medication Therapy Management (MTM) Pharmacist     [phone]     [email or web address]    "

## 2023-09-05 NOTE — PROGRESS NOTES
Medication Therapy Management (MTM) Encounter    ASSESSMENT:                            Medication Adherence/Access: No issues identified    Type 2 Diabetes:   Patient is not meeting A1c goal of < 7%. Most of the CGM BG readings are within the very high ( > 250 mg/dL) 37%, well above goal of <5%. The target range (70 - 180) is 34% well above goal of > 70%. Considering that steroid treatment is completed, it is appropriate to switch NPH to Lantus. Also, patient can benefit from GLP-1 agonist for sugar control. Ozempic was stopped due to weight loss, and will do trulicity considering it has less weight loss effect than Ozempic. Also, appropriate to titrate Lantus.     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 Appropriate to titrate off patient of omeprazole considering steroid treatment is complete.       PLAN:                            Switch NPH to Lantus for better sugar control. Start taking Lantus 15 units daily in the evening. Please increase Lantus dose  by 2 - 3 units every 3-4 days, and stop increasing the dose when your fasting sugar is less than 150 or maximum dose is 20 units.  Advised to eat more healthy food; less carbohydrates and more proteins and veggies.  Start taking trulicity 0.75 mg weekly.   Start tapering off omeprazole slowly. Start by taking it once daily for 4 days, then every other day, and then two days ----- stop if you do not develop any heartburn or acid reflex or stomach pain.     Follow-up: Due on 10/05/2023      SUBJECTIVE/OBJECTIVE:                          Ned Moore is a 63 year old male called for a follow up visit. This is a follow up from 08/21/2023.     Reason for visit: Medication Review Initial.    Allergies/ADRs: Reviewed in chart  Past Medical History: Reviewed in chart  Tobacco: He reports that he has never smoked. He has never used smokeless tobacco.    Medication  Adherence/Access: no issues reported    Type 2 Diabetes: Currently taking metformin 500 mg XR with supper and metformin 1000 mg with breakfast. Patient is taking 1500 mg metformin due to his renal function, and patient taking NPH injecting 15 units daily. Patient stopped taking steroid a week ago (stopped on Sunday). Patient stopped taking Ozempic due to weight loss. Since patient was stopped taking steroid, it is reasonable to change NPH to Lantus. Patient's CGM BG is high throughout the day.  Fourteen average CGM readings is 226.   Aspirin 81mg daily  Blood sugar monitoring: Continuous Glucose Monitor See below   Current diabetes symptoms: Fatigue.   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 After stopping steroids, they wanted patient to continue taking steroids inhalers.  He was not taking Breo Ellipta and was without it for a month, but now he is taking. He is also taking Mepolizumab every three month for his lungs. Patient was using Bactrim DS with steroid for PJP protection, and omeprazole used with steroids. Both steroid and Bactrim DS is discontinued since treatment is completed. Patient said he is feeling better with breathing. He has a scheduled MRI coming.     Today's Vitals: There were no vitals taken for this visit.  ----------------      I spent 45 minutes with this patient today. All  changes were made via collaborative practice agreement with Mark Briggs MD. A copy of the visit note was provided to the patient's provider(s).    A summary of these recommendations was sent via [employer].      Telemedicine Visit Details  Type of service:  Telephone visit  Start Time:  11:00 AM  End Time: 11:45 AM     Medication Therapy Recommendations  Chronic obstructive pulmonary disease, unspecified COPD type (H)    Current Medication: omeprazole (PRILOSEC) 20 MG DR capsule   Rationale: Undesirable effect - Adverse medication event - Safety   Recommendation: Discontinue Medication   Status: Accepted per [facility]         Type 2 diabetes mellitus with diabetic neuropathy, unspecified whether long term insulin use (H)    Current Medication: HUMULIN N KWIKPEN 100 UNIT/ML susp (Discontinued)   Rationale: More effective medication available - Ineffective medication - Effectiveness   Recommendation: Discontinue Medication - Lantus SoloStar 100 UNIT/ML soln   Status: Accepted per [facility]PA          Current Medication: dulaglutide (TRULICITY) 0.75 MG/0.5ML pen   Rationale: Untreated condition - Needs additional medication therapy - Indication   Recommendation: Start Medication - Trulicity 0.75 MG/0.5ML Sopn   Status: Accepted per [facility]PA                Syed Borjas, PharmD     Medication Therapy Management (MTM) Pharmacist     [phone]     [email or web address]

## 2023-09-08 ENCOUNTER — TELEPHONE (OUTPATIENT)
Dept: INTERNAL MEDICINE | Facility: CLINIC | Age: 63
End: 2023-09-08
Payer: COMMERCIAL

## 2023-09-08 NOTE — TELEPHONE ENCOUNTER
September 8, 2023    Disability claim was received via fax for Dr. Briggs to sign.  Patient label was attached to paperwork and placed in provider's inbox to be signed.    Pam J. Behr

## 2023-09-13 NOTE — TELEPHONE ENCOUNTER
September 13, 2023    Disability claim was picked up from outbox of Dr. Briggs.  Paperwork has been reviewed and is complete.  Per initial initial request, this was sent via fax to [phone].     Manju Cheatham

## 2023-10-04 DIAGNOSIS — E11.40 TYPE 2 DIABETES MELLITUS WITH DIABETIC NEUROPATHY, UNSPECIFIED WHETHER LONG TERM INSULIN USE (H): ICD-10-CM

## 2023-10-05 ENCOUNTER — VIRTUAL VISIT (OUTPATIENT)
Dept: PHARMACY | Facility: CLINIC | Age: 63
End: 2023-10-05
Payer: COMMERCIAL

## 2023-10-05 DIAGNOSIS — E11.22 TYPE 2 DIABETES MELLITUS WITH STAGE 3A CHRONIC KIDNEY DISEASE, WITHOUT LONG-TERM CURRENT USE OF INSULIN (H): Primary | ICD-10-CM

## 2023-10-05 DIAGNOSIS — N18.31 TYPE 2 DIABETES MELLITUS WITH STAGE 3A CHRONIC KIDNEY DISEASE, WITHOUT LONG-TERM CURRENT USE OF INSULIN (H): Primary | ICD-10-CM

## 2023-10-05 PROCEDURE — 99607 MTMS BY PHARM ADDL 15 MIN: CPT

## 2023-10-05 PROCEDURE — 99606 MTMS BY PHARM EST 15 MIN: CPT

## 2023-10-05 RX ORDER — DULAGLUTIDE 0.75 MG/.5ML
0.75 INJECTION, SOLUTION SUBCUTANEOUS
Qty: 2 ML | Refills: 1 | Status: SHIPPED | OUTPATIENT
Start: 2023-10-05 | End: 2023-10-05

## 2023-10-05 RX ORDER — DULAGLUTIDE 1.5 MG/.5ML
1.5 INJECTION, SOLUTION SUBCUTANEOUS
Qty: 2 ML | Refills: 0 | Status: SHIPPED | OUTPATIENT
Start: 2023-10-05 | End: 2023-11-03

## 2023-10-05 RX ORDER — AZATHIOPRINE 50 MG/1
TABLET ORAL
COMMUNITY
Start: 2023-09-08 | End: 2023-11-06

## 2023-10-05 NOTE — PATIENT INSTRUCTIONS
"Recommendations from today's MTM visit:                                                    MTM (medication therapy management) is a service provided by a clinical pharmacist designed to help you get the most of out of your medicines.   Today we reviewed what your medicines are for, how to know if they are working, that your medicines are safe and how to make your medicine regimen as easy as possible.      Increase Trulicity dose from 0.75 mg to 1.5 mg weekly  Start taking Jardiance 10 mg daily with breakfast  Start injecting Semglee 10 units at around 6 - 7 PM       Follow-up: Due on 11/06/2023    It was great speaking with you today.  I value your experience and would be very thankful for your time in providing feedback in our clinic survey. In the next few days, you may receive an email or text message from [facility] with a link to a survey related to your  clinical pharmacist.\"     To schedule another MTM appointment, please call the clinic directly or you may call the MTM scheduling line at [phone] or toll-free at [phone].     My Clinical Pharmacist's contact information:                                                      Please feel free to contact me with any questions or concerns you have.        Syed Borjas, PharmD     Medication Therapy Management (MTM) Pharmacist     [phone]     [email or web address]    "

## 2023-10-05 NOTE — PROGRESS NOTES
Medication Therapy Management (MTM) Encounter    ASSESSMENT:                            Medication Adherence/Access: No issues identified    Type 2 Diabetes:   Patient is not meeting A1c goal of < 7%. Most of the CGM BG readings are within the high ( > 250 mg/dL) 43%, well above goal of <25%. The target range (70 - 180) is 21% well below goal of > 70%. Considering that steroid treatment is completed, it is appropriate to switch NPH to Lantus. However, patient did not follow plan. Considering high sugar readings appropriate to increase Trulicity dose. Appropriate to start Jardiace to help with sugar.  In a new sub-analysis of the landmark [employer]ER[employer]-Preserved  phase III trial, Jardiance reduced the combined relative risk of cardiovascular death and hospitalization for heart failure and slowed kidney function decline, lowers blood sugar levels, and can help with weight loss.     PLAN:                            Increase Trulicity dose from 0.75 mg to 1.5 mg weekly  Start taking Jardiance 10 mg daily with breakfast  Start injecting Semglee 10 units at around 6 - 7 PM     Follow-up: Due on 11/06/2023    SUBJECTIVE/OBJECTIVE:                          Ned Moore is a 63 year old male called for a follow up visit. This is a follow up from 09/05/2023.     Reason for visit: Medication Review Initial.    Allergies/ADRs: Reviewed in chart  Past Medical History: Reviewed in chart  Tobacco: He reports that he has never smoked. He has never used smokeless tobacco.    Medication Adherence/Access: no issues reported    Type 2 Diabetes: Currently taking metformin 500 mg XR with supper and metformin 1000 mg with breakfast, and NPH as a fast acting insulin. Patient has not switched from NPH to Semglee yet thinking that Semglee does not affect his high after meal sugar readings.  Patient stopped taking Ozempic due to weight loss. Since patient was stopped taking steroid, it is reasonable to change NPH to Lantus. Patient's CGM BG is high  throughout the day.    Aspirin 81mg daily  Blood sugar monitoring: Continuous Glucose Monitor See below   Current diabetes symptoms: Fatigue.   Diet/Exercise: Was not addressed at this follow up  Eye exam: up to date  Foot exam: due  Urine Albumin:   Lab Results   Component Value Date    UMALCR 24.04 (H) 12/01/2022      Lab Results   Component Value Date    A1C 8.2 (H) 04/26/2023     Lab Results   Component Value Date    A1C 8.2 04/26/2023    A1C 8.0 10/10/2022    A1C 8.2 03/21/2022    A1C 7.4 08/23/2021    A1C 7.9 01/27/2021     Wt Readings from Last 2 Encounters:   08/24/23 187 lb (84.8 kg)   08/10/23 180 lb 1.6 oz (81.7 kg)       Today's Vitals: There were no vitals taken for this visit.  ----------------       Medication Therapy Recommendations  Type 2 diabetes mellitus with stage 3a chronic kidney disease, without long-term current use of insulin (H)    Current Medication: dulaglutide (TRULICITY) 0.75 MG/0.5ML pen (Discontinued)   Rationale: Dose too low - Dosage too low - Effectiveness   Recommendation: Increase Dose - Trulicity 1.5 MG/0.5ML Sopn   Status: Accepted per [facility]          Current Medication: empagliflozin (JARDIANCE) 10 MG TABS tablet   Rationale: Untreated condition - Needs additional medication therapy - Indication   Recommendation: Start Medication - Jardiance 10 MG Tabs   Status: Accepted per [facility]                  Syed Borjas, PharmD     Medication Therapy Management (MTM) Pharmacist     [phone]     [email or web address]              Today's Vitals: There were no vitals taken for this visit.  ----------------    I spent 30 minutes with this patient today. All changes were made via collaborative practice agreement with Mark Briggs MD. A copy of the visit note was provided to the patient's provider(s).    A summary of these recommendations was sent via [employer].    Telemedicine Visit Details  Type of service:  Telephone visit  Start  Time:  10:00 AM  End Time: 10:30 AM       Medication Therapy Recommendations  Type 2 diabetes mellitus with stage 3a chronic kidney disease, without long-term current use of insulin (H)    Current Medication: dulaglutide (TRULICITY) 0.75 MG/0.5ML pen (Discontinued)   Rationale: Dose too low - Dosage too low - Effectiveness   Recommendation: Increase Dose - Trulicity 1.5 MG/0.5ML Sopn   Status: Accepted per [facility]          Current Medication: empagliflozin (JARDIANCE) 10 MG TABS tablet   Rationale: Untreated condition - Needs additional medication therapy - Indication   Recommendation: Start Medication - Jardiance 10 MG Tabs   Status: Accepted per [facility]PA                Syed Borjas, PharmD     Medication Therapy Management (MTM) Pharmacist     [phone]     [email or web address]         .

## 2023-10-17 DIAGNOSIS — E11.40 TYPE 2 DIABETES MELLITUS WITH DIABETIC NEUROPATHY, UNSPECIFIED (H): ICD-10-CM

## 2023-10-17 RX ORDER — ATORVASTATIN CALCIUM 20 MG/1
TABLET, FILM COATED ORAL
Qty: 30 TABLET | Refills: 2 | Status: SHIPPED | OUTPATIENT
Start: 2023-10-17 | End: 2024-01-09

## 2023-10-19 ENCOUNTER — MYC MEDICAL ADVICE (OUTPATIENT)
Dept: INTERNAL MEDICINE | Facility: CLINIC | Age: 63
End: 2023-10-19
Payer: COMMERCIAL

## 2023-10-25 ENCOUNTER — TELEPHONE (OUTPATIENT)
Dept: INTERNAL MEDICINE | Facility: CLINIC | Age: 63
End: 2023-10-25
Payer: COMMERCIAL

## 2023-10-25 NOTE — TELEPHONE ENCOUNTER
Called Bogdan and he doesn't need anything else from us until the next office visit.      General Call    Contacts         Type Contact Phone/Fax    10/25/2023 11:26 AM CDT Phone (Incoming) Domitila Milian (Emergency Contact) [phone]          Reason for Call:  disability paperwork info    What are your questions or concerns:  Stating that the form is not filled out correctly and they have faxed back info to us.    Daughter is calling-not on consent and she would like us to connect with Bogdan [phone]  ext [phone] to see what else he is needing.   He states they are waiting for more info.  Our paperwork is just one page and completed.    Date of last appointment with provider: 9/8/2023    Could we send this information to you in [employer] or would you prefer to receive a phone call?:   Patient would prefer a phone call   Okay to leave a detailed message?: No at Home number on file [phone] (home)ok to call patient if questions.

## 2023-10-26 ENCOUNTER — HOSPITAL ENCOUNTER (EMERGENCY)
Facility: HOSPITAL | Age: 63
Discharge: HOME | End: 2023-10-26
Attending: EMERGENCY MEDICINE | Admitting: EMERGENCY MEDICINE
Payer: COMMERCIAL

## 2023-10-26 VITALS
DIASTOLIC BLOOD PRESSURE: 66 MMHG | OXYGEN SATURATION: 97 % | RESPIRATION RATE: 20 BRPM | TEMPERATURE: 97.5 F | BODY MASS INDEX: 25.67 KG/M2 | HEART RATE: 91 BPM | WEIGHT: 200 LBS | HEIGHT: 74 IN | SYSTOLIC BLOOD PRESSURE: 133 MMHG

## 2023-10-26 DIAGNOSIS — R11.2 NAUSEA AND VOMITING, UNSPECIFIED VOMITING TYPE: ICD-10-CM

## 2023-10-26 DIAGNOSIS — N30.00 ACUTE CYSTITIS WITHOUT HEMATURIA: ICD-10-CM

## 2023-10-26 LAB
ALBUMIN SERPL BCG-MCNC: 4.2 G/DL (ref 3.5–5.2)
ALBUMIN UR-MCNC: 70 MG/DL
ALP SERPL-CCNC: 246 U/L (ref 40–129)
ALT SERPL W P-5'-P-CCNC: 22 U/L (ref 0–70)
AMORPH CRY #/AREA URNS HPF: ABNORMAL /HPF
ANION GAP SERPL CALCULATED.3IONS-SCNC: 17 MMOL/L (ref 7–15)
APPEARANCE UR: CLEAR
AST SERPL W P-5'-P-CCNC: 20 U/L (ref 0–45)
BACTERIA #/AREA URNS HPF: ABNORMAL /HPF
BASOPHILS # BLD AUTO: 0 10E3/UL (ref 0–0.2)
BASOPHILS NFR BLD AUTO: 0 %
BILIRUB SERPL-MCNC: 0.3 MG/DL
BILIRUB UR QL STRIP: NEGATIVE
BUN SERPL-MCNC: 24.9 MG/DL (ref 8–23)
CALCIUM SERPL-MCNC: 9.9 MG/DL (ref 8.8–10.2)
CHLORIDE SERPL-SCNC: 94 MMOL/L (ref 98–107)
COLOR UR AUTO: YELLOW
CREAT SERPL-MCNC: 1.65 MG/DL (ref 0.67–1.17)
DEPRECATED HCO3 PLAS-SCNC: 22 MMOL/L (ref 22–29)
EGFRCR SERPLBLD CKD-EPI 2021: 46 ML/MIN/1.73M2
EOSINOPHIL # BLD AUTO: 0 10E3/UL (ref 0–0.7)
EOSINOPHIL NFR BLD AUTO: 0 %
ERYTHROCYTE [DISTWIDTH] IN BLOOD BY AUTOMATED COUNT: 17.7 % (ref 10–15)
GLUCOSE BLDC GLUCOMTR-MCNC: 247 MG/DL (ref 70–99)
GLUCOSE SERPL-MCNC: 255 MG/DL (ref 70–99)
GLUCOSE UR STRIP-MCNC: >1000 MG/DL
HCT VFR BLD AUTO: 36.8 % (ref 40–53)
HGB BLD-MCNC: 11.2 G/DL (ref 13.3–17.7)
HGB UR QL STRIP: ABNORMAL
HOLD SPECIMEN: NORMAL
HYALINE CASTS: 1 /LPF
IMM GRANULOCYTES # BLD: 0.1 10E3/UL
IMM GRANULOCYTES NFR BLD: 1 %
KETONES UR STRIP-MCNC: NEGATIVE MG/DL
LEUKOCYTE ESTERASE UR QL STRIP: ABNORMAL
LYMPHOCYTES # BLD AUTO: 1 10E3/UL (ref 0.8–5.3)
LYMPHOCYTES NFR BLD AUTO: 7 %
MCH RBC QN AUTO: 23.9 PG (ref 26.5–33)
MCHC RBC AUTO-ENTMCNC: 30.4 G/DL (ref 31.5–36.5)
MCV RBC AUTO: 79 FL (ref 78–100)
MONOCYTES # BLD AUTO: 0.9 10E3/UL (ref 0–1.3)
MONOCYTES NFR BLD AUTO: 7 %
MUCOUS THREADS #/AREA URNS LPF: PRESENT /LPF
NEUTROPHILS # BLD AUTO: 11.1 10E3/UL (ref 1.6–8.3)
NEUTROPHILS NFR BLD AUTO: 85 %
NITRATE UR QL: NEGATIVE
NRBC # BLD AUTO: 0 10E3/UL
NRBC BLD AUTO-RTO: 0 /100
PH UR STRIP: 5.5 [PH] (ref 5–7)
PLATELET # BLD AUTO: 753 10E3/UL (ref 150–450)
POTASSIUM SERPL-SCNC: 4.6 MMOL/L (ref 3.4–5.3)
PROT SERPL-MCNC: 7.9 G/DL (ref 6.4–8.3)
RBC # BLD AUTO: 4.69 10E6/UL (ref 4.4–5.9)
RBC URINE: 3 /HPF
SODIUM SERPL-SCNC: 133 MMOL/L (ref 135–145)
SP GR UR STRIP: 1.02 (ref 1–1.03)
SQUAMOUS EPITHELIAL: <1 /HPF
UROBILINOGEN UR STRIP-MCNC: <2 MG/DL
WBC # BLD AUTO: 13.1 10E3/UL (ref 4–11)
WBC URINE: 7 /HPF

## 2023-10-26 PROCEDURE — 250N000011 HC RX IP 250 OP 636: Mod: JZ | Performed by: STUDENT IN AN ORGANIZED HEALTH CARE EDUCATION/TRAINING PROGRAM

## 2023-10-26 PROCEDURE — 80053 COMPREHEN METABOLIC PANEL: CPT | Performed by: EMERGENCY MEDICINE

## 2023-10-26 PROCEDURE — 258N000003 HC RX IP 258 OP 636: Performed by: STUDENT IN AN ORGANIZED HEALTH CARE EDUCATION/TRAINING PROGRAM

## 2023-10-26 PROCEDURE — 82962 GLUCOSE BLOOD TEST: CPT

## 2023-10-26 PROCEDURE — 96374 THER/PROPH/DIAG INJ IV PUSH: CPT

## 2023-10-26 PROCEDURE — 80053 COMPREHEN METABOLIC PANEL: CPT | Performed by: STUDENT IN AN ORGANIZED HEALTH CARE EDUCATION/TRAINING PROGRAM

## 2023-10-26 PROCEDURE — 81001 URINALYSIS AUTO W/SCOPE: CPT | Performed by: EMERGENCY MEDICINE

## 2023-10-26 PROCEDURE — 85025 COMPLETE CBC W/AUTO DIFF WBC: CPT | Performed by: EMERGENCY MEDICINE

## 2023-10-26 PROCEDURE — 81003 URINALYSIS AUTO W/O SCOPE: CPT | Performed by: STUDENT IN AN ORGANIZED HEALTH CARE EDUCATION/TRAINING PROGRAM

## 2023-10-26 PROCEDURE — 36415 COLL VENOUS BLD VENIPUNCTURE: CPT | Performed by: STUDENT IN AN ORGANIZED HEALTH CARE EDUCATION/TRAINING PROGRAM

## 2023-10-26 PROCEDURE — 87086 URINE CULTURE/COLONY COUNT: CPT | Performed by: EMERGENCY MEDICINE

## 2023-10-26 PROCEDURE — 96361 HYDRATE IV INFUSION ADD-ON: CPT

## 2023-10-26 PROCEDURE — 99284 EMERGENCY DEPT VISIT MOD MDM: CPT | Mod: 25

## 2023-10-26 PROCEDURE — 85025 COMPLETE CBC W/AUTO DIFF WBC: CPT | Performed by: STUDENT IN AN ORGANIZED HEALTH CARE EDUCATION/TRAINING PROGRAM

## 2023-10-26 RX ORDER — PROMETHAZINE HYDROCHLORIDE 25 MG/1
25 TABLET ORAL EVERY 8 HOURS PRN
Qty: 15 TABLET | Refills: 0 | Status: SHIPPED | OUTPATIENT
Start: 2023-10-26 | End: 2024-01-11

## 2023-10-26 RX ORDER — ONDANSETRON 2 MG/ML
4 INJECTION INTRAMUSCULAR; INTRAVENOUS
Status: COMPLETED | OUTPATIENT
Start: 2023-10-26 | End: 2023-10-26

## 2023-10-26 RX ORDER — CEFDINIR 300 MG/1
300 CAPSULE ORAL 2 TIMES DAILY
Qty: 14 CAPSULE | Refills: 0 | Status: SHIPPED | OUTPATIENT
Start: 2023-10-26 | End: 2023-11-06

## 2023-10-26 RX ADMIN — SODIUM CHLORIDE 500 ML: 9 INJECTION, SOLUTION INTRAVENOUS at 11:53

## 2023-10-26 RX ADMIN — ONDANSETRON 4 MG: 2 INJECTION INTRAMUSCULAR; INTRAVENOUS at 11:53

## 2023-10-26 ASSESSMENT — ACTIVITIES OF DAILY LIVING (ADL): ADLS_ACUITY_SCORE: 33

## 2023-10-26 NOTE — ED PROVIDER NOTES
EMERGENCY DEPARTMENT ENCOUNTER      NAME: Ned Moore  AGE: 63 year old male  YOB: 1960  MRN: [id number]  EVALUATION DATE & TIME: 10/26/2023 12:53 PM    PCP: Mark Briggs    ED PROVIDER: Arya Ragland MD      Chief Complaint   Patient presents with    Nausea    Nausea & Vomiting         FINAL IMPRESSION:  Vomiting and nausea  UTI    ED COURSE & MEDICAL DECISION MAKING:    Pertinent Labs & Imaging studies reviewed. (See chart for details)  63 year old male presents to the Emergency Department for evaluation of patient with ongoing nausea vomiting.  Ongoing for last few days.  Was seen recently at another facility for similar symptoms.  Laboratory evaluation and imaging is unremarkable.  Discharged with Zofran.  Unable to tolerate the Zofran.  Patient with history of eosinophilia and recent hospitalization in May.  Patient with strokelike symptoms at that time likely related to hypotensive event.  Patient started on azathioprine for his eosinophilia.  Recently had dosage increase.  On exam patient is a well-nourished follow-up male in no distress.  Neurologically is intact.  Abdomen is soft and nontender.  Patient with ill-defined nausea and recurrent vomiting.  Laboratory evaluation obtained from triage with minimal leukocytosis and anemia.  No evidence of eosinophilia.  Patient with mild acute kidney injury with creatinine of 1.65 and BUN of 24.9.  This is increased slightly from most recent values.  Sodium minimally reduced at 133.  Urine with suspicion for infection but no overt infection.  Nitrite negative.  Leukocyte positive.  A few white blood cells and a few bacteria.  Likely represents acute infection given absence of epithelial cells.  Patient arrives with daughter is very concerned this may represent worsening issues with his eosinophilia and possible stroke once again.  Long conversation held with the patient and daughter regarding absence of neurologic findings to suggest stroke.   "We will review MRI report from hospitalization in May.  1:17 PM I introduced myself to the patient, obtained patient history, performed a physical exam, and discussed plan for ED workup including potential diagnostic laboratory/imaging studies and interventions.  2:36 PM.  MRI with innumerable lesions in watershed distribution suggestive of hypotensive event.  This much more likely explain his presentation at that time.  Patient was \"in and out of consciousness\" due to hypotension.  Findings today very reassuring.  We will proceed with outpatient antibiotics for presumptive UTI.  Daughter does report azathioprine with primary side effect of nausea and vomiting.  Symptoms could relate to the azathioprine.  Patient has follow-up with rheumatology tomorrow.  We will discuss potential change in medications.  Will discharge with Phenergan for symptomatic relief.  We will also discharged with Omnicef for UTI.       At the conclusion of the encounter I discussed the results of all of the tests and the disposition. The questions were answered. The patient or family acknowledged understanding and was agreeable with the care plan.     Medical Decision Making    History:  Supplemental history from: Documented in chart, if applicable  External Record(s) reviewed: Documented in chart, if applicable.    Work Up:  Chart documentation includes differential considered and any EKGs or imaging independently interpreted by provider.  In additional to work up documented, I considered the following work up: Documented in chart, if applicable.    External consultation:  Discussion of management with another provider: Documented in chart, if applicable    Complicating factors:  Care impacted by chronic illness: Eosinophilia, diabetes, dyslipidemia  Care affected by social determinants of health: N/A    Disposition considerations: Discharge. I prescribed additional prescription strength medication(s) as charted. See documentation for any " additional details.      0 minutes of critical care time     MEDICATIONS GIVEN IN THE EMERGENCY:  Medications   sodium chloride 0.9% BOLUS 1,000 mL (has no administration in time range)   ondansetron (ZOFRAN) injection 4 mg (4 mg Intravenous $Given 10/26/23 1153)   sodium chloride 0.9% BOLUS 500 mL (500 mLs Intravenous $Started 10/26/23 1153)       NEW PRESCRIPTIONS STARTED AT TODAY'S ER VISIT  New Prescriptions    No medications on file          =================================================================    HPI    Patient information was obtained from: patient, his daughter    Use of : N/A        Ned Moore is a 63 year old male with a pertinent history of diabetes, COPD, CVA, chronic kidney disease, and EGPA who presents to this ED by wheelchair for evaluation of nausea and vomiting.    Patient reports nausea and vomiting starting at 9 AM this morning. He's had two similar episodes in the past two weeks. He last took Zofran yesterday morning. 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 Patient had a stroke in May and went in for fatigue, weight loss, and a nosebleed. 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 They discharged him on Nucala which was later switched to imuran about a month ago. Patient denies any diarrhea, cough, neurologic symptoms, or exposure to illness.      Chart Review: 10/21/23 ED visit at [facility] for nausea and vomiting. His labs and her CT scan were reassuring along with his EKG. Patient received Zofran and they recommended PCP follow-up.  6/8/23-6/30/23 admission at [facility] for a CVA. MRI of the brain did show innumerable tiny acute nonhemorrhagic bihemispheric supratentorial infarcts, overall distribution favored acute border zone ischemia. He was seen by Rheumatology with diagnosis consistent with eosinophilic granulomatosis with polyangiitis. He was treated with high-dose steroids.    REVIEW OF SYSTEMS   Review of Systems  as noted above otherwise negative    PAST MEDICAL HISTORY:  Past Medical History:   Diagnosis Date    Chronic obstructive pulmonary disease, unspecified COPD type (H) 3/15/2023    Diabetes (H)     History of CVA (cerebrovascular accident) 7/20/2023       PAST SURGICAL HISTORY:  History reviewed. No pertinent surgical history.        CURRENT MEDICATIONS:    ACCU-CHEK FASTCLIX LANCET DRUM  albuterol (PROAIR HFA/PROVENTIL HFA/VENTOLIN HFA) 108 (90 Base) MCG/ACT inhaler  ASPIRIN LOW DOSE 81 MG chewable tablet  atorvastatin (LIPITOR) 20 MG tablet  azaTHIOprine (IMURAN) 50 MG tablet  BD ALEXANDRA U/F 32G X 4 MM insulin pen needle  blood glucose test (ACCU-CHEK SMARTVIEW TEST STRIP) strips  Continuous Blood Gluc Sensor (FREESTYLE LIVIA 2 SENSOR) MISC  dulaglutide (TRULICITY) 1.5 MG/0.5ML pen  empagliflozin (JARDIANCE) 10 MG TABS tablet  fluticasone-vilanterol (BREO ELLIPTA) 100-25 MCG/ACT inhaler  gabapentin (NEURONTIN) 300 MG capsule  insulin glargine (LANTUS PEN) 100 UNIT/ML pen  melatonin 3 MG tablet  Mepolizumab 100 MG/ML SOSY  metFORMIN (GLUCOPHAGE XR) 500 MG 24 hr tablet  metFORMIN (GLUCOPHAGE) 1000 MG tablet  metoprolol succinate ER (TOPROL XL) 25 MG 24 hr tablet  OYSTER SHELL CALCIUM + D3 500-10 MG-MCG TABS  tiotropium (SPIRIVA) 18 MCG inhaled capsule  traZODone (DESYREL) 50 MG tablet        ALLERGIES:  No Known Allergies    FAMILY HISTORY:  Family History   Problem Relation Age of Onset    Heart Disease Mother     Diabetes Mother     Asthma Mother     Alzheimer Disease Father     No Known Problems Brother        SOCIAL HISTORY:   Social History     Socioeconomic History    Marital status:    Tobacco Use    Smoking status: Never    Smokeless tobacco: Never   Vaping Use    Vaping Use: Never used   Substance and Sexual Activity    Alcohol use: Yes     Comment: a beer or wine every few months    Drug use: Never    Sexual activity: Not Currently     Partners: Female   Other Topics Concern    Parent/sibling w/ CABG,  "MI or angioplasty before 65F 55M? No       VITALS:  BP (!) 170/82   Pulse 100   Temp 97.5  F (36.4  C) (Temporal)   Resp 20   Ht 1.88 m (6' 2\")   Wt 90.7 kg (200 lb)   SpO2 100%   BMI 25.68 kg/m      PHYSICAL EXAM    VITAL SIGNS: BP (!) 170/82   Pulse 100   Temp 97.5  F (36.4  C) (Temporal)   Resp 20   Ht 1.88 m (6' 2\")   Wt 90.7 kg (200 lb)   SpO2 100%   BMI 25.68 kg/m      Constitutional:  Awake, alert, in no apparent distress  HENT:  Normocephalic, Atraumatic. Bilateral external ears normal. Oropharynx moist. Nose normal. Neck- Normal range of motion with no guarding, Supple, No stridor.   Eyes:  PERRL, EOMI with no signs of entrapment, Conjunctiva normal, No discharge.   Respiratory:  Normal breath sounds, No respiratory distress, No wheezing.    Cardiovascular:  Normal heart rate, Normal rhythm, No appreciable rubs or gallops.   GI:  Soft, No tenderness, No distension, No palpable masses  Musculoskeletal:  Intact distal pulses, No edema. Good range of motion in all major joints. No tenderness to palpation or major deformities noted.  Integument:  Warm, Dry, No erythema, No rash.   Neurologic:  Alert & oriented, Normal motor function, Normal sensory function, No focal deficits noted.   Psychiatric:  Affect normal, Judgment normal, Mood normal.      LAB:  All pertinent labs reviewed and interpreted.  Results for orders placed or performed during the hospital encounter of 10/26/23   UA with Microscopic reflex to Culture    Specimen: Urine, Midstream   Result Value Ref Range    Color Urine Yellow Colorless, Straw, Light Yellow, Yellow    Appearance Urine Clear Clear    Glucose Urine >1000 (A) Negative mg/dL    Bilirubin Urine Negative Negative    Ketones Urine Negative Negative mg/dL    Specific Gravity Urine 1.022 1.001 - 1.030    Blood Urine 0.03 mg/dL (A) Negative    pH Urine 5.5 5.0 - 7.0    Protein Albumin Urine 70 (A) Negative mg/dL    Urobilinogen Urine <2.0 <2.0 mg/dL    Nitrite Urine Negative " Negative    Leukocyte Esterase Urine 250 Kathleen/uL (A) Negative    Bacteria Urine Few (A) None Seen /HPF    Mucus Urine Present (A) None Seen /LPF    Amorphous Crystals Urine Few (A) None Seen /HPF    RBC Urine 3 (H) <=2 /HPF    WBC Urine 7 (H) <=5 /HPF    Squamous Epithelials Urine <1 <=1 /HPF    Hyaline Casts Urine 1 <=2 /LPF   Comprehensive metabolic panel   Result Value Ref Range    Sodium 133 (L) 135 - 145 mmol/L    Potassium 4.6 3.4 - 5.3 mmol/L    Carbon Dioxide (CO2) 22 22 - 29 mmol/L    Anion Gap 17 (H) 7 - 15 mmol/L    Urea Nitrogen 24.9 (H) 8.0 - 23.0 mg/dL    Creatinine 1.65 (H) 0.67 - 1.17 mg/dL    GFR Estimate 46 (L) >60 mL/min/1.73m2    Calcium 9.9 8.8 - 10.2 mg/dL    Chloride 94 (L) 98 - 107 mmol/L    Glucose 255 (H) 70 - 99 mg/dL    Alkaline Phosphatase 246 (H) 40 - 129 U/L    AST 20 0 - 45 U/L    ALT 22 0 - 70 U/L    Protein Total 7.9 6.4 - 8.3 g/dL    Albumin 4.2 3.5 - 5.2 g/dL    Bilirubin Total 0.3 <=1.2 mg/dL   CBC with platelets and differential   Result Value Ref Range    WBC Count 13.1 (H) 4.0 - 11.0 10e3/uL    RBC Count 4.69 4.40 - 5.90 10e6/uL    Hemoglobin 11.2 (L) 13.3 - 17.7 g/dL    Hematocrit 36.8 (L) 40.0 - 53.0 %    MCV 79 78 - 100 fL    MCH 23.9 (L) 26.5 - 33.0 pg    MCHC 30.4 (L) 31.5 - 36.5 g/dL    RDW 17.7 (H) 10.0 - 15.0 %    Platelet Count 753 (H) 150 - 450 10e3/uL    % Neutrophils 85 %    % Lymphocytes 7 %    % Monocytes 7 %    % Eosinophils 0 %    % Basophils 0 %    % Immature Granulocytes 1 %    NRBCs per 100 WBC 0 <1 /100    Absolute Neutrophils 11.1 (H) 1.6 - 8.3 10e3/uL    Absolute Lymphocytes 1.0 0.8 - 5.3 10e3/uL    Absolute Monocytes 0.9 0.0 - 1.3 10e3/uL    Absolute Eosinophils 0.0 0.0 - 0.7 10e3/uL    Absolute Basophils 0.0 0.0 - 0.2 10e3/uL    Absolute Immature Granulocytes 0.1 <=0.4 10e3/uL    Absolute NRBCs 0.0 10e3/uL   Extra Red Top Tube   Result Value Ref Range    Hold Specimen JIC    Extra Green Top (Lithium Heparin) Tube   Result Value Ref Range    Hold Specimen  JIC    Extra Purple Top Tube   Result Value Ref Range    Hold Specimen JIC    Glucose by meter   Result Value Ref Range    GLUCOSE BY METER POCT 247 (H) 70 - 99 mg/dL       I, Fabi Bell, am serving as a scribe to document services personally performed by Arya Ragland MD based on my observation and the provider's statements to me. I, Arya Ragland MD, attest that Fabi Bell is acting in a scribe capacity, has observed my performance of the services and has documented them in accordance with my direction.    Arya Ragland MD  [facility] EMERGENCY DEPARTMENT  [facility] [facility] [address]  [phone]       Arya Ragland MD  10/26/23 5808

## 2023-10-26 NOTE — ED TRIAGE NOTES
"Pt reports N/V starting at 9am today, similar incidents 2 x in past 2 wks.  Pt reports, \" I have a hx of eosinophillic strokes, which I think may be causing N/V, this dx came from prev MRI and does not present with classic stroke symptoms.\"       Triage Assessment (Adult)       Row Name 10/26/23 1122          Triage Assessment    Airway WDL WDL        Respiratory WDL    Respiratory WDL WDL        Skin Circulation/Temperature WDL    Skin Circulation/Temperature WDL WDL        Cardiac WDL    Cardiac WDL WDL        Peripheral/Neurovascular WDL    Peripheral Neurovascular WDL WDL        Cognitive/Neuro/Behavioral WDL    Cognitive/Neuro/Behavioral WDL WDL                     "

## 2023-10-27 LAB — BACTERIA UR CULT: NORMAL

## 2023-11-03 DIAGNOSIS — E11.22 TYPE 2 DIABETES MELLITUS WITH STAGE 3A CHRONIC KIDNEY DISEASE, WITHOUT LONG-TERM CURRENT USE OF INSULIN (H): ICD-10-CM

## 2023-11-03 DIAGNOSIS — N18.31 TYPE 2 DIABETES MELLITUS WITH STAGE 3A CHRONIC KIDNEY DISEASE, WITHOUT LONG-TERM CURRENT USE OF INSULIN (H): ICD-10-CM

## 2023-11-03 RX ORDER — DULAGLUTIDE 1.5 MG/.5ML
1.5 INJECTION, SOLUTION SUBCUTANEOUS
Qty: 1.5 ML | Refills: 3 | Status: SHIPPED | OUTPATIENT
Start: 2023-11-03 | End: 2023-11-06

## 2023-11-06 ENCOUNTER — VIRTUAL VISIT (OUTPATIENT)
Dept: PHARMACY | Facility: CLINIC | Age: 63
End: 2023-11-06
Payer: COMMERCIAL

## 2023-11-06 DIAGNOSIS — N18.31 TYPE 2 DIABETES MELLITUS WITH STAGE 3A CHRONIC KIDNEY DISEASE, WITHOUT LONG-TERM CURRENT USE OF INSULIN (H): Primary | ICD-10-CM

## 2023-11-06 DIAGNOSIS — E11.22 TYPE 2 DIABETES MELLITUS WITH STAGE 3A CHRONIC KIDNEY DISEASE, WITHOUT LONG-TERM CURRENT USE OF INSULIN (H): Primary | ICD-10-CM

## 2023-11-06 PROCEDURE — 99607 MTMS BY PHARM ADDL 15 MIN: CPT

## 2023-11-06 PROCEDURE — 99606 MTMS BY PHARM EST 15 MIN: CPT

## 2023-11-06 RX ORDER — METFORMIN HCL 500 MG
500 TABLET, EXTENDED RELEASE 24 HR ORAL
Qty: 360 TABLET | Refills: 3 | Status: SHIPPED | OUTPATIENT
Start: 2023-11-06

## 2023-11-06 RX ORDER — METOPROLOL SUCCINATE 25 MG/1
12.5 TABLET, EXTENDED RELEASE ORAL EVERY MORNING
Qty: 45 TABLET | Refills: 6 | Status: SHIPPED | OUTPATIENT
Start: 2023-11-06

## 2023-11-06 RX ORDER — FLUTICASONE FUROATE AND VILANTEROL 100; 25 UG/1; UG/1
1 POWDER RESPIRATORY (INHALATION) DAILY
Qty: 60 EACH | Refills: 6 | Status: SHIPPED | OUTPATIENT
Start: 2023-11-06 | End: 2024-02-01

## 2023-11-06 RX ORDER — DULAGLUTIDE 3 MG/.5ML
3 INJECTION, SOLUTION SUBCUTANEOUS WEEKLY
Qty: 2 ML | Refills: 0 | Status: SHIPPED | OUTPATIENT
Start: 2023-11-06 | End: 2024-01-11

## 2023-11-06 NOTE — PROGRESS NOTES
Medication Therapy Management (MTM) Encounter    ASSESSMENT:                            Medication Adherence/Access: No issues identified    Type 2 Diabetes:   Patient is not meeting A1c goal of < 7%. Most of the CGM BG readings are within the target range (70 - 180 mg/dL) 55%, well below  goal of > 70%, and very much improved from the previous follow up. Considering still high sugar readings appropriate to increase Trulicity dose. Appropriate to discontinue Jardiace due to dehydration. Considering gaps with sugar readings, appropriate to scan sensor at least three times daily.     PLAN:                            Please check you sugar at least three times a day   Increase Trulicity dose from 1.5 mg to 3 mg weekly  Start injecting Semglee 8 units at around 6 - 7 PM   PharmD to order A1C and BMP for patient to check during his visit with Dr. Briggs    Follow-up: Due on 12/06/2023    SUBJECTIVE/OBJECTIVE:                          Ned Moore is a 63 year old male called for a follow up visit. This is a follow up from 09/05/2023.     Reason for visit: Medication Review Initial.  Allergies/ADRs: Reviewed in chart  Past Medical History: Reviewed in chart  Tobacco: He reports that he has never smoked. He has never used smokeless tobacco.    Medication Adherence/Access: no issues reported    Type 2 Diabetes: Currently taking metformin 500 mg XR with supper and metformin 1000 mg with breakfast, and Semglee 5 - 10 units.  Patient stopped taking Ozempic due to weight loss, and started on Trulicity.  Patient's CGM BG is improving slowly.    Aspirin 81mg daily  Blood sugar monitoring: Continuous Glucose Monitor See below   Current diabetes symptoms: Fatigue.   Diet/Exercise: Was not addressed at this follow up  Eye exam: up to date  Foot exam: due  Urine Albumin:   Lab Results   Component Value Date    UMALCR 24.04 (H) 12/01/2022        Wt Readings from Last 2 Encounters:   10/26/23 200 lb (90.7 kg)   08/24/23 187 lb (84.8 kg)      Azathioprine was the med that was causing patient to have nausea and vomiting, and is discontinued by provider. Checked maría 3 coverage, but it is formulary excluded. Will follow up with patient closely.      Today's Vitals: There were no vitals taken for this visit.  ----------------      I spent 24 minutes with this patient today. All changes were made via collaborative practice agreement with Mark Briggs MD. A copy of the visit note was provided to the patient's provider(s).    A summary of these recommendations was sent via [employer].      Telemedicine Visit Details  Type of service:  Telephone visit  Start Time:  09:00 AM  End Time: 9:24 AM     Medication Therapy Recommendations  Type 2 diabetes mellitus with stage 3a chronic kidney disease, without long-term current use of insulin (H)    Current Medication: dulaglutide (TRULICITY) 1.5 MG/0.5ML pen (Discontinued)   Rationale: Dose too low - Dosage too low - Effectiveness   Recommendation: Increase Dose - Trulicity 3 MG/0.5ML Sopn   Status: Accepted per CPA          Current Medication: insulin glargine (LANTUS PEN) 100 UNIT/ML pen   Rationale: Dose too high - Dosage too high - Safety   Recommendation: Decrease Dose - Decrease dose to 8 units from the previous dose of 10 units   Status: Accepted per CPA              Syed Borjas, PharmD     Medication Therapy Management (MTM) Pharmacist     [phone]     [email or web address]

## 2023-11-06 NOTE — PATIENT INSTRUCTIONS
"Recommendations from today's MTM visit:                                                      MTM (medication therapy management) is a service provided by a clinical pharmacist designed to help you get the most of out of your medicines.   Today we reviewed what your medicines are for, how to know if they are working, that your medicines are safe and how to make your medicine regimen as easy as possible.      Please check you sugar at least three times a day   Increase Trulicity dose from 1.5 mg to 3 mg weekly  Start injecting Semglee 8 units at around 6 - 7 PM   PharmD to order A1C and BMP for patient to check during his visit with Dr. Briggs    Follow-up: Due on 12/06/2023    It was great speaking with you today.  I value your experience and would be very thankful for your time in providing feedback in our clinic survey. In the next few days, you may receive an email or text message from [facility] with a link to a survey related to your  clinical pharmacist.\"     To schedule another MTM appointment, please call the clinic directly or you may call the MTM scheduling line at [phone] or toll-free at [phone].     My Clinical Pharmacist's contact information:                                                      Please feel free to contact me with any questions or concerns you have.     Syed Borjas, PharmD     Medication Therapy Management (MTM) Pharmacist     [phone]     [email or web address]     [facility]    "

## 2023-11-14 NOTE — COMMUNITY RESOURCES LIST (ENGLISH)
11/14/2023   [facility] [employer]  N/A  For questions about this resource list or additional care needs, please contact your primary care clinic or care manager.  Phone: [phone]   Email: N/A   Address: [facility] [place] [address]   Hours: N/A        Financial Stability       Rent and mortgage payment assistance  1  [facility] - Outpatient Addiction Treatment Distance: 1.62 miles      In-Person, Phone/Virtual   [address] [place] [address]  Language: English, St Helenian  Hours: Mon - Fri 8:00 AM - 4:30 PM  Fees: Insurance   Phone: (745) 821-1432 Email: roots@[employer] Website: https://[employer]/roots/     2  [facility] Finance Agency - Multifamily Rental Assistance Program Distance: 3.06 miles      Phone/Virtual   [address] [facility] [address] [place] [address]  Language: English  Hours: Mon - Fri 8:00 AM - 5:00 PM Appt. Only  Fees: Free   Phone: (837) 633-2616 Email: [place].M-DAQ@[facility]. Website: https://www.[place]M-[place]Q.gov/index.html          Food and Nutrition       Food pantry  3  [employer] [facility] [place] - Department of [employer] Work - Food pantry Distance: 0.89 miles      Delivery, [facility]   [address] [place] [address]  Language: English  Hours: Mon 1:00 PM - 6:00 PM , Tue 9:30 AM - 2:30 PM , Wed - Thu 9:30 AM - 2:00 PM  Fees: Free   Phone: (131) 921-8123 Email: info@[employer].org Website: http://[employer].org/     4  [facility] Food Market Distance: 2.04 miles      [facility]   [address] [place] [address] [address] [place] [address]  Language: Turkmen, English, Guamanian, [place]ian, Swahili  Hours: Mon - Wed 9:00 AM - 11:30 AM , Tue 1:00 PM - 3:30 PM , Wed 1:00 PM - 4:00 PM  Fees: Free   Phone: (552) 986-1560 Email: info@[employer].org Website: https://[facility].org/programs/food-support/food-markets/     SNAP application assistance  5  [employer] ([employer])  [facility] Distance: 1.75 miles      Phone/Virtual   [address] [place] [address]  Language: English  Hours: Mon - Fri 8:00 AM - 4:30 PM  Fees: Free   Phone: (987) 120-7773 Email: info@[employer].org Website: http://www.capVite.org/     6  [employer] [place] Distance: 3.15 miles      Phone/Virtual   [address] [place] [address]  Language: English, Hmong, American, Estonian, Afghan  Hours: Mon - Fri 8:30 AM - 4:30 PM  Fees: Free   Phone: (934) 703-9683 Email: helpline@[employer].org Website: https://www.[employer].org/programs/[place]-food-helpline/     Soup kitchen or free meals  7  [facility] [place] [facility] Distance: 1.04 miles      Pick   [address] [place] [address]  Language: English  Hours: Tue 2:00 PM - 4:00 PM , Thu 2:00 PM - 4:00 PM  Fees: Free   Phone: (583) 422-7766 Email: tuan@[facility]. Website: https://www.[facility]/facilities/[facility]     8  [employer] [place] - [facility] - Free Summer Meals Distance: 1.45 miles      In-Person   [address] [place] [address]  Language: English, Hmong, Afghan  Hours: Mon - Fri 12:00 PM - 1:00 PM , Mon - Fri 3:00 PM - 4:00 PM  Fees: Free   Phone: (812) 252-5243 Email: Blane@[facility]. Website: https://www.[facility]/departments/stanley-recreation/[facility]          Important Numbers & Websites       Emergency Services   911  City Services   311  Poison Control   (794) 957-1868  Suicide Prevention Lifeline   (523) 842-1260 (TALK)  Child Abuse Hotline   (472) 284-9307 (4-A-Child)  Sexual Assault Hotline   (972) 225-3927 (HOPE)  National Runaway Safeline   (340) 352-6412 (RUNAWAY)  All-Options Talkline   (696) 467-6051  Substance Abuse Referral   (688) 675-8032 (HELP)

## 2023-11-15 ENCOUNTER — OFFICE VISIT (OUTPATIENT)
Dept: INTERNAL MEDICINE | Facility: CLINIC | Age: 63
End: 2023-11-15
Payer: COMMERCIAL

## 2023-11-15 VITALS
DIASTOLIC BLOOD PRESSURE: 62 MMHG | HEIGHT: 74 IN | RESPIRATION RATE: 19 BRPM | HEART RATE: 93 BPM | OXYGEN SATURATION: 98 % | WEIGHT: 186.6 LBS | SYSTOLIC BLOOD PRESSURE: 128 MMHG | TEMPERATURE: 97.8 F | BODY MASS INDEX: 23.95 KG/M2

## 2023-11-15 DIAGNOSIS — N18.31 STAGE 3A CHRONIC KIDNEY DISEASE (H): ICD-10-CM

## 2023-11-15 DIAGNOSIS — M30.1 EOSINOPHILIC GRANULOMATOSIS WITH POLYANGIITIS (EGPA) (H): Primary | ICD-10-CM

## 2023-11-15 DIAGNOSIS — E11.22 TYPE 2 DIABETES MELLITUS WITH STAGE 3A CHRONIC KIDNEY DISEASE, WITHOUT LONG-TERM CURRENT USE OF INSULIN (H): ICD-10-CM

## 2023-11-15 DIAGNOSIS — D84.821 IMMUNOSUPPRESSION DUE TO DRUG THERAPY (H): ICD-10-CM

## 2023-11-15 DIAGNOSIS — D72.18 EOSINOPHILIC GRANULOMATOSIS WITH POLYANGIITIS (EGPA) (H): Primary | ICD-10-CM

## 2023-11-15 DIAGNOSIS — N18.31 TYPE 2 DIABETES MELLITUS WITH STAGE 3A CHRONIC KIDNEY DISEASE, WITHOUT LONG-TERM CURRENT USE OF INSULIN (H): ICD-10-CM

## 2023-11-15 DIAGNOSIS — I50.22 CHRONIC SYSTOLIC HEART FAILURE (H): ICD-10-CM

## 2023-11-15 DIAGNOSIS — E78.5 HYPERLIPIDEMIA LDL GOAL <70: ICD-10-CM

## 2023-11-15 DIAGNOSIS — D64.9 NORMOCYTIC ANEMIA: ICD-10-CM

## 2023-11-15 DIAGNOSIS — Z79.899 IMMUNOSUPPRESSION DUE TO DRUG THERAPY (H): ICD-10-CM

## 2023-11-15 PROBLEM — G81.91 RIGHT HEMIPARESIS (H): Status: ACTIVE | Noted: 2023-11-15

## 2023-11-15 LAB
ANION GAP SERPL CALCULATED.3IONS-SCNC: 12 MMOL/L (ref 7–15)
BASOPHILS # BLD AUTO: 0 10E3/UL (ref 0–0.2)
BASOPHILS NFR BLD AUTO: 0 %
BUN SERPL-MCNC: 18.6 MG/DL (ref 8–23)
CALCIUM SERPL-MCNC: 8.9 MG/DL (ref 8.8–10.2)
CHLORIDE SERPL-SCNC: 101 MMOL/L (ref 98–107)
CREAT SERPL-MCNC: 1.55 MG/DL (ref 0.67–1.17)
CREAT UR-MCNC: 181 MG/DL
DEPRECATED HCO3 PLAS-SCNC: 25 MMOL/L (ref 22–29)
EGFRCR SERPLBLD CKD-EPI 2021: 50 ML/MIN/1.73M2
EOSINOPHIL # BLD AUTO: 0.1 10E3/UL (ref 0–0.7)
EOSINOPHIL NFR BLD AUTO: 1 %
ERYTHROCYTE [DISTWIDTH] IN BLOOD BY AUTOMATED COUNT: 16.1 % (ref 10–15)
FERRITIN SERPL-MCNC: 71 NG/ML (ref 31–409)
GLUCOSE SERPL-MCNC: 188 MG/DL (ref 70–99)
HBA1C MFR BLD: 8 % (ref 0–5.6)
HCT VFR BLD AUTO: 32.7 % (ref 40–53)
HGB BLD-MCNC: 9.9 G/DL (ref 13.3–17.7)
IMM GRANULOCYTES # BLD: 0 10E3/UL
IMM GRANULOCYTES NFR BLD: 0 %
IRON BINDING CAPACITY (ROCHE): 227 UG/DL (ref 240–430)
IRON SATN MFR SERPL: 7 % (ref 15–46)
IRON SERPL-MCNC: 16 UG/DL (ref 61–157)
LYMPHOCYTES # BLD AUTO: 1.5 10E3/UL (ref 0.8–5.3)
LYMPHOCYTES NFR BLD AUTO: 16 %
MCH RBC QN AUTO: 23.6 PG (ref 26.5–33)
MCHC RBC AUTO-ENTMCNC: 30.3 G/DL (ref 31.5–36.5)
MCV RBC AUTO: 78 FL (ref 78–100)
MICROALBUMIN UR-MCNC: 70.7 MG/L
MICROALBUMIN/CREAT UR: 39.06 MG/G CR (ref 0–17)
MONOCYTES # BLD AUTO: 0.9 10E3/UL (ref 0–1.3)
MONOCYTES NFR BLD AUTO: 10 %
NEUTROPHILS # BLD AUTO: 6.5 10E3/UL (ref 1.6–8.3)
NEUTROPHILS NFR BLD AUTO: 73 %
PLATELET # BLD AUTO: 487 10E3/UL (ref 150–450)
POTASSIUM SERPL-SCNC: 4.4 MMOL/L (ref 3.4–5.3)
RBC # BLD AUTO: 4.19 10E6/UL (ref 4.4–5.9)
SODIUM SERPL-SCNC: 138 MMOL/L (ref 135–145)
TOTAL PROTEIN SERUM FOR ELP: 6.9 G/DL (ref 6.4–8.3)
TSH SERPL DL<=0.005 MIU/L-ACNC: 0.43 UIU/ML (ref 0.3–4.2)
WBC # BLD AUTO: 8.9 10E3/UL (ref 4–11)

## 2023-11-15 PROCEDURE — 85025 COMPLETE CBC W/AUTO DIFF WBC: CPT | Performed by: INTERNAL MEDICINE

## 2023-11-15 PROCEDURE — 84155 ASSAY OF PROTEIN SERUM: CPT | Performed by: INTERNAL MEDICINE

## 2023-11-15 PROCEDURE — 83036 HEMOGLOBIN GLYCOSYLATED A1C: CPT | Performed by: INTERNAL MEDICINE

## 2023-11-15 PROCEDURE — 82570 ASSAY OF URINE CREATININE: CPT | Performed by: INTERNAL MEDICINE

## 2023-11-15 PROCEDURE — 83540 ASSAY OF IRON: CPT | Performed by: INTERNAL MEDICINE

## 2023-11-15 PROCEDURE — 36415 COLL VENOUS BLD VENIPUNCTURE: CPT | Performed by: INTERNAL MEDICINE

## 2023-11-15 PROCEDURE — 83550 IRON BINDING TEST: CPT | Performed by: INTERNAL MEDICINE

## 2023-11-15 PROCEDURE — 82728 ASSAY OF FERRITIN: CPT | Performed by: INTERNAL MEDICINE

## 2023-11-15 PROCEDURE — 84443 ASSAY THYROID STIM HORMONE: CPT | Performed by: INTERNAL MEDICINE

## 2023-11-15 PROCEDURE — 99215 OFFICE O/P EST HI 40 MIN: CPT | Performed by: INTERNAL MEDICINE

## 2023-11-15 PROCEDURE — 80048 BASIC METABOLIC PNL TOTAL CA: CPT | Performed by: INTERNAL MEDICINE

## 2023-11-15 PROCEDURE — 84165 PROTEIN E-PHORESIS SERUM: CPT | Performed by: PATHOLOGY

## 2023-11-15 PROCEDURE — 82043 UR ALBUMIN QUANTITATIVE: CPT | Performed by: INTERNAL MEDICINE

## 2023-11-15 ASSESSMENT — ENCOUNTER SYMPTOMS: FATIGUE: 1

## 2023-11-15 NOTE — PROGRESS NOTES
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 Very slow recovery over the last few months with recurrent ED visits for nonspecific fatigue and occasional dehydration over the last month.  Plan: Adult Rheumatology  Referral        Lengthy discussion about his current state. Currently on Mepolizumab, followed by pulmonology ([facility]) and Rheum ( system). Although it is not clear how much of his current health is related to EPGA, he has had organ involvement including cardiac, pulmonary, renal and possibly MSK over the last year.  Firstly, he and his family are interested in consolidating his care so I will place a referral to the rheumatologic team without our system.   He will continue to work with PT and his home efforts in hydration as he often falls behind.    (I50.22) Chronic systolic heart failure (H)  Comment: Cardiac MRI in 9/2023 with EF 45%, suggestive of eosinophilic infiltration.  Plan: Appears euvolemic. Watch for now. Does not see cardiology at this time.    (D64.9) Normocytic anemia  Comment: hx of  Plan: CBC with platelets and differential, Ferritin,         Iron and iron binding capacity, TSH with free         T4 reflex, Protein electrophoresis        Suspect chronic disease related. Will likely be seeing nephrology in the near future, defer treatment until then.    (E11.22,  N18.31) Type 2 diabetes mellitus with stage 3a chronic kidney disease, without long-term current use of insulin (H)  Comment: suboptimal control  Plan: some struggles with adherence to regimen. Working with [facility] pharmacy at this time, has another appointment in the next 10 days as well.     (N18.30) Chronic kidney disease, stage 3 (H)  Comment: EGPA vs diabetes vs all  Plan: per referral note, nephrology evaluation is  "planned.    (E78.5) Hyperlipidemia LDL goal <70  Comment: on statin  Plan: Will plan to continue on previous medication without changes     See me in 2-4 weeks.    Total time used in face-to-face, coordination or care and documentation is equal to 40 minutes.                  Mark Briggs MD  [facility]    Lupe Marino is a 63 year old, presenting for the following health issues:  RECHECK (Pt had trip to  for dehydration from new medication, also c/o N/V.) and Fatigue (Pt c/o fatigue over last 3 months, worsening slowly, also c/o loss of appetite. )      11/15/2023     2:57 PM   Additional Questions   Roomed by Jose RODRIGUEZ RN   Accompanied by Domitila (Daughter)       Fatigue  Associated symptoms include fatigue.   History of Present Illness       Reason for visit:  Stroke recoveryHe consumes 1 sweetened beverage(s) daily.He exercises with enough effort to increase his heart rate 9 or less minutes per day.  He exercises with enough effort to increase his heart rate 3 or less days per week. He is missing 1 dose(s) of medications per week.  He is not taking prescribed medications regularly due to other.                 Review of Systems   Constitutional:  Positive for fatigue.            Objective    /62 (BP Location: Left arm, Patient Position: Sitting, Cuff Size: Adult Regular)   Pulse 93   Temp 97.8  F (36.6  C) (Tympanic)   Resp 19   Ht 1.869 m (6' 1.6\")   Wt 84.6 kg (186 lb 9.6 oz)   SpO2 98%   BMI 24.22 kg/m    Body mass index is 24.22 kg/m .  Physical Exam                         "

## 2023-11-16 ENCOUNTER — PATIENT OUTREACH (OUTPATIENT)
Dept: RHEUMATOLOGY | Facility: CLINIC | Age: 63
End: 2023-11-16
Payer: COMMERCIAL

## 2023-11-16 DIAGNOSIS — E11.22 TYPE 2 DIABETES MELLITUS WITH STAGE 3A CHRONIC KIDNEY DISEASE, WITHOUT LONG-TERM CURRENT USE OF INSULIN (H): Primary | ICD-10-CM

## 2023-11-16 DIAGNOSIS — D72.18 EOSINOPHILIC GRANULOMATOSIS WITH POLYANGIITIS (EGPA) (H): ICD-10-CM

## 2023-11-16 DIAGNOSIS — N18.31 TYPE 2 DIABETES MELLITUS WITH STAGE 3A CHRONIC KIDNEY DISEASE, WITHOUT LONG-TERM CURRENT USE OF INSULIN (H): Primary | ICD-10-CM

## 2023-11-16 DIAGNOSIS — M30.1 EOSINOPHILIC GRANULOMATOSIS WITH POLYANGIITIS (EGPA) (H): ICD-10-CM

## 2023-11-16 LAB
ALBUMIN SERPL ELPH-MCNC: 3.2 G/DL (ref 3.7–5.1)
ALPHA1 GLOB SERPL ELPH-MCNC: 0.5 G/DL (ref 0.2–0.4)
ALPHA2 GLOB SERPL ELPH-MCNC: 1.3 G/DL (ref 0.5–0.9)
B-GLOBULIN SERPL ELPH-MCNC: 0.9 G/DL (ref 0.6–1)
GAMMA GLOB SERPL ELPH-MCNC: 1 G/DL (ref 0.7–1.6)
M PROTEIN SERPL ELPH-MCNC: 0 G/DL
PROT PATTERN SERPL ELPH-IMP: ABNORMAL

## 2023-11-17 ENCOUNTER — TELEPHONE (OUTPATIENT)
Dept: NEPHROLOGY | Facility: CLINIC | Age: 63
End: 2023-11-17
Payer: COMMERCIAL

## 2023-11-17 NOTE — CONFIDENTIAL NOTE
DIAGNOSIS:  New General Nephrology    DATE RECEIVED: 11.30.2023   NOTES STATUS DETAILS   OFFICE NOTE from referring provider     OFFICE NOTE from other specialist  Internal 11.15.2023 Mark Briggs MD     01.24.2022 Declan Chacon MD    *Only VASCULITIS or LUPUS gather office notes for the following     *PULMONARY       *ENT     *DERMATOLOGY     *RHEUMATOLOGY     DISCHARGE SUMMARY from hospital     DISCHARGE REPORT from the ER     MEDICATION LIST Internal    IMAGING  (NEED IMAGES AND REPORTS)     KIDNEY CT SCAN     KIDNEY ULTRASOUND     MR ABDOMEN     NUCLEAR MEDICINE RENAL     LABS     CBC Internal 11.15.2023   CMP Internal 11.15.2023   BMP Internal 11.15.2023   UA Internal 10.26.2023   URINE PROTEIN Internal 10.26.2023   RENAL PANEL     BIOPSY     KIDNEY BIOPSY

## 2023-11-17 NOTE — TELEPHONE ENCOUNTER
Spoke to patient to schedule new nephrology appointment per email with Dr. Tripp with labs prior // 11.17.2023 MCE

## 2023-11-17 NOTE — TELEPHONE ENCOUNTER
Referral support for EGPA with kidney concerns.  See referral for added details.  Reached out via [employer] on referral follow up to alert patient scheduled team will be calling to get him scheduled with Nephrologist for his EGPA vasculitis.    Holly Kirkland RN, BSN, PHN  Vasculitis & Lupus Program Nurse Navigator  [phone]

## 2023-11-20 PROBLEM — G81.91 RIGHT HEMIPARESIS (H): Status: RESOLVED | Noted: 2023-11-15 | Resolved: 2023-11-20

## 2023-11-21 DIAGNOSIS — M30.1 EOSINOPHILIC GRANULOMATOSIS WITH POLYANGIITIS (EGPA) (H): Primary | ICD-10-CM

## 2023-11-21 DIAGNOSIS — D72.18 EOSINOPHILIC GRANULOMATOSIS WITH POLYANGIITIS (EGPA) (H): Primary | ICD-10-CM

## 2023-11-21 NOTE — TELEPHONE ENCOUNTER
Update received from Referring provider, Dr. Mark nesbitt for GN Referral for M30.1, D72.18 (ICD-10-CM) - Eosinophilic granulomatosis with polyangiitis (EGPA) with kidney concerns.  Referral added and linked to appt already scheduled and confirmed with patient.  Holly Kirkland RN, BSN, PHN  Vasculitis & Lupus Program Nurse Navigator  [phone]

## 2023-11-21 NOTE — RESULT ENCOUNTER NOTE
Benjamin Briggs,    I agree he would need a medication that would protect the kidneys, but he could not tolerate the bathroom visits with Jardiance, and that caused him to have dehydration and went to the ED. Trulicity, would help a little bit as well.     Will follow up closely.    Thanks,  Syed

## 2023-11-21 NOTE — CONFIDENTIAL NOTE
NOTES Status Details   OFFICE NOTE from referring provider Internal 11.15.2023 Mark Briggs MD    OFFICE NOTE from other specialist     DISCHARGE SUMMARY from hospital     DISCHARGE REPORT from the ER     MEDICATION LIST Internal    LABS (Any and all labs)      Internal    Biopsy/pathology (Anything related to diagnoses I.e. fluid aspirations, lip biopsy, muscle biopsy)               Imaging (All imaging related to diagnoses)     Echo     HRCT Internal 02.07.2023 CT CHEST HI-RESOLUTION WO CONTRAST   CXR Internal 03.21.2022 XR CHEST 2 VW    EMG                    Scleroderma/Dermatomyositis diagnoses     Previous Cardiology notes      Previous Pulmonary notes     Previous Dermatology notes     Previous GI notes     Lupus diagnoses     Previous Nephrology notes     Previous Dermatology notes     Previous Cardiology notes

## 2023-11-22 ENCOUNTER — MYC MEDICAL ADVICE (OUTPATIENT)
Dept: INTERNAL MEDICINE | Facility: CLINIC | Age: 63
End: 2023-11-22
Payer: COMMERCIAL

## 2023-11-27 ENCOUNTER — OFFICE VISIT (OUTPATIENT)
Dept: PULMONOLOGY | Facility: CLINIC | Age: 63
End: 2023-11-27
Attending: NURSE PRACTITIONER
Payer: COMMERCIAL

## 2023-11-27 VITALS
WEIGHT: 189 LBS | DIASTOLIC BLOOD PRESSURE: 74 MMHG | BODY MASS INDEX: 24.53 KG/M2 | SYSTOLIC BLOOD PRESSURE: 118 MMHG | HEART RATE: 76 BPM | OXYGEN SATURATION: 97 %

## 2023-11-27 DIAGNOSIS — D72.18 EOSINOPHILIC GRANULOMATOSIS WITH POLYANGIITIS (EGPA) (H): Primary | ICD-10-CM

## 2023-11-27 DIAGNOSIS — M30.1 EOSINOPHILIC GRANULOMATOSIS WITH POLYANGIITIS (EGPA) (H): Primary | ICD-10-CM

## 2023-11-27 PROCEDURE — 99214 OFFICE O/P EST MOD 30 MIN: CPT | Performed by: INTERNAL MEDICINE

## 2023-11-27 NOTE — PROGRESS NOTES
Pulmonary Clinic Follow-up Visit    Assessment and Plan:   63 year old male never smoker with a history of EGPA with pulmonary and cardiac involvement, multifocal strokes, DM2, CKD3, dyslipidemia, presenting for follow-up.     Eosinophilic granulomatosis with polyangiitis: I met Ned for initial consultation in January 2023. At that time he had marked obstructive physiology with frequent exacerbations. 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 He was started on LAMA-LABA. He had prominent upper airway symptoms. He was subsequently hospitalized at [facility] for almost a month from May-June 2023 with myocarditis with heart failure, multifocal ischemic strokes, anemia, found to have severe hypereosinophilia and was diagnosed with EGPA. He was seen by multiple consulting teams and underwent myocardial biopsy showing eosinophilic myocarditis. ANCA negative. He improved markedly with steroids, and was started on mepolizumab, which he continues. He went to rehab, and has improved markedly. Due to strokes had gaze deviation and right hemiparesis on presentation, which have resolved. Unclear etiology, perhaps hypotension-related or vasculitis from EGPA. He is doing well now, dyspnea present with exertion but improved, neuro deficits resolving. Ran out of tiotropium two weeks ago due to pharmacy supply issue; continues on low-dose fluticasone-vilanterol. He is going to [facility] twice weekly, and is interested in pulmonary rehab. He was at [facility] on 11/14/23 with fatigue; the note erroneously states that he had N/V and lightheadedness from mepolizumab; these symptoms occurred with azathioprine, which he stopped. He is tolerating mepolizumab well. He is transitioning to a rheumatologist at [place] to consolidate his care into one system. In January 2023 he had severe fixed obstructive physiology on  pulmonary function testing, with FEV1 1.26 L (36%), air trapping without hyperinflation, and mild DLCO reduction. May be improved on current treatment; will repeat.     Plan:  - continue mepolizumab  - continue fluticasone-vilanterol 100-25 mcg one inhalation daily; rinse/gargle/spit water after use  - continue tiotropium handihaler 18 mcg daily  - repeat PFT  - referral to pulmonary rehabilitation at [facility]  - appointment with [place] rheumatology on December 6  - follow up in 6 months  - encouraged him to contact us with questions or concerning symptoms    Jose Biswas MD  Pulmonary and Critical Care Medicine  [facility]  Office [phone]  Pager [phone]  he/him    CCx: EGPA    HPI: 63 year old male never smoker with a history of EGPA with pulmonary and cardiac involvement, multifocal strokes, DM2, CKD3, dyslipidemia, presenting for follow-up. I met Ned for initial consultation in January 2023. At that time he had marked obstructive physiology with frequent exacerbations. 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 He was started on LAMA-LABA. He had prominent upper airway symptoms. He was subsequently hospitalized at [facility] for almost a month from May-June 2023 with myocarditis with heart failure, multifocal ischemic strokes, anemia, found to have severe hypereosinophilia and was diagnosed with EGPA. He was seen by multiple consulting teams and underwent myocardial biopsy showing eosinophilic myocarditis. ANCA negative. He improved markedly with steroids, and was started on mepolizumab, which he continues. He went to rehab, and has improved markedly. Due to strokes had gaze deviation and right hemiparesis on presentation, which have resolved. Unclear etiology, perhaps hypotension-related or vasculitis from EGPA. He is doing well now, dyspnea present with  exertion but improved, neuro deficits resolving. Ran out of tiotropium two weeks ago due to pharmacy supply issue; continues on low-dose fluticasone-vilanterol. He is going to [facility] twice weekly, and is interested in pulmonary rehab. He was at [facility] on 11/14/23 with fatigue; the note erroneously states that he had N/V and lightheadedness from mepolizumab; these symptoms occurred with azathioprine, which he stopped. He is tolerating mepolizumab well. He is transitioning to a rheumatologist at [place] to consolidate his care into one system. In January 2023 he had severe fixed obstructive physiology on pulmonary function testing, with FEV1 1.26 L (36%), air trapping without hyperinflation, and mild DLCO reduction. May be improved on current treatment; will repeat.    ROS:  A 12-system review was obtained and was negative with the exception of the symptoms endorsed in the history of present illness.    PMH:  EGPA with pulmonary and cardiac involvement, multifocal strokes, DM2, CKD3, dyslipidemia    PSH:  No past surgical history on file.    Allergies:  No Known Allergies    Family HX:  Family History   Problem Relation Age of Onset    Heart Disease Mother     Diabetes Mother     Asthma Mother     Alzheimer Disease Father     No Known Problems Brother        Social Hx:  Social History     Socioeconomic History    Marital status:      Spouse name: Not on file    Number of children: Not on file    Years of education: Not on file    Highest education level: Not on file   Occupational History    Not on file   Tobacco Use    Smoking status: Never    Smokeless tobacco: Never   Vaping Use    Vaping Use: Never used   Substance and Sexual Activity    Alcohol use: Yes     Comment: a beer or wine every few months    Drug use: Never    Sexual activity: Not Currently     Partners: Female   Other Topics Concern    Parent/sibling w/ CABG, MI or angioplasty before 65F 55M? No   Social History Narrative    Not on  file     Social Determinants of Health     Financial Resource Strain: Low Risk  (11/14/2023)    Financial Resource Strain     Within the past 12 months, have you or your family members you live with been unable to get utilities (heat, electricity) when it was really needed?: No   Food Insecurity: High Risk (11/14/2023)    Food Insecurity     Within the past 12 months, did you worry that your food would run out before you got money to buy more?: Yes     Within the past 12 months, did the food you bought just not last and you didn t have money to get more?: No   Transportation Needs: Low Risk  (11/14/2023)    Transportation Needs     Within the past 12 months, has lack of transportation kept you from medical appointments, getting your medicines, non-medical meetings or appointments, work, or from getting things that you need?: No   Physical Activity: Not on file   Stress: Not on file   Social Connections: Not on file   Interpersonal Safety: Not on file   Housing Stability: High Risk (11/14/2023)    Housing Stability     Do you have housing? : Yes     Are you worried about losing your housing?: Yes       Current Meds:  Current Outpatient Medications   Medication Sig Dispense Refill    ACCU-CHEK FASTCLIX LANCET DRUM [ACCU-CHEK FASTCLIX LANCET DRUM] TEST BLOOD SUGARS 3 TIMES DAILY 300 each 3    albuterol (PROAIR HFA/PROVENTIL HFA/VENTOLIN HFA) 108 (90 Base) MCG/ACT inhaler INHALE 2 PUFFS INTO THE LUNGS EVERY 6 HOURS 8.5 g 1    ASPIRIN LOW DOSE 81 MG chewable tablet Take 81 mg by mouth daily      atorvastatin (LIPITOR) 20 MG tablet TAKE 1 TABLET BY MOUTH EVERY DAY IN THE EVENING 30 tablet 2    BD ALEXANDRA U/F 32G X 4 MM insulin pen needle       blood glucose test (ACCU-CHEK SMARTVIEW TEST STRIP) strips [BLOOD GLUCOSE TEST (ACCU-CHEK SMARTVIEW TEST STRIP) STRIPS] USE TO CHECK BLOOD SUGARS TWICE DAILY. 200 strip 3    Continuous Blood Gluc Sensor (FREESTYLE LIVIA 2 SENSOR) MISC 1 each every 14 days 2 each 11    Dulaglutide  (TRULICITY) 3 MG/0.5ML SOPN Inject 3 mg Subcutaneous once a week 2 mL 0    fluticasone-vilanterol (BREO ELLIPTA) 100-25 MCG/ACT inhaler Inhale 1 puff into the lungs daily 60 each 6    gabapentin (NEURONTIN) 300 MG capsule Take 300 mg by mouth At Bedtime      insulin glargine (LANTUS PEN) 100 UNIT/ML pen Inject 15 Units Subcutaneous At Bedtime 15 mL 4    melatonin 3 MG tablet Take 9 mg by mouth nightly as needed for sleep      Mepolizumab 100 MG/ML SOSY Inject 3 mLs (300 mg) Subcutaneous every 30 days 3 mL 11    metFORMIN (GLUCOPHAGE XR) 500 MG 24 hr tablet Take 1 tablet (500 mg) by mouth daily (with dinner) 360 tablet 3    metFORMIN (GLUCOPHAGE) 1000 MG tablet Take 1 tablet (1,000 mg) by mouth daily (with breakfast) 90 tablet 6    metoprolol succinate ER (TOPROL XL) 25 MG 24 hr tablet Take 0.5 tablets (12.5 mg) by mouth every morning 45 tablet 6    promethazine (PHENERGAN) 25 MG tablet Take 1 tablet (25 mg) by mouth every 8 hours as needed for nausea 15 tablet 0    tiotropium (SPIRIVA) 18 MCG inhaled capsule Inhale 18 mcg into the lungs daily      traZODone (DESYREL) 50 MG tablet Take 50 mg by mouth daily as needed for sleep         Physical Exam:  /74 (BP Location: Left arm, Patient Position: Chair, Cuff Size: Adult Regular)   Pulse 76   Wt 85.7 kg (189 lb)   SpO2 97%   BMI 24.53 kg/m    Gen: alert, oriented, no distress  HEENT: no cervical or supraclavicular lymphadenopathy  CV: RRR, no M/G/R  Resp: CTAB, no focal crackles or wheezes  Skin: no apparent rashes  Ext: mild pitting edema left leg, markedly improved per patient  Neuro: alert, nonfocal    Labs:  reviewed    Imaging studies:  Chest CTA (May 2023):  - images directly reviewed, formal interpretation follows:  FINDINGS:   ANGIOGRAM CHEST: Normal caliber thoracic aorta. Conventional arch anatomy. Normal caliber pulmonary artery. No pulmonary artery filling defects. No vessel wall thickening. No findings to suggest acute aortic syndrome.     LUNGS  AND PLEURA: There are symmetric patchy peripheral centrilobular distribution nodules with indistinct borders. In the lateral left lower lobe opacities are slightly bandlike and confluent consistent with a mixture of inflammation and atelectasis. Trachea and central airways are patent and normal caliber. The subsegmental airways in the peripheral 3 cm of the lung have wall thickening and/or visible endoluminal material. No pleural effusion or thickening.     MEDIASTINUM: Cardiac chambers are normal in size. Physiologic pericardial fluid. Symmetric mildly enlarged hilar and subcarinal lymph nodes are likely reactive. Prevascular, paratracheal, and paraesophageal nodes are normal in size. Esophagus is decompressed. Imaged thyroid gland is normal.     CORONARY ARTERY CALCIFICATION: Moderate.     HEPATOBILIARY: The liver is normal in size. There are scattered low attenuations randomly distributed in the liver without appreciable enhancement incompletely characterized. Normal contrast opacification of the portal and hepatic veins. Smooth liver capsule. Normal gallbladder and bile ducts.     PANCREAS: Normal.     SPLEEN: Normal.     ADRENAL GLANDS: Normal.     KIDNEYS/BLADDER: Kidneys are normal in size with symmetric enhancement and normal cortex thickness. No nephrolithiasis or hydronephrosis. Urinary bladder is distended but has a smooth wall of uniform thickness.     BOWEL: Normal.     LYMPH NODES: Normal.     VASCULATURE: Minimal mixed attenuation atheroma in the infrarenal abdominal aorta. No aneurysm.     PELVIC ORGANS: Normal.     MUSCULOSKELETAL: Small thoracic and lumbar degenerative osteophytes. No aggressive or destructive bone lesions.     IMPRESSION:     1. No acute pulmonary embolism or aortopathy.   2.  Peripheral airway centric lung opacities in both lungs consistent with bronchopneumonia.   3.  Reactive lymph nodes in the angelica and subcarinal mediastinum.   4.  No focal inflammatory process in the abdomen  "or pelvis.   5.  Scattered hypoattenuating liver lesions, incompletely characterized. These most likely represent benign cysts or small hemangioma.    CPET (April 2023):    A cardiopulmonary stress test was performed following a modified Linda protocol with the patient exercising for 9 minutes and 0 seconds under the supervision of Dr. Radha Oconnor.  Blood pressure and heart rate demonstrated a normal response to exercise.    The stress electrocardiogram is negative for inducible ischemic EKG changes.    There is no prior study for comparison.    The patient's exercise capacity is moderately impaired.    The patient technically gave a \"submaximal effort.\"  There is likely a pulmonary limitation to exercise with the breathing reserve being negative at peak, the lack of doubling ot the tidal volume at max and abnormal baseline spirometry. A cardiac limitation to exercise cannot be ruled out.  The peak VO2 and RPP were lower than expected.  The Ve/VCO2 was also elevated at 39.  The blood pressure at rest revealed an orthostatic response to change in positions (no symptoms).  In recovery the blood pressure remained elevated.    TTE (June 2023):  Summary    1. Normal LV size with normal wall thickness. Calculated bi-planes LVEF   64%.   No regional wall motion abnormalities. (Normal function). Normal diastolic   function.    2. Normal RV size with normal systolic function.     3. Trace to mild valvular regurgitations only.     4. The estimated pulmonary arterial systolic pressure is 23 mmHg,   consistent   with normal pulmonary pressures.     5. Compared to the prior study from 5/27/2023, LV function has improved (was 45-50% in May 2023)    Pulmonary Function Testing  January 2023:  FVC 3.60 (80%)  FEV1 1.26 (36%)  FEV1/FVC 0.35  No significant bronchodilator response  RV 4.80 (187%)  TLC 8.29 (109%)  DLCOc 70%  Most inspiratory efforts lead to flattening of the inspiratory limb; one does not.    Time spent on chart " and image review, meeting with the patient to obtain history, perform physical exam, discuss test results, diagnostic possibilities, further testing options, treatment plan options, and care coordination: 34 minutes

## 2023-11-27 NOTE — PATIENT INSTRUCTIONS
It was good to see you in clinic today. This is what we discussed:    I am glad you are feeling better.  We will get you into pulmonary rehabilitation at [facility].  We will repeat lung function tests and I will contact you with results.  Continue the Nucala injections.  Continue Breo one inhalation daily. Rinse, gargle, and spit water after use.  Continue Spiriva. Inhale the contents of one capsule daily.  You have the appointment with the new rheumatologist on December 6.  I will see you in 6 months.  Contact me with questions or concerns.    Jose Biswas MD  Pulmonary and Critical Care Medicine  [facility]  Office [phone]

## 2023-11-30 ENCOUNTER — PRE VISIT (OUTPATIENT)
Dept: NEPHROLOGY | Facility: CLINIC | Age: 63
End: 2023-11-30

## 2023-11-30 ENCOUNTER — LAB (OUTPATIENT)
Dept: LAB | Facility: CLINIC | Age: 63
End: 2023-11-30
Attending: INTERNAL MEDICINE
Payer: COMMERCIAL

## 2023-11-30 ENCOUNTER — OFFICE VISIT (OUTPATIENT)
Dept: NEPHROLOGY | Facility: CLINIC | Age: 63
End: 2023-11-30
Attending: INTERNAL MEDICINE
Payer: COMMERCIAL

## 2023-11-30 VITALS
HEART RATE: 75 BPM | WEIGHT: 189 LBS | BODY MASS INDEX: 25.05 KG/M2 | SYSTOLIC BLOOD PRESSURE: 139 MMHG | OXYGEN SATURATION: 97 % | HEIGHT: 73 IN | DIASTOLIC BLOOD PRESSURE: 69 MMHG

## 2023-11-30 DIAGNOSIS — N18.31 TYPE 2 DIABETES MELLITUS WITH STAGE 3A CHRONIC KIDNEY DISEASE, WITHOUT LONG-TERM CURRENT USE OF INSULIN (H): ICD-10-CM

## 2023-11-30 DIAGNOSIS — M30.1 EOSINOPHILIC GRANULOMATOSIS WITH POLYANGIITIS (EGPA) (H): ICD-10-CM

## 2023-11-30 DIAGNOSIS — E11.22 TYPE 2 DIABETES MELLITUS WITH STAGE 3A CHRONIC KIDNEY DISEASE, WITHOUT LONG-TERM CURRENT USE OF INSULIN (H): ICD-10-CM

## 2023-11-30 DIAGNOSIS — D50.9 IRON DEFICIENCY ANEMIA, UNSPECIFIED IRON DEFICIENCY ANEMIA TYPE: Primary | ICD-10-CM

## 2023-11-30 DIAGNOSIS — D72.18 EOSINOPHILIC GRANULOMATOSIS WITH POLYANGIITIS (EGPA) (H): ICD-10-CM

## 2023-11-30 LAB
ALBUMIN MFR UR ELPH: 29.6 MG/DL
ALBUMIN SERPL BCG-MCNC: 4.2 G/DL (ref 3.5–5.2)
ALBUMIN UR-MCNC: 20 MG/DL
ANION GAP SERPL CALCULATED.3IONS-SCNC: 11 MMOL/L (ref 7–15)
APPEARANCE UR: CLEAR
BASOPHILS # BLD AUTO: 0.1 10E3/UL (ref 0–0.2)
BASOPHILS NFR BLD AUTO: 1 %
BILIRUB UR QL STRIP: NEGATIVE
BUN SERPL-MCNC: 24.5 MG/DL (ref 8–23)
CALCIUM SERPL-MCNC: 9.9 MG/DL (ref 8.8–10.2)
CHLORIDE SERPL-SCNC: 101 MMOL/L (ref 98–107)
COLOR UR AUTO: ABNORMAL
CREAT SERPL-MCNC: 1.64 MG/DL (ref 0.67–1.17)
CREAT UR-MCNC: 137 MG/DL
CYSTATIN C (ROCHE): 1.9 MG/L (ref 0.6–1)
DEPRECATED HCO3 PLAS-SCNC: 28 MMOL/L (ref 22–29)
EGFRCR SERPLBLD CKD-EPI 2021: 46 ML/MIN/1.73M2
EOSINOPHIL # BLD AUTO: 0.1 10E3/UL (ref 0–0.7)
EOSINOPHIL NFR BLD AUTO: 1 %
ERYTHROCYTE [DISTWIDTH] IN BLOOD BY AUTOMATED COUNT: 16.9 % (ref 10–15)
GFR SERPL CREATININE-BSD FRML MDRD: 33 ML/MIN/1.73M2
GLUCOSE SERPL-MCNC: 130 MG/DL (ref 70–99)
GLUCOSE UR STRIP-MCNC: NEGATIVE MG/DL
HCT VFR BLD AUTO: 35.4 % (ref 40–53)
HGB BLD-MCNC: 11 G/DL (ref 13.3–17.7)
HGB UR QL STRIP: NEGATIVE
IMM GRANULOCYTES # BLD: 0 10E3/UL
IMM GRANULOCYTES NFR BLD: 1 %
KETONES UR STRIP-MCNC: NEGATIVE MG/DL
LEUKOCYTE ESTERASE UR QL STRIP: ABNORMAL
LYMPHOCYTES # BLD AUTO: 2.1 10E3/UL (ref 0.8–5.3)
LYMPHOCYTES NFR BLD AUTO: 30 %
MCH RBC QN AUTO: 24.1 PG (ref 26.5–33)
MCHC RBC AUTO-ENTMCNC: 31.1 G/DL (ref 31.5–36.5)
MCV RBC AUTO: 78 FL (ref 78–100)
MONOCYTES # BLD AUTO: 0.7 10E3/UL (ref 0–1.3)
MONOCYTES NFR BLD AUTO: 9 %
MUCOUS THREADS #/AREA URNS LPF: PRESENT /LPF
NEUTROPHILS # BLD AUTO: 4.2 10E3/UL (ref 1.6–8.3)
NEUTROPHILS NFR BLD AUTO: 58 %
NITRATE UR QL: NEGATIVE
NRBC # BLD AUTO: 0 10E3/UL
NRBC BLD AUTO-RTO: 0 /100
PH UR STRIP: 5.5 [PH] (ref 5–7)
PHOSPHATE SERPL-MCNC: 3.7 MG/DL (ref 2.5–4.5)
PLATELET # BLD AUTO: 374 10E3/UL (ref 150–450)
POTASSIUM SERPL-SCNC: 4.5 MMOL/L (ref 3.4–5.3)
PROT/CREAT 24H UR: 0.22 MG/MG CR (ref 0–0.2)
RBC # BLD AUTO: 4.57 10E6/UL (ref 4.4–5.9)
RBC URINE: 2 /HPF
SODIUM SERPL-SCNC: 140 MMOL/L (ref 135–145)
SP GR UR STRIP: 1.01 (ref 1–1.03)
SQUAMOUS EPITHELIAL: 1 /HPF
UROBILINOGEN UR STRIP-MCNC: NORMAL MG/DL
WBC # BLD AUTO: 7.2 10E3/UL (ref 4–11)
WBC URINE: 3 /HPF

## 2023-11-30 PROCEDURE — 99204 OFFICE O/P NEW MOD 45 MIN: CPT | Performed by: INTERNAL MEDICINE

## 2023-11-30 PROCEDURE — 99213 OFFICE O/P EST LOW 20 MIN: CPT | Performed by: INTERNAL MEDICINE

## 2023-11-30 PROCEDURE — 82610 CYSTATIN C: CPT | Performed by: INTERNAL MEDICINE

## 2023-11-30 PROCEDURE — 83876 ASSAY MYELOPEROXIDASE: CPT | Performed by: INTERNAL MEDICINE

## 2023-11-30 PROCEDURE — 81001 URINALYSIS AUTO W/SCOPE: CPT | Performed by: PATHOLOGY

## 2023-11-30 PROCEDURE — 85025 COMPLETE CBC W/AUTO DIFF WBC: CPT | Performed by: PATHOLOGY

## 2023-11-30 PROCEDURE — 80069 RENAL FUNCTION PANEL: CPT | Performed by: PATHOLOGY

## 2023-11-30 PROCEDURE — 36415 COLL VENOUS BLD VENIPUNCTURE: CPT | Performed by: PATHOLOGY

## 2023-11-30 PROCEDURE — 99000 SPECIMEN HANDLING OFFICE-LAB: CPT | Performed by: PATHOLOGY

## 2023-11-30 PROCEDURE — 84156 ASSAY OF PROTEIN URINE: CPT | Performed by: PATHOLOGY

## 2023-11-30 ASSESSMENT — PAIN SCALES - GENERAL: PAINLEVEL: NO PAIN (0)

## 2023-11-30 NOTE — LETTER
11/30/2023       RE: Ned Moore  [address]  [place] [address]     Dear Colleague,    Thank you for referring your patient, Ned Moore, to the [facility]PHROLOG[facility] [place] [facility]. Please see a copy of my visit note below.    Nephrology Clinic    Ned Moore MRN:[id number] YOB: 1960  Date of Service: 11/30/2023  Primary care provider: Mark Briggs  Requesting physician: Mark Briggs   Pulmonologist: Dr Biswas at [facility]  Cardiologist: Dr Efren Farias       REASON FOR CONSULT: establish care for EGPA at [facility]    HISTORY OF PRESENT ILLNESS:   Ned Moore is a 63 year old male who presents establish care for EGPA at [facility] and have all of his care within the same system.  Mr Moore's history of present illness started in Dec 2022, at which time he developed a new onset of respiratory illness consistent with asthma with wheezing and HAYNES.  He had never had any respiratory disease prior to that, and has never smoked.  He was treated with a short course of prednisone with marked improvement , but the symptoms kept recurring after the prednisone was discontinued.  Inhalers did not seem to keep the asthma under control.   He was able to work only on and off in the winter for 2023  In May 2023, he suffered from a severe epistaxis episode (requiring blood transfusion, and associated with hypotension) that prompted him to go to the ED.  He was admitted to [facility] and had a prolonged hospitalization during which he had , where he was initially found to have severe eosinophilia, hyponatremia and troponin elevation,  myocarditis, multifocal acute strokes, liver lesions concerning for abscesses, highest suspicion for EPGA. He underwent an extensive work up with cardiology, rheumatology, GI, neurology infectious disease, pulmonary and hematology. 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 Marked improvment with steroids and meprolizumab (5/26).   - completed 3 days 500 mg solumedrol; decreased to 60 mg prednisone started 5/25.  Eosinophilic myocarditis confirmed with biopsy. Presenting with ST depressions and elevated troponin. Cardiology evaluation, elevated troponin more consistent with demand ischemia. TTE w/o WMA. Cardiac MRI obtained with findings consistent with myocarditis.     With respect to multifocal acute cerebral infarcts, stroke code was called on 05/20 with acute gaze deviation, worsening right-sided weakness, seen by Neuro Critical Care, felt possibly secondary to an episode of orthostatic hypotension.   He describes amnesia related to the events.  He experienced slurred speech, weakness of the R arm and leg, and left lower quadrantanopsia.  He was transferred to rehab on 6/8/2023 until June 30.   In rehab and since then, he reports regaining significant amount of strength in the right arm/.  The R leg is also improved, but remains weak.  He is now able to drive, walk without a walker, but still appears to have RLExt proximal muscle weakness.  He remains on mepolizumab monthly.  He was started on Azathioprine on 9/8/23 -> but this resulted in recurrent episodes of sever N/V and associated dehydration until he stopped the azathioprine.    Today, Mr Moore reports feeling generally well.   He denies SOB, HAYNES, Cough.  No CP.   Denies GI or  symptoms.  Has nocturia 1x/night  No melena or hematochezia.  No gross hematuria  Reports nasal congestion on the R side , without purulent nasal discharge.  Residual R leg weakness.         PAST MEDICAL HISTORY:  Past Medical History:   Diagnosis Date    Chronic obstructive pulmonary disease, unspecified COPD type (H) 3/15/2023    Chronic systolic heart failure (H) 11/15/2023    Diabetes (H)     History of CVA (cerebrovascular accident) 7/20/2023   Diabetes diagnosed about 8 years ago, but this was already associated  with L ext neuropathy suggesting that onset of disease was probably a decade before. He was previously on empagliflozin from 2023 until Sept/Oct 2023, after a UTI requiring antibiotic treatment.     ?PSVT during hospitalization     He never had a colonoscopy, but has been tested with cologuard -> reportedly neg.       PAST SURGICAL HISTORY:  No past surgical history on file.  MEDICATIONS:  Prescription Medications as of 2023         Rx Number Disp Refills Start End Last Dispensed Date Next Fill Date Owning Pharmacy    ACCU-CHEK FASTCLIX LANCET DRUM  300 each 3 2021        Sig: [ACCU-CHEK FASTCLIX LANCET DRUM] TEST BLOOD SUGARS 3 TIMES DAILY    albuterol (PROAIR HFA/PROVENTIL HFA/VENTOLIN HFA) 108 (90 Base) MCG/ACT inhaler  8.5 g 1 2023    [id number] - [place], [place] - [address]    Sig: INHALE 2 PUFFS INTO THE LUNGS EVERY 6 HOURS    Class: E-Prescribe    Notes to Pharmacy: Pharmacy may dispense brand covered by insurance (Proair, or proventil or ventolin or generic albuterol inhaler)    Route: Inhalation    Renewals       Renewal provider: Mack Hamlin MD            ASPIRIN LOW DOSE 81 MG chewable tablet    2023        Sig: Take 81 mg by mouth daily    Class: Historical    Route: Oral    atorvastatin (LIPITOR) 20 MG tablet  30 tablet 2 10/17/2023    [id number] - [place], [place] - [address]    Sig: TAKE 1 TABLET BY MOUTH EVERY DAY IN THE EVENING    Class: E-Prescribe    BD ALEXANDRA U/F 32G X 4 MM insulin pen needle    2023        Class: Historical    blood glucose test (ACCU-CHEK SMARTVIEW TEST STRIP) strips  200 strip 3 6/10/2020        Sig: [BLOOD GLUCOSE TEST (ACCU-CHEK SMARTVIEW TEST STRIP) STRIPS] USE TO CHECK BLOOD SUGARS TWICE DAILY.    Continuous Blood Gluc Sensor (FREESTYLE LIVIA 2 SENSOR) MISC  2 each 11 2022    [id number] - [place], [place] - [address]    Si each every 14 days    Class: E-Prescribe    Notes to  Pharmacy: Please cancel the Agustín 3 sensor prescription. Patient has the agustín 2 sensor and compatible phone africa.    Route: Does not apply    Dulaglutide (TRULICITY) 3 MG/0.5ML SOPN  2 mL 0 11/6/2023    [facility] - [place], [place] - [address]    Sig: Inject 3 mg Subcutaneous once a week    Class: E-Prescribe    Route: Subcutaneous    fluticasone-vilanterol (BREO ELLIPTA) 100-25 MCG/ACT inhaler  60 each 6 11/6/2023    [id number] - [place], [place] - [address]    Sig: Inhale 1 puff into the lungs daily    Class: E-Prescribe    Route: Inhalation    gabapentin (NEURONTIN) 300 MG capsule    6/29/2023 6/28/2024       Sig: Take 300 mg by mouth At Bedtime    Class: Historical    Route: Oral    insulin glargine (LANTUS PEN) 100 UNIT/ML pen  15 mL 4 9/5/2023    [id number] - [place], [place] - [address]    Sig: Inject 15 Units Subcutaneous At Bedtime    Class: E-Prescribe    Notes to Pharmacy: If Lantus is not covered by insurance, may substitute Basaglar or Semglee or other insulin glargine product per insurance preference at same dose and frequency.      Route: Subcutaneous    melatonin 3 MG tablet    6/30/2023        Sig: Take 9 mg by mouth nightly as needed for sleep    Class: Historical    Route: Oral    Mepolizumab 100 MG/ML SOSY  3 mL 11 6/28/2023    [facility] - [place], [place] - [address]    Sig: Inject 3 mLs (300 mg) Subcutaneous every 30 days    Class: E-Prescribe    Route: Subcutaneous    metFORMIN (GLUCOPHAGE XR) 500 MG 24 hr tablet  360 tablet 3 11/6/2023    [id number] - [place], [place] - [address]    Sig: Take 1 tablet (500 mg) by mouth daily (with dinner)    Class: E-Prescribe    Route: Oral    metFORMIN (GLUCOPHAGE) 1000 MG tablet  90 tablet 6 11/6/2023    [id number] - [place], [place] - [address]    Sig: Take 1 tablet (1,000 mg) by mouth daily (with breakfast)    Class: E-Prescribe     Route: Oral    metoprolol succinate ER (TOPROL XL) 25 MG 24 hr tablet  45 tablet 6 11/6/2023    [id number] - [place], [place] - [address]    Sig: Take 0.5 tablets (12.5 mg) by mouth every morning    Class: E-Prescribe    Route: Oral    promethazine (PHENERGAN) 25 MG tablet  15 tablet 0 10/26/2023    NOT TAKING    Sig: Take 1 tablet (25 mg) by mouth every 8 hours as needed for nausea    Class: Local Print    Route: Oral    tiotropium (SPIRIVA) 18 MCG inhaled capsule    6/29/2023 6/28/2024       Sig: Inhale 18 mcg into the lungs daily    Class: Historical    Route: Inhalation    traZODone (DESYREL) 50 MG tablet    6/29/2023 6/28/2024       Sig: Take 50 mg by mouth daily as needed for sleep    Class: Historical    Route: Oral           ALLERGIES:    Allergies   Allergen Reactions    Azathioprine Nausea and Vomiting     REVIEW OF SYSTEMS:  A comprehensive review of systems was performed and found to be negative except as described here or above.  SOCIAL HISTORY:   Social History     Socioeconomic History    Marital status:      Spouse name: Not on file    Number of children: Not on file    Years of education: Not on file    Highest education level: Not on file   Occupational History    Not on file   Tobacco Use    Smoking status: Never    Smokeless tobacco: Never   Vaping Use    Vaping Use: Never used   Substance and Sexual Activity    Alcohol use: Yes     Comment: a beer or wine every few months    Drug use: Never    Sexual activity: Not Currently     Partners: Female   Other Topics Concern    Parent/sibling w/ CABG, MI or angioplasty before 65F 55M? No   Social History Narrative    Not on file     Social Determinants of Health     Financial Resource Strain: Low Risk  (11/14/2023)    Financial Resource Strain     Within the past 12 months, have you or your family members you live with been unable to get utilities (heat, electricity) when it was really needed?: No   Food Insecurity: High  "Risk (11/14/2023)    Food Insecurity     Within the past 12 months, did you worry that your food would run out before you got money to buy more?: Yes     Within the past 12 months, did the food you bought just not last and you didn t have money to get more?: No   Transportation Needs: Low Risk  (11/14/2023)    Transportation Needs     Within the past 12 months, has lack of transportation kept you from medical appointments, getting your medicines, non-medical meetings or appointments, work, or from getting things that you need?: No   Physical Activity: Not on file   Stress: Not on file   Social Connections: Not on file   Interpersonal Safety: Not on file   Housing Stability: High Risk (11/14/2023)    Housing Stability     Do you have housing? : Yes     Are you worried about losing your housing?: Yes     FAMILY MEDICAL HISTORY:   Family History   Problem Relation Age of Onset    Heart Disease Mother     Diabetes Mother     Asthma Mother     Alzheimer Disease Father     No Known Problems Brother      PHYSICAL EXAM:   /69   Pulse 75   Ht 1.854 m (6' 1\")   Wt 85.7 kg (189 lb)   SpO2 97%   BMI 24.94 kg/m    GENERAL APPEARANCE: alert and no distress  EYES: nonicteric  HENT: mouth without ulcers or lesions.  Anterior nares without crusting or blood  NECK: supple, no adenopathy  Carotids 2+ bilat without bruits  RESP: lungs clear to auscultation   CV: regular rhythm, normal rate, no rub  ABDOMEN: soft, nontender, normal bowel sounds, no HSM   Extremities: trace-1+ ankle and distal shin edema on L.  None on R.  MS: no evidence of inflammation in joints, no muscle tenderness  SKIN: no rash  NEURO: mentation intact and speech normal.  R Leg proximal weakness  PSYCH: affect normal/bright   LABS:   Recent Results (from the past 672 hour(s))   Hemoglobin A1c    Collection Time: 11/15/23  4:08 PM   Result Value Ref Range    Hemoglobin A1C 8.0 (H) 0.0 - 5.6 %   Basic metabolic panel    Collection Time: 11/15/23  4:08 PM "   Result Value Ref Range    Sodium 138 135 - 145 mmol/L    Potassium 4.4 3.4 - 5.3 mmol/L    Chloride 101 98 - 107 mmol/L    Carbon Dioxide (CO2) 25 22 - 29 mmol/L    Anion Gap 12 7 - 15 mmol/L    Urea Nitrogen 18.6 8.0 - 23.0 mg/dL    Creatinine 1.55 (H) 0.67 - 1.17 mg/dL    GFR Estimate 50 (L) >60 mL/min/1.73m2    Calcium 8.9 8.8 - 10.2 mg/dL    Glucose 188 (H) 70 - 99 mg/dL   Ferritin    Collection Time: 11/15/23  4:08 PM   Result Value Ref Range    Ferritin 71 31 - 409 ng/mL   Iron and iron binding capacity    Collection Time: 11/15/23  4:08 PM   Result Value Ref Range    Iron 16 (L) 61 - 157 ug/dL    Iron Binding Capacity 227 (L) 240 - 430 ug/dL    Iron Sat Index 7 (L) 15 - 46 %   TSH with free T4 reflex    Collection Time: 11/15/23  4:08 PM   Result Value Ref Range    TSH 0.43 0.30 - 4.20 uIU/mL   CBC with platelets and differential    Collection Time: 11/15/23  4:08 PM   Result Value Ref Range    WBC Count 8.9 4.0 - 11.0 10e3/uL    RBC Count 4.19 (L) 4.40 - 5.90 10e6/uL    Hemoglobin 9.9 (L) 13.3 - 17.7 g/dL    Hematocrit 32.7 (L) 40.0 - 53.0 %    MCV 78 78 - 100 fL    MCH 23.6 (L) 26.5 - 33.0 pg    MCHC 30.3 (L) 31.5 - 36.5 g/dL    RDW 16.1 (H) 10.0 - 15.0 %    Platelet Count 487 (H) 150 - 450 10e3/uL    % Neutrophils 73 %    % Lymphocytes 16 %    % Monocytes 10 %    % Eosinophils 1 %    % Basophils 0 %    % Immature Granulocytes 0 %    Absolute Neutrophils 6.5 1.6 - 8.3 10e3/uL    Absolute Lymphocytes 1.5 0.8 - 5.3 10e3/uL    Absolute Monocytes 0.9 0.0 - 1.3 10e3/uL    Absolute Eosinophils 0.1 0.0 - 0.7 10e3/uL    Absolute Basophils 0.0 0.0 - 0.2 10e3/uL    Absolute Immature Granulocytes 0.0 <=0.4 10e3/uL   Total Protein, Serum for ELP    Collection Time: 11/15/23  4:08 PM   Result Value Ref Range    Total Protein Serum for ELP 6.9 6.4 - 8.3 g/dL   Protein Electrophoresis, Serum    Collection Time: 11/15/23  4:08 PM   Result Value Ref Range    Albumin 3.2 (L) 3.7 - 5.1 g/dL    Alpha 1 0.5 (H) 0.2 - 0.4 g/dL     Alpha 2 1.3 (H) 0.5 - 0.9 g/dL    Beta Globulin 0.9 0.6 - 1.0 g/dL    Gamma Globulin 1.0 0.7 - 1.6 g/dL    Monoclonal Peak 0.0 <=0.0 g/dL    ELP Interpretation       Hypoalbuminemia with increased alpha 1 and alpha 2 globulins, suggestive of acute phase reaction. No obvious monoclonal protein seen. Pathologic significance requires clinical correlation. Mariah Stokes MD   Albumin Random Urine Quantitative with Creat Ratio    Collection Time: 11/15/23  4:24 PM   Result Value Ref Range    Creatinine Urine mg/dL 181.0 mg/dL    Albumin Urine mg/L 70.7 mg/L    Albumin Urine mg/g Cr 39.06 (H) 0.00 - 17.00 mg/g Cr   Renal panel    Collection Time: 11/30/23 12:09 PM   Result Value Ref Range    Sodium 140 135 - 145 mmol/L    Potassium 4.5 3.4 - 5.3 mmol/L    Chloride 101 98 - 107 mmol/L    Carbon Dioxide (CO2) 28 22 - 29 mmol/L    Anion Gap 11 7 - 15 mmol/L    Glucose 130 (H) 70 - 99 mg/dL    Urea Nitrogen 24.5 (H) 8.0 - 23.0 mg/dL    Creatinine 1.64 (H) 0.67 - 1.17 mg/dL    GFR Estimate 46 (L) >60 mL/min/1.73m2    Calcium 9.9 8.8 - 10.2 mg/dL    Albumin 4.2 3.5 - 5.2 g/dL    Phosphorus 3.7 2.5 - 4.5 mg/dL   Cystatin C with GFR    Collection Time: 11/30/23 12:09 PM   Result Value Ref Range    Cystatin C 1.9 (H) 0.6 - 1.0 mg/L    GFR Calculated with Cystatin C 33 (L) >=60 mL/min/1.73m2   CBC with platelets and differential    Collection Time: 11/30/23 12:09 PM   Result Value Ref Range    WBC Count 7.2 4.0 - 11.0 10e3/uL    RBC Count 4.57 4.40 - 5.90 10e6/uL    Hemoglobin 11.0 (L) 13.3 - 17.7 g/dL    Hematocrit 35.4 (L) 40.0 - 53.0 %    MCV 78 78 - 100 fL    MCH 24.1 (L) 26.5 - 33.0 pg    MCHC 31.1 (L) 31.5 - 36.5 g/dL    RDW 16.9 (H) 10.0 - 15.0 %    Platelet Count 374 150 - 450 10e3/uL    % Neutrophils 58 %    % Lymphocytes 30 %    % Monocytes 9 %    % Eosinophils 1 %    % Basophils 1 %    % Immature Granulocytes 1 %    NRBCs per 100 WBC 0 <1 /100    Absolute Neutrophils 4.2 1.6 - 8.3 10e3/uL    Absolute Lymphocytes  2.1 0.8 - 5.3 10e3/uL    Absolute Monocytes 0.7 0.0 - 1.3 10e3/uL    Absolute Eosinophils 0.1 0.0 - 0.7 10e3/uL    Absolute Basophils 0.1 0.0 - 0.2 10e3/uL    Absolute Immature Granulocytes 0.0 <=0.4 10e3/uL    Absolute NRBCs 0.0 10e3/uL   Protein  random urine    Collection Time: 11/30/23 12:13 PM   Result Value Ref Range    Total Protein Urine mg/dL 29.6   mg/dL    Total Protein Urine mg/mg Creat 0.22 (H) 0.00 - 0.20 mg/mg Cr    Creatinine Urine mg/dL 137.0 mg/dL   Routine UA with micro reflex to culture    Collection Time: 11/30/23 12:13 PM    Specimen: Urine, Midstream   Result Value Ref Range    Color Urine Light Yellow Colorless, Straw, Light Yellow, Yellow    Appearance Urine Clear Clear    Glucose Urine Negative Negative mg/dL    Bilirubin Urine Negative Negative    Ketones Urine Negative Negative mg/dL    Specific Gravity Urine 1.015 1.003 - 1.035    Blood Urine Negative Negative    pH Urine 5.5 5.0 - 7.0    Protein Albumin Urine 20 (A) Negative mg/dL    Urobilinogen Urine Normal Normal, 2.0 mg/dL    Nitrite Urine Negative Negative    Leukocyte Esterase Urine Trace (A) Negative    Mucus Urine Present (A) None Seen /LPF    RBC Urine 2 <=2 /HPF    WBC Urine 3 <=5 /HPF    Squamous Epithelials Urine 1 <=1 /HPF     CMP  Recent Labs   Lab Test 11/30/23  1209 11/15/23  1608 10/26/23  1149 10/26/23  1147 04/26/23  0824 02/07/23  0915 07/05/22  0933 03/21/22  1112 03/21/22  1112 08/23/21  1152 01/27/21  1047 10/30/19  0923 07/29/19  0851 04/15/19  1126    138  --  133* 139  --  140  --  140   < > 140 139 138 138   POTASSIUM 4.5 4.4  --  4.6 4.9  --  4.6   < > 4.5   < > 4.5 4.3 4.6 4.4   CHLORIDE 101 101  --  94* 99  --  103   < > 102   < > 102 102 102 100   CO2 28 25  --  22 27  --  25   < > 25   < > 27 29 24 28   ANIONGAP 11 12  --  17* 13  --  12   < > 13   < > 11 8 12 10   * 188* 247* 255* 173*  --  103*   < > 193*   < > 118 139* 152* 163*   BUN 24.5* 18.6  --  24.9* 22.5  --  23.2*   < > 18   < >  24* 21 24* 21   CR 1.64* 1.55*  --  1.65* 1.35*   < > 1.37*  --  1.51*   < > 1.41* 1.39* 1.37* 1.44*   GFRESTIMATED 46* 50*  --  46* 59*   < > 58*  --  52*   < > 51* 52* 53* 50*   GFRESTBLACK  --   --   --   --   --   --   --   --   --   --  >60 >60 >60 >60   ANAI 9.9 8.9  --  9.9 9.4  --  9.5  --  9.9   < > 9.4 9.6 9.7 10.0   PHOS 3.7  --   --   --   --   --  3.9  --   --   --   --   --   --   --    PROTTOTAL  --   --   --  7.9 7.2  --   --   --  7.1  --  7.6  --   --  7.5   ALBUMIN 4.2  --   --  4.2 4.1  --  4.2  --  3.9  --  4.6  --   --  4.5   BILITOTAL  --   --   --  0.3 0.3  --   --   --  0.5  --  0.7  --   --  0.6   ALKPHOS  --   --   --  246* 103  --   --   --  98  --  75  --   --  75   AST  --   --   --  20 24  --   --   --  17  --  22  --   --  29   ALT  --   --   --  22 23  --   --   --  19  --  31  --   --  38    < > = values in this interval not displayed.     CBC  Recent Labs   Lab Test 11/30/23  1209 11/15/23  1608 10/26/23  1147 04/26/23  0824 01/30/23  1139 03/21/22  1112   HGB 11.0* 9.9* 11.2* 15.4   < > 16.7   WBC 7.2 8.9 13.1*  --   --  7.9   RBC 4.57 4.19* 4.69  --   --  5.81   HCT 35.4* 32.7* 36.8*  --   --  50.7   MCV 78 78 79  --   --  87    487* 753*  --   --  216    < > = values in this interval not displayed.     INRNo lab results found.  ABGNo lab results found.   URINE STUDIES  Recent Labs   Lab Test 11/30/23  1213 10/26/23  1212   APPEARANCE Clear Clear   URINEGLC Negative >1000*   URINEBILI Negative Negative   URINEKETONE Negative Negative   SG 1.015 1.022   UBLD Negative 0.03 mg/dL*   URINEPH 5.5 5.5   PROTEIN 20* 70*   NITRITE Negative Negative   LEUKEST Trace* 250 Kathleen/uL*   RBCU 2 3*   WBCU 3 7*     No lab results found.    ASSESSMENT AND PLAN:   Mr Moore is seen to establish EGPA care  at the [facility].  He has had a complex course since Dec 2022, and a severe complicated illness starting in May 2023.  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 He was treated successfully with high dose corticosteroids and mepolizumab -> with excellent control of his pulmonary symptoms. His ANCA test has been negative (only about 50% of EGPA is ANCA pos).    He presents today with no acute complaints.  His BP in under good control, on modest dose of metoprolol (which I suspect was prescribed for ?PSVT rather than for HTN.  He has chronic kidney disease which antedates the acute illness in May - raising the question as to the relative contributor of diabetes (with modest albuminuria) vs EGPA.  Depending on the presence/progression of proteinuria and hematuria, he may benefit from a diagnostic kidney biopsy.  If there is evidence of glomerulonephritis (in addition to DKD) despite the current therapy with mepolizumab -> this would be an indication for additional immunosuppression.    Because of the loss of muscle mass, his Cr OVERestimates GFR, and his eGFRcys is significantly lower at 33 ml/min/1.73m2 vs an eGFRcr of 46    The CVA lesions resulting in visual and motor deficits appear to have been the result of the hypotensive episode with multiple watershed infarcts.      Anemia with Fe deficiency.  I recommended that Mr Moore undergo a colonoscopy.  He has previously been reluctant to having one. I have referred him for a GI consultation.  He would benefit for IF Fe infusion per his PCP.    I will plan for seeing him again in about 6 weeks for follow up      The total time of this encounter amounted to 118 minutes. This time included time spent with the patient, reviewing records, ordering tests, and performing post visit documentation.       Josue Tripp MD  Division of Renal Disease and Hypertension  November 30, 2023

## 2023-11-30 NOTE — NURSING NOTE
"[facility] Program resource reviewed and contact information given to patient and daughter, Domitila.  Holly Kirkland RN, BSN, PHN  [facility]cul[facility]upus [facility] Nephrology Nurse Navigator  [phone]    Chief Complaint   Patient presents with    Consult     Vital signs:      BP: 139/69 Pulse: 75     SpO2: 97 %     Height: 185.4 cm (6' 1\") (pt reported) Weight: 85.7 kg (189 lb)  Estimated body mass index is 24.94 kg/m  as calculated from the following:    Height as of this encounter: 1.854 m (6' 1\").    Weight as of this encounter: 85.7 kg (189 lb).      Lisbet Newberry, [facility]   11/30/2023 10:03 AM    "

## 2023-11-30 NOTE — PROGRESS NOTES
Nephrology Clinic    Ned Moore MRN:[id number] YOB: 1960  Date of Service: 11/30/2023  Primary care provider: Mark Briggs  Requesting physician: Mark Briggs   Pulmonologist: Dr Biswas at [facility]  Cardiologist: Dr Efren Farias       REASON FOR CONSULT: establish care for EGPA at [facility]    HISTORY OF PRESENT ILLNESS:   Ned Moore is a 63 year old male who presents establish care for EGPA at [facility] and have all of his care within the same system.  Mr Moore's history of present illness started in Dec 2022, at which time he developed a new onset of respiratory illness consistent with asthma with wheezing and HAYNES.  He had never had any respiratory disease prior to that, and has never smoked.  He was treated with a short course of prednisone with marked improvement , but the symptoms kept recurring after the prednisone was discontinued.  Inhalers did not seem to keep the asthma under control.   He was able to work only on and off in the winter for 2023  In May 2023, he suffered from a severe epistaxis episode (requiring blood transfusion, and associated with hypotension) that prompted him to go to the ED.  He was admitted to [facility] and had a prolonged hospitalization during which he had , where he was initially found to have severe eosinophilia, hyponatremia and troponin elevation,  myocarditis, multifocal acute strokes, liver lesions concerning for abscesses, highest suspicion for EPGA. He underwent an extensive work up with cardiology, rheumatology, GI, neurology infectious disease, pulmonary and hematology. 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 Marked improvment with steroids and meprolizumab (5/26).   - completed 3 days 500 mg solumedrol; decreased to 60 mg prednisone started 5/25.  Eosinophilic myocarditis confirmed with biopsy. Presenting with ST depressions and elevated troponin. Cardiology evaluation, elevated troponin more  consistent with demand ischemia. TTE w/o WMA. Cardiac MRI obtained with findings consistent with myocarditis.     With respect to multifocal acute cerebral infarcts, stroke code was called on 05/20 with acute gaze deviation, worsening right-sided weakness, seen by Neuro Critical Care, felt possibly secondary to an episode of orthostatic hypotension.   He describes amnesia related to the events.  He experienced slurred speech, weakness of the R arm and leg, and left lower quadrantanopsia.  He was transferred to rehab on 6/8/2023 until June 30.   In rehab and since then, he reports regaining significant amount of strength in the right arm/.  The R leg is also improved, but remains weak.  He is now able to drive, walk without a walker, but still appears to have RLExt proximal muscle weakness.  He remains on mepolizumab monthly.  He was started on Azathioprine on 9/8/23 -> but this resulted in recurrent episodes of sever N/V and associated dehydration until he stopped the azathioprine.    Today, Mr Moore reports feeling generally well.   He denies SOB, HAYNES, Cough.  No CP.   Denies GI or  symptoms.  Has nocturia 1x/night  No melena or hematochezia.  No gross hematuria  Reports nasal congestion on the R side , without purulent nasal discharge.  Residual R leg weakness.         PAST MEDICAL HISTORY:  Past Medical History:   Diagnosis Date    Chronic obstructive pulmonary disease, unspecified COPD type (H) 3/15/2023    Chronic systolic heart failure (H) 11/15/2023    Diabetes (H)     History of CVA (cerebrovascular accident) 7/20/2023   Diabetes diagnosed about 8 years ago, but this was already associated with L ext neuropathy suggesting that onset of disease was probably a decade before. He was previously on empagliflozin from Feb 2023 until Sept/Oct 2023, after a UTI requiring antibiotic treatment.     ?PSVT during hospitalization     He never had a colonoscopy, but has been tested with cologuard -> reportedly neg.        PAST SURGICAL HISTORY:  No past surgical history on file.  MEDICATIONS:  Prescription Medications as of 2023         Rx Number Disp Refills Start End Last Dispensed Date Next Fill Date Owning Pharmacy    ACCU-CHEK FASTCLIX LANCET DRUM  300 each 3 2021        Sig: [ACCU-CHEK FASTCLIX LANCET DRUM] TEST BLOOD SUGARS 3 TIMES DAILY    albuterol (PROAIR HFA/PROVENTIL HFA/VENTOLIN HFA) 108 (90 Base) MCG/ACT inhaler  8.5 g 1 2023    [id number] - [place], [place] - [address]    Sig: INHALE 2 PUFFS INTO THE LUNGS EVERY 6 HOURS    Class: E-Prescribe    Notes to Pharmacy: Pharmacy may dispense brand covered by insurance (Proair, or proventil or ventolin or generic albuterol inhaler)    Route: Inhalation    Renewals       Renewal provider: Mack Hamlin MD            ASPIRIN LOW DOSE 81 MG chewable tablet    2023        Sig: Take 81 mg by mouth daily    Class: Historical    Route: Oral    atorvastatin (LIPITOR) 20 MG tablet  30 tablet 2 10/17/2023    [id number] - [place], [place] - [address]    Sig: TAKE 1 TABLET BY MOUTH EVERY DAY IN THE EVENING    Class: E-Prescribe    BD ALEXANDRA U/F 32G X 4 MM insulin pen needle    2023        Class: Historical    blood glucose test (ACCU-CHEK SMARTVIEW TEST STRIP) strips  200 strip 3 6/10/2020        Sig: [BLOOD GLUCOSE TEST (ACCU-CHEK SMARTVIEW TEST STRIP) STRIPS] USE TO CHECK BLOOD SUGARS TWICE DAILY.    Continuous Blood Gluc Sensor (FREESTYLE AGUSTÍN 2 SENSOR) MISC  2 each 11 2022    [id number] - [place], [place] - [address]    Si each every 14 days    Class: E-Prescribe    Notes to Pharmacy: Please cancel the Agustín 3 sensor prescription. Patient has the agustín 2 sensor and compatible phone africa.    Route: Does not apply    Dulaglutide (TRULICITY) 3 MG/0.5ML SOPN  2 mL 0 2023    [facility] - [place], [place] - [address]    Sig: Inject 3 mg Subcutaneous once a week    Class:  E-Prescribe    Route: Subcutaneous    fluticasone-vilanterol (BREO ELLIPTA) 100-25 MCG/ACT inhaler  60 each 6 11/6/2023    [id number] - [place], [place] - [address]    Sig: Inhale 1 puff into the lungs daily    Class: E-Prescribe    Route: Inhalation    gabapentin (NEURONTIN) 300 MG capsule    6/29/2023 6/28/2024       Sig: Take 300 mg by mouth At Bedtime    Class: Historical    Route: Oral    insulin glargine (LANTUS PEN) 100 UNIT/ML pen  15 mL 4 9/5/2023    [facility] - [place], [place] - [address]    Sig: Inject 15 Units Subcutaneous At Bedtime    Class: E-Prescribe    Notes to Pharmacy: If Lantus is not covered by insurance, may substitute Basaglar or Semglee or other insulin glargine product per insurance preference at same dose and frequency.      Route: Subcutaneous    melatonin 3 MG tablet    6/30/2023        Sig: Take 9 mg by mouth nightly as needed for sleep    Class: Historical    Route: Oral    Mepolizumab 100 MG/ML SOSY  3 mL 11 6/28/2023    [facility] - [place], [place] - [address] [place] [address] [facility]    Sig: Inject 3 mLs (300 mg) Subcutaneous every 30 days    Class: E-Prescribe    Route: Subcutaneous    metFORMIN (GLUCOPHAGE XR) 500 MG 24 hr tablet  360 tablet 3 11/6/2023    [id number] - [place], [place] - [address]    Sig: Take 1 tablet (500 mg) by mouth daily (with dinner)    Class: E-Prescribe    Route: Oral    metFORMIN (GLUCOPHAGE) 1000 MG tablet  90 tablet 6 11/6/2023    [facility] - [place], [place] - [address]    Sig: Take 1 tablet (1,000 mg) by mouth daily (with breakfast)    Class: E-Prescribe    Route: Oral    metoprolol succinate ER (TOPROL XL) 25 MG 24 hr tablet  45 tablet 6 11/6/2023    [facility] - [place], [place] - [address]    Sig: Take 0.5 tablets (12.5 mg) by mouth every morning    Class: E-Prescribe    Route: Oral    promethazine (PHENERGAN) 25 MG tablet  15 tablet 0 10/26/2023     NOT TAKING    Sig: Take 1 tablet (25 mg) by mouth every 8 hours as needed for nausea    Class: Local Print    Route: Oral    tiotropium (SPIRIVA) 18 MCG inhaled capsule    6/29/2023 6/28/2024       Sig: Inhale 18 mcg into the lungs daily    Class: Historical    Route: Inhalation    traZODone (DESYREL) 50 MG tablet    6/29/2023 6/28/2024       Sig: Take 50 mg by mouth daily as needed for sleep    Class: Historical    Route: Oral           ALLERGIES:    Allergies   Allergen Reactions    Azathioprine Nausea and Vomiting     REVIEW OF SYSTEMS:  A comprehensive review of systems was performed and found to be negative except as described here or above.  SOCIAL HISTORY:   Social History     Socioeconomic History    Marital status:      Spouse name: Not on file    Number of children: Not on file    Years of education: Not on file    Highest education level: Not on file   Occupational History    Not on file   Tobacco Use    Smoking status: Never    Smokeless tobacco: Never   Vaping Use    Vaping Use: Never used   Substance and Sexual Activity    Alcohol use: Yes     Comment: a beer or wine every few months    Drug use: Never    Sexual activity: Not Currently     Partners: Female   Other Topics Concern    Parent/sibling w/ CABG, MI or angioplasty before 65F 55M? No   Social History Narrative    Not on file     Social Determinants of Health     Financial Resource Strain: Low Risk  (11/14/2023)    Financial Resource Strain     Within the past 12 months, have you or your family members you live with been unable to get utilities (heat, electricity) when it was really needed?: No   Food Insecurity: High Risk (11/14/2023)    Food Insecurity     Within the past 12 months, did you worry that your food would run out before you got money to buy more?: Yes     Within the past 12 months, did the food you bought just not last and you didn t have money to get more?: No   Transportation Needs: Low Risk  (11/14/2023)     "Transportation Needs     Within the past 12 months, has lack of transportation kept you from medical appointments, getting your medicines, non-medical meetings or appointments, work, or from getting things that you need?: No   Physical Activity: Not on file   Stress: Not on file   Social Connections: Not on file   Interpersonal Safety: Not on file   Housing Stability: High Risk (11/14/2023)    Housing Stability     Do you have housing? : Yes     Are you worried about losing your housing?: Yes     FAMILY MEDICAL HISTORY:   Family History   Problem Relation Age of Onset    Heart Disease Mother     Diabetes Mother     Asthma Mother     Alzheimer Disease Father     No Known Problems Brother      PHYSICAL EXAM:   /69   Pulse 75   Ht 1.854 m (6' 1\")   Wt 85.7 kg (189 lb)   SpO2 97%   BMI 24.94 kg/m    GENERAL APPEARANCE: alert and no distress  EYES: nonicteric  HENT: mouth without ulcers or lesions.  Anterior nares without crusting or blood  NECK: supple, no adenopathy  Carotids 2+ bilat without bruits  RESP: lungs clear to auscultation   CV: regular rhythm, normal rate, no rub  ABDOMEN: soft, nontender, normal bowel sounds, no HSM   Extremities: trace-1+ ankle and distal shin edema on L.  None on R.  MS: no evidence of inflammation in joints, no muscle tenderness  SKIN: no rash  NEURO: mentation intact and speech normal.  R Leg proximal weakness  PSYCH: affect normal/bright   LABS:   Recent Results (from the past 672 hour(s))   Hemoglobin A1c    Collection Time: 11/15/23  4:08 PM   Result Value Ref Range    Hemoglobin A1C 8.0 (H) 0.0 - 5.6 %   Basic metabolic panel    Collection Time: 11/15/23  4:08 PM   Result Value Ref Range    Sodium 138 135 - 145 mmol/L    Potassium 4.4 3.4 - 5.3 mmol/L    Chloride 101 98 - 107 mmol/L    Carbon Dioxide (CO2) 25 22 - 29 mmol/L    Anion Gap 12 7 - 15 mmol/L    Urea Nitrogen 18.6 8.0 - 23.0 mg/dL    Creatinine 1.55 (H) 0.67 - 1.17 mg/dL    GFR Estimate 50 (L) >60 " mL/min/1.73m2    Calcium 8.9 8.8 - 10.2 mg/dL    Glucose 188 (H) 70 - 99 mg/dL   Ferritin    Collection Time: 11/15/23  4:08 PM   Result Value Ref Range    Ferritin 71 31 - 409 ng/mL   Iron and iron binding capacity    Collection Time: 11/15/23  4:08 PM   Result Value Ref Range    Iron 16 (L) 61 - 157 ug/dL    Iron Binding Capacity 227 (L) 240 - 430 ug/dL    Iron Sat Index 7 (L) 15 - 46 %   TSH with free T4 reflex    Collection Time: 11/15/23  4:08 PM   Result Value Ref Range    TSH 0.43 0.30 - 4.20 uIU/mL   CBC with platelets and differential    Collection Time: 11/15/23  4:08 PM   Result Value Ref Range    WBC Count 8.9 4.0 - 11.0 10e3/uL    RBC Count 4.19 (L) 4.40 - 5.90 10e6/uL    Hemoglobin 9.9 (L) 13.3 - 17.7 g/dL    Hematocrit 32.7 (L) 40.0 - 53.0 %    MCV 78 78 - 100 fL    MCH 23.6 (L) 26.5 - 33.0 pg    MCHC 30.3 (L) 31.5 - 36.5 g/dL    RDW 16.1 (H) 10.0 - 15.0 %    Platelet Count 487 (H) 150 - 450 10e3/uL    % Neutrophils 73 %    % Lymphocytes 16 %    % Monocytes 10 %    % Eosinophils 1 %    % Basophils 0 %    % Immature Granulocytes 0 %    Absolute Neutrophils 6.5 1.6 - 8.3 10e3/uL    Absolute Lymphocytes 1.5 0.8 - 5.3 10e3/uL    Absolute Monocytes 0.9 0.0 - 1.3 10e3/uL    Absolute Eosinophils 0.1 0.0 - 0.7 10e3/uL    Absolute Basophils 0.0 0.0 - 0.2 10e3/uL    Absolute Immature Granulocytes 0.0 <=0.4 10e3/uL   Total Protein, Serum for ELP    Collection Time: 11/15/23  4:08 PM   Result Value Ref Range    Total Protein Serum for ELP 6.9 6.4 - 8.3 g/dL   Protein Electrophoresis, Serum    Collection Time: 11/15/23  4:08 PM   Result Value Ref Range    Albumin 3.2 (L) 3.7 - 5.1 g/dL    Alpha 1 0.5 (H) 0.2 - 0.4 g/dL    Alpha 2 1.3 (H) 0.5 - 0.9 g/dL    Beta Globulin 0.9 0.6 - 1.0 g/dL    Gamma Globulin 1.0 0.7 - 1.6 g/dL    Monoclonal Peak 0.0 <=0.0 g/dL    ELP Interpretation       Hypoalbuminemia with increased alpha 1 and alpha 2 globulins, suggestive of acute phase reaction. No obvious monoclonal protein  seen. Pathologic significance requires clinical correlation. Mariah Stokes MD   Albumin Random Urine Quantitative with Creat Ratio    Collection Time: 11/15/23  4:24 PM   Result Value Ref Range    Creatinine Urine mg/dL 181.0 mg/dL    Albumin Urine mg/L 70.7 mg/L    Albumin Urine mg/g Cr 39.06 (H) 0.00 - 17.00 mg/g Cr   Renal panel    Collection Time: 11/30/23 12:09 PM   Result Value Ref Range    Sodium 140 135 - 145 mmol/L    Potassium 4.5 3.4 - 5.3 mmol/L    Chloride 101 98 - 107 mmol/L    Carbon Dioxide (CO2) 28 22 - 29 mmol/L    Anion Gap 11 7 - 15 mmol/L    Glucose 130 (H) 70 - 99 mg/dL    Urea Nitrogen 24.5 (H) 8.0 - 23.0 mg/dL    Creatinine 1.64 (H) 0.67 - 1.17 mg/dL    GFR Estimate 46 (L) >60 mL/min/1.73m2    Calcium 9.9 8.8 - 10.2 mg/dL    Albumin 4.2 3.5 - 5.2 g/dL    Phosphorus 3.7 2.5 - 4.5 mg/dL   Cystatin C with GFR    Collection Time: 11/30/23 12:09 PM   Result Value Ref Range    Cystatin C 1.9 (H) 0.6 - 1.0 mg/L    GFR Calculated with Cystatin C 33 (L) >=60 mL/min/1.73m2   CBC with platelets and differential    Collection Time: 11/30/23 12:09 PM   Result Value Ref Range    WBC Count 7.2 4.0 - 11.0 10e3/uL    RBC Count 4.57 4.40 - 5.90 10e6/uL    Hemoglobin 11.0 (L) 13.3 - 17.7 g/dL    Hematocrit 35.4 (L) 40.0 - 53.0 %    MCV 78 78 - 100 fL    MCH 24.1 (L) 26.5 - 33.0 pg    MCHC 31.1 (L) 31.5 - 36.5 g/dL    RDW 16.9 (H) 10.0 - 15.0 %    Platelet Count 374 150 - 450 10e3/uL    % Neutrophils 58 %    % Lymphocytes 30 %    % Monocytes 9 %    % Eosinophils 1 %    % Basophils 1 %    % Immature Granulocytes 1 %    NRBCs per 100 WBC 0 <1 /100    Absolute Neutrophils 4.2 1.6 - 8.3 10e3/uL    Absolute Lymphocytes 2.1 0.8 - 5.3 10e3/uL    Absolute Monocytes 0.7 0.0 - 1.3 10e3/uL    Absolute Eosinophils 0.1 0.0 - 0.7 10e3/uL    Absolute Basophils 0.1 0.0 - 0.2 10e3/uL    Absolute Immature Granulocytes 0.0 <=0.4 10e3/uL    Absolute NRBCs 0.0 10e3/uL   Protein  random urine    Collection Time: 11/30/23 12:13 PM    Result Value Ref Range    Total Protein Urine mg/dL 29.6   mg/dL    Total Protein Urine mg/mg Creat 0.22 (H) 0.00 - 0.20 mg/mg Cr    Creatinine Urine mg/dL 137.0 mg/dL   Routine UA with micro reflex to culture    Collection Time: 11/30/23 12:13 PM    Specimen: Urine, Midstream   Result Value Ref Range    Color Urine Light Yellow Colorless, Straw, Light Yellow, Yellow    Appearance Urine Clear Clear    Glucose Urine Negative Negative mg/dL    Bilirubin Urine Negative Negative    Ketones Urine Negative Negative mg/dL    Specific Gravity Urine 1.015 1.003 - 1.035    Blood Urine Negative Negative    pH Urine 5.5 5.0 - 7.0    Protein Albumin Urine 20 (A) Negative mg/dL    Urobilinogen Urine Normal Normal, 2.0 mg/dL    Nitrite Urine Negative Negative    Leukocyte Esterase Urine Trace (A) Negative    Mucus Urine Present (A) None Seen /LPF    RBC Urine 2 <=2 /HPF    WBC Urine 3 <=5 /HPF    Squamous Epithelials Urine 1 <=1 /HPF     CMP  Recent Labs   Lab Test 11/30/23  1209 11/15/23  1608 10/26/23  1149 10/26/23  1147 04/26/23  0824 02/07/23  0915 07/05/22  0933 03/21/22  1112 03/21/22  1112 08/23/21  1152 01/27/21  1047 10/30/19  0923 07/29/19  0851 04/15/19  1126    138  --  133* 139  --  140  --  140   < > 140 139 138 138   POTASSIUM 4.5 4.4  --  4.6 4.9  --  4.6   < > 4.5   < > 4.5 4.3 4.6 4.4   CHLORIDE 101 101  --  94* 99  --  103   < > 102   < > 102 102 102 100   CO2 28 25  --  22 27  --  25   < > 25   < > 27 29 24 28   ANIONGAP 11 12  --  17* 13  --  12   < > 13   < > 11 8 12 10   * 188* 247* 255* 173*  --  103*   < > 193*   < > 118 139* 152* 163*   BUN 24.5* 18.6  --  24.9* 22.5  --  23.2*   < > 18   < > 24* 21 24* 21   CR 1.64* 1.55*  --  1.65* 1.35*   < > 1.37*  --  1.51*   < > 1.41* 1.39* 1.37* 1.44*   GFRESTIMATED 46* 50*  --  46* 59*   < > 58*  --  52*   < > 51* 52* 53* 50*   GFRESTBLACK  --   --   --   --   --   --   --   --   --   --  >60 >60 >60 >60   ANAI 9.9 8.9  --  9.9 9.4  --  9.5  --  9.9    < > 9.4 9.6 9.7 10.0   PHOS 3.7  --   --   --   --   --  3.9  --   --   --   --   --   --   --    PROTTOTAL  --   --   --  7.9 7.2  --   --   --  7.1  --  7.6  --   --  7.5   ALBUMIN 4.2  --   --  4.2 4.1  --  4.2  --  3.9  --  4.6  --   --  4.5   BILITOTAL  --   --   --  0.3 0.3  --   --   --  0.5  --  0.7  --   --  0.6   ALKPHOS  --   --   --  246* 103  --   --   --  98  --  75  --   --  75   AST  --   --   --  20 24  --   --   --  17  --  22  --   --  29   ALT  --   --   --  22 23  --   --   --  19  --  31  --   --  38    < > = values in this interval not displayed.     CBC  Recent Labs   Lab Test 11/30/23  1209 11/15/23  1608 10/26/23  1147 04/26/23  0824 01/30/23  1139 03/21/22  1112   HGB 11.0* 9.9* 11.2* 15.4   < > 16.7   WBC 7.2 8.9 13.1*  --   --  7.9   RBC 4.57 4.19* 4.69  --   --  5.81   HCT 35.4* 32.7* 36.8*  --   --  50.7   MCV 78 78 79  --   --  87    487* 753*  --   --  216    < > = values in this interval not displayed.     INRNo lab results found.  ABGNo lab results found.   URINE STUDIES  Recent Labs   Lab Test 11/30/23  1213 10/26/23  1212   APPEARANCE Clear Clear   URINEGLC Negative >1000*   URINEBILI Negative Negative   URINEKETONE Negative Negative   SG 1.015 1.022   UBLD Negative 0.03 mg/dL*   URINEPH 5.5 5.5   PROTEIN 20* 70*   NITRITE Negative Negative   LEUKEST Trace* 250 Kathleen/uL*   RBCU 2 3*   WBCU 3 7*     No lab results found.    ASSESSMENT AND PLAN:   Mr Pikula is seen to establish EGPA care  at the [facility].  He has had a complex course since Dec 2022, and a severe complicated illness starting in May 2023.  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 He was treated successfully with high dose corticosteroids and mepolizumab -> with excellent control of his pulmonary symptoms. His ANCA test has been negative (only about 50% of EGPA is ANCA pos).    He presents today with no acute complaints.  His BP in under good control, on modest dose of  metoprolol (which I suspect was prescribed for ?PSVT rather than for HTN.  He has chronic kidney disease which antedates the acute illness in May - raising the question as to the relative contributor of diabetes (with modest albuminuria) vs EGPA.  Depending on the presence/progression of proteinuria and hematuria, he may benefit from a diagnostic kidney biopsy.  If there is evidence of glomerulonephritis (in addition to DKD) despite the current therapy with mepolizumab -> this would be an indication for additional immunosuppression.    Because of the loss of muscle mass, his Cr OVERestimates GFR, and his eGFRcys is significantly lower at 33 ml/min/1.73m2 vs an eGFRcr of 46    The CVA lesions resulting in visual and motor deficits appear to have been the result of the hypotensive episode with multiple watershed infarcts.      Anemia with Fe deficiency.  I recommended that Mr Moore undergo a colonoscopy.  He has previously been reluctant to having one. I have referred him for a GI consultation.  He would benefit for IF Fe infusion per his PCP.    I will plan for seeing him again in about 6 weeks for follow up      The total time of this encounter amounted to 118 minutes. This time included time spent with the patient, reviewing records, ordering tests, and performing post visit documentation.       Josue Tripp MD  Division of Renal Disease and Hypertension  November 30, 2023

## 2023-12-01 ENCOUNTER — E-VISIT (OUTPATIENT)
Dept: INTERNAL MEDICINE | Facility: CLINIC | Age: 63
End: 2023-12-01
Payer: COMMERCIAL

## 2023-12-01 ENCOUNTER — TELEPHONE (OUTPATIENT)
Dept: GASTROENTEROLOGY | Facility: CLINIC | Age: 63
End: 2023-12-01

## 2023-12-01 DIAGNOSIS — J01.90 ACUTE SINUSITIS WITH SYMPTOMS GREATER THAN 10 DAYS: Primary | ICD-10-CM

## 2023-12-01 LAB
MYELOPEROXIDASE AB SER IA-ACNC: 0.3 U/ML
MYELOPEROXIDASE AB SER IA-ACNC: NEGATIVE
PROTEINASE3 AB SER IA-ACNC: <1 U/ML
PROTEINASE3 AB SER IA-ACNC: NEGATIVE

## 2023-12-01 PROCEDURE — 99422 OL DIG E/M SVC 11-20 MIN: CPT | Performed by: INTERNAL MEDICINE

## 2023-12-01 NOTE — TELEPHONE ENCOUNTER
[facility] Call Center    Phone Message    May a detailed message be left on voicemail: yes     Reason for Call: Other: NEW GI Emergent referral scheduled outside allowed time frame. Scheduled first available.      Action Taken: Other: triage gi    Travel Screening: Not Applicable

## 2023-12-05 ENCOUNTER — PATIENT OUTREACH (OUTPATIENT)
Dept: NEPHROLOGY | Facility: CLINIC | Age: 63
End: 2023-12-05
Payer: COMMERCIAL

## 2023-12-05 NOTE — PROGRESS NOTES
Follow up requested by Dr. Tripp to get patient scheduled in 6 weeks after clinic.  Noted GI appt is scheduled for later this month.  Called and left voicemail to patient inquiring if he is available Thursday Jan 18th or 25th at 1230pm for clinic follow up.  Patient returned call but did not have his schedule with him, but conveyed message and patient will return call with his availability.  Contribhart message also sent so patient can reply via [employer] as another option.  Holly Kirkland RN, BSN, PHN  [facility] Nephrology Nurse Navigator  [phone]

## 2023-12-05 NOTE — TELEPHONE ENCOUNTER
Pt is scheduled within the appropriate time frame of two weeks for emergent triaged referrals. No rescheduling is needed. Closing encounter.

## 2023-12-06 ENCOUNTER — OFFICE VISIT (OUTPATIENT)
Dept: RHEUMATOLOGY | Facility: CLINIC | Age: 63
End: 2023-12-06
Attending: STUDENT IN AN ORGANIZED HEALTH CARE EDUCATION/TRAINING PROGRAM
Payer: COMMERCIAL

## 2023-12-06 ENCOUNTER — VIRTUAL VISIT (OUTPATIENT)
Dept: PHARMACY | Facility: CLINIC | Age: 63
End: 2023-12-06
Payer: COMMERCIAL

## 2023-12-06 ENCOUNTER — MYC MEDICAL ADVICE (OUTPATIENT)
Dept: INTERNAL MEDICINE | Facility: CLINIC | Age: 63
End: 2023-12-06
Payer: COMMERCIAL

## 2023-12-06 ENCOUNTER — PRE VISIT (OUTPATIENT)
Dept: RHEUMATOLOGY | Facility: CLINIC | Age: 63
End: 2023-12-06
Payer: COMMERCIAL

## 2023-12-06 VITALS
SYSTOLIC BLOOD PRESSURE: 106 MMHG | WEIGHT: 198 LBS | DIASTOLIC BLOOD PRESSURE: 68 MMHG | HEIGHT: 73 IN | BODY MASS INDEX: 26.24 KG/M2 | HEART RATE: 88 BPM | OXYGEN SATURATION: 98 %

## 2023-12-06 DIAGNOSIS — Z79.899 IMMUNOSUPPRESSION DUE TO DRUG THERAPY (H): ICD-10-CM

## 2023-12-06 DIAGNOSIS — D84.821 IMMUNOSUPPRESSION DUE TO DRUG THERAPY (H): ICD-10-CM

## 2023-12-06 DIAGNOSIS — R09.81 NASAL SINUS CONGESTION: ICD-10-CM

## 2023-12-06 DIAGNOSIS — M30.1 EOSINOPHILIC GRANULOMATOSIS WITH POLYANGIITIS (EGPA) (H): Primary | ICD-10-CM

## 2023-12-06 DIAGNOSIS — D64.9 NORMOCYTIC ANEMIA: Primary | ICD-10-CM

## 2023-12-06 DIAGNOSIS — D72.18 EOSINOPHILIC GRANULOMATOSIS WITH POLYANGIITIS (EGPA) (H): Primary | ICD-10-CM

## 2023-12-06 DIAGNOSIS — E11.40 TYPE 2 DIABETES MELLITUS WITH DIABETIC NEUROPATHY, UNSPECIFIED WHETHER LONG TERM INSULIN USE (H): ICD-10-CM

## 2023-12-06 PROCEDURE — 99213 OFFICE O/P EST LOW 20 MIN: CPT | Performed by: STUDENT IN AN ORGANIZED HEALTH CARE EDUCATION/TRAINING PROGRAM

## 2023-12-06 PROCEDURE — 99606 MTMS BY PHARM EST 15 MIN: CPT

## 2023-12-06 PROCEDURE — 99205 OFFICE O/P NEW HI 60 MIN: CPT | Performed by: STUDENT IN AN ORGANIZED HEALTH CARE EDUCATION/TRAINING PROGRAM

## 2023-12-06 ASSESSMENT — PAIN SCALES - GENERAL: PAINLEVEL: SEVERE PAIN (7)

## 2023-12-06 NOTE — PATIENT INSTRUCTIONS
"Recommendations from today's MTM visit:                                                      MTM (medication therapy management) is a service provided by a clinical pharmacist designed to help you get the most of out of your medicines.   Today we reviewed what your medicines are for, how to know if they are working, that your medicines are safe and how to make your medicine regimen as easy as possible.      Please check you sugar at least three times a day   Increase Trulicity dose from 1.5 mg to 3 mg weekly  Start injecting Semglee 10 units at around 6 - 7 PM     Follow-up: Due on 01/08/2024    It was great speaking with you today.  I value your experience and would be very thankful for your time in providing feedback in our clinic survey. In the next few days, you may receive an email or text message from [facility] with a link to a survey related to your  clinical pharmacist.\"     To schedule another MTM appointment, please call the clinic directly or you may call the MTM scheduling line at [phone] or toll-free at [phone].     My Clinical Pharmacist's contact information:                                                      Please feel free to contact me with any questions or concerns you have.        Syed Borjas, PharmD     Medication Therapy Management (MTM) Pharmacist     [phone]     [email or web address]     [facility]    "

## 2023-12-06 NOTE — PROGRESS NOTES
Medication Therapy Management (MTM) Encounter    ASSESSMENT:                            Medication Adherence/Access: No issues identified    Type 2 Diabetes:   Patient is not meeting A1c goal of < 7%. Most of the CGM BG readings are within the target range (70 - 180 mg/dL) 59%, below  goal of > 70%, and very much improved from the previous follow up.   Considering still high sugar readings appropriate to increase Trulicity dose. Patient does not want to gains weight.        PLAN:                            Please check you sugar at least three times a day   Increase Trulicity dose from 1.5 mg to 3 mg weekly  Start injecting Semglee 10 units at around 6 - 7 PM     Follow-up: Due on 01/08/2024    SUBJECTIVE/OBJECTIVE:                          Ned Moore is a 63 year old male called for a follow up visit. This is a follow up from 09/05/2023.     Reason for visit: Medication Review Initial.  Allergies/ADRs: Reviewed in chart  Past Medical History: Reviewed in chart  Tobacco: He reports that he has never smoked. He has never used smokeless tobacco.    Medication Adherence/Access: no issues reported    Type 2 Diabetes: Currently taking metformin 500 mg XR with supper and metformin 1000 mg with breakfast, and Semglee 10 units.  Patient stopped taking Ozempic due to weight loss, and taking Trulicity 1.5 mg weekly.  Patient's CGM BG is improving slowly. Check CGM readings below at the bottom of the note.   Aspirin 81mg daily  Blood sugar monitoring: Continuous Glucose Monitor See below   Current diabetes symptoms: Fatigue.   Diet/Exercise: Was not addressed at this follow up  Eye exam: up to date  Foot exam: due  Urine Albumin:   Lab Results   Component Value Date    UMALCR 39.06 (H) 11/15/2023        Lab Results   Component Value Date    A1C 8.0 11/15/2023    A1C 8.2 04/26/2023    A1C 8.0 10/10/2022    A1C 8.2 03/21/2022    A1C 7.4 08/23/2021         Wt Readings from Last 2 Encounters:   12/06/23 198 lb (89.8 kg)    11/30/23 189 lb (85.7 kg)     - Patient did not loose any weight and he has gained weight in the last one or two weeks. He does not have any swelling at this time.   - He said his sugar might have spiked may be he was eating food with carbohydrates.     Azathioprine was the med that was causing patient to have nausea and vomiting, and is discontinued by provider. Checked maría 3 coverage, but it is formulary excluded. Will follow up with patient closely.      Today's Vitals: There were no vitals taken for this visit.  ----------------              Today's Vitals: There were no vitals taken for this visit.  ----------------      I spent 19 minutes with this patient today. All changes were made via collaborative practice agreement with Mark Briggs MD. A copy of the visit note was provided to the patient's provider(s).    A summary of these recommendations was sent via [employer].      Telemedicine Visit Details  Type of service:  Telephone visit  Start Time:  09:30 AM  End Time: 9:49 AM     Medication Therapy Recommendations  No medication therapy recommendations to display     Syed Borjas, PharmD     Medication Therapy Management (MTM) Pharmacist     [phone]     [email or web address]

## 2023-12-06 NOTE — PATIENT INSTRUCTIONS
- Referral sent to ENT for your sinus swelling  - Please return in 3 months to monitor your disease activity  - Please call in to clinic with any concerning symptoms including chest pain, shortness of breath, fevers, new rashes, etc.

## 2023-12-06 NOTE — NURSING NOTE
"Chief Complaint   Patient presents with    Consult     Eosinophilic granulomatosis       Vital signs:      BP: 106/68 Pulse: 88     SpO2: 98 %     Height: 185.4 cm (6' 1\") (pt reported) Weight: 89.8 kg (198 lb)  Estimated body mass index is 26.12 kg/m  as calculated from the following:    Height as of this encounter: 1.854 m (6' 1\").    Weight as of this encounter: 89.8 kg (198 lb).      Lisbet Newberry CMA   12/6/2023 8:24 AM    "

## 2023-12-06 NOTE — TELEPHONE ENCOUNTER
FUTURE VISIT INFORMATION      FUTURE VISIT INFORMATION:  Date: 12/15/23  Time: 10am  Location: [facility]  REFERRAL INFORMATION:  Referring provider:  Jareth Strong DO   Referring providers clinic:  [facility]  Reason for visit/diagnosis  EGPA under control, sinus congestion with drainage     RECORDS REQUESTED FROM:       Clinic name Comments Records Status Imaging Status   [facility] Rheumatology   12/6/23- ov Jareth Carrasquillo DO  [facility]  5/21/23- MR Brain   5/20/23- CT Angio Head Neck   5/14/23- CT Angio Chest   5/14/23- CT [facility] everywhere  12/15/23-  Pending     PACS           December 6, 2023 1:52 PM - [facility] to [place] PACS- Stephanie   December 11, 2023 6:57 PM - Image resolved in PACS -wendy

## 2023-12-06 NOTE — PROGRESS NOTES
[facility] Outpatient Rheumatology Consultation  Date of service: December 5, 2023    Patient name: Ned Moore  YOB: 1960  MRN: [id number]    Reason for consult: EGPA management    HISTORY OF PRESENT ILLNESS:  Ned is a 63yoM w/ PMHx of COPD, systolic HF, DM and h/o CVA and EGPA w/ cardiac involvement who is presenting to rheumatology clinic for management of EGPA.    Rheumatological History:  - Admitted May of 2023 with abdominal pain, epistaxis, 30 lb weight loss and found to have patchy pulmonary opacities consistent with bronchopneumonia, marked peripheral eosinophilia (>400,000), myocarditis with reduced ejection fraction (48%), moderate proteinuria with mild hematuria, and multifocal ischemia on MRI brain with right sided LEweakness. 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 Inpatient Rheumatology diagnosed him with EGPA and started him on steroid taper and mepolizumab.   - Pt did have some past occupation exposures including to fiberglass dust and resin in his 20s, later worked as a tech aide at [employer] and was sensitized to acetate.   - Follows with pulmonology, last visit on 11/27/23 - planning to get repeat PFTs. Last PFTs in January 2023 - severe fixed obstructive physiology on pulmonary function testing, with FEV1 1.26 L (36%), air trapping without hyperinflation, and mild DLCO reduction.     Pertinent Medications/interventions Tried:   - Azathioprine: nausea, vomiting  - Prednisone taper  - Mepolizumab: 6/26/23 - current    Previously followed by rheumatology [facility]. Diagnosed with EGPA in 05/2023 at [facility]. He was last seen in 10/2023 at [facility] and plan was to continue monitoring. In 9/8/23 cyclophosphamide considered as next step while on mepolizumab and glucocorticoids however pt was concerned of side-effects/cost. Treatment was to be escalated if future CM showed disease activity though no evidence of this. Has tapered off  "prednisone. Presented to ED in 10/2023 with weakness, nausea and vomiting temporally related to starting azathioprine and improved once discontinued so this was stopped. At last rheumatology visit his breathing, cardiopulmonary issues were not worsening. No paresthesias and his foot drop was noted to be improved.     Today:  We reviewed his hospital course and post-hospital course.   Recalls in November of 2022 having similar symptoms and improving greatly with courses of prednisone. Would happen so often that his PCP did not want to continue prescribing prednisone. Then went to pulmonology and started treatment with inhalers.    Things are slow but feels he is progressing. He drove up here and walked to the building. Little unsteady gait as he was standing up for 4 hours last week. Hasn't stressed himself like this for a long time since before his stroke. When he moves he gets a spasm in backs of his legs. Energy is also better. Doesn't think he has chronic fatigue anymore.    No wheezing, lungs feel clear, no coughing. No more nose bleeds. Does have some \"sinusitis\" he thinks as when he lays down his nose stuffed up.  He has also noticed some green discharge. He is not having fevers or chills. He was started on amoxicillin for this and is 3 days into this. Is not seeing any improvement thus far. No trouble swallowing. Has noticed a dark grey area in his bottom left visual field that is present in both eyes, R eye is more out of focus in general. This \"dead spot\" is finally getting smaller. He is working with physical therapy for his R side, now strength is about equal whereas it was stronger before L side before he had his stroke. R leg he has to drag into the car. He did have some tripping with R foot but that has strengthened with therapy, no foot drop. Denies any paresthesias. Denies f/c/s, swollen glands.  Did not have any chest pain with his presentation.  Denies any chest pain currently, no pleuritis or " palpitations.  Has noticed some lower extremity edema around his ankles though this is improving.    Never smoked. No asthma as a child. Mother had asthma. His breathing issues began in adulthood towards end of 2022. No EtOH or illicits.    Injecting mepolizumab on the first of the month. Injection into belly usually and no adverse effects. Steroids have been stopped now for about 3-4 months now.    Past Medical History:  Past Medical History:   Diagnosis Date     Chronic obstructive pulmonary disease, unspecified COPD type (H) 3/15/2023     Chronic systolic heart failure (H) 11/15/2023     Diabetes (H)      History of CVA (cerebrovascular accident) 7/20/2023     Past Surgical History:  No past surgical history on file.    Medications:  Current Outpatient Medications   Medication     ACCU-CHEK FASTCLIX LANCET DRUM     amoxicillin-clavulanate (AUGMENTIN) 875-125 MG tablet     ASPIRIN LOW DOSE 81 MG chewable tablet     atorvastatin (LIPITOR) 20 MG tablet     BD ALEXANDRA U/F 32G X 4 MM insulin pen needle     blood glucose test (ACCU-CHEK SMARTVIEW TEST STRIP) strips     Continuous Blood Gluc Sensor (FREESTYLE LIVIA 2 SENSOR) MISC     Dulaglutide (TRULICITY) 3 MG/0.5ML SOPN     fluticasone-vilanterol (BREO ELLIPTA) 100-25 MCG/ACT inhaler     gabapentin (NEURONTIN) 300 MG capsule     insulin glargine (LANTUS PEN) 100 UNIT/ML pen     melatonin 3 MG tablet     Mepolizumab 100 MG/ML SOSY     metFORMIN (GLUCOPHAGE XR) 500 MG 24 hr tablet     metFORMIN (GLUCOPHAGE) 1000 MG tablet     metoprolol succinate ER (TOPROL XL) 25 MG 24 hr tablet     promethazine (PHENERGAN) 25 MG tablet     tiotropium (SPIRIVA) 18 MCG inhaled capsule     traZODone (DESYREL) 50 MG tablet     albuterol (PROAIR HFA/PROVENTIL HFA/VENTOLIN HFA) 108 (90 Base) MCG/ACT inhaler     No current facility-administered medications for this visit.       Allergies:  Allergies   Allergen Reactions     Azathioprine Nausea and Vomiting     Family history:  No family  "history of autoimmune disease.    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    OBJECTIVE:  /68   Pulse 88   Ht 1.854 m (6' 1\")   Wt 89.8 kg (198 lb)   SpO2 98%   BMI 26.12 kg/m      GEN: NAD, well-appearing  HEENT: No facial rash, sclera clear, no oral or nasal ulcers, no inflammatory nasal bridge/external ear changes, good saliva pool, good tear meniscus  CV: RRR, normal S1 and S2, no m/r/g bilateral upper extremity pulses 2+, there is some pitting edema bilateral lower extremities to mid shin  Pulm: CTAB, no crackles, wheezing or rhonchi, no dullness to percussion  Abdomen: soft, non-tender, not distended, no masses  Extremities: Full active and passive ROM of b/l shoulders, elbows, wrists, MCPs, PIPs, DIPs, hips, knees, ankles. Makes full fists b/l with good strength. No synovitis of any joints. No dactylitis. No digital pitting. No nail changes. No sclerodactyly.  Skin: No acute cutaneous changes  Neuro: Gait is antalgic, diminished strength greater right side compared to left    Labs:  WBC Count   Date Value Ref Range Status   11/30/2023 7.2 4.0 - 11.0 10e3/uL Final     Hemoglobin   Date Value Ref Range Status   11/30/2023 11.0 (L) 13.3 - 17.7 g/dL Final     Platelet Count   Date Value Ref Range Status   11/30/2023 374 150 - 450 10e3/uL Final     Creatinine   Date Value Ref Range Status   11/30/2023 1.64 (H) 0.67 - 1.17 mg/dL Final     Lab Results   Component Value Date    ALKPHOS 246 10/26/2023     AST   Date Value Ref Range Status   10/26/2023 20 0 - 45 U/L Final     Comment:     Reference intervals for this test were updated on 6/12/2023 to more accurately reflect our healthy population. There may be differences in the flagging of prior results with similar values performed with this method. Interpretation of those prior results can be made in the context of the updated reference intervals.     Lab " "Results   Component Value Date    ALT 22 10/26/2023     No results found for: \"SED\"  No results found for: \"CRP\"  UA RESULTS:  Recent Labs   Lab Test 11/30/23  1213   COLOR Light Yellow   APPEARANCE Clear   URINEGLC Negative   URINEBILI Negative   URINEKETONE Negative   SG 1.015   UBLD Negative   URINEPH 5.5   PROTEIN 20*   NITRITE Negative   LEUKEST Trace*   RBCU 2   WBCU 3      Myeloperoxidase Antibody IgG   Date Value Ref Range Status   11/30/2023 Negative Negative Final     Proteinase 3 Antibody IgG   Date Value Ref Range Status   11/30/2023 Negative Negative Final       Imaging:     CT Chest w/ contrast 05/2023  IMPRESSION:   1. No acute pulmonary embolism or aortopathy.   2.  Peripheral airway centric lung opacities in both lungs consistent with bronchopneumonia.   3.  Reactive lymph nodes in the angelica and subcarinal mediastinum.   4.  No focal inflammatory process in the abdomen or pelvis.   5.  Scattered hypoattenuating liver lesions, incompletely characterized. These most likely represent benign cysts or small hemangioma.     CT angio head/neck 05/2023  IMPRESSION:   HEAD CT:   1.  No acute intracranial hemorrhage.   2.  Few small foci of low attenuation involving the bilateral frontoparietal white matter and may reflect small chronic infarcts or sequela of chronic small vessel ischemic disease.     HEAD CTA:   1.  No significant stenosis, aneurysm, or high flow vascular malformation identified.   2.  Variant [place] anatomy as above.     NECK CTA:   1.  No significant stenosis of the bilateral internal carotid arteries.     Cardiac MRI 05/2023  There is patchy, mid-myocardial delayed enhancement in a diffuse   pattern throughout the basal into     mid left ventricle. The presence of myocardial edema is suggestive of an   active inflammatory process     such as myocarditis.       Cardiac MRI 9/13/23   Summary    1. The left ventricular cavity is normal in size.     2. There is mildly increased left " ventricular wall thickness.     3. Left ventricular ejection fraction is mildly reduced, 46 %, with   hypokinesis of the inferolateral wall.     4. There is scattered subendocardial late gadolinium enhancement (LGE)   throughout the basal to mid inferolateral and the inferoapex with areas of   near transmurality. In comparison to the prior cardiac MRI, the LGE pattern    is   subendocardial compared to mid wall. Given biopsy proven eosinophilic   myocarditis, this is favored to represent endocardial fibrosis in response   to   eosinophilic infiltrate. There is no myocardial edema in this territory to   suggest acute inflammation.     5. Normal RV size and function.     6. Qualitatively mild aortic insufficiency.     7. Mild biatrial enlargement.    ASSESSMENT & PLAN    Ned Moore is a 63yoM who is presenting to rheumatology clinic for management of EGPA.    EGPA in remission - Dx in 05/2023 at [facility]. MPO/PR3 negative.  Symptoms began in 11/2022 with question of new onset COPD vs. Asthma and a non-smoker. Constitutional symptoms then started and symptoms progressed to the point where he presented to the hospital. Admitted with abdominal pain, epistaxis, 30 lb weight loss and found to have patchy pulmonary opacities consistent with bronchopneumonia, marked peripheral eosinophilia (>400,000), elevated IgE, myocarditis with reduced ejection fraction (48%)  biopsy c/w eosinophilic myocarditis, moderate proteinuria with mild hematuria, and multifocal ischemia on MRI brain with right sided LEweakness. Subsequently started on steroid taper and mepolizumab. Currently off prednisone since 09/2023. Continues treatment with mepolizumab 300 mg every 4 weeks - tolerating well. Cytoxan previously discussed if needing to escalate cares but patient c/f cost/side-effects and right now no clear indication for this.  Follows with pulmonology and is getting repeat PFTs.  Follows with nephrology for his CKD which predates  his illness and depending on persistence of proteinuria/hematuria would consider kidney biopsy in future and additional immunosuppression if this were to be the case.  Proteinuria and hematuria have improved with most recent protein/creatinine ratio being 0.22 and no evidence of hematuria on UA.    Cardiomyopathy is an independent predictor of mortality in EGPA and cyclophosphamide is the preferred agent especially when serologies are negative. ANCA-negative patient more frequently manifest cardiomyopathy and lung involvement. Whereas ANCA-positive patients have more periphral neuropathy, renal involvement and skin findings. ANCA-negative patients with less relapses but higher morbidity 2/2 cardiac involvement. Five-Factor Score was 2 at diagnosis with cardiac involvement and proteinuria which warrants more aggressive treatment. Maintenance of remission can be achieved with GCs, mepolizumab, rituximab and/or DMARDs.    Reference - https://doi.org/10.1038/[email or web address]     High risk medication use - mepolizumab, glucocorticoids     Sinus congestion -currently on amoxicillin 3/7 d treatment. Symptoms not improving.  Did not have sinus involvement with his initial presentation.  It is possible that this is a viral versus bacterial process.  Not seeing signs of improvement yet.  Would recommend he be connected with ENT given his history of vasculitis.    Plan (discussed with patient):  -- Continue mepolizumab 300 mg every 4 weeks for remission maintenance - tolerating well, being prescribed by pulmonology  -- Monitoring labs in 3 months -CRP/ESR, BMP, UA, prot/cr, CBC w/ diff  -- ENT referral expedited for his sinus congestion, currently tx with amoxicillin (3/7 days)   -- Continue to follow with pulmonology, PFTs upcoming  -- For cardiac involvement, sx's were absent (I.e. no chest pain, pleuritis, palpitations) but no evidence of disease flare at this time. Monitor closely. In future should we have c/f flare  would order CMR.  -- For neurologic symptoms r/t stroke - continue with PT. No signs of peripheral neuropathy. CTM.    Follow-up: 3 months    Patient seen and staffed with Dr. Rogelio Strong, DO  Rheumatology Fellow  PGY4    Attending Note: I saw and evaluated the patient with Dr. Jareth Strong. I agree with the assessment and plan.    Lina Mercado MD

## 2023-12-08 RX ORDER — FERROUS SULFATE 325(65) MG
325 TABLET ORAL
Qty: 90 TABLET | Refills: 3 | Status: SHIPPED | OUTPATIENT
Start: 2023-12-08 | End: 2024-09-26

## 2023-12-12 NOTE — PROGRESS NOTES
Message left for patient to clarify availability to open clinic for appt on Thursday Jan 18th or 25th at 1230pm for clinic follow up, okayed by provider to add either day.  Holly Kirkland RN, BSN, PHN  [facility] Nephrology Nurse Navigator  [phone]

## 2023-12-12 NOTE — TELEPHONE ENCOUNTER
Records Requested     December 12, 2023 10:34 AM  [address]  Fax: [phone]   Outcome Urgent request faxed to  to push images to PACS.     12/12/23 3:08 PM - Images resolved in PACS.        REFERRAL INFORMATION:  Referring Provider:  Josue Tripp MD  Referring Clinic:  [facility] Nephrology   Reason for Visit/Diagnosis: Concern for anemia with history of EGPA, required Iron infusions with previous hospitalization. [facility]. M30.1, D72.18 (ICD-10-CM) - Eosinophilic granulomatosis with polyangiitis (EGPA) (H); D50.9 (ICD-10-CM) - Iron deficiency anemia, unspecified iron deficiency anemia type; E11.22, N18.31 (ICD-10-CM) - Type 2 diabetes mellitus with stage 3a chronic kidney disease, without long-term current use of insulin (H)     FUTURE VISIT INFORMATION:  Appointment Date: 12/13/23  Appointment Time: 3:00 PM      NOTES STATUS DETAILS   OFFICE NOTE from Referring Provider Internal 11/30/23 - Neph OV visit with Jsoue Tripp MD    OFFICE NOTE from Other Specialist Internal [facility] [address] - Critical Care:  11/27/23 - Critical Care OV with Haleigh Biswas MD    [facility]:  11/15/23 - PCC OV with Mark Briggs MD      [facility]:  10/11/23 - PM&R OV with Keena Woo MD      Specialty Center:   9/8/23 - Rheum OV with Susie Ghosh MD    HOSPITAL DISCHARGE SUMMARY/  ED VISITS Care Everywhere 11/14/23- [facility] ED visit with Elana Rodriguez MD     10/26/23 - [facility] ED visit with Arya Ragland MD     10/21/23 - [facility] ED visit with Madie Lenz MD     10/17/23 - [facility] ED visit with Sergio Ibarra MD     6/8/23-6/30/23 - [facility] Admission with Keena Woo MD     5/14/23-6/8/23 - [facility] Hospital Admission with Sanjiv Mcduffie MD    OPERATIVE REPORT Care Everywhere 5/22/23 - Coronary  Angiogram with biopsy - Zana Roberson MD - [facility]    MEDICATION LIST Care Everywhere         ENDOSCOPY  Care Everywhere [facility]:   5/18/23 - Bronchoscopy      STOOL TESTING Care Everywhere Occult Blood Stool - 11/14/23, 3/10/21, 5/7/19, 2/6/19    Ova & Parasite - 5/27/23, 5/21/23    H Pylori - 5/27/23    Enteric parasite - 5/21/23   PERTINENT LABS Internal 11/30/23 - CBC/diff; Renal Panel    11/15/23 - Iron and Iron binding; BMP; CBC/diff    PATHOLOGY REPORTS (RELATED) Care Everywhere 5/22/23- heart, eosinophils, parasites bx    IMAGING (CT, MRI, EGD, MRCP, Small Bowel Follow Through/SBT, MR/CT Enterography) PACS [facility]:   CT Abdomen Pelvis - 10/21/23    MR Abdomen - 5/20/23    US Abdomen - 5/18/23    CTA CAP - 5/14/23    XR Chest - 5/14/23

## 2023-12-13 ENCOUNTER — PRE VISIT (OUTPATIENT)
Dept: GASTROENTEROLOGY | Facility: CLINIC | Age: 63
End: 2023-12-13

## 2023-12-13 ENCOUNTER — LAB (OUTPATIENT)
Dept: LAB | Facility: CLINIC | Age: 63
End: 2023-12-13
Payer: COMMERCIAL

## 2023-12-13 ENCOUNTER — OFFICE VISIT (OUTPATIENT)
Dept: GASTROENTEROLOGY | Facility: CLINIC | Age: 63
End: 2023-12-13
Payer: COMMERCIAL

## 2023-12-13 VITALS
SYSTOLIC BLOOD PRESSURE: 137 MMHG | HEART RATE: 70 BPM | HEIGHT: 73 IN | WEIGHT: 198.7 LBS | BODY MASS INDEX: 26.33 KG/M2 | OXYGEN SATURATION: 99 % | DIASTOLIC BLOOD PRESSURE: 87 MMHG

## 2023-12-13 DIAGNOSIS — J44.9 CHRONIC OBSTRUCTIVE PULMONARY DISEASE, UNSPECIFIED COPD TYPE (H): Primary | ICD-10-CM

## 2023-12-13 DIAGNOSIS — D50.9 MICROCYTIC ANEMIA: Primary | ICD-10-CM

## 2023-12-13 DIAGNOSIS — D50.9 IRON DEFICIENCY ANEMIA, UNSPECIFIED IRON DEFICIENCY ANEMIA TYPE: ICD-10-CM

## 2023-12-13 DIAGNOSIS — N18.31 TYPE 2 DIABETES MELLITUS WITH STAGE 3A CHRONIC KIDNEY DISEASE, WITHOUT LONG-TERM CURRENT USE OF INSULIN (H): ICD-10-CM

## 2023-12-13 DIAGNOSIS — D72.18 EOSINOPHILIC GRANULOMATOSIS WITH POLYANGIITIS (EGPA) (H): ICD-10-CM

## 2023-12-13 DIAGNOSIS — M30.1 EOSINOPHILIC GRANULOMATOSIS WITH POLYANGIITIS (EGPA) (H): ICD-10-CM

## 2023-12-13 DIAGNOSIS — D50.9 MICROCYTIC ANEMIA: ICD-10-CM

## 2023-12-13 DIAGNOSIS — E11.22 TYPE 2 DIABETES MELLITUS WITH STAGE 3A CHRONIC KIDNEY DISEASE, WITHOUT LONG-TERM CURRENT USE OF INSULIN (H): ICD-10-CM

## 2023-12-13 PROCEDURE — 99204 OFFICE O/P NEW MOD 45 MIN: CPT | Performed by: PHYSICIAN ASSISTANT

## 2023-12-13 RX ORDER — TIOTROPIUM BROMIDE 18 UG/1
18 CAPSULE ORAL; RESPIRATORY (INHALATION) DAILY
Qty: 18 CAPSULE | Refills: 3 | Status: SHIPPED | OUTPATIENT
Start: 2023-12-13

## 2023-12-13 ASSESSMENT — PAIN SCALES - GENERAL: PAINLEVEL: NO PAIN (0)

## 2023-12-13 NOTE — LETTER
12/13/2023         RE: Ned Moore  [address]  [place] [address]        Dear Colleague,    Thank you for referring your patient, Ned Moore, to the [facility] [place]. Please see a copy of my visit note below.      GI CLINIC VISIT    CC/REFERRING MD:  Josue Tripp    Chart Review  GI eval for new Fe def anemia (1 mo ago, 9-11.2 from baseline 15-17). MCV 78.   Nephrology Dr Tripp 11/30  Cologuard 3/10/21    HPI  Ned Moore is a 63 year old year old male with PMHx of COPD, systolic HF, DM and h/o CVA and EGPA w/ cardiac involvement (following with rheum, nephro, cards) presenting to establish care with the [facility] GI group for new anemia x 1 month.      Presenting with daughter Domitila, though patient provides all the history.     Reports had symptomatic anemia for the last few month ago - sx of fatigue, pallor, feeling cool   Seen at ED 11/14 for anemia w/ Hgb 9.9, a 1.3 point drop from 10/26 (11.2 hgb). Stool guaiac was NEG. Given IVG.     Denies overt bleeding, such as  nosebleeds, hematemesis, easy bruising, hematuria, hematochezia, melena.  Denied hx of GI bleed in past. No hx of falls.   continues on daily on aspirin 81 mg, no other thinners.   Used NSAIDs daily x 2 weeks (ibuprofen 200 mg) for some joint pain, stopped late nov 2023. Now not on any NSAIDs.     May - admitted to [facility] complicated by stroke. Newly dx EGPA and started on nucala which made him very nauseated and caused him to vomit. It was held with resolution of N/V.  During admission, had acute blood loss anemia 2/2 epistaxis s/p transfusion and IV venofer.      Reported 2 mo history of N/V after start of azathioprine for his EGPA - after it was held, N/V resolved. Required IVF.     Typically runs constipated, [place] two to three. In the last few months, he has been passing a [place] 4 every 3-4 after trying to focus on increasing dietary and take of fiber. He denies straining,  abdominal discomfort, abdominal distress, bloating. Feels overall good.     He worked as a paramedic for 20 years.  Shares that he has seen multiple injuries related to endoscopic procedures, particularly colonoscopies and would prefer to avoid this procedure for his care if at all possible.   Never had Cscope. Cologuard negative 2021. No fhx of CRC or colonic polyps.      Appetite good  Weights - below     Wt Readings from Last 10 Encounters:   12/13/23 90.1 kg (198 lb 11.2 oz)   12/06/23 89.8 kg (198 lb)   11/30/23 85.7 kg (189 lb)   11/27/23 85.7 kg (189 lb)   11/15/23 84.6 kg (186 lb 9.6 oz)   10/26/23 90.7 kg (200 lb)   08/24/23 84.8 kg (187 lb)   08/10/23 81.7 kg (180 lb 1.6 oz)   07/20/23 81.1 kg (178 lb 14.4 oz)   01/31/23 73 kg (161 lb)     ROS  Denies fevers, chills, nausea, emesis, dysphagia, odynophagia, heartburn, regurgitation, abdominal pain, bloating/fullness, post prandial bloating, black tarrying stools, bloody stools, rectal pain, rectal fullness, rectal itching.     Family Hx  No other known family history or GI related malignancy (esophageal, gastric, pancreatic, liver or colon) or family history of IBD/celiac disease.     Social Hx   Never smoker. occ etoh, no drugs     PROBLEM LIST  Patient Active Problem List    Diagnosis Date Noted    Immunosuppression due to drug therapy  11/15/2023     Priority: Medium    Chronic systolic heart failure (H) 11/15/2023     Priority: Medium    Eosinophilic granulomatosis with polyangiitis (EGPA) (H) 07/20/2023     Priority: Medium    History of CVA (cerebrovascular accident) 07/20/2023     Priority: Medium     Noted during hospitalization in 5/2023 at [facility]  Multifocal acute areas      Chronic obstructive pulmonary disease, unspecified COPD type (H) 03/15/2023     Priority: Medium    Hyperlipidemia LDL goal <70 10/30/2019     Priority: Medium    Type 2 diabetes mellitus with stage 3a chronic kidney disease, without long-term current use of insulin (H)  01/09/2018     Priority: Medium       PERTINENT PAST MEDICAL HISTORY:  Past Medical History:   Diagnosis Date    Chronic obstructive pulmonary disease, unspecified COPD type (H) 3/15/2023    Chronic systolic heart failure (H) 11/15/2023    Diabetes (H)     History of CVA (cerebrovascular accident) 7/20/2023       PREVIOUS SURGERIES:  No past surgical history on file.    ALLERGIES:     Allergies   Allergen Reactions    Azathioprine Nausea and Vomiting       PERTINENT MEDICATIONS:    Current Outpatient Medications:     ACCU-CHEK FASTCLIX LANCET DRUM, [ACCU-CHEK FASTCLIX LANCET DRUM] TEST BLOOD SUGARS 3 TIMES DAILY, Disp: 300 each, Rfl: 3    ASPIRIN LOW DOSE 81 MG chewable tablet, Take 81 mg by mouth daily, Disp: , Rfl:     atorvastatin (LIPITOR) 20 MG tablet, TAKE 1 TABLET BY MOUTH EVERY DAY IN THE EVENING, Disp: 30 tablet, Rfl: 2    BD ALEXANDRA U/F 32G X 4 MM insulin pen needle, , Disp: , Rfl:     blood glucose test (ACCU-CHEK SMARTVIEW TEST STRIP) strips, [BLOOD GLUCOSE TEST (ACCU-CHEK SMARTVIEW TEST STRIP) STRIPS] USE TO CHECK BLOOD SUGARS TWICE DAILY., Disp: 200 strip, Rfl: 3    Continuous Blood Gluc Sensor (FREESTYLE LIVIA 2 SENSOR) MISC, 1 each every 14 days, Disp: 2 each, Rfl: 11    Dulaglutide (TRULICITY) 3 MG/0.5ML SOPN, Inject 3 mg Subcutaneous once a week, Disp: 2 mL, Rfl: 0    ferrous sulfate (FEROSUL) 325 (65 Fe) MG tablet, Take 1 tablet (325 mg) by mouth daily (with breakfast), Disp: 90 tablet, Rfl: 3    fluticasone-vilanterol (BREO ELLIPTA) 100-25 MCG/ACT inhaler, Inhale 1 puff into the lungs daily, Disp: 60 each, Rfl: 6    gabapentin (NEURONTIN) 300 MG capsule, Take 300 mg by mouth At Bedtime, Disp: , Rfl:     insulin glargine (LANTUS PEN) 100 UNIT/ML pen, Inject 10 Units Subcutaneous at bedtime, Disp: , Rfl:     melatonin 3 MG tablet, Take 9 mg by mouth nightly as needed for sleep, Disp: , Rfl:     Mepolizumab 100 MG/ML SOSY, Inject 3 mLs (300 mg) Subcutaneous every 30 days, Disp: 3 mL, Rfl: 11     metFORMIN (GLUCOPHAGE XR) 500 MG 24 hr tablet, Take 1 tablet (500 mg) by mouth daily (with dinner), Disp: 360 tablet, Rfl: 3    metFORMIN (GLUCOPHAGE) 1000 MG tablet, Take 1 tablet (1,000 mg) by mouth daily (with breakfast), Disp: 90 tablet, Rfl: 6    metoprolol succinate ER (TOPROL XL) 25 MG 24 hr tablet, Take 0.5 tablets (12.5 mg) by mouth every morning, Disp: 45 tablet, Rfl: 6    promethazine (PHENERGAN) 25 MG tablet, Take 1 tablet (25 mg) by mouth every 8 hours as needed for nausea, Disp: 15 tablet, Rfl: 0    tiotropium (SPIRIVA) 18 MCG inhaled capsule, Inhale 1 capsule (18 mcg) into the lungs daily, Disp: 18 capsule, Rfl: 3    traZODone (DESYREL) 50 MG tablet, Take 50 mg by mouth daily as needed for sleep, Disp: , Rfl:     SOCIAL HISTORY:  Social History     Socioeconomic History    Marital status:      Spouse name: Not on file    Number of children: Not on file    Years of education: Not on file    Highest education level: Not on file   Occupational History    Not on file   Tobacco Use    Smoking status: Never    Smokeless tobacco: Never   Vaping Use    Vaping Use: Never used   Substance and Sexual Activity    Alcohol use: Yes     Comment: a beer or wine every few months    Drug use: Never    Sexual activity: Not Currently     Partners: Female   Other Topics Concern    Parent/sibling w/ CABG, MI or angioplasty before 65F 55M? No   Social History Narrative    Not on file     Social Determinants of Health     Financial Resource Strain: Low Risk  (11/14/2023)    Financial Resource Strain     Within the past 12 months, have you or your family members you live with been unable to get utilities (heat, electricity) when it was really needed?: No   Food Insecurity: High Risk (11/14/2023)    Food Insecurity     Within the past 12 months, did you worry that your food would run out before you got money to buy more?: Yes     Within the past 12 months, did the food you bought just not last and you didn t have money  "to get more?: No   Transportation Needs: Low Risk  (11/14/2023)    Transportation Needs     Within the past 12 months, has lack of transportation kept you from medical appointments, getting your medicines, non-medical meetings or appointments, work, or from getting things that you need?: No   Physical Activity: Not on file   Stress: Not on file   Social Connections: Not on file   Interpersonal Safety: Not on file   Housing Stability: High Risk (11/14/2023)    Housing Stability     Do you have housing? : Yes     Are you worried about losing your housing?: Yes       FAMILY HISTORY:  Family History   Problem Relation Age of Onset    Heart Disease Mother     Diabetes Mother     Asthma Mother     Alzheimer Disease Father     No Known Problems Brother        Past/family/social history reviewed and no changes    PHYSICAL EXAMINATION:  Vitals reviewed: /87   Pulse 70   Ht 1.854 m (6' 1\")   Wt 90.1 kg (198 lb 11.2 oz)   SpO2 99%   BMI 26.22 kg/m    Wt:   Wt Readings from Last 2 Encounters:   12/13/23 90.1 kg (198 lb 11.2 oz)   12/06/23 89.8 kg (198 lb)      Constitutional: aaox3, cooperative, pleasant, not dyspneic/diaphoretic, no acute distress. Slight pallor.   Eyes: Sclera anicteric/injected  Ears/nose/mouth/throat: hearing intact  Neck: supple, active ROM w/o limitation or pain   CV: No edema  Respiratory: Unlabored breathing  Abd:  Nondistended, +bs, no hepatosplenomegaly, nontender, no peritoneal signs, neg Weaver's sign  Skin: warm, perfused, no jaundice  Psych: Normal affect  MSK: Normal gait     PERTINENT STUDIES:    Lab on 11/30/2023   Component Date Value Ref Range Status    Sodium 11/30/2023 140  135 - 145 mmol/L Final    Potassium 11/30/2023 4.5  3.4 - 5.3 mmol/L Final    Chloride 11/30/2023 101  98 - 107 mmol/L Final    Carbon Dioxide (CO2) 11/30/2023 28  22 - 29 mmol/L Final    Anion Gap 11/30/2023 11  7 - 15 mmol/L Final    Glucose 11/30/2023 130 (H)  70 - 99 mg/dL Final    Urea Nitrogen 11/30/2023 " 24.5 (H)  8.0 - 23.0 mg/dL Final    Creatinine 11/30/2023 1.64 (H)  0.67 - 1.17 mg/dL Final    GFR Estimate 11/30/2023 46 (L)  >60 mL/min/1.73m2 Final    Calcium 11/30/2023 9.9  8.8 - 10.2 mg/dL Final    Albumin 11/30/2023 4.2  3.5 - 5.2 g/dL Final    Phosphorus 11/30/2023 3.7  2.5 - 4.5 mg/dL Final    Total Protein Urine mg/dL 11/30/2023 29.6    mg/dL Final    Total Protein Urine mg/mg Creat 11/30/2023 0.22 (H)  0.00 - 0.20 mg/mg Cr Final    Creatinine Urine mg/dL 11/30/2023 137.0  mg/dL Final    Color Urine 11/30/2023 Light Yellow  Colorless, Straw, Light Yellow, Yellow Final    Appearance Urine 11/30/2023 Clear  Clear Final    Glucose Urine 11/30/2023 Negative  Negative mg/dL Final    Bilirubin Urine 11/30/2023 Negative  Negative Final    Ketones Urine 11/30/2023 Negative  Negative mg/dL Final    Specific Gravity Urine 11/30/2023 1.015  1.003 - 1.035 Final    Blood Urine 11/30/2023 Negative  Negative Final    pH Urine 11/30/2023 5.5  5.0 - 7.0 Final    Protein Albumin Urine 11/30/2023 20 (A)  Negative mg/dL Final    Urobilinogen Urine 11/30/2023 Normal  Normal, 2.0 mg/dL Final    Nitrite Urine 11/30/2023 Negative  Negative Final    Leukocyte Esterase Urine 11/30/2023 Trace (A)  Negative Final    Mucus Urine 11/30/2023 Present (A)  None Seen /LPF Final    RBC Urine 11/30/2023 2  <=2 /HPF Final    WBC Urine 11/30/2023 3  <=5 /HPF Final    Squamous Epithelials Urine 11/30/2023 1  <=1 /HPF Final    MPO Janice IgG Instrument Value 11/30/2023 0.3  <3.5 U/mL Final    Myeloperoxidase Antibody IgG 11/30/2023 Negative  Negative Final    Proteinase 3 Janice IgG Instrument Va* 11/30/2023 <1.0  <2.0 U/mL Final    Proteinase 3 Antibody IgG 11/30/2023 Negative  Negative Final    Cystatin C 11/30/2023 1.9 (H)  0.6 - 1.0 mg/L Final    GFR Calculated with Cystatin C 11/30/2023 33 (L)  >=60 mL/min/1.73m2 Final    WBC Count 11/30/2023 7.2  4.0 - 11.0 10e3/uL Final    RBC Count 11/30/2023 4.57  4.40 - 5.90 10e6/uL Final    Hemoglobin  11/30/2023 11.0 (L)  13.3 - 17.7 g/dL Final    Hematocrit 11/30/2023 35.4 (L)  40.0 - 53.0 % Final    MCV 11/30/2023 78  78 - 100 fL Final    MCH 11/30/2023 24.1 (L)  26.5 - 33.0 pg Final    MCHC 11/30/2023 31.1 (L)  31.5 - 36.5 g/dL Final    RDW 11/30/2023 16.9 (H)  10.0 - 15.0 % Final    Platelet Count 11/30/2023 374  150 - 450 10e3/uL Final    % Neutrophils 11/30/2023 58  % Final    % Lymphocytes 11/30/2023 30  % Final    % Monocytes 11/30/2023 9  % Final    % Eosinophils 11/30/2023 1  % Final    % Basophils 11/30/2023 1  % Final    % Immature Granulocytes 11/30/2023 1  % Final    NRBCs per 100 WBC 11/30/2023 0  <1 /100 Final    Absolute Neutrophils 11/30/2023 4.2  1.6 - 8.3 10e3/uL Final    Absolute Lymphocytes 11/30/2023 2.1  0.8 - 5.3 10e3/uL Final    Absolute Monocytes 11/30/2023 0.7  0.0 - 1.3 10e3/uL Final    Absolute Eosinophils 11/30/2023 0.1  0.0 - 0.7 10e3/uL Final    Absolute Basophils 11/30/2023 0.1  0.0 - 0.2 10e3/uL Final    Absolute Immature Granulocytes 11/30/2023 0.0  <=0.4 10e3/uL Final    Absolute NRBCs 11/30/2023 0.0  10e3/uL Final        Lab Results   Component Value Date    WBC 7.2 11/30/2023    WBC 8.9 11/15/2023    WBC 13.1 (H) 10/26/2023    HGB 11.0 (L) 11/30/2023    HGB 9.9 (L) 11/15/2023    HGB 11.2 (L) 10/26/2023     11/30/2023     (H) 11/15/2023     (H) 10/26/2023    CHOL 152 04/26/2023    CHOL 156 01/27/2021    CHOL 141 10/30/2019    TRIG 97 04/26/2023    TRIG 123 01/27/2021    TRIG 67 10/30/2019    HDL 64 04/26/2023    HDL 57 01/27/2021    HDL 56 10/30/2019    ALT 22 10/26/2023    ALT 23 04/26/2023    ALT 19 03/21/2022    AST 20 10/26/2023    AST 24 04/26/2023    AST 17 03/21/2022     11/30/2023     11/15/2023     (L) 10/26/2023    BUN 24.5 (H) 11/30/2023    BUN 18.6 11/15/2023    BUN 24.9 (H) 10/26/2023    CO2 28 11/30/2023    CO2 25 11/15/2023    CO2 22 10/26/2023    TSH 0.43 11/15/2023    TSH 0.61 03/21/2022    TSH 0.54 01/08/2018         Liver Function Studies -   Recent Labs   Lab Test 11/30/23  1209 10/26/23  1147   PROTTOTAL  --  7.9   ALBUMIN 4.2 4.2   BILITOTAL  --  0.3   ALKPHOS  --  246*   AST  --  20   ALT  --  22        PREVIOUS ENDOSCOPY    No results found for this or any previous visit.    ASSESSMENT/PLAN:  Ned Moore is a 63 year old year old male with PMHx of COPD, systolic HF, DM and h/o CVA and EGPA w/ cardiac involvement (following with rheum, nephro, cards) presenting to establish care with the [facility] GI group for new anemia x 1 month. He denies hx GIB, had a NEG guiac test in the ED 11/2023 and a negative cologuard in 2021. He is on ASA 81 mg. Most recent H/H 11.0/35.4 now on daily iron supplement, tolerating w/o GI distress    #new anemia   New anemia of unclear etiology requiring blood transfusion and IV venofer - recommended both upper and lower endoscopy for evaluation for R/O of occult GI bleed. If both these studies are negative, then would recommend to obtain VCE pill cam as well.     -We had an extensive discussion about indication of invasive approach, risk and benefits. He will think this decision over and if is agreeable, will call to make an appointment. He is OK for me to order the procedures today to ease scheduling logistics.     I detailed with him that I would recommend the following measures for endoscopy (which I included in my order)   -to be performed with GI endoscopy  -PAC eval prior for safety optimization   -double prep for colonoscopy     ER precautions reviewed     Future consideration -   -video capsule endoscopy if scopes are unremarkable  -consider restart of PPI after h.pylori     # stooling pattern  Consistent with constipation secondary to dietary/ lifestyle. Differential includes functional constipation, medication induced , intraluminal mass. TSH and metabolic panel WNL.     - recommended dietary /lifestyle changes  - okay to try fiber, per AVS    Future consideration   -miralax   -can  consider celiac serology as part of work up in future if constipation continues     Orders Placed This Encounter   Procedures    Helicobacter pylori Antigen Stool    Adult GI  Referral - Procedure Only    PAC Visit Referral (For [facility] Only)     Colorectal cancer screening: ordered Cscope today     RTC after scopes or 2 mo or sooner if needed     Thank you for this consultation.  It was a pleasure to participate in the care of this patient; please contact me with any further questions.     Follow up: As planned above. Today, I personally spent 40 minutes in direct face to face time with the patient, of which greater than 50% of the time was spent in patient education and counseling as described above. Approximately minutes were spent on indirect care associated with the patient's consultation including but not limited to review of: patient medical records to date, clinic visits, hospital records, lab results, imaging studies, procedural documentation, and coordinating care with other providers. The findings from this review are summarized in the above note. All of the above accounted for a cumulative time of 40 minutes and was performed on the date of service.     Code per complexity         Again, thank you for allowing me to participate in the care of your patient.      Sincerely,    Sandrine Moise PA-C

## 2023-12-13 NOTE — NURSING NOTE
"Chief Complaint   Patient presents with    New Patient       Vitals:    12/13/23 1457   BP: 137/87   Pulse: 70   SpO2: 99%   Weight: 90.1 kg (198 lb 11.2 oz)   Height: 1.854 m (6' 1\")       Body mass index is 26.22 kg/m .    [employer]    "

## 2023-12-13 NOTE — PROGRESS NOTES
GI CLINIC VISIT    CC/REFERRING MD:  Josue Tripp    Chart Review  GI eval for new Fe def anemia (1 mo ago, 9-11.2 from baseline 15-17). MCV 78.   Nephrology Dr Tripp 11/30  [place]olog[place]gett 3/10/21    HPI  Ned Moore is a 63 year old year old male with PMHx of COPD, systolic HF, DM and h/o CVA and EGPA w/ cardiac involvement (following with rheum, nephro, cards) presenting to establish care with the [facility] GI group for new anemia x 1 month.      Presenting with daughter Domitila, though patient provides all the history.     Reports had symptomatic anemia for the last few month ago - sx of fatigue, pallor, feeling cool   Seen at ED 11/14 for anemia w/ Hgb 9.9, a 1.3 point drop from 10/26 (11.2 hgb). Stool guaiac was NEG. Given IVG.     Denies overt bleeding, such as  nosebleeds, hematemesis, easy bruising, hematuria, hematochezia, melena.  Denied hx of GI bleed in past. No hx of falls.   continues on daily on aspirin 81 mg, no other thinners.   Used NSAIDs daily x 2 weeks (ibuprofen 200 mg) for some joint pain, stopped late nov 2023. Now not on any NSAIDs.     May - admitted to [facility] complicated by stroke. Newly dx EGPA and started on nucala which made him very nauseated and caused him to vomit. It was held with resolution of N/V.  During admission, had acute blood loss anemia 2/2 epistaxis s/p transfusion and IV venofer.      Reported 2 mo history of N/V after start of azathioprine for his EGPA - after it was held, N/V resolved. Required IVF.     Typically runs constipated, [place] two to three. In the last few months, he has been passing a [place] 4 every 3-4 after trying to focus on increasing dietary and take of fiber. He denies straining, abdominal discomfort, abdominal distress, bloating. Feels overall good.     He worked as a paramedic for 20 years.  Shares that he has seen multiple injuries related to endoscopic procedures, particularly colonoscopies and would prefer to avoid this procedure for his care  if at all possible.   Never had Cscope. Cologuard negative 2021. No fhx of CRC or colonic polyps.      Appetite good  Weights - below     Wt Readings from Last 10 Encounters:   12/13/23 90.1 kg (198 lb 11.2 oz)   12/06/23 89.8 kg (198 lb)   11/30/23 85.7 kg (189 lb)   11/27/23 85.7 kg (189 lb)   11/15/23 84.6 kg (186 lb 9.6 oz)   10/26/23 90.7 kg (200 lb)   08/24/23 84.8 kg (187 lb)   08/10/23 81.7 kg (180 lb 1.6 oz)   07/20/23 81.1 kg (178 lb 14.4 oz)   01/31/23 73 kg (161 lb)     ROS  Denies fevers, chills, nausea, emesis, dysphagia, odynophagia, heartburn, regurgitation, abdominal pain, bloating/fullness, post prandial bloating, black tarrying stools, bloody stools, rectal pain, rectal fullness, rectal itching.     Family Hx  No other known family history or GI related malignancy (esophageal, gastric, pancreatic, liver or colon) or family history of IBD/celiac disease.     Social Hx   Never smoker. occ etoh, no drugs     PROBLEM LIST  Patient Active Problem List    Diagnosis Date Noted     Immunosuppression due to drug therapy  11/15/2023     Priority: Medium     Chronic systolic heart failure (H) 11/15/2023     Priority: Medium     Eosinophilic granulomatosis with polyangiitis (EGPA) (H) 07/20/2023     Priority: Medium     History of CVA (cerebrovascular accident) 07/20/2023     Priority: Medium     Noted during hospitalization in 5/2023 at [facility]  [facility] acute areas       Chronic obstructive pulmonary disease, unspecified COPD type (H) 03/15/2023     Priority: Medium     Hyperlipidemia LDL goal <70 10/30/2019     Priority: Medium     Type 2 diabetes mellitus with stage 3a chronic kidney disease, without long-term current use of insulin (H) 01/09/2018     Priority: Medium       PERTINENT PAST MEDICAL HISTORY:  Past Medical History:   Diagnosis Date     Chronic obstructive pulmonary disease, unspecified COPD type (H) 3/15/2023     Chronic systolic heart failure (H) 11/15/2023     Diabetes (H)      History of  CVA (cerebrovascular accident) 7/20/2023       PREVIOUS SURGERIES:  No past surgical history on file.    ALLERGIES:     Allergies   Allergen Reactions     Azathioprine Nausea and Vomiting       PERTINENT MEDICATIONS:    Current Outpatient Medications:      ACCU-CHEK FASTCLIX LANCET DRUM, [ACCU-CHEK FASTCLIX LANCET DRUM] TEST BLOOD SUGARS 3 TIMES DAILY, Disp: 300 each, Rfl: 3     ASPIRIN LOW DOSE 81 MG chewable tablet, Take 81 mg by mouth daily, Disp: , Rfl:      atorvastatin (LIPITOR) 20 MG tablet, TAKE 1 TABLET BY MOUTH EVERY DAY IN THE EVENING, Disp: 30 tablet, Rfl: 2     BD ALEXANDRA U/F 32G X 4 MM insulin pen needle, , Disp: , Rfl:      blood glucose test (ACCU-CHEK SMARTVIEW TEST STRIP) strips, [BLOOD GLUCOSE TEST (ACCU-CHEK SMARTVIEW TEST STRIP) STRIPS] USE TO CHECK BLOOD SUGARS TWICE DAILY., Disp: 200 strip, Rfl: 3     Continuous Blood Gluc Sensor (FREESTYLE LIVIA 2 SENSOR) MISC, 1 each every 14 days, Disp: 2 each, Rfl: 11     Dulaglutide (TRULICITY) 3 MG/0.5ML SOPN, Inject 3 mg Subcutaneous once a week, Disp: 2 mL, Rfl: 0     ferrous sulfate (FEROSUL) 325 (65 Fe) MG tablet, Take 1 tablet (325 mg) by mouth daily (with breakfast), Disp: 90 tablet, Rfl: 3     fluticasone-vilanterol (BREO ELLIPTA) 100-25 MCG/ACT inhaler, Inhale 1 puff into the lungs daily, Disp: 60 each, Rfl: 6     gabapentin (NEURONTIN) 300 MG capsule, Take 300 mg by mouth At Bedtime, Disp: , Rfl:      insulin glargine (LANTUS PEN) 100 UNIT/ML pen, Inject 10 Units Subcutaneous at bedtime, Disp: , Rfl:      melatonin 3 MG tablet, Take 9 mg by mouth nightly as needed for sleep, Disp: , Rfl:      Mepolizumab 100 MG/ML SOSY, Inject 3 mLs (300 mg) Subcutaneous every 30 days, Disp: 3 mL, Rfl: 11     metFORMIN (GLUCOPHAGE XR) 500 MG 24 hr tablet, Take 1 tablet (500 mg) by mouth daily (with dinner), Disp: 360 tablet, Rfl: 3     metFORMIN (GLUCOPHAGE) 1000 MG tablet, Take 1 tablet (1,000 mg) by mouth daily (with breakfast), Disp: 90 tablet, Rfl: 6      metoprolol succinate ER (TOPROL XL) 25 MG 24 hr tablet, Take 0.5 tablets (12.5 mg) by mouth every morning, Disp: 45 tablet, Rfl: 6     promethazine (PHENERGAN) 25 MG tablet, Take 1 tablet (25 mg) by mouth every 8 hours as needed for nausea, Disp: 15 tablet, Rfl: 0     tiotropium (SPIRIVA) 18 MCG inhaled capsule, Inhale 1 capsule (18 mcg) into the lungs daily, Disp: 18 capsule, Rfl: 3     traZODone (DESYREL) 50 MG tablet, Take 50 mg by mouth daily as needed for sleep, Disp: , Rfl:     SOCIAL HISTORY:  Social History     Socioeconomic History     Marital status:      Spouse name: Not on file     Number of children: Not on file     Years of education: Not on file     Highest education level: Not on file   Occupational History     Not on file   Tobacco Use     Smoking status: Never     Smokeless tobacco: Never   Vaping Use     Vaping Use: Never used   Substance and Sexual Activity     Alcohol use: Yes     Comment: a beer or wine every few months     Drug use: Never     Sexual activity: Not Currently     Partners: Female   Other Topics Concern     Parent/sibling w/ CABG, MI or angioplasty before 65F 55M? No   Social History Narrative     Not on file     Social Determinants of Health     Financial Resource Strain: Low Risk  (11/14/2023)    Financial Resource Strain      Within the past 12 months, have you or your family members you live with been unable to get utilities (heat, electricity) when it was really needed?: No   Food Insecurity: High Risk (11/14/2023)    Food Insecurity      Within the past 12 months, did you worry that your food would run out before you got money to buy more?: Yes      Within the past 12 months, did the food you bought just not last and you didn t have money to get more?: No   Transportation Needs: Low Risk  (11/14/2023)    Transportation Needs      Within the past 12 months, has lack of transportation kept you from medical appointments, getting your medicines, non-medical meetings or  "appointments, work, or from getting things that you need?: No   Physical Activity: Not on file   Stress: Not on file   Social Connections: Not on file   Interpersonal Safety: Not on file   Housing Stability: High Risk (11/14/2023)    Housing Stability      Do you have housing? : Yes      Are you worried about losing your housing?: Yes       FAMILY HISTORY:  Family History   Problem Relation Age of Onset     Heart Disease Mother      Diabetes Mother      Asthma Mother      Alzheimer Disease Father      No Known Problems Brother        Past/family/social history reviewed and no changes    PHYSICAL EXAMINATION:  Vitals reviewed: /87   Pulse 70   Ht 1.854 m (6' 1\")   Wt 90.1 kg (198 lb 11.2 oz)   SpO2 99%   BMI 26.22 kg/m    Wt:   Wt Readings from Last 2 Encounters:   12/13/23 90.1 kg (198 lb 11.2 oz)   12/06/23 89.8 kg (198 lb)      Constitutional: aaox3, cooperative, pleasant, not dyspneic/diaphoretic, no acute distress. Slight pallor.   Eyes: Sclera anicteric/injected  Ears/nose/mouth/throat: hearing intact  Neck: supple, active ROM w/o limitation or pain   CV: No edema  Respiratory: Unlabored breathing  Abd:  Nondistended, +bs, no hepatosplenomegaly, nontender, no peritoneal signs, neg Weaver's sign  Skin: warm, perfused, no jaundice  Psych: Normal affect  MSK: Normal gait     PERTINENT STUDIES:    Lab on 11/30/2023   Component Date Value Ref Range Status     Sodium 11/30/2023 140  135 - 145 mmol/L Final     Potassium 11/30/2023 4.5  3.4 - 5.3 mmol/L Final     Chloride 11/30/2023 101  98 - 107 mmol/L Final     Carbon Dioxide (CO2) 11/30/2023 28  22 - 29 mmol/L Final     Anion Gap 11/30/2023 11  7 - 15 mmol/L Final     Glucose 11/30/2023 130 (H)  70 - 99 mg/dL Final     Urea Nitrogen 11/30/2023 24.5 (H)  8.0 - 23.0 mg/dL Final     Creatinine 11/30/2023 1.64 (H)  0.67 - 1.17 mg/dL Final     GFR Estimate 11/30/2023 46 (L)  >60 mL/min/1.73m2 Final     Calcium 11/30/2023 9.9  8.8 - 10.2 mg/dL Final     Albumin " 11/30/2023 4.2  3.5 - 5.2 g/dL Final     Phosphorus 11/30/2023 3.7  2.5 - 4.5 mg/dL Final     Total Protein Urine mg/dL 11/30/2023 29.6    mg/dL Final     Total Protein Urine mg/mg Creat 11/30/2023 0.22 (H)  0.00 - 0.20 mg/mg Cr Final     Creatinine Urine mg/dL 11/30/2023 137.0  mg/dL Final     Color Urine 11/30/2023 Light Yellow  Colorless, Straw, Light Yellow, Yellow Final     Appearance Urine 11/30/2023 Clear  Clear Final     Glucose Urine 11/30/2023 Negative  Negative mg/dL Final     Bilirubin Urine 11/30/2023 Negative  Negative Final     Ketones Urine 11/30/2023 Negative  Negative mg/dL Final     Specific Gravity Urine 11/30/2023 1.015  1.003 - 1.035 Final     Blood Urine 11/30/2023 Negative  Negative Final     pH Urine 11/30/2023 5.5  5.0 - 7.0 Final     Protein Albumin Urine 11/30/2023 20 (A)  Negative mg/dL Final     Urobilinogen Urine 11/30/2023 Normal  Normal, 2.0 mg/dL Final     Nitrite Urine 11/30/2023 Negative  Negative Final     Leukocyte Esterase Urine 11/30/2023 Trace (A)  Negative Final     Mucus Urine 11/30/2023 Present (A)  None Seen /LPF Final     RBC Urine 11/30/2023 2  <=2 /HPF Final     WBC Urine 11/30/2023 3  <=5 /HPF Final     Squamous Epithelials Urine 11/30/2023 1  <=1 /HPF Final     MPO Janice IgG Instrument Value 11/30/2023 0.3  <3.5 U/mL Final     Myeloperoxidase Antibody IgG 11/30/2023 Negative  Negative Final     Proteinase 3 Janice IgG Instrument Va* 11/30/2023 <1.0  <2.0 U/mL Final     Proteinase 3 Antibody IgG 11/30/2023 Negative  Negative Final     Cystatin C 11/30/2023 1.9 (H)  0.6 - 1.0 mg/L Final     GFR Calculated with Cystatin C 11/30/2023 33 (L)  >=60 mL/min/1.73m2 Final     WBC Count 11/30/2023 7.2  4.0 - 11.0 10e3/uL Final     RBC Count 11/30/2023 4.57  4.40 - 5.90 10e6/uL Final     Hemoglobin 11/30/2023 11.0 (L)  13.3 - 17.7 g/dL Final     Hematocrit 11/30/2023 35.4 (L)  40.0 - 53.0 % Final     MCV 11/30/2023 78  78 - 100 fL Final     MCH 11/30/2023 24.1 (L)  26.5 - 33.0 pg  Final     MCHC 11/30/2023 31.1 (L)  31.5 - 36.5 g/dL Final     RDW 11/30/2023 16.9 (H)  10.0 - 15.0 % Final     Platelet Count 11/30/2023 374  150 - 450 10e3/uL Final     % Neutrophils 11/30/2023 58  % Final     % Lymphocytes 11/30/2023 30  % Final     % Monocytes 11/30/2023 9  % Final     % Eosinophils 11/30/2023 1  % Final     % Basophils 11/30/2023 1  % Final     % Immature Granulocytes 11/30/2023 1  % Final     NRBCs per 100 WBC 11/30/2023 0  <1 /100 Final     Absolute Neutrophils 11/30/2023 4.2  1.6 - 8.3 10e3/uL Final     Absolute Lymphocytes 11/30/2023 2.1  0.8 - 5.3 10e3/uL Final     Absolute Monocytes 11/30/2023 0.7  0.0 - 1.3 10e3/uL Final     Absolute Eosinophils 11/30/2023 0.1  0.0 - 0.7 10e3/uL Final     Absolute Basophils 11/30/2023 0.1  0.0 - 0.2 10e3/uL Final     Absolute Immature Granulocytes 11/30/2023 0.0  <=0.4 10e3/uL Final     Absolute NRBCs 11/30/2023 0.0  10e3/uL Final        Lab Results   Component Value Date    WBC 7.2 11/30/2023    WBC 8.9 11/15/2023    WBC 13.1 (H) 10/26/2023    HGB 11.0 (L) 11/30/2023    HGB 9.9 (L) 11/15/2023    HGB 11.2 (L) 10/26/2023     11/30/2023     (H) 11/15/2023     (H) 10/26/2023    CHOL 152 04/26/2023    CHOL 156 01/27/2021    CHOL 141 10/30/2019    TRIG 97 04/26/2023    TRIG 123 01/27/2021    TRIG 67 10/30/2019    HDL 64 04/26/2023    HDL 57 01/27/2021    HDL 56 10/30/2019    ALT 22 10/26/2023    ALT 23 04/26/2023    ALT 19 03/21/2022    AST 20 10/26/2023    AST 24 04/26/2023    AST 17 03/21/2022     11/30/2023     11/15/2023     (L) 10/26/2023    BUN 24.5 (H) 11/30/2023    BUN 18.6 11/15/2023    BUN 24.9 (H) 10/26/2023    CO2 28 11/30/2023    CO2 25 11/15/2023    CO2 22 10/26/2023    TSH 0.43 11/15/2023    TSH 0.61 03/21/2022    TSH 0.54 01/08/2018        Liver Function Studies -   Recent Labs   Lab Test 11/30/23  1209 10/26/23  1147   PROTTOTAL  --  7.9   ALBUMIN 4.2 4.2   BILITOTAL  --  0.3   ALKPHOS  --  246*   AST   --  20   ALT  --  22        PREVIOUS ENDOSCOPY    No results found for this or any previous visit.    ASSESSMENT/PLAN:  Ned Moore is a 63 year old year old male with PMHx of COPD, systolic HF, DM and h/o CVA and EGPA w/ cardiac involvement (following with rheum, nephro, cards) presenting to establish care with the [facility] GI group for new anemia x 1 month. He denies hx GIB, had a NEG guiac test in the ED 11/2023 and a negative cologuard in 2021. He is on ASA 81 mg. Most recent H/H 11.0/35.4 now on daily iron supplement, tolerating w/o GI distress    #new anemia   New anemia of unclear etiology requiring blood transfusion and IV venofer - recommended both upper and lower endoscopy for evaluation for R/O of occult GI bleed. If both these studies are negative, then would recommend to obtain VCE pill cam as well.     -We had an extensive discussion about indication of invasive approach, risk and benefits. He will think this decision over and if is agreeable, will call to make an appointment. He is OK for me to order the procedures today to ease scheduling logistics.     I detailed with him that I would recommend the following measures for endoscopy (which I included in my order)   -to be performed with GI endoscopy  -PAC eval prior for safety optimization   -double prep for colonoscopy     ER precautions reviewed     Future consideration -   -video capsule endoscopy if scopes are unremarkable  -consider restart of PPI after h.pylori     # stooling pattern  Consistent with constipation secondary to dietary/ lifestyle. Differential includes functional constipation, medication induced , intraluminal mass. TSH and metabolic panel WNL.     - recommended dietary /lifestyle changes  - okay to try fiber, per AVS    Future consideration   -miralax   -can consider celiac serology as part of work up in future if constipation continues     Orders Placed This Encounter   Procedures     Helicobacter pylori Antigen Stool     Adult GI   Referral - Procedure Only     PAC Visit Referral (For [facility] Only)     Colorectal cancer screening: ordered Cscope today     RTC after scopes or 2 mo or sooner if needed     Thank you for this consultation.  It was a pleasure to participate in the care of this patient; please contact me with any further questions.     Sandrine Moise PA-C    Follow up: As planned above. Today, I personally spent 40 minutes in direct face to face time with the patient, of which greater than 50% of the time was spent in patient education and counseling as described above. Approximately minutes were spent on indirect care associated with the patient's consultation including but not limited to review of: patient medical records to date, clinic visits, hospital records, lab results, imaging studies, procedural documentation, and coordinating care with other providers. The findings from this review are summarized in the above note. All of the above accounted for a cumulative time of 40 minutes and was performed on the date of service.     Code per complexity

## 2023-12-13 NOTE — PATIENT INSTRUCTIONS
It was a pleasure taking care of you today.  I've included a brief summary of our discussion and care plan from today's visit below.  Please review this information with your primary care provider.  _______________________________________________________________________    My recommendations are summarized as follows:    Most important next steps would be both upper and lower endoscopy procedure. Think it over. If you decide Yes, then please call to schedule at below number.   --MAC anesthesia  --Schedule with GI endoscopy (CSC at [facility])  --extended prep for colonoscopy to clean out the bowels  For the stooling - try to aim for about 25-30 gram of fiber in a day. Use next week to get a good idea of baseline daily fiber intake and build on it gradually. OK to supplement this with powder fiber such as Citrucel 1 tsp a daily x 2 weeks, increase gradually by 1 tsp weekly for a goal of 1-2 tbsp a day     Please call our clinic or send a [employer] message to us if you have any questions or concerns. MyChart messages are answered by your nurse or doctor typically within 24 hours.  Please allow extra time on weekends and holidays    Return to GI Clinic in (after scopes) months to review your progress.    _______________________________________________________________________    How do I schedule labs, imaging studies, or procedures that were ordered in clinic today?      Labs: To schedule lab appointment at the [facility], use my chart or call [phone]. If you have a [facility] lab closer to home where you are regularly seen you can give them a call.      Procedures: If a colonoscopy, upper endoscopy, breath test, esophageal manometry, or pH impedence was ordered today, our endoscopy team will call you to schedule this. If you have not heard from our endoscopy team within a week, please call [phone] option 2 to schedule.      Imaging Studies: If you were scheduled for a CT  scan, X-ray, MRI, ultrasound, HIDA scan or other imaging study, please call [phone] to have this scheduled.      Referral: If a referral to another specialty was ordered, expect a phone call or follow instructions above. If you have not heard from anyone regarding your referral in a week, please call our clinic to check the status.      Who do I call with any questions after my visit?  Please be in touch if there are any further questions that arise following today's visit.  There are multiple ways to contact your gastroenterology care team.       During business hours, you may reach a Gastroenterology nurse at [phone]     To schedule or reschedule an appointment, please call [phone].      You can always send a secure message through [employer].  [employer] messages are answered by your nurse or doctor typically within 24 hours.  Please allow extra time on weekends and holidays.       For urgent/emergent questions after business hours, you may reach the on-call GI Fellow by contacting the [facility]  at [phone].     How will I get the results of any tests ordered?    You will receive all of your results.  If you have signed up for [employer], any tests ordered at your visit will be available to you after your physician reviews them.  Typically this takes 1-2 weeks.  If there are urgent results that require a change in your care plan, your physician or nurse will call you to discuss the next steps.       What is [employer]?  [employer] is a secure way for you to access all of your healthcare records from the [facility].  It is a web based computer program, so you can sign on to it from any location.  It also allows you to send secure messages to your care team.  I recommend signing up for [employer] access if you have not already done so and are comfortable with using a computer.       How to I schedule a follow-up visit?  If you did not schedule a follow-up visit today, please call  [phone] to schedule a follow-up office visit.      Sincerely,    Sandrine Moise PA-C  Gastroenterology

## 2023-12-15 ENCOUNTER — PRE VISIT (OUTPATIENT)
Dept: OTOLARYNGOLOGY | Facility: CLINIC | Age: 63
End: 2023-12-15

## 2023-12-18 ENCOUNTER — MYC MEDICAL ADVICE (OUTPATIENT)
Dept: INTERNAL MEDICINE | Facility: CLINIC | Age: 63
End: 2023-12-18
Payer: COMMERCIAL

## 2023-12-18 DIAGNOSIS — M25.551 HIP PAIN, RIGHT: Primary | ICD-10-CM

## 2023-12-20 ENCOUNTER — THERAPY VISIT (OUTPATIENT)
Dept: PHYSICAL THERAPY | Facility: CLINIC | Age: 63
End: 2023-12-20
Attending: INTERNAL MEDICINE
Payer: COMMERCIAL

## 2023-12-20 DIAGNOSIS — M25.551 HIP PAIN, RIGHT: ICD-10-CM

## 2023-12-20 PROCEDURE — 97110 THERAPEUTIC EXERCISES: CPT | Mod: GP | Performed by: PHYSICAL THERAPIST

## 2023-12-20 PROCEDURE — 97161 PT EVAL LOW COMPLEX 20 MIN: CPT | Mod: GP | Performed by: PHYSICAL THERAPIST

## 2023-12-20 PROCEDURE — 99000 SPECIMEN HANDLING OFFICE-LAB: CPT | Performed by: PATHOLOGY

## 2023-12-20 PROCEDURE — 87338 HPYLORI STOOL AG IA: CPT | Performed by: PHYSICIAN ASSISTANT

## 2023-12-20 ASSESSMENT — ACTIVITIES OF DAILY LIVING (ADL)
LIGHT_TO_MODERATE_WORK: SLIGHT DIFFICULTY
HOS_ADL_SCORE(%): 53.13
WALKING_15_MINUTES_OR_GREATER: EXTREME DIFFICULTY
SITTING FOR 15 MINUTES: NO DIFFICULTY AT ALL
HOS_ADL_HIGHEST_POTENTIAL_SCORE: 64
HOS_ADL_ITEM_SCORE_TOTAL: 34
ROLLING OVER IN BED: SLIGHT DIFFICULTY
GOING DOWN 1 FLIGHT OF STAIRS: MODERATE DIFFICULTY
GETTING_INTO_AND_OUT_OF_A_BATHTUB: SLIGHT DIFFICULTY
HEAVY_WORK: MODERATE DIFFICULTY
WALKING_INITIALLY: MODERATE DIFFICULTY
DEEP SQUATTING: UNABLE TO DO
HOW_WOULD_YOU_RATE_YOUR_CURRENT_LEVEL_OF_FUNCTION_DURING_YOUR_USUAL_ACTIVITIES_OF_DAILY_LIVING_FROM_0_TO_100_WITH_100_BEING_YOUR_LEVEL_OF_FUNCTION_PRIOR_TO_YOUR_HIP_PROBLEM_AND_0_BEING_THE_INABILITY_TO_PERFORM_ANY_OF_YOUR_USUAL_DAILY_ACTIVITIES?: 40
STEPPING UP AND DOWN CURBS: SLIGHT DIFFICULTY
WALKING_UP_STEEP_HILLS: EXTREME DIFFICULTY
TWISTING/PIVOTING ON INVOLVED LEG: SLIGHT DIFFICULTY
GOING UP 1 FLIGHT OF STAIRS: MODERATE DIFFICULTY
GETTING INTO AND OUT OF AN AVERAGE CAR: SLIGHT DIFFICULTY
PUTTING ON SOCKS AND SHOES: NO DIFFICULTY AT ALL
WALKING_DOWN_STEEP_HILLS: EXTREME DIFFICULTY
WALKING_APPROXIMATELY_10_MINUTES: SLIGHT DIFFICULTY
STANDING FOR 15 MINUTES: MODERATE DIFFICULTY

## 2023-12-20 NOTE — PROGRESS NOTES
PHYSICAL THERAPY EVALUATION  Type of Visit: Evaluation    See electronic medical record for Abuse and Falls Screening details.    Subjective       Presenting condition or subjective complaint: Hip pain after prolonged standing and walking at work. 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   Date of onset: 11/30/23    Relevant medical history: Anemia; Asthma; Diabetes; Fibromyalgia; Foot drop; Kidney disease; Significant weakness; Stroke; Vision problems   Dates & types of surgery:      Prior diagnostic imaging/testing results: Other None of these   Prior therapy history for the same diagnosis, illness or injury: Yes Had setback after a day of part time work, prolonged standing    Prior Level of Function  Transfers:   Ambulation:   ADL:   IADL:     Living Environment  Social support: With family members   Type of home: House; 2-story   Stairs to enter the home: Yes 4 Is there a railing: Yes   Ramp: No   Stairs inside the home: Yes 12 Is there a railing: Yes   Help at home: Self Cares (home health aide/personal care attendant, family, etc)  Equipment owned: Walker with wheels     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[employer], 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 [employer]  Hobbies/Interests: Model building, reading, computer    Patient goals for therapy: Easily walk, climb stairs w/o walker    Pain assessment: See objective evaluation for additional pain details     Objective   LUMBAR SPINE EVALUATION  PAIN: Pain Level at Rest: 0/10  Pain Level with Use: 8/10  Pain Location: R gluteals, R lateral hip/thigh  Pain Quality: Sharp  Pain Frequency: intermittent  Pain is Worst: worse after prolonged sitting  Pain is Exacerbated By: transfers, early in the morning, walking after prolonged sitting  Pain is Relieved By: NSAIDs  Pain Progression: Improved  INTEGUMENTARY (edema, incisions):   POSTURE:   GAIT:   Weightbearing Status:  WBAT  Assistive Device(s): Cane (single end), Walker (front wheeled)  Gait Deviations: Base of support increased  Increased trunk sway, impaired coordination, occasional loss of balance; SBA needed, near fall in clinic during gait assessment, requiring min assist to correct  BALANCE/PROPRIOCEPTION: Single Leg Stance Eyes Open (seconds): unable due to LOB  WEIGHTBEARING ALIGNMENT:   NON-WEIGHTBEARING ALIGNMENT:    ROM:  WNL R hip PROM, pain ++ with R hip ER. Min loss of Lumbar ext at R lower lumbar spine   PELVIC/SI SCREEN:   STRENGTH:  R hip ABD: 2/5; R hip ER: 2/5     MYOTOMES:    Left Right   T12-L3 (Hip Flexion) 5 2   L2-4 (Quads)  5 4   L4 (Ankle DF) 5 4   L5 (Great Toe Ext)     S1 (Toe Raise)     *MYOTOMAL Loss from prev CVA  DTR S:   CORD SIGNS:   DERMATOMES:   NEURAL TENSION:   FLEXIBILITY: Decreased hip flexors L, Decreased hip flexors R  LUMBAR/HIP Special Tests:    PELVIS/SI SPECIAL TESTS:   FUNCTIONAL TESTS:   PALPATION:   SPINAL SEGMENTAL CONCLUSIONS:       Assessment & Plan   CLINICAL IMPRESSIONS  Medical Diagnosis: R hip pain and weakness    Treatment Diagnosis: R hip pain and weakness   Impression/Assessment: Patient is a 63 year old male with R hip complaints.  The following significant findings have been identified: Pain, Decreased ROM/flexibility, Decreased joint mobility, Decreased strength, Impaired gait, and Impaired muscle performance. These impairments interfere with their ability to perform self care tasks, work tasks, recreational activities, household chores, household mobility, and community mobility as compared to previous level of function.     Clinical Decision Making (Complexity):  Clinical Presentation: Stable/Uncomplicated  Clinical Presentation Rationale: based on medical and personal factors listed in PT evaluation  Clinical Decision Making (Complexity): Low complexity    PLAN OF CARE  Treatment Interventions:  Interventions: Gait Training, Manual Therapy, Neuromuscular  Re-education, Therapeutic Activity, Therapeutic Exercise    Long Term Goals     PT Goal 1  Goal Identifier: Transfers  Goal Description: Pt will be able to complete sit to stands w/o UE support and w/o increased pain  Rationale: to maximize safety and independence with performance of ADLs and functional tasks;to maximize safety and independence within the home  Target Date: 02/01/24      Frequency of Treatment: 1x week  Duration of Treatment: 6 weeks    Recommended Referrals to Other Professionals:   Education Assessment:   Learner/Method: Patient;No Barriers to Learning    Risks and benefits of evaluation/treatment have been explained.   Patient/Family/caregiver agrees with Plan of Care.     Evaluation Time:     PT Eval, Low Complexity Minutes (35496): 25       Signing Clinician: Tommie Posada PT

## 2023-12-22 LAB — H PYLORI AG STL QL IA: NEGATIVE

## 2023-12-24 ENCOUNTER — HEALTH MAINTENANCE LETTER (OUTPATIENT)
Age: 63
End: 2023-12-24

## 2024-01-02 ENCOUNTER — HOSPITAL ENCOUNTER (OUTPATIENT)
Dept: CARDIAC REHAB | Facility: CLINIC | Age: 64
Discharge: HOME | End: 2024-01-02
Attending: INTERNAL MEDICINE
Payer: COMMERCIAL

## 2024-01-02 DIAGNOSIS — D72.18 EOSINOPHILIC GRANULOMATOSIS WITH POLYANGIITIS (EGPA) (H): ICD-10-CM

## 2024-01-02 DIAGNOSIS — M30.1 EOSINOPHILIC GRANULOMATOSIS WITH POLYANGIITIS (EGPA) (H): ICD-10-CM

## 2024-01-02 PROCEDURE — G0238 OTH RESP PROC, INDIV: HCPCS

## 2024-01-03 ENCOUNTER — THERAPY VISIT (OUTPATIENT)
Dept: PHYSICAL THERAPY | Facility: CLINIC | Age: 64
End: 2024-01-03
Attending: INTERNAL MEDICINE
Payer: COMMERCIAL

## 2024-01-03 ENCOUNTER — PATIENT OUTREACH (OUTPATIENT)
Dept: NEPHROLOGY | Facility: CLINIC | Age: 64
End: 2024-01-03

## 2024-01-03 DIAGNOSIS — M25.551 HIP PAIN, RIGHT: Primary | ICD-10-CM

## 2024-01-03 PROCEDURE — 97110 THERAPEUTIC EXERCISES: CPT | Mod: GP | Performed by: PHYSICAL THERAPIST

## 2024-01-03 PROCEDURE — 97112 NEUROMUSCULAR REEDUCATION: CPT | Mod: GP | Performed by: PHYSICAL THERAPIST

## 2024-01-03 NOTE — PROGRESS NOTES
Daughter returned call- patient is using walker, falling multiple times in the last few weeks. Daughter has taken time off work and needing more help.  Patient is not able to take shower or other IADLs on his own since discharge.  Daughter is reporting concerns she is not able to sustain the care required of her dad. Updated she has a follow up with PCP on resources.  Reviewed through dialog that patient is not safe to leave the home without assistance at this time, r/t walker frequent falls when previously attempted on own.  Daughter or spouse has to bring patient to all appts. With these findings patient may qualify for being home bound as not safe to leave without assistance and therefore a request for home care assessment would be appropriate to ask. PCP also has social workers that can give some resources in community they can see if patient qualifies for, including [facility] assessment for medicaide or waivered services.   Home Care can send an RN/PT/MSW out to the home to check if patient eligible for home care, offer safety tips or equipment that may help prevent more falls in the home, and SW to help coordinate community resources with patient and family.  Domitila verbalized understanding to suggestions and will follow up with PCP on concerns and requests.    Plan: follow up with Dr. Tripp on add on clinic as patient needs follow up this month if able then follow up with patient on GI request for Endo/Colon procedures and clinic appt.    Holly Kirkland RN, BSN, PHN  [facility] Nephrology Nurse Navigator  [phone]

## 2024-01-03 NOTE — PROGRESS NOTES
Message received from daughter, message left with [facility]culitis Program nurse line for timely support.  Holly Kirkland RN, BSN, PHN  [facility] Nephrology Nurse Navigator  [phone]

## 2024-01-04 ENCOUNTER — VIRTUAL VISIT (OUTPATIENT)
Dept: INTERNAL MEDICINE | Facility: CLINIC | Age: 64
End: 2024-01-04
Payer: COMMERCIAL

## 2024-01-04 ENCOUNTER — TELEPHONE (OUTPATIENT)
Dept: INTERNAL MEDICINE | Facility: CLINIC | Age: 64
End: 2024-01-04

## 2024-01-04 DIAGNOSIS — M30.1 EOSINOPHILIC GRANULOMATOSIS WITH POLYANGIITIS (EGPA) (H): ICD-10-CM

## 2024-01-04 DIAGNOSIS — I50.22 CHRONIC SYSTOLIC HEART FAILURE (H): ICD-10-CM

## 2024-01-04 DIAGNOSIS — D72.18 EOSINOPHILIC GRANULOMATOSIS WITH POLYANGIITIS (EGPA) (H): ICD-10-CM

## 2024-01-04 DIAGNOSIS — Z79.899 IMMUNOSUPPRESSION DUE TO DRUG THERAPY (H): ICD-10-CM

## 2024-01-04 DIAGNOSIS — D84.821 IMMUNOSUPPRESSION DUE TO DRUG THERAPY (H): ICD-10-CM

## 2024-01-04 DIAGNOSIS — E11.22 TYPE 2 DIABETES MELLITUS WITH STAGE 3A CHRONIC KIDNEY DISEASE, WITHOUT LONG-TERM CURRENT USE OF INSULIN (H): Primary | ICD-10-CM

## 2024-01-04 DIAGNOSIS — J44.9 CHRONIC OBSTRUCTIVE PULMONARY DISEASE, UNSPECIFIED COPD TYPE (H): ICD-10-CM

## 2024-01-04 DIAGNOSIS — N18.31 TYPE 2 DIABETES MELLITUS WITH STAGE 3A CHRONIC KIDNEY DISEASE, WITHOUT LONG-TERM CURRENT USE OF INSULIN (H): Primary | ICD-10-CM

## 2024-01-04 PROCEDURE — 99443 PR PHYSICIAN TELEPHONE EVALUATION 21-30 MIN: CPT | Performed by: INTERNAL MEDICINE

## 2024-01-04 NOTE — PROGRESS NOTES
"Ned is a 63 year old who is being evaluated via a billable telephone visit.      What phone number would you like to be contacted at? [phone]  How would you like to obtain your AVS? [place]    Distant Location (provider location):  On-site    Assessment & Plan     (E11.22,  N18.31) Type 2 diabetes mellitus with stage 3a chronic kidney disease, without long-term current use of insulin (H)  (primary encounter diagnosis)  Comment: seems to have multiple providers involved with this.  Plan: since we have MTM and myself, they will cancel future appointments with  [facility] system. They are very interested in consolidating care of his multiple specialists into [facility].    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 Weakness is significant still with some falls.  Recommended home care for evaluation of safety as well as care needs going forward.    (D84.821,  Z79.899) Immunosuppression due to drug therapy   Comment: stable condition  Plan: per pulmonology    (I50.22) Chronic systolic heart failure (H)  Comment: stable by sx report  Plan: Home Care Referral, Adult Cardiology Eval          Referral        To consolidate care, recommended transfer cardiology services to our system, referral placed.    (J44.9) Chronic obstructive pulmonary disease, unspecified COPD type (H)  Comment: ongoing care with pulmonology  Plan: Home Care Referral        Weakness, falls continue. Home care referral               BMI:   Estimated body mass index is 26.22 kg/m  as calculated from the following:    Height as of 12/13/23: 1.854 m (6' 1\").    Weight as of 12/13/23: 90.1 kg (198 lb 11.2 oz).           Mark Briggs MD  [facility]    Subjective   Ned is a 63 year old, presenting for the following health " issues:  Follow Up      1/4/2024    11:16 AM   Additional Questions   Roomed by RICHARD Fernandez       History of Present Illness     Asthma:  He presents for follow up of asthma.  He has no cough, no wheezing, and no shortness of breath. He is not using a relief medication.  He does not miss any doses of his controller medication throughout the week. Patient is aware of the following triggers: unaware of any triggers. The patient has not had a visit to the Emergency Room, Urgent Care or Hospital due to asthma since the last clinic visit.     Back Pain:  He presents for follow up of back pain. Patient's back pain is a new problem.    Original cause of back pain: other  First noticed back pain: more than 1 month ago  Patient feels back pain: comes and goesLocation of back pain:  Right hip, right side of waist and left side of waist  Description of back pain: sharp  Back pain spreads: nowhere    Since patient first noticed back pain, pain is: gradually improving  Does back pain interfere with his job:  Yes  On a scale of 1-10 (10 being the worst), patient describes pain as:  7  What makes back pain worse: bending and certain positions   Acupuncture: not tried  Acetaminophen: helpful  Activity or exercise: helpful  Chiropractor:  Not tried  Cold: not tried  Heat: not tried  Massage: not tried  Muscle relaxants: not tried  NSAIDS: not helpful  Opioids: not tried  Physical Therapy: helpful  Rest: helpful  Steroid Injection: not tried  Stretching: helpful  Surgery: not tried  TENS unit: not tried  Topical pain relievers: not tried  Other healthcare providers patient is seeing for back pain: Physical Therapist    Diabetes:   He presents for follow up of diabetes.  He is checking home blood glucose two times daily.   He checks blood glucose before meals and at bedtime.  Blood glucose is never over 200 and never under 70. He is aware of hypoglycemia symptoms including shakiness.    He has no concerns regarding his diabetes at this  time.  He is having numbness in feet and excessive thirst.            He eats 0-1 servings of fruits and vegetables daily.He consumes 0 sweetened beverage(s) daily.He exercises with enough effort to increase his heart rate 9 or less minutes per day.  He exercises with enough effort to increase his heart rate 3 or less days per week. He is missing 1 dose(s) of medications per week.  He is not taking prescribed medications regularly due to other.               Review of Systems         Objective    Vitals - Patient Reported  Systolic (Patient Reported): 130  Diastolic (Patient Reported): 65        Physical Exam   healthy, alert, and no distress  PSYCH: Alert and oriented times 3; coherent speech, normal   rate and volume, able to articulate logical thoughts, able   to abstract reason, no tangential thoughts, no hallucinations   or delusions  His affect is normal  RESP: No cough, no audible wheezing, able to talk in full sentences  Remainder of exam unable to be completed due to telephone visits                Phone call duration: 34 minutes

## 2024-01-04 NOTE — TELEPHONE ENCOUNTER
Reason for Call:  Appointment Request    Patient requesting this type of appt:  Telephone Call    Requested provider: Mark Briggs    Reason patient unable to be scheduled: Not within requested timeframe    When does patient want to be seen/preferred time:  As soon as possible    Comments: Pts daughter called and stated she didn't realize telephone call was at 11:30 for today, so needs to reschedule for as soon as possible. Please contact.     Could we send this information to you in "[employer]" or would you prefer to receive a phone call?:   Patient would prefer a phone call   Okay to leave a detailed message?: Yes at Other phone number:  [phone]    Call taken on 1/4/2024 at 11:06 AM by Tere Thomas

## 2024-01-04 NOTE — TELEPHONE ENCOUNTER
Called, and pt's daughter thought the telephone appt was at 10:30AM and she missed it. She does not need to change appt then, just the phone number to call, so changed in appt notes.

## 2024-01-08 ENCOUNTER — HOSPITAL ENCOUNTER (OUTPATIENT)
Dept: CARDIAC REHAB | Facility: CLINIC | Age: 64
Discharge: HOME | End: 2024-01-08
Attending: INTERNAL MEDICINE
Payer: COMMERCIAL

## 2024-01-08 PROCEDURE — G0238 OTH RESP PROC, INDIV: HCPCS

## 2024-01-08 NOTE — TELEPHONE ENCOUNTER
FUTURE VISIT INFORMATION      FUTURE VISIT INFORMATION:    Date: 1/16/23    Time: 8/40am    [place] [facility] [place] [facility] [place]FORMATION:    Referring provider:  Jareth Strong DO     Referring providers clinic:  [facility]    Reason for visit/diagnosis  EGPA under control, sinus congestion with drainage    RECORDS REQUESTED FROM:       Clinic name Comments Records Status Imaging Status   [facility]   12/6/23- ov wJareth Walters DO  [facility]  5/21/23- MR Brain   5/20/23- CT Angio Head Neck   5/14/23- CT Angio Chest   5/14/23- CT [facility]  Pacs

## 2024-01-09 DIAGNOSIS — E11.40 TYPE 2 DIABETES MELLITUS WITH DIABETIC NEUROPATHY, UNSPECIFIED (H): ICD-10-CM

## 2024-01-09 RX ORDER — ATORVASTATIN CALCIUM 20 MG/1
TABLET, FILM COATED ORAL
Qty: 90 TABLET | Refills: 1 | Status: SHIPPED | OUTPATIENT
Start: 2024-01-09 | End: 2024-02-28

## 2024-01-10 ENCOUNTER — THERAPY VISIT (OUTPATIENT)
Dept: PHYSICAL THERAPY | Facility: CLINIC | Age: 64
End: 2024-01-10
Attending: INTERNAL MEDICINE
Payer: COMMERCIAL

## 2024-01-10 ENCOUNTER — HOSPITAL ENCOUNTER (OUTPATIENT)
Dept: CARDIAC REHAB | Facility: CLINIC | Age: 64
Discharge: HOME | End: 2024-01-10
Attending: INTERNAL MEDICINE
Payer: COMMERCIAL

## 2024-01-10 DIAGNOSIS — M25.551 HIP PAIN, RIGHT: Primary | ICD-10-CM

## 2024-01-10 PROCEDURE — 97112 NEUROMUSCULAR REEDUCATION: CPT | Mod: GP | Performed by: PHYSICAL THERAPIST

## 2024-01-10 PROCEDURE — 97110 THERAPEUTIC EXERCISES: CPT | Mod: GP | Performed by: PHYSICAL THERAPIST

## 2024-01-10 PROCEDURE — G0239 OTH RESP PROC, GROUP: HCPCS

## 2024-01-11 ENCOUNTER — VIRTUAL VISIT (OUTPATIENT)
Dept: PHARMACY | Facility: CLINIC | Age: 64
End: 2024-01-11
Payer: COMMERCIAL

## 2024-01-11 DIAGNOSIS — G47.00 INSOMNIA, UNSPECIFIED TYPE: ICD-10-CM

## 2024-01-11 DIAGNOSIS — M30.1 EOSINOPHILIC GRANULOMATOSIS WITH POLYANGIITIS (EGPA) (H): ICD-10-CM

## 2024-01-11 DIAGNOSIS — J44.9 CHRONIC OBSTRUCTIVE PULMONARY DISEASE, UNSPECIFIED COPD TYPE (H): ICD-10-CM

## 2024-01-11 DIAGNOSIS — G56.10 MEDIAN NERVE NEUROPATHY, UNSPECIFIED LATERALITY: ICD-10-CM

## 2024-01-11 DIAGNOSIS — E11.40 TYPE 2 DIABETES MELLITUS WITH DIABETIC NEUROPATHY, UNSPECIFIED WHETHER LONG TERM INSULIN USE (H): Primary | ICD-10-CM

## 2024-01-11 DIAGNOSIS — Z78.9 TAKES DIETARY SUPPLEMENTS: ICD-10-CM

## 2024-01-11 DIAGNOSIS — E78.5 HYPERLIPIDEMIA LDL GOAL <70: ICD-10-CM

## 2024-01-11 DIAGNOSIS — D72.18 EOSINOPHILIC GRANULOMATOSIS WITH POLYANGIITIS (EGPA) (H): ICD-10-CM

## 2024-01-11 PROCEDURE — 99607 MTMS BY PHARM ADDL 15 MIN: CPT

## 2024-01-11 PROCEDURE — 99605 MTMS BY PHARM NP 15 MIN: CPT

## 2024-01-11 RX ORDER — PROMETHAZINE HYDROCHLORIDE 25 MG/1
25 TABLET ORAL EVERY 8 HOURS PRN
Qty: 30 TABLET | Refills: 0 | Status: SHIPPED | OUTPATIENT
Start: 2024-01-11 | End: 2024-02-01

## 2024-01-11 NOTE — PATIENT INSTRUCTIONS
"Recommendations from today's MTM visit:                                                      MTM (medication therapy management) is a service provided by a clinical pharmacist designed to help you get the most of out of your medicines.   Today we reviewed what your medicines are for, how to know if they are working, that your medicines are safe and how to make your medicine regimen as easy as possible.      Decrease Semglee dose to 6 units from previous dose of 10 units   Increase Trazodone dose to 75 mg from the previous dose of 50 mg at bedtime   Consider adding 5 extra tablets the gabapentin monthly tablets    Increase Trulicity dose from 0.75 to 1.5 mg weekly    Follow-up: Due on 02/13/202    It was great speaking with you today.  I value your experience and would be very thankful for your time in providing feedback in our clinic survey. In the next few days, you may receive an email or text message from [facility] [employer] with a link to a survey related to your  clinical pharmacist.\"     To schedule another MTM appointment, please call the clinic directly or you may call the MTM scheduling line at [phone].    My Clinical Pharmacist's contact information:                                                      Please feel free to contact me with any questions or concerns you have.        Syed Borjas, PharmD     Medication Therapy Management (MTM) Pharmacist     [phone]     [email or web address]    "

## 2024-01-11 NOTE — PROGRESS NOTES
Medication Therapy Management (MTM) Encounter    ASSESSMENT:                            Medication Adherence/Access: No issues identified    Type 2 Diabetes: Patient is not meeting A1c goal of < 7%.  Most of the FBG readings in the morning are within goal of 80 - 130. Out of maría sensor and using finger pock. Appropriate to increase Trulicity dose considering patient restarted at a lower dose due to not taking the med for sometime since it was  (Not refrigerated).      Chronic Obstructive Pulmonary disease/Eosinophilic Granulomatosis: Stable on current medication, but patient is congested and will be seen ENT soon. The congestion is affecting patient's sleep.     Insomnia: Unable to sleep well due to congestion, and will be seen ENT soon. Otherwise trazodone and melatonin work for patient    Myocarditis: Stable on metoprolol succinate 12.5 mg daily.     Hyperlipidemia: Stable on atorvastatin 20 mg daily.     Neuropathic nerve leg pain:  Stable on gabapentin 300 mg daily.    Dietary supplement: Continue  taking Arianna Shell calcium+D3 500 - 10 mg,and takes twice daily,     PLAN:                            Decrease Semglee dose to 6 units from previous dose of 10 units   Increase Trazodone dose to 75 mg from the previous dose of 50 mg at bedtime   Consider adding 5 extra tablets the gabapentin monthly tablets    Increase Trulicity dose from 0.75 to 1.5 mg weekly    Follow-up: Due on 2024     SUBJECTIVE/OBJECTIVE:                          Ned Moore is a 63 year old male called for a follow up visit from 2023.    Reason for visit: Medication Review Initial.    Allergies/ADRs: Reviewed in chart  Past Medical History: Reviewed in chart  Tobacco: He reports that he has never smoked. He has never been exposed to tobacco smoke. He has never used smokeless tobacco.    Medication Adherence/Access: no issues reported    Type 2 Diabetes: Currently taking metformin 500 mg XR with supper and metformin 1000 mg  with breakfast, and Semglee 10 units.  Patient stopped taking Ozempic due to weight loss, and taking Trulicity 0.75 mg weekly. Patient did not know that Trulicity should be refrigerated, and he had to throw away, and start all over again since insurance could not refill it before time.    Aspirin 81mg daily  Blood sugar monitoring: Continuous Glucose Monitor. Out of maría sensors. Using glucometer. In the morning the readings were 83, 101, and 110.   Denies any low sugar symptoms  Current diabetes symptoms: Fatigue.   Diet/Exercise: He is eating three times a day and he does not have the appetite he used to have. This might be due to his stuffed nose.   Eye exam: up to date  Foot exam: due  Urine Albumin:         Lab Results   Component Value Date     UMALCR 39.06 (H) 11/15/2023            Lab Results   Component Value Date     A1C 8.0 11/15/2023     A1C 8.2 04/26/2023         Wt Readings from Last 2 Encounters:   12/06/23 198 lb (89.8 kg)   11/30/23 189 lb (85.7 kg)      - Patient did not loose any weight and he has gained weight in the last one or two weeks. He does not have any swelling at this time.   Checked maría 3 coverage, but it is formulary excluded. Will follow up with patient closely    Chronic Obstructive Pulmonary disease/Eosinophilic Granulomatosis:  Currently taking Breo Ellipta one inhalation daily, albuterol  2 puff every 6 hours as needed for breathing.  He was not taking Breo Ellipta and was without it for a month, but now he is taking. He is also taking Mepolizumab every three month for his lungs. Patient said he does not have any shortness of breath. He has a nasal congestion and he is going to see ENT. He tried Flonase and also deep sea nasal spray. This did not work for him. He might be snoring also.    Insomnia: Currently taking trazodone 50 mg at bedtime as needed, and melatonin 9  mg daily as needed. Noted most of the nights he sleeps fine, but lately due to congestion not sleeping well.  The congestion makes him breath with his mouth. This gives him dry mouth and makes him not to sleep well. Patient will be seen ENT soon.     Myocarditis: Currently taking metoprolol succinate 12.5 mg daily. Patient said he is using this medication for myocarditis. 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     BP Readings from Last 3 Encounters:   12/13/23 137/87   12/06/23 106/68   11/30/23 139/69     Hyperlipidemia: Currently taking atorvastatin 20 mg daily. No reported side effects.     LDL Cholesterol Calculated   Date Value Ref Range Status   04/26/2023 69 <=100 mg/dL Final     Direct Measure HDL   Date Value Ref Range Status   04/26/2023 64 >=40 mg/dL Final     Neuropathic nerve leg pain:Currently taking gabapentin 300 mg daily. Patient said his nerve pain is controlled. Rarely does he feel pain and he takes a second tablet. Patient might wanted an extra 5 tablets for some of the days his pain is not controlled.     Anemia: Currently taking ferrous sulfate 325 mg daily, but takes promethazine with it for nausea and vomiting. He run out of promethazine and did not take iron yesterday and possibly today.     Hemoglobin   Date Value Ref Range Status   11/30/2023 11.0 (L) 13.3 - 17.7 g/dL Final       Today's Vitals: There were no vitals taken for this visit.  ----------------      I spent 44 minutes with this patient today. All changes were made via collaborative practice agreement with Mark Briggs MD. A copy of the visit note was provided to the patient's provider(s).    A summary of these recommendations was mailed to the patient.      Telemedicine Visit Details  Type of service:  Telephone visit  Start Time:  09:30 AM  End Time: 10:14 AM     Medication Therapy Recommendations  Insomnia, unspecified type    Current Medication: traZODone (DESYREL) 50 MG tablet    Rationale: Dose too low - Dosage too low - Effectiveness   Recommendation: Increase Dose - traZODone 75 mg Tabs half-tab   Status: Accepted per CPA         Type 2 diabetes mellitus with diabetic neuropathy, unspecified whether long term insulin use (H)    Current Medication: dulaglutide (TRULICITY) 1.5 MG/0.5ML pen   Rationale: Dose too low - Dosage too low - Effectiveness   Recommendation: Increase Dose - Increase Trulicity dose from 0.75 mg to 1.5 mg weekly   Status: Accepted per CPA          Current Medication: insulin glargine (LANTUS PEN) 100 UNIT/ML pen   Rationale: Dose too high - Dosage too high - Safety   Recommendation: Decrease Dose - Decrease Semglee dose to 6 units from the previous dose of 10 units   Status: Accepted per CPA              Syed Borjas, PharmD     Medication Therapy Management (MTM) Pharmacist     [phone]     [email or web address]

## 2024-01-12 ENCOUNTER — THERAPY VISIT (OUTPATIENT)
Dept: PHYSICAL THERAPY | Facility: CLINIC | Age: 64
End: 2024-01-12
Payer: COMMERCIAL

## 2024-01-12 ENCOUNTER — TELEPHONE (OUTPATIENT)
Dept: NEPHROLOGY | Facility: CLINIC | Age: 64
End: 2024-01-12

## 2024-01-12 DIAGNOSIS — M25.551 HIP PAIN, RIGHT: Primary | ICD-10-CM

## 2024-01-12 PROCEDURE — 97110 THERAPEUTIC EXERCISES: CPT | Mod: GP | Performed by: PHYSICAL THERAPIST

## 2024-01-12 PROCEDURE — 97112 NEUROMUSCULAR REEDUCATION: CPT | Mod: GP | Performed by: PHYSICAL THERAPIST

## 2024-01-15 ENCOUNTER — HOSPITAL ENCOUNTER (OUTPATIENT)
Dept: CARDIAC REHAB | Facility: CLINIC | Age: 64
Discharge: HOME | End: 2024-01-15
Attending: INTERNAL MEDICINE
Payer: COMMERCIAL

## 2024-01-15 PROCEDURE — G0239 OTH RESP PROC, GROUP: HCPCS

## 2024-01-16 ENCOUNTER — MYC MEDICAL ADVICE (OUTPATIENT)
Dept: NEPHROLOGY | Facility: CLINIC | Age: 64
End: 2024-01-16

## 2024-01-16 ENCOUNTER — ANCILLARY PROCEDURE (OUTPATIENT)
Dept: CT IMAGING | Facility: CLINIC | Age: 64
End: 2024-01-16
Attending: STUDENT IN AN ORGANIZED HEALTH CARE EDUCATION/TRAINING PROGRAM
Payer: COMMERCIAL

## 2024-01-16 ENCOUNTER — OFFICE VISIT (OUTPATIENT)
Dept: OTOLARYNGOLOGY | Facility: CLINIC | Age: 64
End: 2024-01-16
Payer: COMMERCIAL

## 2024-01-16 ENCOUNTER — PRE VISIT (OUTPATIENT)
Dept: OTOLARYNGOLOGY | Facility: CLINIC | Age: 64
End: 2024-01-16

## 2024-01-16 VITALS
WEIGHT: 188.98 LBS | SYSTOLIC BLOOD PRESSURE: 154 MMHG | DIASTOLIC BLOOD PRESSURE: 71 MMHG | HEIGHT: 73 IN | BODY MASS INDEX: 25.05 KG/M2

## 2024-01-16 DIAGNOSIS — M30.1 EOSINOPHILIC GRANULOMATOSIS WITH POLYANGIITIS (EGPA) (H): ICD-10-CM

## 2024-01-16 DIAGNOSIS — D72.18 EOSINOPHILIC GRANULOMATOSIS WITH POLYANGIITIS (EGPA) (H): Primary | ICD-10-CM

## 2024-01-16 DIAGNOSIS — J33.9 NASAL POLYP: Primary | ICD-10-CM

## 2024-01-16 DIAGNOSIS — J34.89 NASAL OBSTRUCTION: ICD-10-CM

## 2024-01-16 DIAGNOSIS — D72.18 EOSINOPHILIC GRANULOMATOSIS WITH POLYANGIITIS (EGPA) (H): ICD-10-CM

## 2024-01-16 DIAGNOSIS — J34.2 DEVIATED NASAL SEPTUM: ICD-10-CM

## 2024-01-16 DIAGNOSIS — M30.1 EOSINOPHILIC GRANULOMATOSIS WITH POLYANGIITIS (EGPA) (H): Primary | ICD-10-CM

## 2024-01-16 DIAGNOSIS — J33.9 NASAL POLYP: ICD-10-CM

## 2024-01-16 PROCEDURE — 99214 OFFICE O/P EST MOD 30 MIN: CPT | Mod: 25 | Performed by: STUDENT IN AN ORGANIZED HEALTH CARE EDUCATION/TRAINING PROGRAM

## 2024-01-16 PROCEDURE — 31231 NASAL ENDOSCOPY DX: CPT | Performed by: STUDENT IN AN ORGANIZED HEALTH CARE EDUCATION/TRAINING PROGRAM

## 2024-01-16 PROCEDURE — 70486 CT MAXILLOFACIAL W/O DYE: CPT | Performed by: RADIOLOGY

## 2024-01-16 RX ORDER — MOMETASONE FUROATE MONOHYDRATE 50 UG/1
2 SPRAY, METERED NASAL DAILY
Qty: 17 G | Refills: 3 | Status: SHIPPED | OUTPATIENT
Start: 2024-01-16

## 2024-01-16 ASSESSMENT — PAIN SCALES - GENERAL: PAINLEVEL: NO PAIN (0)

## 2024-01-16 NOTE — PROGRESS NOTES
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  --we discussed that chronic sinusitis (CRS) is a chronic inflammatory condition that affects the nose and sinuses   --we discussed that we manage CRS using a combination of systemic antibiotic and steroid therapy, topical steroid and antibiotics, endoscopic sinus surgery, and occasionally systemic biologic therapy (like nucala that he is on)  --will have to use systemic steroids judiciously given his diabetes - if needed, will coordinate with rheum/endo to minimize side effects    Plan:   --NS irrigation, nasonex  --CT scan  --follow up 1 month    (J34.2) Deviated nasal septum  Comment:   --significant DNS to the left  Plan:   --monitor  --would need septoplasty at the time of surgery if surgery is needed        Chief Complaint: nasal congestion    History of Present Illness:   Ned Moore is a 63 year old male who presents for consultation regarding EGPA.    Complicated presentation of EGPA   Currently under control  Nucala , no steroids, no current synthetic DMARDS    3 months ago felt that nose started feeling blocked  Difficulty with air flow  ++rhinorrhea/post nasal drip  Starting to mouth breathe - significantly affecting his sleep  Saline spray, fluticasone  Smell and taste are gone  Has not used afrin    Review of systems: A 14-point review of systems has been conducted and was negative for any notable symptoms, except as dictated in the history of present illness.     Medical History:  Past Medical History:   Diagnosis Date    Chronic obstructive pulmonary disease, unspecified COPD type (H) 3/15/2023     Chronic systolic heart failure (H) 11/15/2023    Diabetes (H)     History of CVA (cerebrovascular accident) 7/20/2023        Surgical History:   No past surgical history on file.     Family History:  Family History   Problem Relation Age of Onset    Heart Disease Mother     Diabetes Mother     Asthma Mother     Alzheimer Disease Father     No Known Problems Brother         Social History:   Social History     Socioeconomic History    Marital status:    Tobacco Use    Smoking status: Never     Passive exposure: Never    Smokeless tobacco: Never   Vaping Use    Vaping Use: Never used   Substance and Sexual Activity    Alcohol use: Yes     Comment: a beer or wine every few months    Drug use: Never    Sexual activity: Not Currently     Partners: Female   Other Topics Concern    Parent/sibling w/ CABG, MI or angioplasty before 65F 55M? No     Social Determinants of Health     Financial Resource Strain: Low Risk  (1/3/2024)    Financial Resource Strain     Within the past 12 months, have you or your family members you live with been unable to get utilities (heat, electricity) when it was really needed?: No   Food Insecurity: Low Risk  (1/3/2024)    Food Insecurity     Within the past 12 months, did you worry that your food would run out before you got money to buy more?: No     Within the past 12 months, did the food you bought just not last and you didn t have money to get more?: No   Recent Concern: Food Insecurity - High Risk (11/14/2023)    Food Insecurity     Within the past 12 months, did you worry that your food would run out before you got money to buy more?: Yes     Within the past 12 months, did the food you bought just not last and you didn t have money to get more?: No   Transportation Needs: Low Risk  (1/3/2024)    Transportation Needs     Within the past 12 months, has lack of transportation kept you from medical appointments, getting your medicines, non-medical meetings or appointments, work, or  "from getting things that you need?: No   Housing Stability: Low Risk  (1/3/2024)    Housing Stability     Do you have housing? : Yes     Are you worried about losing your housing?: No   Recent Concern: Housing Stability - High Risk (11/14/2023)    Housing Stability     Do you have housing? : Yes     Are you worried about losing your housing?: Yes        Physical Exam:  Vital signs: BP (!) 154/71 (BP Location: Right arm, Patient Position: Sitting, Cuff Size: Adult Regular)   Ht 1.854 m (6' 1\")   Wt 85.7 kg (188 lb 15.7 oz)   BMI 24.93 kg/m     General Appearance: No acute distress, appropriate demeanor, conversant  Eyes: moist conjunctivae; EOMI; pupils symmetric; visual acuity grossly intact; no proptosis  Head: normocephalic; overall symmetric appearance without deformity  Face: overall symmetric without deformity  Ears: Normal appearance of external ear; external meatus normal in appearance; TMs intact without perforation bilaterally;   Nose: No external deformity; septum (significant DNS to the left) ; inferior turbinates (edematous)   Oral Cavity/oropharynx: Normal appearance of mucosa; tongue midline; no mass or lesions; oropharynx without obvious mucosal abnormality  Neck: no palpable lymphadenopathy; thyroid without palpable nodules  Lungs: symmetric chest rise; no wheezing  CV: Good distal perfusion; normal hear rate  Extremities: No deformity  Neurologic Exam: Cranial nerves II-XII are grossly intact; no focal deficit    Procedure Note  Procedure performed: Rigid nasal endoscopy  Indication: To evaluate for sinonasal pathology not visualized on routine anterior rhinoscopy  Anesthesia: 4% topical lidocaine with 0.05% oxymetazoline  Description of procedure: A 30 degree, 3 mm rigid endoscope was inserted into bilateral nasal cavities and the nasal valves, nasal cavity, middle meatus, sphenoethmoid recess, and nasopharynx were thoroughly evaluated for evidence of obstruction, edema, purulence, polyps and/or " mass/lesion.     Findings:    Unable to visualize MM/MT on the left  Thick mucus occluding the right nasal cavity  NP medial to the MT    The patient tolerated the procedure well without complication.     Imaging Review:  CT head 2017 - at this time, there was no evidence of CRS or nasal polyps - well aerated paranasal sinuses    Faizan Samuel MD    [facility]  Rhinology  Endoscopic Skull Base Surgery  [facility]  Department of Otolaryngology - Head & Neck Surgery

## 2024-01-16 NOTE — NURSING NOTE
"Chief Complaint   Patient presents with    Consult   Blood pressure (!) 154/71, height 1.854 m (6' 1\"), weight 85.7 kg (188 lb 15.7 oz). Manny White, EMT    "

## 2024-01-16 NOTE — PATIENT INSTRUCTIONS
You were seen in the ENT Clinic today by Dr. Samuel. If you have any questions or concerns after your appointment, please contact us (see below)    The following has been recommended for you based upon your appointment today:  CT sinus    Please return to clinic in 1 month    How to Contact Us:  Send a [employer] message to your provider. Our team will respond to you via [employer]. Occasionally, we will need to call you to get further information.  For urgent matters (Monday-Friday), call the ENT Clinic: [phone] and speak with a call center team member - they will route your call appropriately.   If you'd like to speak directly with a nurse, please find our contact information below. We do our best to check voicemail frequently throughout the day, and will work to call you back within 1-2 days. For urgent matters, please use the general clinic phone numbers listed above.    Bethany NETTLES RN, BSN   RN Care Coordinator, ENT Clinic  [facility]  Direct: [phone]  Radha PRICE LPN  Direct: [phone]

## 2024-01-16 NOTE — LETTER
1/16/2024       RE: Ned Moore  [address]  [place] [address]     Dear Colleague,    Thank you for referring your patient, Ned Moore, to the [facility] [place] at [facility]. Please see a copy of my visit note below.      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  --we discussed that chronic sinusitis (CRS) is a chronic inflammatory condition that affects the nose and sinuses   --we discussed that we manage CRS using a combination of systemic antibiotic and steroid therapy, topical steroid and antibiotics, endoscopic sinus surgery, and occasionally systemic biologic therapy (like nucala that he is on)  --will have to use systemic steroids judiciously given his diabetes - if needed, will coordinate with rheum/endo to minimize side effects    Plan:   --NS irrigation, nasonex  --CT scan  --follow up 1 month    (J34.2) Deviated nasal septum  Comment:   --significant DNS to the left  Plan:   --monitor  --would need septoplasty at the time of surgery if surgery is needed        Chief Complaint: nasal congestion    History of Present Illness:   Ned Moore is a 63 year old male who presents for consultation regarding EGPA.    Complicated presentation of EGPA   Currently under control  Nucala , no steroids, no current synthetic DMARDS    3 months ago felt that nose started feeling blocked  Difficulty with air flow  ++rhinorrhea/post nasal drip  Starting to mouth breathe - significantly affecting his sleep  Saline spray, fluticasone  Smell and taste are gone  Has not used  afrin    Review of systems: A 14-point review of systems has been conducted and was negative for any notable symptoms, except as dictated in the history of present illness.     Medical History:  Past Medical History:   Diagnosis Date    Chronic obstructive pulmonary disease, unspecified COPD type (H) 3/15/2023    Chronic systolic heart failure (H) 11/15/2023    Diabetes (H)     History of CVA (cerebrovascular accident) 7/20/2023        Surgical History:   No past surgical history on file.     Family History:  Family History   Problem Relation Age of Onset    Heart Disease Mother     Diabetes Mother     Asthma Mother     Alzheimer Disease Father     No Known Problems Brother         Social History:   Social History     Socioeconomic History    Marital status:    Tobacco Use    Smoking status: Never     Passive exposure: Never    Smokeless tobacco: Never   Vaping Use    Vaping Use: Never used   Substance and Sexual Activity    Alcohol use: Yes     Comment: a beer or wine every few months    Drug use: Never    Sexual activity: Not Currently     Partners: Female   Other Topics Concern    Parent/sibling w/ CABG, MI or angioplasty before 65F 55M? No     Social Determinants of Health     Financial Resource Strain: Low Risk  (1/3/2024)    Financial Resource Strain     Within the past 12 months, have you or your family members you live with been unable to get utilities (heat, electricity) when it was really needed?: No   Food Insecurity: Low Risk  (1/3/2024)    Food Insecurity     Within the past 12 months, did you worry that your food would run out before you got money to buy more?: No     Within the past 12 months, did the food you bought just not last and you didn t have money to get more?: No   Recent Concern: Food Insecurity - High Risk (11/14/2023)    Food Insecurity     Within the past 12 months, did you worry that your food would run out before you got money to buy more?: Yes     Within the past 12 months, did  "the food you bought just not last and you didn t have money to get more?: No   Transportation Needs: Low Risk  (1/3/2024)    Transportation Needs     Within the past 12 months, has lack of transportation kept you from medical appointments, getting your medicines, non-medical meetings or appointments, work, or from getting things that you need?: No   Housing Stability: Low Risk  (1/3/2024)    Housing Stability     Do you have housing? : Yes     Are you worried about losing your housing?: No   Recent Concern: Housing Stability - High Risk (11/14/2023)    Housing Stability     Do you have housing? : Yes     Are you worried about losing your housing?: Yes        Physical Exam:  Vital signs: BP (!) 154/71 (BP Location: Right arm, Patient Position: Sitting, Cuff Size: Adult Regular)   Ht 1.854 m (6' 1\")   Wt 85.7 kg (188 lb 15.7 oz)   BMI 24.93 kg/m     General Appearance: No acute distress, appropriate demeanor, conversant  Eyes: moist conjunctivae; EOMI; pupils symmetric; visual acuity grossly intact; no proptosis  Head: normocephalic; overall symmetric appearance without deformity  Face: overall symmetric without deformity  Ears: Normal appearance of external ear; external meatus normal in appearance; TMs intact without perforation bilaterally;   Nose: No external deformity; septum (significant DNS to the left) ; inferior turbinates (edematous)   Oral Cavity/oropharynx: Normal appearance of mucosa; tongue midline; no mass or lesions; oropharynx without obvious mucosal abnormality  Neck: no palpable lymphadenopathy; thyroid without palpable nodules  Lungs: symmetric chest rise; no wheezing  CV: Good distal perfusion; normal hear rate  Extremities: No deformity  Neurologic Exam: Cranial nerves II-XII are grossly intact; no focal deficit    Procedure Note  Procedure performed: Rigid nasal endoscopy  Indication: To evaluate for sinonasal pathology not visualized on routine anterior rhinoscopy  Anesthesia: 4% topical " lidocaine with 0.05% oxymetazoline  Description of procedure: A 30 degree, 3 mm rigid endoscope was inserted into bilateral nasal cavities and the nasal valves, nasal cavity, middle meatus, sphenoethmoid recess, and nasopharynx were thoroughly evaluated for evidence of obstruction, edema, purulence, polyps and/or mass/lesion.     Findings:    Unable to visualize MM/MT on the left  Thick mucus occluding the right nasal cavity  NP medial to the MT    The patient tolerated the procedure well without complication.     Imaging Review:  CT head 2017 - at this time, there was no evidence of CRS or nasal polyps - well aerated paranasal sinuses    Faizan Samuel MD    [facility]  Rhinology  Endoscopic Skull Base Surgery  [facility]  Department of Otolaryngology - Head & Neck Surgery

## 2024-01-17 ENCOUNTER — HOSPITAL ENCOUNTER (OUTPATIENT)
Dept: CARDIAC REHAB | Facility: CLINIC | Age: 64
Discharge: HOME | End: 2024-01-17
Attending: INTERNAL MEDICINE
Payer: COMMERCIAL

## 2024-01-17 ENCOUNTER — THERAPY VISIT (OUTPATIENT)
Dept: PHYSICAL THERAPY | Facility: CLINIC | Age: 64
End: 2024-01-17
Payer: COMMERCIAL

## 2024-01-17 DIAGNOSIS — M25.551 HIP PAIN, RIGHT: Primary | ICD-10-CM

## 2024-01-17 PROCEDURE — 97110 THERAPEUTIC EXERCISES: CPT | Mod: GP | Performed by: PHYSICAL THERAPIST

## 2024-01-17 PROCEDURE — G0239 OTH RESP PROC, GROUP: HCPCS

## 2024-01-17 PROCEDURE — 97112 NEUROMUSCULAR REEDUCATION: CPT | Mod: GP | Performed by: PHYSICAL THERAPIST

## 2024-01-19 ENCOUNTER — TELEPHONE (OUTPATIENT)
Dept: INTERNAL MEDICINE | Facility: CLINIC | Age: 64
End: 2024-01-19

## 2024-01-19 NOTE — TELEPHONE ENCOUNTER
January 19, 2024    Work capacity clarification was received via fax for Dr. Briggs to sign.  Patient label was attached to paperwork and placed in provider's inbox to be signed.    Manju Cheatham

## 2024-01-22 ENCOUNTER — HOSPITAL ENCOUNTER (OUTPATIENT)
Dept: CARDIAC REHAB | Facility: CLINIC | Age: 64
Discharge: HOME | End: 2024-01-22
Attending: INTERNAL MEDICINE
Payer: COMMERCIAL

## 2024-01-22 PROCEDURE — G0239 OTH RESP PROC, GROUP: HCPCS

## 2024-01-24 ENCOUNTER — HOSPITAL ENCOUNTER (OUTPATIENT)
Dept: CARDIAC REHAB | Facility: CLINIC | Age: 64
Discharge: HOME | End: 2024-01-24
Attending: INTERNAL MEDICINE
Payer: COMMERCIAL

## 2024-01-24 ENCOUNTER — THERAPY VISIT (OUTPATIENT)
Dept: PHYSICAL THERAPY | Facility: CLINIC | Age: 64
End: 2024-01-24
Payer: COMMERCIAL

## 2024-01-24 ENCOUNTER — DOCUMENTATION ONLY (OUTPATIENT)
Dept: MULTI SPECIALTY CLINIC | Facility: CLINIC | Age: 64
End: 2024-01-24

## 2024-01-24 DIAGNOSIS — M25.551 HIP PAIN, RIGHT: Primary | ICD-10-CM

## 2024-01-24 PROCEDURE — G0239 OTH RESP PROC, GROUP: HCPCS

## 2024-01-24 PROCEDURE — 97110 THERAPEUTIC EXERCISES: CPT | Mod: GP | Performed by: PHYSICAL THERAPIST

## 2024-01-24 PROCEDURE — 97112 NEUROMUSCULAR REEDUCATION: CPT | Mod: GP | Performed by: PHYSICAL THERAPIST

## 2024-01-25 ENCOUNTER — LAB (OUTPATIENT)
Dept: LAB | Facility: CLINIC | Age: 64
End: 2024-01-25
Attending: INTERNAL MEDICINE
Payer: COMMERCIAL

## 2024-01-25 ENCOUNTER — OFFICE VISIT (OUTPATIENT)
Dept: NEPHROLOGY | Facility: CLINIC | Age: 64
End: 2024-01-25
Attending: INTERNAL MEDICINE
Payer: COMMERCIAL

## 2024-01-25 ENCOUNTER — MYC MEDICAL ADVICE (OUTPATIENT)
Dept: INTERNAL MEDICINE | Facility: CLINIC | Age: 64
End: 2024-01-25

## 2024-01-25 VITALS
HEART RATE: 80 BPM | BODY MASS INDEX: 25.01 KG/M2 | SYSTOLIC BLOOD PRESSURE: 139 MMHG | DIASTOLIC BLOOD PRESSURE: 77 MMHG | OXYGEN SATURATION: 98 % | WEIGHT: 189.6 LBS

## 2024-01-25 DIAGNOSIS — N18.4 CHRONIC KIDNEY DISEASE, STAGE IV (SEVERE) (H): ICD-10-CM

## 2024-01-25 DIAGNOSIS — D72.18 EOSINOPHILIC GRANULOMATOSIS WITH POLYANGIITIS (EGPA) (H): ICD-10-CM

## 2024-01-25 DIAGNOSIS — M30.1 EOSINOPHILIC GRANULOMATOSIS WITH POLYANGIITIS (EGPA) (H): ICD-10-CM

## 2024-01-25 DIAGNOSIS — R82.81 PYURIA: ICD-10-CM

## 2024-01-25 DIAGNOSIS — E11.22 TYPE 2 DIABETES MELLITUS WITH STAGE 3A CHRONIC KIDNEY DISEASE, WITHOUT LONG-TERM CURRENT USE OF INSULIN (H): ICD-10-CM

## 2024-01-25 DIAGNOSIS — N18.31 TYPE 2 DIABETES MELLITUS WITH STAGE 3A CHRONIC KIDNEY DISEASE, WITHOUT LONG-TERM CURRENT USE OF INSULIN (H): ICD-10-CM

## 2024-01-25 DIAGNOSIS — I12.9 RENAL HYPERTENSION: ICD-10-CM

## 2024-01-25 DIAGNOSIS — D84.9 IMMUNOSUPPRESSION (H): ICD-10-CM

## 2024-01-25 DIAGNOSIS — R82.81 PYURIA: Primary | ICD-10-CM

## 2024-01-25 LAB
ALBUMIN MFR UR ELPH: 43.5 MG/DL
ALBUMIN SERPL BCG-MCNC: 4.4 G/DL (ref 3.5–5.2)
ALBUMIN UR-MCNC: 10 MG/DL
ANION GAP SERPL CALCULATED.3IONS-SCNC: 9 MMOL/L (ref 7–15)
APPEARANCE UR: CLEAR
BASOPHILS # BLD AUTO: 0.1 10E3/UL (ref 0–0.2)
BASOPHILS NFR BLD AUTO: 1 %
BILIRUB UR QL STRIP: NEGATIVE
BUN SERPL-MCNC: 23.1 MG/DL (ref 8–23)
CALCIUM SERPL-MCNC: 9.4 MG/DL (ref 8.8–10.2)
CHLORIDE SERPL-SCNC: 103 MMOL/L (ref 98–107)
COLOR UR AUTO: ABNORMAL
CREAT SERPL-MCNC: 1.69 MG/DL (ref 0.67–1.17)
CREAT UR-MCNC: 221 MG/DL
DEPRECATED HCO3 PLAS-SCNC: 28 MMOL/L (ref 22–29)
EGFRCR SERPLBLD CKD-EPI 2021: 45 ML/MIN/1.73M2
EOSINOPHIL # BLD AUTO: 0.2 10E3/UL (ref 0–0.7)
EOSINOPHIL NFR BLD AUTO: 3 %
ERYTHROCYTE [DISTWIDTH] IN BLOOD BY AUTOMATED COUNT: 17.1 % (ref 10–15)
GLUCOSE SERPL-MCNC: 183 MG/DL (ref 70–99)
GLUCOSE UR STRIP-MCNC: 150 MG/DL
HCT VFR BLD AUTO: 38.5 % (ref 40–53)
HGB BLD-MCNC: 12.1 G/DL (ref 13.3–17.7)
HGB UR QL STRIP: NEGATIVE
HYALINE CASTS: 3 /LPF
IMM GRANULOCYTES # BLD: 0 10E3/UL
IMM GRANULOCYTES NFR BLD: 0 %
KETONES UR STRIP-MCNC: NEGATIVE MG/DL
LEUKOCYTE ESTERASE UR QL STRIP: ABNORMAL
LYMPHOCYTES # BLD AUTO: 1.7 10E3/UL (ref 0.8–5.3)
LYMPHOCYTES NFR BLD AUTO: 28 %
MCH RBC QN AUTO: 25.6 PG (ref 26.5–33)
MCHC RBC AUTO-ENTMCNC: 31.4 G/DL (ref 31.5–36.5)
MCV RBC AUTO: 81 FL (ref 78–100)
MONOCYTES # BLD AUTO: 0.7 10E3/UL (ref 0–1.3)
MONOCYTES NFR BLD AUTO: 11 %
MUCOUS THREADS #/AREA URNS LPF: PRESENT /LPF
NEUTROPHILS # BLD AUTO: 3.3 10E3/UL (ref 1.6–8.3)
NEUTROPHILS NFR BLD AUTO: 57 %
NITRATE UR QL: NEGATIVE
NRBC # BLD AUTO: 0 10E3/UL
NRBC BLD AUTO-RTO: 0 /100
PH UR STRIP: 5 [PH] (ref 5–7)
PHOSPHATE SERPL-MCNC: 3.4 MG/DL (ref 2.5–4.5)
PLATELET # BLD AUTO: 237 10E3/UL (ref 150–450)
POTASSIUM SERPL-SCNC: 5 MMOL/L (ref 3.4–5.3)
PROT/CREAT 24H UR: 0.2 MG/MG CR (ref 0–0.2)
RBC # BLD AUTO: 4.73 10E6/UL (ref 4.4–5.9)
RBC URINE: 3 /HPF
SODIUM SERPL-SCNC: 140 MMOL/L (ref 135–145)
SP GR UR STRIP: 1.03 (ref 1–1.03)
SQUAMOUS EPITHELIAL: <1 /HPF
UROBILINOGEN UR STRIP-MCNC: NORMAL MG/DL
WBC # BLD AUTO: 5.9 10E3/UL (ref 4–11)
WBC URINE: 16 /HPF

## 2024-01-25 PROCEDURE — 84156 ASSAY OF PROTEIN URINE: CPT | Performed by: PATHOLOGY

## 2024-01-25 PROCEDURE — 83516 IMMUNOASSAY NONANTIBODY: CPT | Performed by: INTERNAL MEDICINE

## 2024-01-25 PROCEDURE — 99000 SPECIMEN HANDLING OFFICE-LAB: CPT | Performed by: PATHOLOGY

## 2024-01-25 PROCEDURE — 99213 OFFICE O/P EST LOW 20 MIN: CPT | Performed by: INTERNAL MEDICINE

## 2024-01-25 PROCEDURE — 99215 OFFICE O/P EST HI 40 MIN: CPT | Performed by: INTERNAL MEDICINE

## 2024-01-25 PROCEDURE — 81001 URINALYSIS AUTO W/SCOPE: CPT | Performed by: PATHOLOGY

## 2024-01-25 PROCEDURE — 80069 RENAL FUNCTION PANEL: CPT | Performed by: PATHOLOGY

## 2024-01-25 PROCEDURE — 36415 COLL VENOUS BLD VENIPUNCTURE: CPT | Performed by: PATHOLOGY

## 2024-01-25 PROCEDURE — 87086 URINE CULTURE/COLONY COUNT: CPT | Performed by: INTERNAL MEDICINE

## 2024-01-25 PROCEDURE — 85025 COMPLETE CBC W/AUTO DIFF WBC: CPT | Performed by: PATHOLOGY

## 2024-01-25 ASSESSMENT — PAIN SCALES - GENERAL: PAINLEVEL: NO PAIN (0)

## 2024-01-25 NOTE — TELEPHONE ENCOUNTER
Pre-clinic labs ordered for appt today.  Holly Kirkland RN, BSN, PHN  [facility] Nephrology Nurse Navigator  [phone]

## 2024-01-25 NOTE — PATIENT INSTRUCTIONS
Please increase metoprolol to 1 tablet (25 mg) once daily.  If you blood pressure drops below 110, go back to 1/2 tablets.

## 2024-01-25 NOTE — LETTER
1/25/2024       RE: Ned Moore  [address]  [place] [address]     Dear Colleague,    Thank you for referring your patient, Ned Moore, to the [facility] [place] at [facility]. Please see a copy of my visit note below.    Nephrology Clinic    Ned Moore MRN:[id number] YOB: 1960  Date of Service: 01/25/2024  Primary care provider: Mark Briggs  Requesting physician: Mark Briggs   Pulmonologist: Dr Biswas at [facility]  Cardiologist: Dr Efren Farias       REASON FOR CONSULT: establish care for EGPA at [facility]    HISTORY OF PRESENT ILLNESS:   Ned Moore is a 63 year old male who presents establish care for EGPA at [facility] and have all of his care within the same system.  Mr Moore's history of present illness started in Dec 2022, at which time he developed a new onset of respiratory illness consistent with asthma with wheezing and HAYNES.  He had never had any respiratory disease prior to that, and has never smoked.  He was treated with a short course of prednisone with marked improvement , but the symptoms kept recurring after the prednisone was discontinued.  Inhalers did not seem to keep the asthma under control.   He was able to work only on and off in the winter for 2023  In May 2023, he suffered from a severe epistaxis episode (requiring blood transfusion, and associated with hypotension) that prompted him to go to the ED.  He was admitted to [facility] and had a prolonged hospitalization during which he had , where he was initially found to have severe eosinophilia, hyponatremia and troponin elevation,  myocarditis, multifocal acute strokes, liver lesions concerning for abscesses, highest suspicion for EPGA. He underwent an extensive work up with cardiology, rheumatology, GI, neurology infectious disease, pulmonary and hematology. Myocardial biopsy confirmed eosinophilic  Anesthesia Pre Eval Note    Anesthesia ROS/Med Hx          Pulmonary Review:    Positive for sleep apnea     Cardiovascular Review:  Comments: Long qt syndrome    Positive for dysrhythmias - Chronic A-fib  Positive for hypertension    GI/HEPATIC/RENAL Review:    Positive for GERD    End/Other Review:  Positive for diabetes  Positive for arthritis      Relevant Problems   No relevant active problems       Physical Exam     Airway   Mallampati: III  TM Distance: >3 FB  Neck ROM: Full  TMJ Mobility: Good    Cardiovascular    Cardio Rhythm: Regular  Cardio Rate: Normal    Head Assessment  Head assessment: Normocephalic    General Assessment  General Assessment: Alert and oriented and No acute distress    Dental Exam  Dental exam normal    Pulmonary Exam  Pulmonary exam normal    Abdominal Exam  Abdominal exam normal      Anesthesia Plan:    ASA Status: 3  Anesthesia Type: General    Induction: Intravenous  Preferred Airway Type: Nasal Cannula  Maintenance: TIVA  Premedication: IV      Post-op Pain Management: Per Surgeon      Checklist  Reviewed: Lab Results, EKG, Outside Records, NPO Status, DNR Status, Allergies, Medications, Problem list and Past Med History  Consent/Risks Discussed Statement:  The proposed anesthetic plan, including its risks and benefits, have been discussed with the Patient along with the risks and benefits of alternatives. Questions were encouraged and answered and the patient and/or representative understands and agrees to proceed.        I discussed with the patient (and/or patient's legal representative) the risks and benefits of the proposed anesthesia plan, General, which may include services performed by other anesthesia providers.    Alternative anesthesia plans, if available, were reviewed with the patient (and/or patient's legal representative). Discussion has been held with the patient (and/or patient's legal representative) regarding risks of anesthesia, which include Nausea, Vomiting,  Headache, Sore Throat, Dental Injury, Allergic Reaction and Intra-operative Awareness and emergent situations that may require change in anesthesia plan.    The patient (and/or patient's legal representative) has indicated understanding, his/her questions have been answered, and he/she wishes to proceed with the planned anesthetic.      Blood Products: Not Anticipated     myocarditis, ANCA negative. Marked improvment with steroids and meprolizumab (5/26).   - completed 3 days 500 mg solumedrol; decreased to 60 mg prednisone started 5/25.  Eosinophilic myocarditis confirmed with biopsy. Presenting with ST depressions and elevated troponin. Cardiology evaluation, elevated troponin more consistent with demand ischemia. TTE w/o WMA. Cardiac MRI obtained with findings consistent with myocarditis.     With respect to multifocal acute cerebral infarcts, stroke code was called on 05/20 with acute gaze deviation, worsening right-sided weakness, seen by Neuro Critical Care, felt possibly secondary to an episode of orthostatic hypotension.   He describes amnesia related to the events.  He experienced slurred speech, weakness of the R arm and leg, and left lower quadrantanopsia.  He was transferred to rehab on 6/8/2023 until June 30.   In rehab and since then, he reports regaining significant amount of strength in the right arm/.  The R leg is also improved, but remains weak.  He is now able to drive, walk without a walker, but still appears to have RLExt proximal muscle weakness.  He remains on mepolizumab monthly.  He was started on Azathioprine on 9/8/23 -> but this resulted in recurrent episodes of sever N/V and associated dehydration until he stopped the azathioprine.    Today, Mr Moore reports feeling generally well.   He denies SOB, HAYNES, Cough.  No CP.   Denies GI or  symptoms.  Has nocturia 1x/night  No melena or hematochezia.  No gross hematuria  Reports nasal congestion on the R side , without purulent nasal discharge.  Residual R leg weakness.     January 25, 2024  IN PT.  L Ext strength is improving.  No wheezing, SOB, CP  Cardiac rehab.  Reports good O2 sats HR in low 100's  Saw ENT.  Had CT. Had severe congestion.   Has a polyp    On topical steroid and daily steroid rinses.    Sense of smell in improving  No GI spms.  Diarrhea resolved after  stopping aza  On Fe supplement.  "-> gets some N with it,  No  spms.  Nocturia once a night.  No hesitancy, straining or frequency.    BP was 110 at cardiac rehab a week ago.  Reports \"dehydration\" and SBP in 80's a few weeks ago.    Is scheduled to have EGD and Colonoscopy    PAST MEDICAL HISTORY:  Past Medical History:   Diagnosis Date     Chronic obstructive pulmonary disease, unspecified COPD type (H) 3/15/2023     Chronic systolic heart failure (H) 11/15/2023     Diabetes (H)      History of CVA (cerebrovascular accident) 7/20/2023   Diabetes diagnosed about 8 years ago, but this was already associated with L ext neuropathy suggesting that onset of disease was probably a decade before. He was previously on empagliflozin from Feb 2023 until Sept/Oct 2023, after a UTI requiring antibiotic treatment.     ?PSVT during hospitalization     He never had a colonoscopy, but has been tested with cologuard -> reportedly neg.       PAST SURGICAL HISTORY:  No past surgical history on file.  MEDICATIONS:    Current Outpatient Medications  reviewed with patient January 25, 2024     Medication Sig Dispense Refill     ACCU-CHEK FASTCLIX LANCET DRUM [ACCU-CHEK FASTCLIX LANCET DRUM] TEST BLOOD SUGARS 3 TIMES DAILY 300 each 3     ASPIRIN LOW DOSE 81 MG chewable tablet Take 81 mg by mouth daily       atorvastatin (LIPITOR) 20 MG tablet TAKE 1 TABLET BY MOUTH EVERY DAY IN THE EVENING 90 tablet 1     BD ALEXANDRA U/F 32G X 4 MM insulin pen needle        blood glucose test (ACCU-CHEK SMARTVIEW TEST STRIP) strips [BLOOD GLUCOSE TEST (ACCU-CHEK SMARTVIEW TEST STRIP) STRIPS] USE TO CHECK BLOOD SUGARS TWICE DAILY. 200 strip 3     Continuous Blood Gluc Sensor (FREESTYLE LIVIA 2 SENSOR) MISC 1 each every 14 days 2 each 11     dulaglutide (TRULICITY) 1.5 MG/0.5ML pen    Currently on 0.75 mg weekly,  plan to increase in 2 weeks. Inject 1.5 mg Subcutaneous every 7 days 2 mL 0     ferrous sulfate (FEROSUL) 325 (65 Fe) MG tablet Take 1 tablet (325 mg) by mouth daily (with " breakfast) 90 tablet 3     fluticasone-vilanterol (BREO ELLIPTA) 100-25 MCG/ACT inhaler Inhale 1 puff into the lungs daily 60 each 6     gabapentin (NEURONTIN) 300 MG capsule Take 300 mg by mouth At Bedtime       insulin glargine (LANTUS PEN) 100 UNIT/ML pen Inject 6 Units Subcutaneous at bedtime       melatonin 3 MG tablet Take 9 mg by mouth nightly as needed for sleep       Mepolizumab 100 MG/ML SOSY Inject 3 mLs (300 mg) Subcutaneous every 30 days 3 mL 11     metFORMIN (GLUCOPHAGE XR) 500 MG 24 hr tablet Take 1 tablet (500 mg) by mouth daily (with dinner) 360 tablet 3     metFORMIN (GLUCOPHAGE) 1000 MG tablet Take 1 tablet (1,000 mg) by mouth daily (with breakfast) 90 tablet 6     metoprolol succinate ER (TOPROL XL) 25 MG 24 hr tablet Take 0.5 tablets (12.5 mg) by mouth every morning 45 tablet 6     mometasone (NASONEX) 50 MCG/ACT nasal spray Spray 2 sprays into both nostrils daily 17 g 3     omeprazole (PRILOSEC) 20 MG DR capsule TAKE 1 CAPSULE BY MOUTH 2 TIMES DAILY. TAKE 1 HOUR BEFORE A MEAL.       tiotropium (SPIRIVA) 18 MCG inhaled capsule Inhale 1 capsule (18 mcg) into the lungs daily 18 capsule 3     traZODone (DESYREL) 50 MG tablet Take 50 mg by mouth daily as needed for sleep           ALLERGIES:    Allergies   Allergen Reactions     Azathioprine Nausea and Vomiting     REVIEW OF SYSTEMS:  A comprehensive review of systems was performed and found to be negative except as described here or above.  SOCIAL HISTORY:   Social History     Socioeconomic History     Marital status:      Spouse name: Not on file     Number of children: Not on file     Years of education: Not on file     Highest education level: Not on file   Occupational History     Not on file   Tobacco Use     Smoking status: Never     Passive exposure: Never     Smokeless tobacco: Never   Vaping Use     Vaping Use: Never used   Substance and Sexual Activity     Alcohol use: Yes     Comment: a beer or wine every few months     Drug use:  Never     Sexual activity: Not Currently     Partners: Female   Other Topics Concern     Parent/sibling w/ CABG, MI or angioplasty before 65F 55M? No   Social History Narrative     Not on file     Social Determinants of Health     Financial Resource Strain: Low Risk  (1/3/2024)    Financial Resource Strain      Within the past 12 months, have you or your family members you live with been unable to get utilities (heat, electricity) when it was really needed?: No   Food Insecurity: Low Risk  (1/3/2024)    Food Insecurity      Within the past 12 months, did you worry that your food would run out before you got money to buy more?: No      Within the past 12 months, did the food you bought just not last and you didn t have money to get more?: No   Recent Concern: Food Insecurity - High Risk (11/14/2023)    Food Insecurity      Within the past 12 months, did you worry that your food would run out before you got money to buy more?: Yes      Within the past 12 months, did the food you bought just not last and you didn t have money to get more?: No   Transportation Needs: Low Risk  (1/3/2024)    Transportation Needs      Within the past 12 months, has lack of transportation kept you from medical appointments, getting your medicines, non-medical meetings or appointments, work, or from getting things that you need?: No   Physical Activity: Not on file   Stress: Not on file   Social Connections: Not on file   Interpersonal Safety: Not on file   Housing Stability: Low Risk  (1/3/2024)    Housing Stability      Do you have housing? : Yes      Are you worried about losing your housing?: No   Recent Concern: Housing Stability - High Risk (11/14/2023)    Housing Stability      Do you have housing? : Yes      Are you worried about losing your housing?: Yes     FAMILY MEDICAL HISTORY:   Family History   Problem Relation Age of Onset     Heart Disease Mother      Diabetes Mother      Asthma Mother      Alzheimer Disease Father      No  Known Problems Brother      PHYSICAL EXAM:   /77 (BP Location: Right arm, Patient Position: Sitting, Cuff Size: Adult Regular)   Pulse 80   Wt 86 kg (189 lb 9.6 oz)   SpO2 98%   BMI 25.01 kg/m    GENERAL APPEARANCE: alert and no distress  EYES: nonicteric  NECK: supple, no adenopathy  Carotids 2+ bilat without bruits  RESP: lungs clear to auscultation   CV: regular rhythm, normal rate, no rub  DP 2+ Bilat  ABDOMEN: soft, nontender, normal bowel sounds, no HSM   Extremities: trace-1+ ankle and distal shin edema on R.  None on L.  MS: no evidence of inflammation in joints, no muscle tenderness  SKIN: no rash.  Mild discoloration dorsum of L foot, but no redness or tenderness  NEURO: mentation intact and speech normal.  R Leg proximal weakness.  Feet:  L dorsiflexion > R.  PSYCH: affect normal/bright   LABS:   Recent Results (from the past 672 hour(s))   Renal panel    Collection Time: 01/25/24  3:33 PM   Result Value Ref Range    Sodium 140 135 - 145 mmol/L    Potassium 5.0 3.4 - 5.3 mmol/L    Chloride 103 98 - 107 mmol/L    Carbon Dioxide (CO2) 28 22 - 29 mmol/L    Anion Gap 9 7 - 15 mmol/L    Glucose 183 (H) 70 - 99 mg/dL    Urea Nitrogen 23.1 (H) 8.0 - 23.0 mg/dL    Creatinine 1.69 (H) 0.67 - 1.17 mg/dL    GFR Estimate 45 (L) >60 mL/min/1.73m2    Calcium 9.4 8.8 - 10.2 mg/dL    Albumin 4.4 3.5 - 5.2 g/dL    Phosphorus 3.4 2.5 - 4.5 mg/dL   CBC with platelets and differential    Collection Time: 01/25/24  3:33 PM   Result Value Ref Range    WBC Count 5.9 4.0 - 11.0 10e3/uL    RBC Count 4.73 4.40 - 5.90 10e6/uL    Hemoglobin 12.1 (L) 13.3 - 17.7 g/dL    Hematocrit 38.5 (L) 40.0 - 53.0 %    MCV 81 78 - 100 fL    MCH 25.6 (L) 26.5 - 33.0 pg    MCHC 31.4 (L) 31.5 - 36.5 g/dL    RDW 17.1 (H) 10.0 - 15.0 %    Platelet Count 237 150 - 450 10e3/uL    % Neutrophils 57 %    % Lymphocytes 28 %    % Monocytes 11 %    % Eosinophils 3 %    % Basophils 1 %    % Immature Granulocytes 0 %    NRBCs per 100 WBC 0 <1 /100     Absolute Neutrophils 3.3 1.6 - 8.3 10e3/uL    Absolute Lymphocytes 1.7 0.8 - 5.3 10e3/uL    Absolute Monocytes 0.7 0.0 - 1.3 10e3/uL    Absolute Eosinophils 0.2 0.0 - 0.7 10e3/uL    Absolute Basophils 0.1 0.0 - 0.2 10e3/uL    Absolute Immature Granulocytes 0.0 <=0.4 10e3/uL    Absolute NRBCs 0.0 10e3/uL   UA with Microscopic    Collection Time: 01/25/24  3:37 PM   Result Value Ref Range    Color Urine Dark Yellow (A) Colorless, Straw, Light Yellow, Yellow    Appearance Urine Clear Clear    Glucose Urine 150 (A) Negative mg/dL    Bilirubin Urine Negative Negative    Ketones Urine Negative Negative mg/dL    Specific Gravity Urine 1.028 1.003 - 1.035    Blood Urine Negative Negative    pH Urine 5.0 5.0 - 7.0    Protein Albumin Urine 10 (A) Negative mg/dL    Urobilinogen Urine Normal Normal, 2.0 mg/dL    Nitrite Urine Negative Negative    Leukocyte Esterase Urine Moderate (A) Negative    Mucus Urine Present (A) None Seen /LPF    RBC Urine 3 (H) <=2 /HPF    WBC Urine 16 (H) <=5 /HPF    Squamous Epithelials Urine <1 <=1 /HPF    Hyaline Casts Urine 3 (H) <=2 /LPF   Protein  random urine    Collection Time: 01/25/24  3:37 PM   Result Value Ref Range    Total Protein Urine mg/dL 43.5   mg/dL    Total Protein Urine mg/mg Creat 0.20 0.00 - 0.20 mg/mg Cr    Creatinine Urine mg/dL 221.0 mg/dL       CMP  Recent Labs   Lab Test 01/25/24  1533 11/30/23  1209 11/15/23  1608 10/26/23  1149 10/26/23  1147 04/26/23  0824 02/07/23  0915 07/05/22  0933 03/21/22  1112 03/21/22  1112 08/23/21  1152 01/27/21  1047 10/30/19  0923 07/29/19  0851 04/15/19  1126    140 138  --  133* 139  --  140  --  140   < > 140 139 138 138   POTASSIUM 5.0 4.5 4.4  --  4.6 4.9  --  4.6   < > 4.5   < > 4.5 4.3 4.6 4.4   CHLORIDE 103 101 101  --  94* 99  --  103   < > 102   < > 102 102 102 100   CO2 28 28 25  --  22 27  --  25   < > 25   < > 27 29 24 28   ANIONGAP 9 11 12  --  17* 13  --  12   < > 13   < > 11 8 12 10   * 130* 188* 247* 255* 173*   --  103*   < > 193*   < > 118 139* 152* 163*   BUN 23.1* 24.5* 18.6  --  24.9* 22.5  --  23.2*   < > 18   < > 24* 21 24* 21   CR 1.69* 1.64* 1.55*  --  1.65* 1.35*   < > 1.37*  --  1.51*   < > 1.41* 1.39* 1.37* 1.44*   GFRESTIMATED 45* 46* 50*  --  46* 59*   < > 58*  --  52*   < > 51* 52* 53* 50*   GFRESTBLACK  --   --   --   --   --   --   --   --   --   --   --  >60 >60 >60 >60   ANAI 9.4 9.9 8.9  --  9.9 9.4  --  9.5  --  9.9   < > 9.4 9.6 9.7 10.0   PHOS 3.4 3.7  --   --   --   --   --  3.9  --   --   --   --   --   --   --    PROTTOTAL  --   --   --   --  7.9 7.2  --   --   --  7.1  --  7.6  --   --  7.5   ALBUMIN 4.4 4.2  --   --  4.2 4.1  --  4.2   < > 3.9  --  4.6  --   --  4.5   BILITOTAL  --   --   --   --  0.3 0.3  --   --   --  0.5  --  0.7  --   --  0.6   ALKPHOS  --   --   --   --  246* 103  --   --   --  98  --  75  --   --  75   AST  --   --   --   --  20 24  --   --   --  17  --  22  --   --  29   ALT  --   --   --   --  22 23  --   --   --  19  --  31  --   --  38    < > = values in this interval not displayed.     CBC  Recent Labs   Lab Test 01/25/24  1533 11/30/23  1209 11/15/23  1608 10/26/23  1147   HGB 12.1* 11.0* 9.9* 11.2*   WBC 5.9 7.2 8.9 13.1*   RBC 4.73 4.57 4.19* 4.69   HCT 38.5* 35.4* 32.7* 36.8*   MCV 81 78 78 79    374 487* 753*     INRNo lab results found.  ABGNo lab results found.   URINE STUDIES  Recent Labs   Lab Test 01/25/24  1537 11/30/23  1213 10/26/23  1212   APPEARANCE Clear Clear Clear   URINEGLC 150* Negative >1000*   URINEBILI Negative Negative Negative   URINEKETONE Negative Negative Negative   SG 1.028 1.015 1.022   UBLD Negative Negative 0.03 mg/dL*   URINEPH 5.0 5.5 5.5   PROTEIN 10* 20* 70*   NITRITE Negative Negative Negative   LEUKEST Moderate* Trace* 250 Kathleen/uL*   RBCU 3* 2 3*   WBCU 16* 3 7*     No lab results found.    ASSESSMENT AND PLAN:   Mr Moore is seen to establish EGPA care  at the [facility].  He has had a complex course since Dec 2022, and a severe  complicated illness starting in May 2023.  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 He was treated successfully with high dose corticosteroids and mepolizumab -> with excellent control of his pulmonary symptoms. His ANCA test has been negative (only about 50% of EGPA is ANCA pos).    January 25, 2024    Mr Mooer presents today with no acute complaints.  His BP in under acceptable but not optimal control, on modest dose of metoprolol .  I have instructed him to increase the dose to 25 mg daily.    He has chronic kidney disease which antedates the acute illness in May - Is stable.  He has NO significant proteinuria.  He has some mild pyuria without symptoms.  I will get a formal culture (he has had a UTI previously).  He has no symptoms or signs of active EGPA at the present time, on  the current therapy with mepolizumab only -> I think we can continue with mepolizumab for maintenance therapy and consider adding rituximab if he develops evidence of relapsing disease..      Anemia with Fe deficiency.  I encouraged  Mr Moore undergo a colonoscopy (and EGD) as recommended by GI.  He remains somewhat reluctant.  I am encouraged by the improved Hgb.    I will plan for seeing him again in about 3 months for follow up    I spent a total of 50 minutes in reviewing the medical records, face-to-face evaluation and physical exam, review of labs, counseling, orders, care coordination and documentation        Josue Tripp MD  Division of Renal Disease and Hypertension  January 25, 2024          Again, thank you for allowing me to participate in the care of your patient.      Sincerely,    Josue Tripp MD

## 2024-01-25 NOTE — NURSING NOTE
Chief Complaint   Patient presents with    RECHECK     Labs for Dr. Tripp     /77 (BP Location: Right arm, Patient Position: Sitting, Cuff Size: Adult Regular)   Pulse 80   Wt 86 kg (189 lb 9.6 oz)   SpO2 98%   BMI 25.01 kg/m    Domitila Moody [facility]

## 2024-01-25 NOTE — TELEPHONE ENCOUNTER
Routing refill request to provider for review/approval because:  Drug not on the [facility] refill protocol     Last Written Prescription Date:  2022  Last Fill Quantity: 2,  # refills: 11   Last office visit provider:  2024     Requested Prescriptions   Pending Prescriptions Disp Refills    Continuous Blood Gluc Sensor (FREESTYLE LIVIA 2 SENSOR) MISC 2 each 11     Si each every 14 days       There is no refill protocol information for this order          Tyler Polk RN 24 9:56 AM

## 2024-01-25 NOTE — PROGRESS NOTES
Nephrology Clinic    Ned Moore MRN:[id number] YOB: 1960  Date of Service: 01/25/2024  Primary care provider: Mark Briggs  Requesting physician: Mark Briggs   Pulmonologist: Dr Biswas at [facility]  Cardiologist: Dr Efren Farias       REASON FOR CONSULT: establish care for EGPA at [facility]    HISTORY OF PRESENT ILLNESS:   Ned Moore is a 63 year old male who presents establish care for EGPA at [facility] and have all of his care within the same system.  Mr Moore's history of present illness started in Dec 2022, at which time he developed a new onset of respiratory illness consistent with asthma with wheezing and HAYNES.  He had never had any respiratory disease prior to that, and has never smoked.  He was treated with a short course of prednisone with marked improvement , but the symptoms kept recurring after the prednisone was discontinued.  Inhalers did not seem to keep the asthma under control.   He was able to work only on and off in the winter for 2023  In May 2023, he suffered from a severe epistaxis episode (requiring blood transfusion, and associated with hypotension) that prompted him to go to the ED.  He was admitted to [facility] and had a prolonged hospitalization during which he had , where he was initially found to have severe eosinophilia, hyponatremia and troponin elevation,  myocarditis, multifocal acute strokes, liver lesions concerning for abscesses, highest suspicion for EPGA. He underwent an extensive work up with cardiology, rheumatology, GI, neurology infectious disease, pulmonary and hematology. 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 Marked improvment with steroids and meprolizumab (5/26).   - completed 3 days 500 mg solumedrol; decreased to 60 mg prednisone started 5/25.  Eosinophilic myocarditis confirmed with biopsy. Presenting with ST depressions and elevated troponin. Cardiology evaluation, elevated troponin more  "consistent with demand ischemia. TTE w/o WMA. Cardiac MRI obtained with findings consistent with myocarditis.     With respect to multifocal acute cerebral infarcts, stroke code was called on 05/20 with acute gaze deviation, worsening right-sided weakness, seen by Neuro Critical Care, felt possibly secondary to an episode of orthostatic hypotension.   He describes amnesia related to the events.  He experienced slurred speech, weakness of the R arm and leg, and left lower quadrantanopsia.  He was transferred to rehab on 6/8/2023 until June 30.   In rehab and since then, he reports regaining significant amount of strength in the right arm/.  The R leg is also improved, but remains weak.  He is now able to drive, walk without a walker, but still appears to have RLExt proximal muscle weakness.  He remains on mepolizumab monthly.  He was started on Azathioprine on 9/8/23 -> but this resulted in recurrent episodes of sever N/V and associated dehydration until he stopped the azathioprine.    Today, Mr Moore reports feeling generally well.   He denies SOB, HAYNES, Cough.  No CP.   Denies GI or  symptoms.  Has nocturia 1x/night  No melena or hematochezia.  No gross hematuria  Reports nasal congestion on the R side , without purulent nasal discharge.  Residual R leg weakness.     January 25, 2024  IN PT.  L Ext strength is improving.  No wheezing, SOB, CP  Cardiac rehab.  Reports good O2 sats HR in low 100's  Saw ENT.  Had CT. Had severe congestion.   Has a polyp    On topical steroid and daily steroid rinses.    Sense of smell in improving  No GI spms.  Diarrhea resolved after  stopping aza  On Fe supplement. -> gets some N with it,  No  spms.  Nocturia once a night.  No hesitancy, straining or frequency.    BP was 110 at cardiac rehab a week ago.  Reports \"dehydration\" and SBP in 80's a few weeks ago.    Is scheduled to have EGD and Colonoscopy    PAST MEDICAL HISTORY:  Past Medical History:   Diagnosis Date    " Chronic obstructive pulmonary disease, unspecified COPD type (H) 3/15/2023    Chronic systolic heart failure (H) 11/15/2023    Diabetes (H)     History of CVA (cerebrovascular accident) 7/20/2023   Diabetes diagnosed about 8 years ago, but this was already associated with L ext neuropathy suggesting that onset of disease was probably a decade before. He was previously on empagliflozin from Feb 2023 until Sept/Oct 2023, after a UTI requiring antibiotic treatment.     ?PSVT during hospitalization     He never had a colonoscopy, but has been tested with cologuard -> reportedly neg.       PAST SURGICAL HISTORY:  No past surgical history on file.  MEDICATIONS:    Current Outpatient Medications  reviewed with patient January 25, 2024     Medication Sig Dispense Refill    ACCU-CHEK FASTCLIX LANCET DRUM [ACCU-CHEK FASTCLIX LANCET DRUM] TEST BLOOD SUGARS 3 TIMES DAILY 300 each 3    ASPIRIN LOW DOSE 81 MG chewable tablet Take 81 mg by mouth daily      atorvastatin (LIPITOR) 20 MG tablet TAKE 1 TABLET BY MOUTH EVERY DAY IN THE EVENING 90 tablet 1    BD ALEXANDRA U/F 32G X 4 MM insulin pen needle       blood glucose test (ACCU-CHEK SMARTVIEW TEST STRIP) strips [BLOOD GLUCOSE TEST (ACCU-CHEK SMARTVIEW TEST STRIP) STRIPS] USE TO CHECK BLOOD SUGARS TWICE DAILY. 200 strip 3    Continuous Blood Gluc Sensor (FREESTYLE LIVIA 2 SENSOR) MISC 1 each every 14 days 2 each 11    dulaglutide (TRULICITY) 1.5 MG/0.5ML pen    Currently on 0.75 mg weekly,  plan to increase in 2 weeks. Inject 1.5 mg Subcutaneous every 7 days 2 mL 0    ferrous sulfate (FEROSUL) 325 (65 Fe) MG tablet Take 1 tablet (325 mg) by mouth daily (with breakfast) 90 tablet 3    fluticasone-vilanterol (BREO ELLIPTA) 100-25 MCG/ACT inhaler Inhale 1 puff into the lungs daily 60 each 6    gabapentin (NEURONTIN) 300 MG capsule Take 300 mg by mouth At Bedtime      insulin glargine (LANTUS PEN) 100 UNIT/ML pen Inject 6 Units Subcutaneous at bedtime      melatonin 3 MG tablet Take 9 mg  by mouth nightly as needed for sleep      Mepolizumab 100 MG/ML SOSY Inject 3 mLs (300 mg) Subcutaneous every 30 days 3 mL 11    metFORMIN (GLUCOPHAGE XR) 500 MG 24 hr tablet Take 1 tablet (500 mg) by mouth daily (with dinner) 360 tablet 3    metFORMIN (GLUCOPHAGE) 1000 MG tablet Take 1 tablet (1,000 mg) by mouth daily (with breakfast) 90 tablet 6    metoprolol succinate ER (TOPROL XL) 25 MG 24 hr tablet Take 0.5 tablets (12.5 mg) by mouth every morning 45 tablet 6    mometasone (NASONEX) 50 MCG/ACT nasal spray Spray 2 sprays into both nostrils daily 17 g 3    omeprazole (PRILOSEC) 20 MG DR capsule TAKE 1 CAPSULE BY MOUTH 2 TIMES DAILY. TAKE 1 HOUR BEFORE A MEAL.      tiotropium (SPIRIVA) 18 MCG inhaled capsule Inhale 1 capsule (18 mcg) into the lungs daily 18 capsule 3    traZODone (DESYREL) 50 MG tablet Take 50 mg by mouth daily as needed for sleep           ALLERGIES:    Allergies   Allergen Reactions    Azathioprine Nausea and Vomiting     REVIEW OF SYSTEMS:  A comprehensive review of systems was performed and found to be negative except as described here or above.  SOCIAL HISTORY:   Social History     Socioeconomic History    Marital status:      Spouse name: Not on file    Number of children: Not on file    Years of education: Not on file    Highest education level: Not on file   Occupational History    Not on file   Tobacco Use    Smoking status: Never     Passive exposure: Never    Smokeless tobacco: Never   Vaping Use    Vaping Use: Never used   Substance and Sexual Activity    Alcohol use: Yes     Comment: a beer or wine every few months    Drug use: Never    Sexual activity: Not Currently     Partners: Female   Other Topics Concern    Parent/sibling w/ CABG, MI or angioplasty before 65F 55M? No   Social History Narrative    Not on file     Social Determinants of Health     Financial Resource Strain: Low Risk  (1/3/2024)    Financial Resource Strain     Within the past 12 months, have you or your  family members you live with been unable to get utilities (heat, electricity) when it was really needed?: No   Food Insecurity: Low Risk  (1/3/2024)    Food Insecurity     Within the past 12 months, did you worry that your food would run out before you got money to buy more?: No     Within the past 12 months, did the food you bought just not last and you didn t have money to get more?: No   Recent Concern: Food Insecurity - High Risk (11/14/2023)    Food Insecurity     Within the past 12 months, did you worry that your food would run out before you got money to buy more?: Yes     Within the past 12 months, did the food you bought just not last and you didn t have money to get more?: No   Transportation Needs: Low Risk  (1/3/2024)    Transportation Needs     Within the past 12 months, has lack of transportation kept you from medical appointments, getting your medicines, non-medical meetings or appointments, work, or from getting things that you need?: No   Physical Activity: Not on file   Stress: Not on file   Social Connections: Not on file   Interpersonal Safety: Not on file   Housing Stability: Low Risk  (1/3/2024)    Housing Stability     Do you have housing? : Yes     Are you worried about losing your housing?: No   Recent Concern: Housing Stability - High Risk (11/14/2023)    Housing Stability     Do you have housing? : Yes     Are you worried about losing your housing?: Yes     FAMILY MEDICAL HISTORY:   Family History   Problem Relation Age of Onset    Heart Disease Mother     Diabetes Mother     Asthma Mother     Alzheimer Disease Father     No Known Problems Brother      PHYSICAL EXAM:   /77 (BP Location: Right arm, Patient Position: Sitting, Cuff Size: Adult Regular)   Pulse 80   Wt 86 kg (189 lb 9.6 oz)   SpO2 98%   BMI 25.01 kg/m    GENERAL APPEARANCE: alert and no distress  EYES: nonicteric  NECK: supple, no adenopathy  Carotids 2+ bilat without bruits  RESP: lungs clear to auscultation   CV:  regular rhythm, normal rate, no rub  DP 2+ Bilat  ABDOMEN: soft, nontender, normal bowel sounds, no HSM   Extremities: trace-1+ ankle and distal shin edema on R.  None on L.  MS: no evidence of inflammation in joints, no muscle tenderness  SKIN: no rash.  Mild discoloration dorsum of L foot, but no redness or tenderness  NEURO: mentation intact and speech normal.  R Leg proximal weakness.  Feet:  L dorsiflexion > R.  PSYCH: affect normal/bright   LABS:   Recent Results (from the past 672 hour(s))   Renal panel    Collection Time: 01/25/24  3:33 PM   Result Value Ref Range    Sodium 140 135 - 145 mmol/L    Potassium 5.0 3.4 - 5.3 mmol/L    Chloride 103 98 - 107 mmol/L    Carbon Dioxide (CO2) 28 22 - 29 mmol/L    Anion Gap 9 7 - 15 mmol/L    Glucose 183 (H) 70 - 99 mg/dL    Urea Nitrogen 23.1 (H) 8.0 - 23.0 mg/dL    Creatinine 1.69 (H) 0.67 - 1.17 mg/dL    GFR Estimate 45 (L) >60 mL/min/1.73m2    Calcium 9.4 8.8 - 10.2 mg/dL    Albumin 4.4 3.5 - 5.2 g/dL    Phosphorus 3.4 2.5 - 4.5 mg/dL   CBC with platelets and differential    Collection Time: 01/25/24  3:33 PM   Result Value Ref Range    WBC Count 5.9 4.0 - 11.0 10e3/uL    RBC Count 4.73 4.40 - 5.90 10e6/uL    Hemoglobin 12.1 (L) 13.3 - 17.7 g/dL    Hematocrit 38.5 (L) 40.0 - 53.0 %    MCV 81 78 - 100 fL    MCH 25.6 (L) 26.5 - 33.0 pg    MCHC 31.4 (L) 31.5 - 36.5 g/dL    RDW 17.1 (H) 10.0 - 15.0 %    Platelet Count 237 150 - 450 10e3/uL    % Neutrophils 57 %    % Lymphocytes 28 %    % Monocytes 11 %    % Eosinophils 3 %    % Basophils 1 %    % Immature Granulocytes 0 %    NRBCs per 100 WBC 0 <1 /100    Absolute Neutrophils 3.3 1.6 - 8.3 10e3/uL    Absolute Lymphocytes 1.7 0.8 - 5.3 10e3/uL    Absolute Monocytes 0.7 0.0 - 1.3 10e3/uL    Absolute Eosinophils 0.2 0.0 - 0.7 10e3/uL    Absolute Basophils 0.1 0.0 - 0.2 10e3/uL    Absolute Immature Granulocytes 0.0 <=0.4 10e3/uL    Absolute NRBCs 0.0 10e3/uL   UA with Microscopic    Collection Time: 01/25/24  3:37 PM    Result Value Ref Range    Color Urine Dark Yellow (A) Colorless, Straw, Light Yellow, Yellow    Appearance Urine Clear Clear    Glucose Urine 150 (A) Negative mg/dL    Bilirubin Urine Negative Negative    Ketones Urine Negative Negative mg/dL    Specific Gravity Urine 1.028 1.003 - 1.035    Blood Urine Negative Negative    pH Urine 5.0 5.0 - 7.0    Protein Albumin Urine 10 (A) Negative mg/dL    Urobilinogen Urine Normal Normal, 2.0 mg/dL    Nitrite Urine Negative Negative    Leukocyte Esterase Urine Moderate (A) Negative    Mucus Urine Present (A) None Seen /LPF    RBC Urine 3 (H) <=2 /HPF    WBC Urine 16 (H) <=5 /HPF    Squamous Epithelials Urine <1 <=1 /HPF    Hyaline Casts Urine 3 (H) <=2 /LPF   Protein  random urine    Collection Time: 01/25/24  3:37 PM   Result Value Ref Range    Total Protein Urine mg/dL 43.5   mg/dL    Total Protein Urine mg/mg Creat 0.20 0.00 - 0.20 mg/mg Cr    Creatinine Urine mg/dL 221.0 mg/dL       CMP  Recent Labs   Lab Test 01/25/24  1533 11/30/23  1209 11/15/23  1608 10/26/23  1149 10/26/23  1147 04/26/23  0824 02/07/23  0915 07/05/22  0933 03/21/22  1112 03/21/22  1112 08/23/21  1152 01/27/21  1047 10/30/19  0923 07/29/19  0851 04/15/19  1126    140 138  --  133* 139  --  140  --  140   < > 140 139 138 138   POTASSIUM 5.0 4.5 4.4  --  4.6 4.9  --  4.6   < > 4.5   < > 4.5 4.3 4.6 4.4   CHLORIDE 103 101 101  --  94* 99  --  103   < > 102   < > 102 102 102 100   CO2 28 28 25  --  22 27  --  25   < > 25   < > 27 29 24 28   ANIONGAP 9 11 12  --  17* 13  --  12   < > 13   < > 11 8 12 10   * 130* 188* 247* 255* 173*  --  103*   < > 193*   < > 118 139* 152* 163*   BUN 23.1* 24.5* 18.6  --  24.9* 22.5  --  23.2*   < > 18   < > 24* 21 24* 21   CR 1.69* 1.64* 1.55*  --  1.65* 1.35*   < > 1.37*  --  1.51*   < > 1.41* 1.39* 1.37* 1.44*   GFRESTIMATED 45* 46* 50*  --  46* 59*   < > 58*  --  52*   < > 51* 52* 53* 50*   GFRESTBLACK  --   --   --   --   --   --   --   --   --   --   --   >60 >60 >60 >60   ANAI 9.4 9.9 8.9  --  9.9 9.4  --  9.5  --  9.9   < > 9.4 9.6 9.7 10.0   PHOS 3.4 3.7  --   --   --   --   --  3.9  --   --   --   --   --   --   --    PROTTOTAL  --   --   --   --  7.9 7.2  --   --   --  7.1  --  7.6  --   --  7.5   ALBUMIN 4.4 4.2  --   --  4.2 4.1  --  4.2   < > 3.9  --  4.6  --   --  4.5   BILITOTAL  --   --   --   --  0.3 0.3  --   --   --  0.5  --  0.7  --   --  0.6   ALKPHOS  --   --   --   --  246* 103  --   --   --  98  --  75  --   --  75   AST  --   --   --   --  20 24  --   --   --  17  --  22  --   --  29   ALT  --   --   --   --  22 23  --   --   --  19  --  31  --   --  38    < > = values in this interval not displayed.     CBC  Recent Labs   Lab Test 01/25/24  1533 11/30/23  1209 11/15/23  1608 10/26/23  1147   HGB 12.1* 11.0* 9.9* 11.2*   WBC 5.9 7.2 8.9 13.1*   RBC 4.73 4.57 4.19* 4.69   HCT 38.5* 35.4* 32.7* 36.8*   MCV 81 78 78 79    374 487* 753*     INRNo lab results found.  ABGNo lab results found.   URINE STUDIES  Recent Labs   Lab Test 01/25/24  1537 11/30/23  1213 10/26/23  1212   APPEARANCE Clear Clear Clear   URINEGLC 150* Negative >1000*   URINEBILI Negative Negative Negative   URINEKETONE Negative Negative Negative   SG 1.028 1.015 1.022   UBLD Negative Negative 0.03 mg/dL*   URINEPH 5.0 5.5 5.5   PROTEIN 10* 20* 70*   NITRITE Negative Negative Negative   LEUKEST Moderate* Trace* 250 Kathleen/uL*   RBCU 3* 2 3*   WBCU 16* 3 7*     No lab results found.    ASSESSMENT AND PLAN:   Mr Moore is seen to establish EGPA care  at the [facility].  He has had a complex course since Dec 2022, and a severe complicated illness starting in May 2023.  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 He was treated successfully with high dose corticosteroids and mepolizumab -> with excellent control of his pulmonary symptoms. His ANCA test has been negative (only about 50% of EGPA is ANCA pos).    January 25, 2024    Mr Moore presents today  with no acute complaints.  His BP in under acceptable but not optimal control, on modest dose of metoprolol .  I have instructed him to increase the dose to 25 mg daily.    He has chronic kidney disease which antedates the acute illness in May - Is stable.  He has NO significant proteinuria.  He has some mild pyuria without symptoms.  I will get a formal culture (he has had a UTI previously).  He has no symptoms or signs of active EGPA at the present time, on  the current therapy with mepolizumab only -> I think we can continue with mepolizumab for maintenance therapy and consider adding rituximab if he develops evidence of relapsing disease..      Anemia with Fe deficiency.  I encouraged  Mr Moore undergo a colonoscopy (and EGD) as recommended by GI.  He remains somewhat reluctant.  I am encouraged by the improved Hgb.    I will plan for seeing him again in about 3 months for follow up    I spent a total of 50 minutes in reviewing the medical records, face-to-face evaluation and physical exam, review of labs, counseling, orders, care coordination and documentation        Josue Tripp MD  Division of Renal Disease and Hypertension  January 25, 2024

## 2024-01-26 LAB
BACTERIA UR CULT: NORMAL
MYELOPEROXIDASE AB SER IA-ACNC: 0.3 U/ML
MYELOPEROXIDASE AB SER IA-ACNC: NEGATIVE
PROTEINASE3 AB SER IA-ACNC: <1 U/ML
PROTEINASE3 AB SER IA-ACNC: NEGATIVE

## 2024-01-30 ENCOUNTER — TELEPHONE (OUTPATIENT)
Dept: INTERNAL MEDICINE | Facility: CLINIC | Age: 64
End: 2024-01-30

## 2024-01-31 ENCOUNTER — THERAPY VISIT (OUTPATIENT)
Dept: PHYSICAL THERAPY | Facility: CLINIC | Age: 64
End: 2024-01-31
Payer: COMMERCIAL

## 2024-01-31 ENCOUNTER — HOSPITAL ENCOUNTER (OUTPATIENT)
Dept: CARDIAC REHAB | Facility: CLINIC | Age: 64
Discharge: HOME | End: 2024-01-31
Attending: INTERNAL MEDICINE
Payer: COMMERCIAL

## 2024-01-31 DIAGNOSIS — M25.551 HIP PAIN, RIGHT: Primary | ICD-10-CM

## 2024-01-31 PROCEDURE — G0239 OTH RESP PROC, GROUP: HCPCS

## 2024-01-31 PROCEDURE — 97112 NEUROMUSCULAR REEDUCATION: CPT | Mod: GP | Performed by: PHYSICAL THERAPIST

## 2024-01-31 PROCEDURE — 97110 THERAPEUTIC EXERCISES: CPT | Mod: GP | Performed by: PHYSICAL THERAPIST

## 2024-02-01 ENCOUNTER — VIRTUAL VISIT (OUTPATIENT)
Dept: INTERNAL MEDICINE | Facility: CLINIC | Age: 64
End: 2024-02-01
Payer: COMMERCIAL

## 2024-02-01 DIAGNOSIS — G81.91 RIGHT HEMIPARESIS (H): ICD-10-CM

## 2024-02-01 DIAGNOSIS — I50.22 CHRONIC SYSTOLIC HEART FAILURE (H): ICD-10-CM

## 2024-02-01 DIAGNOSIS — D72.18 EOSINOPHILIC GRANULOMATOSIS WITH POLYANGIITIS (EGPA) (H): Primary | ICD-10-CM

## 2024-02-01 DIAGNOSIS — J44.9 CHRONIC OBSTRUCTIVE PULMONARY DISEASE, UNSPECIFIED COPD TYPE (H): ICD-10-CM

## 2024-02-01 DIAGNOSIS — M30.1 EOSINOPHILIC GRANULOMATOSIS WITH POLYANGIITIS (EGPA) (H): Primary | ICD-10-CM

## 2024-02-01 PROCEDURE — 99214 OFFICE O/P EST MOD 30 MIN: CPT | Mod: 95 | Performed by: INTERNAL MEDICINE

## 2024-02-01 NOTE — PROGRESS NOTES
Ned is a 63 year old who is being evaluated via a billable video visit.      How would you like to obtain your AVS? MyChart  If the video visit is dropped, the invitation should be resent by: Send to e-mail at: [email or web address]  Will anyone else be joining your video visit? No          Assessment & Plan     (M30.1,  D72.18) Eosinophilic granulomatosis with polyangiitis (EGPA) (H)  (primary encounter diagnosis)  Comment: diagnosed 5/2023-complicated by cardiopulmonary and renal involvement. Prolonged hospitalization which included acute CVA, residual right LE weakness as well as generalized deconditioning and mild cognitive impairment--neuropsych evaluation/notes pending.  Plan: Primary Care - Care Coordination Referral        He is currently limited in his physical abilities, though gradual improvement is needed over the last 8 months but it is slow. He lives with daughter and they are hoping to seek some additional home aide--placed care coordination referral to see what options he may have as he is NOT home-bound.  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 Whether this is a possibility down the road is yet to be seen.  Otherwise, ongoing care through nephrology, cardiology, pulmonology, [facility] pharmacy and rheum.    (J44.9) Chronic obstructive pulmonary disease, unspecified COPD type (H)  Comment: see above  Plan: stable, ongoing pulm/card rehab    (I50.22) Chronic systolic heart failure (H)  Comment: stable  Plan: Primary Care - Care Coordination Referral        Cardio rehab ongoing. Per cards team    (G81.91) Right hemiparesis (H)  Comment: mostly LE at this time. Sees  system PMR, [facility] physical therapy, ongoing.  Plan: Primary Care - Care Coordination Referral        Still uses walker for longer walks.    Diabetes: improved control. Working with [facility]. Follow up with them in a week. CGM showing improved numbers--particularly now that he is no longer on higher dose oral  steroids.    See me in 4 months, physical                  Subjective   Ned is a 63 year old, presenting for the following health issues:  Diabetes (General  follow up  )      2/1/2024    11:45 AM   Additional Questions   Roomed by LEONIE Conrad   Accompanied by Self     History of Present Illness       Diabetes:   He presents for follow up of diabetes.  He is checking home blood glucose three times daily.   He checks blood glucose before meals and at bedtime.  Blood glucose is sometimes over 200 and never under 70. He is aware of hypoglycemia symptoms including shakiness.    He has no concerns regarding his diabetes at this time.  He is having numbness in feet.            Reason for visit:  EGPA    He eats 0-1 servings of fruits and vegetables daily.He consumes 0 sweetened beverage(s) daily.He exercises with enough effort to increase his heart rate 20 to 29 minutes per day.  He exercises with enough effort to increase his heart rate 4 days per week. He is missing 1 dose(s) of medications per week.  He is not taking prescribed medications regularly due to cost of medication and other.                   Objective           Vitals:  No vitals were obtained today due to virtual visit.    Physical Exam   GENERAL: alert and no distress  EYES: Eyes grossly normal to inspection.  No discharge or erythema, or obvious scleral/conjunctival abnormalities.  RESP: No audible wheeze, cough, or visible cyanosis.    SKIN: Visible skin clear. No significant rash, abnormal pigmentation or lesions.  NEURO: Cranial nerves grossly intact.  Mentation and speech appropriate for age.  PSYCH: Appropriate affect, tone, and pace of words          Video-Visit Details    Type of service:  Video Visit     Originating Location (pt. Location): Home    Distant Location (provider location):  On-site  Platform used for Video Visit: Duong  Signed Electronically by: Mark Briggs MD

## 2024-02-02 ENCOUNTER — PATIENT OUTREACH (OUTPATIENT)
Dept: CARE COORDINATION | Facility: CLINIC | Age: 64
End: 2024-02-02

## 2024-02-02 ENCOUNTER — TELEPHONE (OUTPATIENT)
Dept: INTERNAL MEDICINE | Facility: CLINIC | Age: 64
End: 2024-02-02

## 2024-02-02 ENCOUNTER — THERAPY VISIT (OUTPATIENT)
Dept: PHYSICAL THERAPY | Facility: CLINIC | Age: 64
End: 2024-02-02
Payer: COMMERCIAL

## 2024-02-02 DIAGNOSIS — M25.551 HIP PAIN, RIGHT: Primary | ICD-10-CM

## 2024-02-02 PROCEDURE — 97112 NEUROMUSCULAR REEDUCATION: CPT | Mod: GP | Performed by: PHYSICAL THERAPIST

## 2024-02-02 PROCEDURE — 97110 THERAPEUTIC EXERCISES: CPT | Mod: GP | Performed by: PHYSICAL THERAPIST

## 2024-02-02 NOTE — TELEPHONE ENCOUNTER
February 2, 2024    [facility] Work Capacity Narrative Disability Benefits was picked up from outbox of Dr. Briggs.  Paperwork has been reviewed and is complete.  Per initial initial request, this was sent via fax to [phone].     Form also sent to HIM    Melanie Rawls

## 2024-02-02 NOTE — PROGRESS NOTES
Clinic Care Coordination Contact  Community Health Worker Initial Outreach    CHW Initial Information Gathering:  Referral Source: PCP  Preferred Hospital: [facility]  [phone]  Current living arrangement:: I live in a private home with family  Type of residence:: Private home - stairs  Community Resources: None  Supplies Currently Used at Home: None  Equipment Currently Used at Home: walker, standard, cane, quad  Informal Support system:: Family  No PCP office visit in Past Year: No  Transportation means:: Accessible car  CHW Additional Questions  MyChart active?: Yes  Patient sent Social Determinants of Health questionnaire?: Yes    Patient accepts CC: Yes. Patient scheduled for assessment with CCC RICHARD Hewitt on 2/6/24 at 10AM. Patient noted desire to discuss home care resources.     CHW Note:  CHW contacted patient regarding a referral that was placed for CCC. CHW introduced self and role of CCC.  Patient shared he is interested in exploring home care resources that would be covered by his insurance.  CHW scheduled patient with CCC RICHARD Hewitt on 2/6/24 at 10AM for assistance exploring home care resources.     Order Questions    Question Answer   Reason for Referral: Patient/Caregiver Support   Patient/Caregiver Suport: Resources for Support   Clinical Staff have discussed the Care Coordination Referral with the patient and/or caregiver: Yes         Angela Rivers  Community Health Worker   [facility] Care Coordination  [facility]   [email or web address]  Office: [phone]

## 2024-02-06 ENCOUNTER — PATIENT OUTREACH (OUTPATIENT)
Dept: NURSING | Facility: CLINIC | Age: 64
End: 2024-02-06
Payer: COMMERCIAL

## 2024-02-06 NOTE — PROGRESS NOTES
CCC RN spoke with patient today to discuss enrollment in Care Coordination. Patient stated he would prefer to have his daughter in on today conversation, but she was currently unavailable. Per his request, RN assessment was rescheduled for 2/8/24 at 1:00 pm.

## 2024-02-07 ENCOUNTER — HOSPITAL ENCOUNTER (OUTPATIENT)
Dept: CARDIAC REHAB | Facility: CLINIC | Age: 64
Discharge: HOME | End: 2024-02-07
Attending: INTERNAL MEDICINE
Payer: COMMERCIAL

## 2024-02-07 PROCEDURE — G0239 OTH RESP PROC, GROUP: HCPCS

## 2024-02-08 ENCOUNTER — PATIENT OUTREACH (OUTPATIENT)
Dept: NURSING | Facility: CLINIC | Age: 64
End: 2024-02-08
Payer: COMMERCIAL

## 2024-02-08 ASSESSMENT — ACTIVITIES OF DAILY LIVING (ADL): DEPENDENT_IADLS:: CLEANING;COOKING;LAUNDRY;SHOPPING;MEAL PREPARATION;MEDICATION MANAGEMENT

## 2024-02-08 NOTE — PROGRESS NOTES
Clinic Care Coordination Contact    Situation: Patient chart reviewed by care coordinator.    Background: Spoke with patient today to discuss enrollment in Care Coordination.     Assessment: Patient currently in cardiac rehab program. He is having some mobility issues. His daughter, who is currently staying with would like to have housekeeping resources and possible services to assist him with bathing. Writer will forward resources to patient.     Plan/Recommendations: Patient did wish to enroll at this time, but did accept writer's phone number if needs or concerns arise.     David Myhre, RN   CCC RN

## 2024-02-12 ENCOUNTER — HOSPITAL ENCOUNTER (OUTPATIENT)
Dept: CARDIAC REHAB | Facility: CLINIC | Age: 64
Discharge: HOME | End: 2024-02-12
Attending: INTERNAL MEDICINE
Payer: COMMERCIAL

## 2024-02-12 PROBLEM — J33.9 NASAL POLYP: Status: ACTIVE | Noted: 2024-02-12

## 2024-02-12 PROCEDURE — G0239 OTH RESP PROC, GROUP: HCPCS

## 2024-02-13 ENCOUNTER — VIRTUAL VISIT (OUTPATIENT)
Dept: PHARMACY | Facility: CLINIC | Age: 64
End: 2024-02-13
Payer: COMMERCIAL

## 2024-02-13 ENCOUNTER — OFFICE VISIT (OUTPATIENT)
Dept: OTOLARYNGOLOGY | Facility: CLINIC | Age: 64
End: 2024-02-13
Payer: COMMERCIAL

## 2024-02-13 VITALS
OXYGEN SATURATION: 99 % | HEIGHT: 73 IN | DIASTOLIC BLOOD PRESSURE: 56 MMHG | WEIGHT: 185.6 LBS | BODY MASS INDEX: 24.6 KG/M2 | SYSTOLIC BLOOD PRESSURE: 144 MMHG

## 2024-02-13 DIAGNOSIS — G47.00 INSOMNIA, UNSPECIFIED TYPE: ICD-10-CM

## 2024-02-13 DIAGNOSIS — J33.9 NASAL POLYP: Primary | ICD-10-CM

## 2024-02-13 DIAGNOSIS — E11.40 TYPE 2 DIABETES MELLITUS WITH DIABETIC NEUROPATHY, UNSPECIFIED WHETHER LONG TERM INSULIN USE (H): Primary | ICD-10-CM

## 2024-02-13 DIAGNOSIS — M30.1 EOSINOPHILIC GRANULOMATOSIS WITH POLYANGIITIS (EGPA) (H): ICD-10-CM

## 2024-02-13 DIAGNOSIS — D72.18 EOSINOPHILIC GRANULOMATOSIS WITH POLYANGIITIS (EGPA) (H): ICD-10-CM

## 2024-02-13 PROCEDURE — 99607 MTMS BY PHARM ADDL 15 MIN: CPT | Mod: 93

## 2024-02-13 PROCEDURE — 31231 NASAL ENDOSCOPY DX: CPT | Performed by: STUDENT IN AN ORGANIZED HEALTH CARE EDUCATION/TRAINING PROGRAM

## 2024-02-13 PROCEDURE — 99214 OFFICE O/P EST MOD 30 MIN: CPT | Mod: 25 | Performed by: STUDENT IN AN ORGANIZED HEALTH CARE EDUCATION/TRAINING PROGRAM

## 2024-02-13 PROCEDURE — 99606 MTMS BY PHARM EST 15 MIN: CPT | Mod: 93

## 2024-02-13 RX ORDER — DULAGLUTIDE 0.75 MG/.5ML
0.75 INJECTION, SOLUTION SUBCUTANEOUS
Qty: 2 ML | Refills: 3 | Status: SHIPPED | OUTPATIENT
Start: 2024-02-13 | End: 2024-05-16

## 2024-02-13 RX ORDER — BLOOD-GLUCOSE SENSOR
1 EACH MISCELLANEOUS
Qty: 2 EACH | Refills: 11 | Status: SHIPPED | OUTPATIENT
Start: 2024-02-13 | End: 2024-09-26

## 2024-02-13 ASSESSMENT — PAIN SCALES - GENERAL: PAINLEVEL: NO PAIN (0)

## 2024-02-13 NOTE — PROGRESS NOTES
Medication Therapy Management (MTM) Encounter    ASSESSMENT:                            Medication Adherence/Access: No issues identified    Type 2 Diabetes: Patient is not meeting A1c goal of < 7%. Most of the CGM readings are within the target range (70 - 180) 72 %, above goal of > 70%. Considering nausea appropriate to lower the dose and possibly discontinue Trulicity. Could benefit from using agustín 3 sensor considering he does not check his sugar more than three times a day. Agustín 3 sensor was ordered and sent to the pharmacy. Will closely follow the sugar readings.     Insomnia: Sleeping was affected by his congestion previously, but after ENT visit patient used Nasonex. However, he is not sleep still well. Advised patient to increase Trazodone dose from 50 mg to 75 mg. However, if trazodone does not work, patient could try diphenhydramine. Advised patient of the side effects of this medication and to use it sparingly.      PLAN:                            Decrease Trulicity dose from 1.5 mg to 0.75 mg, and you continue to experience injection pain, you can stop take it   Increase Trazodone dose to 75 mg from the previous dose of 50 mg at bedtime   Please scan your sensor at least three times a day for  better sugar profile     Follow-up: Due 03/13/2024    SUBJECTIVE/OBJECTIVE:                          Ned Moore is a 63 year old male called for a follow up visit from 12/06/2023.    Reason for visit: Medication Review Initial.    Allergies/ADRs: Reviewed in chart  Past Medical History: Reviewed in chart  Tobacco: He reports that he has never smoked. He has never been exposed to tobacco smoke. He has never used smokeless tobacco.    Medication Adherence/Access: no issues reported    Type 2 Diabetes: Currently taking metformin 500 mg XR with supper and metformin 1000 mg with breakfast, and Semglee 6 - 8 units units.  Patient stopped taking Ozempic due to weight loss, and taking Trulicity 1.5 mg weekly. Patient  did not know that Trulicity should be refrigerated, and he had to throw away, and start all over again since insurance could not refill it before time.    Aspirin 81mg daily  Blood sugar monitoring: Continuous Glucose Monitor. Out of maría sensors. Using glucometer. Complained of the difference between BG readings with the sensor compared to finger pock. The BG via glucometer is higher than reading by the maría 2 reading. Noted he does not sleep on the side where the sensor is.   Denies any low sugar symptoms  Current diabetes symptoms: Fatigue.   Diet/Exercise: He is eating three times a day and he does not have the appetite he used to have. This might be due to his stuffed nose. He has a little of nausea during the day. This is affecting his diet. Will consider to lower dose or stop the medication.   Eye exam: up to date  Foot exam: due  Urine Albumin:         Lab Results   Component Value Date     UMALCR 39.06 (H) 11/15/2023            Lab Results   Component Value Date     A1C 8.0 11/15/2023     A1C 8.2 04/26/2023         Wt Readings from Last 2 Encounters:   12/06/23 198 lb (89.8 kg)   11/30/23 189 lb (85.7 kg)      - 185 Pounds. Patient does not want to lose weight.  Lost about 2 - 3 pounds this last month.     - Checked maría 3 coverage, but it is formulary excluded. Will follow up with patient closely              Insomnia: Currently taking trazodone 50 mg at bedtime as needed, and melatonin 9  mg daily as needed. Noted most of the nights he sleeps fine, but lately due to congestion not sleeping well. The congestion makes him breath with his mouth. This gives him dry mouth and makes him not to sleep well. Patient will be seen ENT soon. Patient       I spent 30 minutes with this patient today. All changes were made via collaborative practice agreement with Mark Briggs MD. A copy of the visit note was provided to the patient's provider(s).    A summary of these recommendations was sent via  Dao      Telemedicine Visit Details  Type of service:  Telephone visit  Start Time:  09:00 AM  End Time:   09:30 AM     Medication Therapy Recommendations  Insomnia, unspecified type    Current Medication: traZODone (DESYREL) 50 MG tablet   Rationale: Dose too low - Dosage too low - Effectiveness   Recommendation: Increase Dose - traZODone 75 mg Tabs half-tab - Advised patient to increase trazodone dose from 50 mg to 75 mg   Status: Accepted per CPA         Type 2 diabetes mellitus with diabetic neuropathy, unspecified whether long term insulin use (H)    Current Medication: dulaglutide (TRULICITY) 1.5 MG/0.5ML pen (Discontinued)   Rationale: Dose too high - Dosage too high - Safety   Recommendation: Decrease Dose - Trulicity 0.75 MG/0.5ML Sopn   Status: Accepted per CPA              Syed Borjas, PharmD     Medication Therapy Management (MTM) Pharmacist     [phone]     [email or web address]     [facility]

## 2024-02-13 NOTE — PATIENT INSTRUCTIONS
"Recommendations from today's MTM visit:                                                      MTM (medication therapy management) is a service provided by a clinical pharmacist designed to help you get the most of out of your medicines.   Today we reviewed what your medicines are for, how to know if they are working, that your medicines are safe and how to make your medicine regimen as easy as possible.      Decrease Trulicity dose from 1.5 mg to 0.75 mg, and you continue to experience injection pain, you can stop take it   Increase Trazodone dose to 75 mg from the previous dose of 50 mg at bedtime   Please scan your sensor at least three times a day for  better sugar profile     Follow-up: Due 03/13/2024    It was great speaking with you today.  I value your experience and would be very thankful for your time in providing feedback in our clinic survey. In the next few days, you may receive an email or text message from [facility] with a link to a survey related to your  clinical pharmacist.\"     To schedule another MTM appointment, please call the clinic directly or you may call the MTM scheduling line at [phone].    My Clinical Pharmacist's contact information:                                                      Please feel free to contact me with any questions or concerns you have.        Syed Borjas, PharmD     Medication Therapy Management (MTM) Pharmacist     [phone]     [email or web address]    "

## 2024-02-13 NOTE — PROGRESS NOTES
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)  Comment:   --significant improvement on mometasone sprays (< grade 1 polyps residual on the left)  --sleep, NAWO, smell, taste, voice all improved  Plan: IMAGESTREAM RECORDING ORDER  --continue nasonex  --follow up 3 months    Interval History:  Doing well int he meantime  Tolerating mepo  Once daily nasonex, no longer using saline rnise    HPI (copied from initial consultation):   Complicated presentation of EGPA   Currently under control  Nucala , no steroids, no current synthetic DMARDS     3 months ago felt that nose started feeling blocked  Difficulty with air flow  ++rhinorrhea/post nasal drip  Starting to mouth breathe - significantly affecting his sleep  Saline spray, fluticasone  Smell and taste are gone  Has not used afrin    Review of systems: A 14-point review of systems has been conducted and was negative for any notable symptoms, except as dictated in the history of present illness.     Medical History:  Past Medical History:   Diagnosis Date    Chronic obstructive pulmonary disease, unspecified COPD type (H) 3/15/2023    Chronic systolic heart failure (H) 11/15/2023    Diabetes (H)     History of CVA (cerebrovascular accident) 7/20/2023        Surgical History:   No past surgical history on file.     Family History:  Family History   Problem Relation Age of Onset    Heart Disease Mother     Diabetes Mother     Asthma Mother     Alzheimer Disease Father     No Known Problems Brother         Social History:   Social History     Socioeconomic History    Marital status:    Tobacco Use    Smoking status: Never     Passive exposure: Never    Smokeless tobacco: Never   Vaping Use    Vaping Use: Never used   Substance and Sexual Activity    Alcohol use: Yes     Comment: a beer or wine every few  months    Drug use: Never    Sexual activity: Not Currently     Partners: Female   Other Topics Concern    Parent/sibling w/ CABG, MI or angioplasty before 65F 55M? No     Social Determinants of Health     Financial Resource Strain: Low Risk  (2/2/2024)    Financial Resource Strain     Within the past 12 months, have you or your family members you live with been unable to get utilities (heat, electricity) when it was really needed?: No   Food Insecurity: High Risk (2/2/2024)    Food Insecurity     Within the past 12 months, did you worry that your food would run out before you got money to buy more?: Yes     Within the past 12 months, did the food you bought just not last and you didn t have money to get more?: No   Transportation Needs: Low Risk  (2/2/2024)    Transportation Needs     Within the past 12 months, has lack of transportation kept you from medical appointments, getting your medicines, non-medical meetings or appointments, work, or from getting things that you need?: No   Physical Activity: Insufficiently Active (2/2/2024)    Exercise Vital Sign     Days of Exercise per Week: 3 days     Minutes of Exercise per Session: 30 min   Stress: No Stress Concern Present (2/2/2024)    [employer] [place] [employer] - Occupational Stress Questionnaire     Feeling of Stress : Not at all   Social Connections: Socially Isolated (2/2/2024)    Social Connection and Isolation Panel [NHANES]     Frequency of Communication with Friends and Family: Once a week     Frequency of Social Gatherings with Friends and Family: More than three times a week     Attends Bahai Services: Never     Active Member of Clubs or Organizations: No     Attends Club or Organization Meetings: Never     Marital Status:    Housing Stability: Low Risk  (2/2/2024)    Housing Stability     Do you have housing? : Yes     Are you worried about losing your housing?: No   Recent Concern: Housing Stability - High Risk (11/14/2023)     "Housing Stability     Do you have housing? : Yes     Are you worried about losing your housing?: Yes        Physical Exam:  Vital signs: BP (!) 144/56 (BP Location: Right arm, Patient Position: Sitting, Cuff Size: Adult Regular)   Ht 1.854 m (6' 1\")   Wt 84.2 kg (185 lb 9.6 oz)   SpO2 99%   BMI 24.49 kg/m     General Appearance: No acute distress, appropriate demeanor, conversant  Eyes: moist conjunctivae; EOMI; pupils symmetric; visual acuity grossly intact; no proptosis  Head: normocephalic; overall symmetric appearance without deformity  Face: overall symmetric without deformity  Ears: Normal appearance of external ear; external meatus normal in appearance; TMs intact without perforation bilaterally;   Nose: No external deformity; septum slight DNS to the right ; inferior turbinates (non hypertrophic)   Oral Cavity/oropharynx: Normal appearance of mucosa; tongue midline; no mass or lesions; oropharynx without obvious mucosal abnormality  Neck: no palpable lymphadenopathy; thyroid without palpable nodules  Lungs: symmetric chest rise; no wheezing  CV: Good distal perfusion; normal hear rate  Extremities: No deformity  Neurologic Exam: Cranial nerves II-XII are grossly intact; no focal deficit    Procedure Note  Procedure performed: Rigid nasal endoscopy  Indication: To evaluate for sinonasal pathology not visualized on routine anterior rhinoscopy  Anesthesia: 4% topical lidocaine with 0.05% oxymetazoline  Description of procedure: A 30 degree, 3 mm rigid endoscope was inserted into bilateral nasal cavities and the nasal valves, nasal cavity, middle meatus, sphenoethmoid recess, and nasopharynx were thoroughly evaluated for evidence of obstruction, edema, purulence, polyps and/or mass/lesion.     Findings:    Interval resolution of all MM polyps on the right  < Grade 1 polyps/polypoid edema on the left    The patient tolerated the procedure well without complication.     Imaging Review:  CT 2024 primary review " - ethmoid predominant opacfication and polyposis    Faizan Samuel MD    [facility]  Rhinology  Endoscopic Skull Base Surgery  [facility]  Department of Otolaryngology - Head & Neck Surgery

## 2024-02-13 NOTE — LETTER
2/13/2024       RE: Ned Moore  [address]  [place] [address]     Dear Colleague,    Thank you for referring your patient, Ned Moore, to the [facility] [place] at [facility]. Please see a copy of my visit note below.    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)  Comment:   --significant improvement on mometasone sprays (< grade 1 polyps residual on the left)  --sleep, NAWO, smell, taste, voice all improved  Plan: IMAGESTREAM RECORDING ORDER  --continue nasonex  --follow up 3 months    Interval History:  Doing well int he meantime  Tolerating mepo  Once daily nasonex, no longer using saline rnise    HPI (copied from initial consultation):   Complicated presentation of EGPA   Currently under control  Nucala , no steroids, no current synthetic DMARDS     3 months ago felt that nose started feeling blocked  Difficulty with air flow  ++rhinorrhea/post nasal drip  Starting to mouth breathe - significantly affecting his sleep  Saline spray, fluticasone  Smell and taste are gone  Has not used afrin    Review of systems: A 14-point review of systems has been conducted and was negative for any notable symptoms, except as dictated in the history of present illness.     Medical History:  Past Medical History:   Diagnosis Date     Chronic obstructive pulmonary disease, unspecified COPD type (H) 3/15/2023     Chronic systolic heart failure (H) 11/15/2023     Diabetes (H)      History of CVA (cerebrovascular accident) 7/20/2023        Surgical History:   No past surgical history on file.     Family History:  Family History   Problem Relation Age of Onset     Heart Disease Mother      Diabetes Mother      Asthma Mother      Alzheimer Disease Father      No Known Problems  Brother         Social History:   Social History     Socioeconomic History     Marital status:    Tobacco Use     Smoking status: Never     Passive exposure: Never     Smokeless tobacco: Never   Vaping Use     Vaping Use: Never used   Substance and Sexual Activity     Alcohol use: Yes     Comment: a beer or wine every few months     Drug use: Never     Sexual activity: Not Currently     Partners: Female   Other Topics Concern     Parent/sibling w/ CABG, MI or angioplasty before 65F 55M? No     Social Determinants of Health     Financial Resource Strain: Low Risk  (2/2/2024)    Financial Resource Strain      Within the past 12 months, have you or your family members you live with been unable to get utilities (heat, electricity) when it was really needed?: No   Food Insecurity: High Risk (2/2/2024)    Food Insecurity      Within the past 12 months, did you worry that your food would run out before you got money to buy more?: Yes      Within the past 12 months, did the food you bought just not last and you didn t have money to get more?: No   Transportation Needs: Low Risk  (2/2/2024)    Transportation Needs      Within the past 12 months, has lack of transportation kept you from medical appointments, getting your medicines, non-medical meetings or appointments, work, or from getting things that you need?: No   Physical Activity: Insufficiently Active (2/2/2024)    Exercise Vital Sign      Days of Exercise per Week: 3 days      Minutes of Exercise per Session: 30 min   Stress: No Stress Concern Present (2/2/2024)    [employer] [place] [employer] - Occupational Stress Questionnaire      Feeling of Stress : Not at all   Social Connections: Socially Isolated (2/2/2024)    Social Connection and Isolation Panel [NHANES]      Frequency of Communication with Friends and Family: Once a week      Frequency of Social Gatherings with Friends and Family: More than three times a week      Attends Alevism  "Services: Never      Active Member of Clubs or Organizations: No      Attends Club or Organization Meetings: Never      Marital Status:    Housing Stability: Low Risk  (2/2/2024)    Housing Stability      Do you have housing? : Yes      Are you worried about losing your housing?: No   Recent Concern: Housing Stability - High Risk (11/14/2023)    Housing Stability      Do you have housing? : Yes      Are you worried about losing your housing?: Yes        Physical Exam:  Vital signs: BP (!) 144/56 (BP Location: Right arm, Patient Position: Sitting, Cuff Size: Adult Regular)   Ht 1.854 m (6' 1\")   Wt 84.2 kg (185 lb 9.6 oz)   SpO2 99%   BMI 24.49 kg/m     General Appearance: No acute distress, appropriate demeanor, conversant  Eyes: moist conjunctivae; EOMI; pupils symmetric; visual acuity grossly intact; no proptosis  Head: normocephalic; overall symmetric appearance without deformity  Face: overall symmetric without deformity  Ears: Normal appearance of external ear; external meatus normal in appearance; TMs intact without perforation bilaterally;   Nose: No external deformity; septum slight DNS to the right ; inferior turbinates (non hypertrophic)   Oral Cavity/oropharynx: Normal appearance of mucosa; tongue midline; no mass or lesions; oropharynx without obvious mucosal abnormality  Neck: no palpable lymphadenopathy; thyroid without palpable nodules  Lungs: symmetric chest rise; no wheezing  CV: Good distal perfusion; normal hear rate  Extremities: No deformity  Neurologic Exam: Cranial nerves II-XII are grossly intact; no focal deficit    Procedure Note  Procedure performed: Rigid nasal endoscopy  Indication: To evaluate for sinonasal pathology not visualized on routine anterior rhinoscopy  Anesthesia: 4% topical lidocaine with 0.05% oxymetazoline  Description of procedure: A 30 degree, 3 mm rigid endoscope was inserted into bilateral nasal cavities and the nasal valves, nasal cavity, middle meatus, " sphenoethmoid recess, and nasopharynx were thoroughly evaluated for evidence of obstruction, edema, purulence, polyps and/or mass/lesion.     Findings:    Interval resolution of all MM polyps on the right  < Grade 1 polyps/polypoid edema on the left    The patient tolerated the procedure well without complication.     Imaging Review:  CT 2024 primary review - ethmoid predominant opacfication and polyposis    Faizan Samuel MD    [facility]  Rhinology  Endoscopic Skull Base Surgery  [facility]  Department of Otolaryngology - Head & Neck Surgery    Again, thank you for allowing me to participate in the care of your patient.      Sincerely,    Faizan Samuel MD

## 2024-02-13 NOTE — NURSING NOTE
"Chief Complaint   Patient presents with    RECHECK   Blood pressure (!) 144/56, height 1.854 m (6' 1\"), weight 84.2 kg (185 lb 9.6 oz), SpO2 99%. Manny White, EMT    "

## 2024-02-13 NOTE — PATIENT INSTRUCTIONS
You were seen in the ENT Clinic today by Dr. Samuel. If you have any questions or concerns after your appointment, please contact us (see below)    The following has been recommended for you based upon your appointment today:  Continue Nasonex    Please return to clinic in 3 months for follow up with Dr. Samuel    How to Contact Us:  Send a MESI message to your provider. Our team will respond to you via MESI. Occasionally, we will need to call you to get further information.  For urgent matters (Monday-Friday), call the ENT Clinic: [phone] and speak with a call center team member - they will route your call appropriately.   If you'd like to speak directly with a nurse, please find our contact information below. We do our best to check voicemail frequently throughout the day, and will work to call you back within 1-2 days. For urgent matters, please use the general clinic phone numbers listed above.    Bethany NETTLES RN, BSN   RN Care Coordinator, ENT Clinic  [facility]  Direct: [phone]  Radha PRICE LPN  Direct: [phone]

## 2024-02-14 ENCOUNTER — HOSPITAL ENCOUNTER (OUTPATIENT)
Dept: CARDIAC REHAB | Facility: CLINIC | Age: 64
Discharge: HOME | End: 2024-02-14
Attending: INTERNAL MEDICINE
Payer: COMMERCIAL

## 2024-02-14 PROCEDURE — G0239 OTH RESP PROC, GROUP: HCPCS

## 2024-02-16 ENCOUNTER — HOSPITAL ENCOUNTER (EMERGENCY)
Facility: CLINIC | Age: 64
Discharge: HOME | End: 2024-02-16
Attending: EMERGENCY MEDICINE | Admitting: EMERGENCY MEDICINE
Payer: COMMERCIAL

## 2024-02-16 VITALS
BODY MASS INDEX: 24.41 KG/M2 | WEIGHT: 185 LBS | DIASTOLIC BLOOD PRESSURE: 50 MMHG | OXYGEN SATURATION: 96 % | HEART RATE: 77 BPM | RESPIRATION RATE: 19 BRPM | SYSTOLIC BLOOD PRESSURE: 132 MMHG | TEMPERATURE: 98.4 F

## 2024-02-16 DIAGNOSIS — R00.1 BRADYCARDIA: ICD-10-CM

## 2024-02-16 LAB
ALBUMIN SERPL BCG-MCNC: 4.3 G/DL (ref 3.5–5.2)
ALP SERPL-CCNC: 88 U/L (ref 40–150)
ALT SERPL W P-5'-P-CCNC: 22 U/L (ref 0–70)
ANION GAP SERPL CALCULATED.3IONS-SCNC: 13 MMOL/L (ref 7–15)
AST SERPL W P-5'-P-CCNC: 24 U/L (ref 0–45)
ATRIAL RATE - MUSE: 79 BPM
BILIRUB SERPL-MCNC: 0.3 MG/DL
BUN SERPL-MCNC: 37.7 MG/DL (ref 8–23)
CALCIUM SERPL-MCNC: 9.3 MG/DL (ref 8.8–10.2)
CHLORIDE SERPL-SCNC: 101 MMOL/L (ref 98–107)
CREAT SERPL-MCNC: 1.75 MG/DL (ref 0.67–1.17)
DEPRECATED HCO3 PLAS-SCNC: 23 MMOL/L (ref 22–29)
DIASTOLIC BLOOD PRESSURE - MUSE: NORMAL MMHG
EGFRCR SERPLBLD CKD-EPI 2021: 43 ML/MIN/1.73M2
GLUCOSE SERPL-MCNC: 187 MG/DL (ref 70–99)
INTERPRETATION ECG - MUSE: NORMAL
MAGNESIUM SERPL-MCNC: 2.5 MG/DL (ref 1.7–2.3)
P AXIS - MUSE: 64 DEGREES
POTASSIUM SERPL-SCNC: 4.7 MMOL/L (ref 3.4–5.3)
PR INTERVAL - MUSE: 152 MS
PROT SERPL-MCNC: 7.1 G/DL (ref 6.4–8.3)
QRS DURATION - MUSE: 90 MS
QT - MUSE: 408 MS
QTC - MUSE: 467 MS
R AXIS - MUSE: 3 DEGREES
SODIUM SERPL-SCNC: 137 MMOL/L (ref 135–145)
SYSTOLIC BLOOD PRESSURE - MUSE: NORMAL MMHG
T AXIS - MUSE: 51 DEGREES
VENTRICULAR RATE- MUSE: 79 BPM

## 2024-02-16 PROCEDURE — 93010 ELECTROCARDIOGRAM REPORT: CPT | Performed by: EMERGENCY MEDICINE

## 2024-02-16 PROCEDURE — 83735 ASSAY OF MAGNESIUM: CPT | Performed by: EMERGENCY MEDICINE

## 2024-02-16 PROCEDURE — 99284 EMERGENCY DEPT VISIT MOD MDM: CPT | Performed by: EMERGENCY MEDICINE

## 2024-02-16 PROCEDURE — 93005 ELECTROCARDIOGRAM TRACING: CPT | Performed by: EMERGENCY MEDICINE

## 2024-02-16 PROCEDURE — 99284 EMERGENCY DEPT VISIT MOD MDM: CPT | Mod: 25 | Performed by: EMERGENCY MEDICINE

## 2024-02-16 PROCEDURE — 36415 COLL VENOUS BLD VENIPUNCTURE: CPT | Performed by: EMERGENCY MEDICINE

## 2024-02-16 PROCEDURE — 80053 COMPREHEN METABOLIC PANEL: CPT | Performed by: EMERGENCY MEDICINE

## 2024-02-16 ASSESSMENT — ACTIVITIES OF DAILY LIVING (ADL)
ADLS_ACUITY_SCORE: 33
ADLS_ACUITY_SCORE: 35

## 2024-02-16 NOTE — ED TRIAGE NOTES
Presents with concern over a slow heart beat as low as 42. Pt denies any palpitations, dizziness, or CP. States they check his vital signs 2x a week at cardiac rehab and its low and he checks it at home also.     Recent increase in metoprolol.         Triage Assessment (Adult)       Row Name 02/16/24 1327          Triage Assessment    Airway WDL WDL        Respiratory WDL    Respiratory WDL WDL        Skin Circulation/Temperature WDL    Skin Circulation/Temperature WDL WDL        Cardiac WDL    Cardiac WDL WDL        Peripheral/Neurovascular WDL    Peripheral Neurovascular WDL WDL        Cognitive/Neuro/Behavioral WDL    Cognitive/Neuro/Behavioral WDL WDL

## 2024-02-16 NOTE — ED PROVIDER NOTES
ED PROVIDER NOTE  February 16, 2024  History     Chief Complaint   Patient presents with    Irregular Heart Beat     HPI  Ned Moore is a 63 year old male who has a history significant for prior CVA type 2 diabetes arriving today to the emergency department secondary to asymptomatic bradycardia.  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  This has been in the 40s.  This was in the setting of recent increase in metoprolol to 25 daily.  He was previously on 12.5.  He states reasoning for increase is unclear his blood pressure has been fairly well-controlled.  The patient did this morning self discontinue a dose of metoprolol and his heart rate has increased to the 60s to 80s.  He states he has been asymptomatic with no presyncopal symptoms no exertional shortness of breath no chest discomfort.  He reports no other palpitations.  Patient does have follow-up with his cardiologist in approximate 1 week time.      Past Medical History  Past Medical History:   Diagnosis Date    Chronic obstructive pulmonary disease, unspecified COPD type (H) 3/15/2023    Chronic systolic heart failure (H) 11/15/2023    Diabetes (H)     History of CVA (cerebrovascular accident) 7/20/2023     History reviewed. No pertinent surgical history.  ACCU-CHEK FASTCLIX LANCET DRUM  ASPIRIN LOW DOSE 81 MG chewable tablet  atorvastatin (LIPITOR) 20 MG tablet  BD ALEXANDRA U/F 32G X 4 MM insulin pen needle  blood glucose test (ACCU-CHEK SMARTVIEW TEST STRIP) strips  Continuous Blood Gluc Sensor (FREESTYLE LIVIA 2 SENSOR) MISC  Continuous Blood Gluc Sensor (FREESTYLE LIVIA 3 SENSOR) MISC  dulaglutide (TRULICITY) 0.75 MG/0.5ML pen  ferrous sulfate (FEROSUL) 325 (65 Fe) MG tablet  gabapentin (NEURONTIN) 300 MG capsule  insulin glargine (LANTUS PEN) 100 UNIT/ML pen  melatonin 3 MG tablet  Mepolizumab 100 MG/ML SOSY  metFORMIN (GLUCOPHAGE XR) 500 MG 24 hr tablet  metFORMIN  (GLUCOPHAGE) 1000 MG tablet  metoprolol succinate ER (TOPROL XL) 25 MG 24 hr tablet  mometasone (NASONEX) 50 MCG/ACT nasal spray  omeprazole (PRILOSEC) 20 MG DR capsule  tiotropium (SPIRIVA) 18 MCG inhaled capsule  traZODone (DESYREL) 50 MG tablet      Allergies   Allergen Reactions    Azathioprine Nausea and Vomiting    Dulaglutide Nausea     Family History  Family History   Problem Relation Age of Onset    Heart Disease Mother     Diabetes Mother     Asthma Mother     Alzheimer Disease Father     No Known Problems Brother      Social History   Social History     Tobacco Use    Smoking status: Never     Passive exposure: Never    Smokeless tobacco: Never   Vaping Use    Vaping Use: Never used   Substance Use Topics    Alcohol use: Yes     Comment: a beer or wine every few months    Drug use: Never         A medically appropriate review of systems was performed with pertinent positives and negatives noted in the HPI, and all other systems negative.      Physical Exam   BP: (!) 167/78  Pulse: 82  Temp: 98.4  F (36.9  C)  Resp: 18  Weight: 83.9 kg (185 lb)  SpO2: 98 %      Physical Exam  Vitals and nursing note reviewed.   Constitutional:       General: He is not in acute distress.     Appearance: He is not ill-appearing or diaphoretic.   HENT:      Head: Normocephalic and atraumatic.   Eyes:      Extraocular Movements: Extraocular movements intact.      Pupils: Pupils are equal, round, and reactive to light.   Cardiovascular:      Rate and Rhythm: Normal rate and regular rhythm.      Pulses: Normal pulses.   Pulmonary:      Effort: Pulmonary effort is normal. No respiratory distress.   Skin:     General: Skin is warm.      Findings: No rash.   Neurological:      General: No focal deficit present.      Mental Status: He is alert.   Psychiatric:         Mood and Affect: Mood normal.         Behavior: Behavior normal.         Thought Content: Thought content normal.         ED Course        Procedures         Results  for orders placed or performed during the hospital encounter of 02/16/24 (from the past 24 hour(s))   EKG 12 lead   Result Value Ref Range    Systolic Blood Pressure  mmHg    Diastolic Blood Pressure  mmHg    Ventricular Rate 79 BPM    Atrial Rate 79 BPM    NM Interval 152 ms    QRS Duration 90 ms     ms    QTc 467 ms    P Axis 64 degrees    R AXIS 3 degrees    T Axis 51 degrees    Interpretation ECG       Sinus rhythm with frequent Premature ventricular complexes  Cannot rule out Inferior infarct , age undetermined  Abnormal ECG  Unconfirmed report - interpretation of this ECG is computer generated - see medical record for final interpretation    Confirmed by - EMERGENCY ROOM, PHYSICIAN ([id number]),  BRIGIDA GALLARDO ([id number]) on 2/16/2024 3:44:58 PM     Comprehensive metabolic panel   Result Value Ref Range    Sodium 137 135 - 145 mmol/L    Potassium 4.7 3.4 - 5.3 mmol/L    Carbon Dioxide (CO2) 23 22 - 29 mmol/L    Anion Gap 13 7 - 15 mmol/L    Urea Nitrogen 37.7 (H) 8.0 - 23.0 mg/dL    Creatinine 1.75 (H) 0.67 - 1.17 mg/dL    GFR Estimate 43 (L) >60 mL/min/1.73m2    Calcium 9.3 8.8 - 10.2 mg/dL    Chloride 101 98 - 107 mmol/L    Glucose 187 (H) 70 - 99 mg/dL    Alkaline Phosphatase 88 40 - 150 U/L    AST 24 0 - 45 U/L    ALT 22 0 - 70 U/L    Protein Total 7.1 6.4 - 8.3 g/dL    Albumin 4.3 3.5 - 5.2 g/dL    Bilirubin Total 0.3 <=1.2 mg/dL   Magnesium   Result Value Ref Range    Magnesium 2.5 (H) 1.7 - 2.3 mg/dL     Medications - No data to display          Critical care was not performed.     Medical Decision Making  The patient's presentation was of moderate complexity (an acute complicated injury).    The patient's evaluation involved:  review of external note(s) from 2 sources (see separate area of note for details)  review of 2 test result(s) ordered prior to this encounter (see separate area of note for details)  ordering and/or review of 2 test(s) in this encounter (see separate area of note for  details)        Assessments & Plan (with Medical Decision Making)     Ned Moore is a 63 year old male who has a history significant for prior CVA type 2 diabetes arriving today to the emergency department secondary to asymptomatic bradycardia.   Upon arrival patient is alert, afebrile, he medically stable with mild to moderate systolic hypertension.  GCS 15.  He speaking full sentence without signs of neurovascular deficit.  I did review his EKG demonstrating a sinus rhythm with rates in the 80s.  No acute ST changes, or interval abnormality.  Patient does have occasional PVCs which I suspect was part of the reasoning for increase in metoprolol.  I have a lower suspicion based on improvement of heart rate after discontinuation of metoprolol for sick sinus syndrome or electrophysiology dysfunction warranting emergent consultation.  I do not believe he requires hospitalization at this time.  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  I am reassured that he has outpatient follow-up with his cardiology in 1 week.  I would agree with plan for cutting his dose in half for the next 1 week.  I discussed indications for emergent return chest and symptomatic nature such as presyncopal symptoms, worsening of bradycardia, palpitations or other concern.  The patient feels comfortable with this plan.  We did discuss option for assessing electrolytes.  These were checked with no significant abnormality minimal elevation magnesium.  Plan as per above.    I have reviewed the nursing notes.    I have reviewed the findings, diagnosis, plan and need for follow up with the patient.    New Prescriptions    No medications on file       Final diagnoses:   Bradycardia       SURINDER PATEL MD    2/16/2024   [facility] EMERGENCY DEPARTMENT     Surinder Patel MD  02/16/24 2041

## 2024-02-16 NOTE — DISCHARGE INSTRUCTIONS
I was happy to see that overall your heart rate has improved today with decreasing your metoprolol.  As we discussed and did obtain your electrolytes these are overall reassuring with slight elevation in your magnesium but no significant change in your potassium or sodium.  I think it is reasonable to decrease your metoprolol to your prior dose of 12.5 mg daily.  I was happy to hear that you have close outpatient follow-up with your cardiologist.  Please continue to monitor your symptoms closely.  Should you become symptomatic such as having lightheadedness, shortness of breath with exertion or challenging exertion when going up stairs or performing simple activities please call or return emergently in the interim.

## 2024-02-19 ENCOUNTER — HOSPITAL ENCOUNTER (OUTPATIENT)
Dept: CARDIAC REHAB | Facility: CLINIC | Age: 64
Discharge: HOME | End: 2024-02-19
Attending: INTERNAL MEDICINE
Payer: COMMERCIAL

## 2024-02-19 PROCEDURE — G0239 OTH RESP PROC, GROUP: HCPCS

## 2024-02-21 ENCOUNTER — HOSPITAL ENCOUNTER (OUTPATIENT)
Dept: CARDIAC REHAB | Facility: CLINIC | Age: 64
Discharge: HOME | End: 2024-02-21
Attending: INTERNAL MEDICINE
Payer: COMMERCIAL

## 2024-02-21 PROCEDURE — G0239 OTH RESP PROC, GROUP: HCPCS

## 2024-02-27 ENCOUNTER — TELEPHONE (OUTPATIENT)
Dept: INTERNAL MEDICINE | Facility: CLINIC | Age: 64
End: 2024-02-27
Payer: COMMERCIAL

## 2024-02-27 NOTE — TELEPHONE ENCOUNTER
February 27, 2024    [facility] Certification Request for Accommodation was received via fax for Dr. Briggs to sign.  Patient label was attached to paperwork and placed in provider's inbox to be signed.    Melanie Rawls

## 2024-02-28 ENCOUNTER — OFFICE VISIT (OUTPATIENT)
Dept: CARDIOLOGY | Facility: CLINIC | Age: 64
End: 2024-02-28
Attending: INTERNAL MEDICINE
Payer: COMMERCIAL

## 2024-02-28 VITALS
BODY MASS INDEX: 23.74 KG/M2 | SYSTOLIC BLOOD PRESSURE: 148 MMHG | HEIGHT: 74 IN | RESPIRATION RATE: 18 BRPM | WEIGHT: 185 LBS | HEART RATE: 68 BPM | DIASTOLIC BLOOD PRESSURE: 72 MMHG

## 2024-02-28 DIAGNOSIS — I49.3 PVC'S (PREMATURE VENTRICULAR CONTRACTIONS): ICD-10-CM

## 2024-02-28 DIAGNOSIS — I51.4 OTHER MYOCARDITIS, UNSPECIFIED CHRONICITY (H): ICD-10-CM

## 2024-02-28 DIAGNOSIS — I50.22 CHRONIC SYSTOLIC HEART FAILURE (H): ICD-10-CM

## 2024-02-28 DIAGNOSIS — E78.5 HYPERLIPIDEMIA LDL GOAL <70: Primary | ICD-10-CM

## 2024-02-28 PROCEDURE — 93000 ELECTROCARDIOGRAM COMPLETE: CPT | Performed by: INTERNAL MEDICINE

## 2024-02-28 PROCEDURE — 99205 OFFICE O/P NEW HI 60 MIN: CPT | Performed by: PHYSICIAN ASSISTANT

## 2024-02-28 RX ORDER — LISINOPRIL 2.5 MG/1
2.5 TABLET ORAL DAILY
Qty: 90 TABLET | Refills: 3 | Status: SHIPPED | OUTPATIENT
Start: 2024-02-28

## 2024-02-28 RX ORDER — FLUTICASONE FUROATE AND VILANTEROL 100; 25 UG/1; UG/1
1 POWDER RESPIRATORY (INHALATION) DAILY
COMMUNITY
End: 2024-08-01

## 2024-02-28 RX ORDER — ATORVASTATIN CALCIUM 20 MG/1
20 TABLET, FILM COATED ORAL EVERY EVENING
Qty: 90 TABLET | Refills: 3 | Status: SHIPPED | OUTPATIENT
Start: 2024-02-28

## 2024-02-28 NOTE — PATIENT INSTRUCTIONS
It was great to see you today!     Start lisinopril 2.5 mg daily.    Wear heart monitor for 7 days.     Return in 6 months with echocardiogram before.     For scheduling questions, our 24 hours scheduling line is available at [phone].    To reach our nursing team, please call [phone]. Our nursing team is available 8-4:30 Monday-Friday. If you have a medical emergency, please call [phone].

## 2024-02-28 NOTE — PROGRESS NOTES
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 Treated with steoids and mprolizumab. Repeat MRI in September 2023 showed late gadolinium enhancement throughout the cameron to mid inferolateral and the inferoapical areas, near transmurality. In comparison to previous, this was subendocardial compared to mid-wall. Likely this represents endocardial fibrosis in response to eosinophilic infiltrate. No myocardia edema to suggest ongoing acute inflammation. He is on immunosuppressants for eosinophilic granulomatosis  Continue metoprolol xl 12.5 mg daily, unable to tolerate higher doses due to bradycardia   Initiate Lisinopril 2.5 mg daily  No evidence of heart failure   Zio monitor to evaluate for arrhythmia given fibrosis   Repeat imaging in 6 months, 12 months from prior  PVCs, he has had these for many years. Increased palpitations since CVA. Fibrosis noted on cardiac MRI  Recommend 7 day holter monitor to evaluate PVC burden and rule out other arrhythmias given cardiac fibrosis   Continue low dose metoprolol   Prevention   Dyslipidemia, Mother had CHF, unclear what age this started, likely in mid-60s. He is a never smoker.  152, LDL 69, HLD 64 on atorvastatin 20 mg daily.  Continue atorvastatin 20 mg daily     Return to care in 6 months with me.      History of Present Illness/Subjective    Ned Moore is a 63 year old male with past medical history significant for:  History of eosinophilic myocarditis  Eosinophilic granulomatosis with polyangiitis   PVCs   History of CVA, possible  Hypotension may have contributed to stroke-like symptoms  CT and MRI without evidence of acute emboli   Type II DM  CKD stage I A  COPD    The patient was admitted to the hospital in May 2023 with heart failure, found to have eosinophilic myocarditis confirmed by  biopsy. Presentation was that of failure to thrive, asymptomatic from CV standpoint but incidentally discovered troponin elevation and ST depression. Found to have patchy pulmonary opacities consistent with bronchopneumonia, marked peripheral eosinophilia (>400,000)  Inflammatory markers were elevated. Followed up in the clinic with Dr. Farias in August and it was noted repeat eF in June 2023 showed improvement in EF.     Was seen in the ED in February for asymptomatic bradycardia after increased dose of metoprolol from 12.5 mg daily to 25 mg daily. With discontinuation of metoprolol heart rates improved.     Today he shares that since his stroke he's noticed more pVCs. There is no associated weakness or dizziness. He never feels them or has palpitations, he just notices this when checking his pulse. He sadly inured his low back recently, but before that he was doing better and able to do more, walking with assistive devices. He did PT and his hip/back pain has resolved. He can walk around the house well without the cane. No exertional chest pain, pressure or dyspnea. No dizziness, lightheadedness. No orthopnea or PND. No significant leg swelling. No blood in stool or urine. No pre-syncope, syncope or falls.        Data Review Today:   Cardiac MRI 9/13/2024:  Summary    1. The left ventricular cavity is normal in size.     2. There is mildly increased left ventricular wall thickness.     3. Left ventricular ejection fraction is mildly reduced, 46 %, with   hypokinesis of the inferolateral wall.     4. There is scattered subendocardial late gadolinium enhancement (LGE)   throughout the basal to mid inferolateral and the inferoapex with areas of   near transmurality. In comparison to the prior cardiac MRI, the LGE pattern    is   subendocardial compared to mid wall. Given biopsy proven eosinophilic   myocarditis, this is favored to represent endocardial fibrosis in response   to   eosinophilic infiltrate. There is  no myocardial edema in this territory to   suggest acute inflammation.     5. Normal RV size and function.     6. Qualitatively mild aortic insufficiency.     7. Mild biatrial enlargement.     Limited Echo post-biopsy 5/24  Summary    1. Limited echocardiogram- for follow-up of effusion.    2. Sinus tachycardia during study.    3. Normal LV size. Calculated bi-planes LVEF 45-50%. Mildly reduced  function.    4. Normal RV size with normal systolic function. Estimated PASP 35 mmHg    +  RA Pressure.    5. There is a trivial pericardial effusion.    6. Compared to the prior study from 5/22/23, the current study reveals no  significant change.      Echo 5/22  Summary    1. Mild  global left ventricular systolic dysfunction with a biplane LVEF of 48%.    2. Normal right ventricular size and systolic function.    3. There is mild tricuspid valve regurgitation.    4. The estimated pulmonary artery systolic pressure is 46 mmHg.    5. There is a trivial pericardial effusion.    6. Sinus rhythm during study.    7. A prior echocardiogram on 5/15/23 reported normal left ventricular systolic function with a biplane LVEF of 75%, mild aortic regurgitation, and mild tricuspid regurgitation.    Coronary Angiogram 5/22/2023  Conclusions    1. Normal Right Heart Pressures/Hemodynamics:.    2. RA 3.    3. RV  36/5.    4. PA 36/14/22.    5. PCW 7.    6. Raghu CO/CI: 5.44/2.8.    7. 6 endomyocardial biopsies taken from the RV septal wall under echo and fluoro guidance.    8. Samples were sent for pathology- orders per inpatient team and rheum.    9. Moderate TR and small pericardial effusion pre procedure. Unchanged post procedure.     cMRI 5/19/2023   Conclusions:    1. Normal LV size with mildly reduced systolic function; calculated LVEF 48%.    2. There is patchy, mid-myocardial delayed enhancement in a diffuse pattern throughout the basal into     mid left ventricle. The presence of myocardial edema is suggestive of an active  inflammatory process     such as myocarditis.     3. Mild biatrial enlargement.    4. Mild mitral regurgitation.     5. Trace to mild aortic regurgitation.     6. Moderate tricuspid regurgitation.     7. Multifocal opacities throughout the lung fields, qualitatively these appear to have progressed since     the prior CT dated 5/14/2023. Consider repeat CT chest for further evaluation.     CTA chest/abd/pelvis 5/14/23:  IMPRESSION:  1.  No acute pulmonary embolism or aortopathy.  2.  Peripheral airway centric lung opacities in both lungs consistent with bronchopneumonia.  3.  Reactive lymph nodes in the angelica and subcarinal mediastinum.  4.  No focal inflammatory process in the abdomen or pelvis.  5.  Scattered hypoattenuating liver lesions, incompletely characterized. These most likely represent benign cysts or small hemangioma.       I have reviewed and updated the patient's past medical history, allergy list and medication list.          Physical Examination   Vitals: There were no vitals taken for this visit.    BMI= There is no height or weight on file to calculate BMI.    Wt Readings from Last 3 Encounters:   02/16/24 83.9 kg (185 lb)   02/13/24 84.2 kg (185 lb 9.6 oz)   01/25/24 86 kg (189 lb 9.6 oz)       General :   Alert and oriented, in no acute distress.    HEENT:  Normocephalic and atraumatic. .    Neck: No JVP, carotid bruit or obvious thyromegaly.   Lungs:   Respirations unlabored. Clear bilaterally with no rales, rhonchi, or wheezes.     Cardiovascular:   Rhythm is regular. S1 and S2 are normal. No significant murmur is present. Lower extremities demonstrate no significant edema.        Skin: Skin is warm, dry, and otherwise intact.   Neurologic: Gait not assessed. Mood and affect appropriate.           Medical History  Surgical History Family History Social History   Past Medical History:   Diagnosis Date    Chronic obstructive pulmonary disease, unspecified COPD type (H) 3/15/2023    Chronic systolic  heart failure (H) 11/15/2023    Diabetes (H)     History of CVA (cerebrovascular accident) 7/20/2023    No past surgical history on file. Family History   Problem Relation Age of Onset    Heart Disease Mother     Diabetes Mother     Asthma Mother     Alzheimer Disease Father     No Known Problems Brother     Social History     Socioeconomic History    Marital status:      Spouse name: Not on file    Number of children: Not on file    Years of education: Not on file    Highest education level: Not on file   Occupational History    Not on file   Tobacco Use    Smoking status: Never     Passive exposure: Never    Smokeless tobacco: Never   Vaping Use    Vaping Use: Never used   Substance and Sexual Activity    Alcohol use: Yes     Comment: a beer or wine every few months    Drug use: Never    Sexual activity: Not Currently     Partners: Female   Other Topics Concern    Parent/sibling w/ CABG, MI or angioplasty before 65F 55M? No   Social History Narrative    Not on file     Social Determinants of Health     Financial Resource Strain: Low Risk  (2/2/2024)    Financial Resource Strain     Within the past 12 months, have you or your family members you live with been unable to get utilities (heat, electricity) when it was really needed?: No   Food Insecurity: High Risk (2/2/2024)    Food Insecurity     Within the past 12 months, did you worry that your food would run out before you got money to buy more?: Yes     Within the past 12 months, did the food you bought just not last and you didn t have money to get more?: No   Transportation Needs: Low Risk  (2/2/2024)    Transportation Needs     Within the past 12 months, has lack of transportation kept you from medical appointments, getting your medicines, non-medical meetings or appointments, work, or from getting things that you need?: No   Physical Activity: Insufficiently Active (2/2/2024)    Exercise Vital Sign     Days of Exercise per Week: 3 days     Minutes of  Exercise per Session: 30 min   Stress: No Stress Concern Present (2/2/2024)    Citizen of [place] [employer]ational [employer] - Occupational Stress Questionnaire     Feeling of Stress : Not at all   Social Connections: Socially Isolated (2/2/2024)    Social Connection and Isolation Panel [NHANES]     Frequency of Communication with Friends and Family: Once a week     Frequency of Social Gatherings with Friends and Family: More than three times a week     Attends Jew Services: Never     Active Member of Clubs or Organizations: No     Attends Club or Organization Meetings: Never     Marital Status:    Interpersonal Safety: Not on file   Housing Stability: Low Risk  (2/2/2024)    Housing Stability     Do you have housing? : Yes     Are you worried about losing your housing?: No   Recent Concern: Housing Stability - High Risk (11/14/2023)    Housing Stability     Do you have housing? : Yes     Are you worried about losing your housing?: Yes          Medications  Allergies   Scheduled Meds:  Current Outpatient Medications   Medication Sig Dispense Refill    ACCU-CHEK FASTCLIX LANCET DRUM [ACCU-CHEK FASTCLIX LANCET DRUM] TEST BLOOD SUGARS 3 TIMES DAILY 300 each 3    ASPIRIN LOW DOSE 81 MG chewable tablet Take 81 mg by mouth daily      atorvastatin (LIPITOR) 20 MG tablet TAKE 1 TABLET BY MOUTH EVERY DAY IN THE EVENING 90 tablet 1    BD ALEXANDRA U/F 32G X 4 MM insulin pen needle       blood glucose test (ACCU-CHEK SMARTVIEW TEST STRIP) strips [BLOOD GLUCOSE TEST (ACCU-CHEK SMARTVIEW TEST STRIP) STRIPS] USE TO CHECK BLOOD SUGARS TWICE DAILY. 200 strip 3    Continuous Blood Gluc Sensor (FREESTYLE LIVIA 2 SENSOR) MISC 1 each every 14 days 2 each 11    Continuous Blood Gluc Sensor (FREESTYLE LIVIA 3 SENSOR) MISC 1 each every 14 days 2 each 11    dulaglutide (TRULICITY) 0.75 MG/0.5ML pen Inject 0.75 mg Subcutaneous every 7 days 2 mL 3    ferrous sulfate (FEROSUL) 325 (65 Fe) MG tablet Take 1 tablet (325 mg) by mouth daily  (with breakfast) 90 tablet 3    gabapentin (NEURONTIN) 300 MG capsule Take 300 mg by mouth At Bedtime      insulin glargine (LANTUS PEN) 100 UNIT/ML pen Inject 6 Units Subcutaneous at bedtime      melatonin 3 MG tablet Take 9 mg by mouth nightly as needed for sleep      Mepolizumab 100 MG/ML SOSY Inject 3 mLs (300 mg) Subcutaneous every 30 days 3 mL 11    metFORMIN (GLUCOPHAGE XR) 500 MG 24 hr tablet Take 1 tablet (500 mg) by mouth daily (with dinner) 360 tablet 3    metFORMIN (GLUCOPHAGE) 1000 MG tablet Take 1 tablet (1,000 mg) by mouth daily (with breakfast) 90 tablet 6    metoprolol succinate ER (TOPROL XL) 25 MG 24 hr tablet Take 0.5 tablets (12.5 mg) by mouth every morning 45 tablet 6    mometasone (NASONEX) 50 MCG/ACT nasal spray Spray 2 sprays into both nostrils daily 17 g 3    omeprazole (PRILOSEC) 20 MG DR capsule TAKE 1 CAPSULE BY MOUTH 2 TIMES DAILY. TAKE 1 HOUR BEFORE A MEAL.      tiotropium (SPIRIVA) 18 MCG inhaled capsule Inhale 1 capsule (18 mcg) into the lungs daily 18 capsule 3    traZODone (DESYREL) 50 MG tablet Take 50 mg by mouth daily as needed for sleep      Allergies   Allergen Reactions    Azathioprine Nausea and Vomiting    Dulaglutide Nausea         Lab Results    Chemistry/lipid CBC Cardiac Enzymes/BNP/TSH/INR   Lab Results   Component Value Date    CHOL 152 04/26/2023    HDL 64 04/26/2023    TRIG 97 04/26/2023    BUN 37.7 (H) 02/16/2024     02/16/2024    CO2 23 02/16/2024    Lab Results   Component Value Date    WBC 5.9 01/25/2024    HGB 12.1 (L) 01/25/2024    HCT 38.5 (L) 01/25/2024    MCV 81 01/25/2024     01/25/2024    @RESUFAST(BMP,CBC,BNP,TSH,  INR)@      60 minutes spent reviewing prior records (including documentation, laboratory studies, cardiac testing/imaging), history and physical exam, planning, and subsequent documentation.     The longitudinal plan of care for the condition(s) below were addressed during this visit. Due to the added complexity in care, I will  continue to support Ned in the subsequent management of this condition(s) and with the ongoing continuity of care of this condition(s): cardiomyopathy, eosinophilic granulomas and sequelae     This note has been dictated using voice recognition software. Any grammatical, typographical, or context distortions are unintentional and inherent to the software.    Ирина Cortés PA-C, RD  General Cardiology

## 2024-02-28 NOTE — Clinical Note
Hi, Dr Briggs, it seems Mr. Moore's hx of CVA may actually have been a hypotensive episode causing stroke-like symptoms, imaging din't have acute infarct. He's wondering if he needs ASA, he's only taking it intermittently.  He doesn't need it from a CV standpoint, so I told him I'd pass this along to you!

## 2024-02-28 NOTE — LETTER
2/28/2024    Mark Briggs MD  [address] [place] [address]    RE: Ned MARTIN Oscar       Dear Colleague,     I had the pleasure of seeing Ned Moore in the [facility].          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 Treated with steoids and mprolizumab. Repeat MRI in September 2023 showed late gadolinium enhancement throughout the cameron to mid inferolateral and the inferoapical areas, near transmurality. In comparison to previous, this was subendocardial compared to mid-wall. Likely this represents endocardial fibrosis in response to eosinophilic infiltrate. No myocardia edema to suggest ongoing acute inflammation. He is on immunosuppressants for eosinophilic granulomatosis  Continue metoprolol xl 12.5 mg daily, unable to tolerate higher doses due to bradycardia   Initiate Lisinopril 2.5 mg daily  No evidence of heart failure   Zio monitor to evaluate for arrhythmia given fibrosis   Repeat imaging in 6 months, 12 months from prior  PVCs, he has had these for many years. Increased palpitations since CVA. Fibrosis noted on cardiac MRI  Recommend 7 day holter monitor to evaluate PVC burden and rule out other arrhythmias given cardiac fibrosis   Continue low dose metoprolol   Prevention   Dyslipidemia, Mother had CHF, unclear what age this started, likely in mid-60s. He is a never smoker.  152, LDL 69, HLD 64 on atorvastatin 20 mg daily.  Continue atorvastatin 20 mg daily     Return to care in 6 months with me.      History of Present Illness/Subjective    Ned Moore is a 63 year old male with past medical history significant for:  History of eosinophilic myocarditis  Eosinophilic granulomatosis with polyangiitis   PVCs   History of CVA, possible  Hypotension may have contributed to stroke-like  symptoms  CT and MRI without evidence of acute emboli   Type II DM  CKD stage I A  COPD    The patient was admitted to the hospital in May 2023 with heart failure, found to have eosinophilic myocarditis confirmed by biopsy. Presentation was that of failure to thrive, asymptomatic from CV standpoint but incidentally discovered troponin elevation and ST depression. Found to have patchy pulmonary opacities consistent with bronchopneumonia, marked peripheral eosinophilia (>400,000)  Inflammatory markers were elevated. Followed up in the clinic with Dr. Farias in August and it was noted repeat eF in June 2023 showed improvement in EF.     Was seen in the ED in February for asymptomatic bradycardia after increased dose of metoprolol from 12.5 mg daily to 25 mg daily. With discontinuation of metoprolol heart rates improved.     Today he shares that since his stroke he's noticed more pVCs. There is no associated weakness or dizziness. He never feels them or has palpitations, he just notices this when checking his pulse. He sadly inured his low back recently, but before that he was doing better and able to do more, walking with assistive devices. He did PT and his hip/back pain has resolved. He can walk around the house well without the cane. No exertional chest pain, pressure or dyspnea. No dizziness, lightheadedness. No orthopnea or PND. No significant leg swelling. No blood in stool or urine. No pre-syncope, syncope or falls.        Data Review Today:   Cardiac MRI 9/13/2024:  Summary    1. The left ventricular cavity is normal in size.     2. There is mildly increased left ventricular wall thickness.     3. Left ventricular ejection fraction is mildly reduced, 46 %, with   hypokinesis of the inferolateral wall.     4. There is scattered subendocardial late gadolinium enhancement (LGE)   throughout the basal to mid inferolateral and the inferoapex with areas of   near transmurality. In comparison to the prior cardiac  MRI, the LGE pattern    is   subendocardial compared to mid wall. Given biopsy proven eosinophilic   myocarditis, this is favored to represent endocardial fibrosis in response   to   eosinophilic infiltrate. There is no myocardial edema in this territory to   suggest acute inflammation.     5. Normal RV size and function.     6. Qualitatively mild aortic insufficiency.     7. Mild biatrial enlargement.     Limited Echo post-biopsy 5/24  Summary    1. Limited echocardiogram- for follow-up of effusion.    2. Sinus tachycardia during study.    3. Normal LV size. Calculated bi-planes LVEF 45-50%. Mildly reduced  function.    4. Normal RV size with normal systolic function. Estimated PASP 35 mmHg    +  RA Pressure.    5. There is a trivial pericardial effusion.    6. Compared to the prior study from 5/22/23, the current study reveals no  significant change.      Echo 5/22  Summary    1. Mild  global left ventricular systolic dysfunction with a biplane LVEF of 48%.    2. Normal right ventricular size and systolic function.    3. There is mild tricuspid valve regurgitation.    4. The estimated pulmonary artery systolic pressure is 46 mmHg.    5. There is a trivial pericardial effusion.    6. Sinus rhythm during study.    7. A prior echocardiogram on 5/15/23 reported normal left ventricular systolic function with a biplane LVEF of 75%, mild aortic regurgitation, and mild tricuspid regurgitation.    Coronary Angiogram 5/22/2023  Conclusions    1. Normal Right Heart Pressures/Hemodynamics:.    2. RA 3.    3. RV  36/5.    4. PA 36/14/22.    5. PCW 7.    6. Raghu CO/CI: 5.44/2.8.    7. 6 endomyocardial biopsies taken from the RV septal wall under echo and fluoro guidance.    8. Samples were sent for pathology- orders per inpatient team and rheum.    9. Moderate TR and small pericardial effusion pre procedure. Unchanged post procedure.     cMRI 5/19/2023   Conclusions:    1. Normal LV size with mildly reduced systolic function;  calculated LVEF 48%.    2. There is patchy, mid-myocardial delayed enhancement in a diffuse pattern throughout the basal into     mid left ventricle. The presence of myocardial edema is suggestive of an active inflammatory process     such as myocarditis.     3. Mild biatrial enlargement.    4. Mild mitral regurgitation.     5. Trace to mild aortic regurgitation.     6. Moderate tricuspid regurgitation.     7. Multifocal opacities throughout the lung fields, qualitatively these appear to have progressed since     the prior CT dated 5/14/2023. Consider repeat CT chest for further evaluation.     CTA chest/abd/pelvis 5/14/23:  IMPRESSION:  1.  No acute pulmonary embolism or aortopathy.  2.  Peripheral airway centric lung opacities in both lungs consistent with bronchopneumonia.  3.  Reactive lymph nodes in the angelica and subcarinal mediastinum.  4.  No focal inflammatory process in the abdomen or pelvis.  5.  Scattered hypoattenuating liver lesions, incompletely characterized. These most likely represent benign cysts or small hemangioma.       I have reviewed and updated the patient's past medical history, allergy list and medication list.          Physical Examination   Vitals: There were no vitals taken for this visit.    BMI= There is no height or weight on file to calculate BMI.    Wt Readings from Last 3 Encounters:   02/16/24 83.9 kg (185 lb)   02/13/24 84.2 kg (185 lb 9.6 oz)   01/25/24 86 kg (189 lb 9.6 oz)       General :   Alert and oriented, in no acute distress.    HEENT:  Normocephalic and atraumatic. .    Neck: No JVP, carotid bruit or obvious thyromegaly.   Lungs:   Respirations unlabored. Clear bilaterally with no rales, rhonchi, or wheezes.     Cardiovascular:   Rhythm is regular. S1 and S2 are normal. No significant murmur is present. Lower extremities demonstrate no significant edema.        Skin: Skin is warm, dry, and otherwise intact.   Neurologic: Gait not assessed. Mood and affect appropriate.            Medical History  Surgical History Family History Social History   Past Medical History:   Diagnosis Date    Chronic obstructive pulmonary disease, unspecified COPD type (H) 3/15/2023    Chronic systolic heart failure (H) 11/15/2023    Diabetes (H)     History of CVA (cerebrovascular accident) 7/20/2023    No past surgical history on file. Family History   Problem Relation Age of Onset    Heart Disease Mother     Diabetes Mother     Asthma Mother     Alzheimer Disease Father     No Known Problems Brother     Social History     Socioeconomic History    Marital status:      Spouse name: Not on file    Number of children: Not on file    Years of education: Not on file    Highest education level: Not on file   Occupational History    Not on file   Tobacco Use    Smoking status: Never     Passive exposure: Never    Smokeless tobacco: Never   Vaping Use    Vaping Use: Never used   Substance and Sexual Activity    Alcohol use: Yes     Comment: a beer or wine every few months    Drug use: Never    Sexual activity: Not Currently     Partners: Female   Other Topics Concern    Parent/sibling w/ CABG, MI or angioplasty before 65F 55M? No   Social History Narrative    Not on file     Social Determinants of Health     Financial Resource Strain: Low Risk  (2/2/2024)    Financial Resource Strain     Within the past 12 months, have you or your family members you live with been unable to get utilities (heat, electricity) when it was really needed?: No   Food Insecurity: High Risk (2/2/2024)    Food Insecurity     Within the past 12 months, did you worry that your food would run out before you got money to buy more?: Yes     Within the past 12 months, did the food you bought just not last and you didn t have money to get more?: No   Transportation Needs: Low Risk  (2/2/2024)    Transportation Needs     Within the past 12 months, has lack of transportation kept you from medical appointments, getting your medicines,  non-medical meetings or appointments, work, or from getting things that you need?: No   Physical Activity: Insufficiently Active (2/2/2024)    Exercise Vital Sign     Days of Exercise per Week: 3 days     Minutes of Exercise per Session: 30 min   Stress: No Stress Concern Present (2/2/2024)    [employer] [place] [employer] - Occupational Stress Questionnaire     Feeling of Stress : Not at all   Social Connections: Socially Isolated (2/2/2024)    Social Connection and Isolation Panel [NHANES]     Frequency of Communication with Friends and Family: Once a week     Frequency of Social Gatherings with Friends and Family: More than three times a week     Attends Synagogue Services: Never     Active Member of Clubs or Organizations: No     Attends Club or Organization Meetings: Never     Marital Status:    Interpersonal Safety: Not on file   Housing Stability: Low Risk  (2/2/2024)    Housing Stability     Do you have housing? : Yes     Are you worried about losing your housing?: No   Recent Concern: Housing Stability - High Risk (11/14/2023)    Housing Stability     Do you have housing? : Yes     Are you worried about losing your housing?: Yes          Medications  Allergies   Scheduled Meds:  Current Outpatient Medications   Medication Sig Dispense Refill    ACCU-CHEK FASTCLIX LANCET DRUM [ACCU-CHEK FASTCLIX LANCET DRUM] TEST BLOOD SUGARS 3 TIMES DAILY 300 each 3    ASPIRIN LOW DOSE 81 MG chewable tablet Take 81 mg by mouth daily      atorvastatin (LIPITOR) 20 MG tablet TAKE 1 TABLET BY MOUTH EVERY DAY IN THE EVENING 90 tablet 1    BD ALEXANDRA U/F 32G X 4 MM insulin pen needle       blood glucose test (ACCU-CHEK SMARTVIEW TEST STRIP) strips [BLOOD GLUCOSE TEST (ACCU-CHEK SMARTVIEW TEST STRIP) STRIPS] USE TO CHECK BLOOD SUGARS TWICE DAILY. 200 strip 3    Continuous Blood Gluc Sensor (FREESTYLE LIVIA 2 SENSOR) MISC 1 each every 14 days 2 each 11    Continuous Blood Gluc Sensor (FREESTYLE LIVIA 3 SENSOR)  MISC 1 each every 14 days 2 each 11    dulaglutide (TRULICITY) 0.75 MG/0.5ML pen Inject 0.75 mg Subcutaneous every 7 days 2 mL 3    ferrous sulfate (FEROSUL) 325 (65 Fe) MG tablet Take 1 tablet (325 mg) by mouth daily (with breakfast) 90 tablet 3    gabapentin (NEURONTIN) 300 MG capsule Take 300 mg by mouth At Bedtime      insulin glargine (LANTUS PEN) 100 UNIT/ML pen Inject 6 Units Subcutaneous at bedtime      melatonin 3 MG tablet Take 9 mg by mouth nightly as needed for sleep      Mepolizumab 100 MG/ML SOSY Inject 3 mLs (300 mg) Subcutaneous every 30 days 3 mL 11    metFORMIN (GLUCOPHAGE XR) 500 MG 24 hr tablet Take 1 tablet (500 mg) by mouth daily (with dinner) 360 tablet 3    metFORMIN (GLUCOPHAGE) 1000 MG tablet Take 1 tablet (1,000 mg) by mouth daily (with breakfast) 90 tablet 6    metoprolol succinate ER (TOPROL XL) 25 MG 24 hr tablet Take 0.5 tablets (12.5 mg) by mouth every morning 45 tablet 6    mometasone (NASONEX) 50 MCG/ACT nasal spray Spray 2 sprays into both nostrils daily 17 g 3    omeprazole (PRILOSEC) 20 MG DR capsule TAKE 1 CAPSULE BY MOUTH 2 TIMES DAILY. TAKE 1 HOUR BEFORE A MEAL.      tiotropium (SPIRIVA) 18 MCG inhaled capsule Inhale 1 capsule (18 mcg) into the lungs daily 18 capsule 3    traZODone (DESYREL) 50 MG tablet Take 50 mg by mouth daily as needed for sleep      Allergies   Allergen Reactions    Azathioprine Nausea and Vomiting    Dulaglutide Nausea         Lab Results    Chemistry/lipid CBC Cardiac Enzymes/BNP/TSH/INR   Lab Results   Component Value Date    CHOL 152 04/26/2023    HDL 64 04/26/2023    TRIG 97 04/26/2023    BUN 37.7 (H) 02/16/2024     02/16/2024    CO2 23 02/16/2024    Lab Results   Component Value Date    WBC 5.9 01/25/2024    HGB 12.1 (L) 01/25/2024    HCT 38.5 (L) 01/25/2024    MCV 81 01/25/2024     01/25/2024    @RESUFAST(BMP,CBC,BNP,TSH,  INR)@      60 minutes spent reviewing prior records (including documentation, laboratory studies, cardiac  testing/imaging), history and physical exam, planning, and subsequent documentation.     The longitudinal plan of care for the condition(s) below were addressed during this visit. Due to the added complexity in care, I will continue to support Ned in the subsequent management of this condition(s) and with the ongoing continuity of care of this condition(s): cardiomyopathy, eosinophilic granulomas and sequelae     This note has been dictated using voice recognition software. Any grammatical, typographical, or context distortions are unintentional and inherent to the software.    Ирина Cortés PA-C, RD  General Cardiology           Thank you for allowing me to participate in the care of your patient.      Sincerely,     Ирина Cortés PA-C     [facility]  cc:   Mark Briggs MD  [address] [place] [address]

## 2024-02-29 LAB
ATRIAL RATE - MUSE: 69 BPM
DIASTOLIC BLOOD PRESSURE - MUSE: NORMAL MMHG
INTERPRETATION ECG - MUSE: NORMAL
P AXIS - MUSE: 30 DEGREES
PR INTERVAL - MUSE: 140 MS
QRS DURATION - MUSE: 88 MS
QT - MUSE: 412 MS
QTC - MUSE: 441 MS
R AXIS - MUSE: -4 DEGREES
SYSTOLIC BLOOD PRESSURE - MUSE: NORMAL MMHG
T AXIS - MUSE: 43 DEGREES
VENTRICULAR RATE- MUSE: 69 BPM

## 2024-03-04 ENCOUNTER — VIRTUAL VISIT (OUTPATIENT)
Dept: GASTROENTEROLOGY | Facility: CLINIC | Age: 64
End: 2024-03-04
Payer: COMMERCIAL

## 2024-03-04 ENCOUNTER — HOSPITAL ENCOUNTER (OUTPATIENT)
Dept: CARDIAC REHAB | Facility: CLINIC | Age: 64
Discharge: HOME | End: 2024-03-04
Attending: INTERNAL MEDICINE
Payer: COMMERCIAL

## 2024-03-04 VITALS — HEIGHT: 73 IN | BODY MASS INDEX: 24.65 KG/M2 | WEIGHT: 186 LBS

## 2024-03-04 DIAGNOSIS — D50.9 IRON DEFICIENCY ANEMIA, UNSPECIFIED IRON DEFICIENCY ANEMIA TYPE: ICD-10-CM

## 2024-03-04 DIAGNOSIS — D50.9 MICROCYTIC ANEMIA: Primary | ICD-10-CM

## 2024-03-04 PROCEDURE — 99213 OFFICE O/P EST LOW 20 MIN: CPT | Mod: 95 | Performed by: PHYSICIAN ASSISTANT

## 2024-03-04 PROCEDURE — G0239 OTH RESP PROC, GROUP: HCPCS

## 2024-03-04 ASSESSMENT — PAIN SCALES - GENERAL: PAINLEVEL: NO PAIN (0)

## 2024-03-04 NOTE — LETTER
3/4/2024         RE: Ned Moore  [address]  [place] [address]        Dear Colleague,    Thank you for referring your patient, Ned Moore, to the [facility] [place]. Please see a copy of my visit note below.    Virtual Visit Details    Type of service:  Video Visit     Originating Location (pt. Location): Home in [place]     Distant Location (provider location):  Off-site  Platform used for Video Visit: Duong     Joined the call at 3/4/2024, 8:01:41 am.  Left the call at 3/4/2024, 8:17:25 am.  You were on the call for 15 minutes 43 seconds .    GI CLINIC VISIT    Chart Review  GI eval for new anemia (1 mo ago, 9-11.2 from baseline 15-17). MCV 78.   Nephrology Dr Tripp 11/30    Cologuard NEG on 3/10/21    HPI  Ned Moore is a 63 year old year old male with PMHx of COPD, systolic HF, DM and h/o CVA and EGPA w/ cardiac involvement (following with rheum, nephro, cards) following with the [facility] GI group for new anemia.     Interval History   -est'd care with me 12/2023 - recommended bidirectional endoscopy. He wanted to think it over.     -1/2024 Hgb with climb to 12.1 from 11.0 (11/30). MCV 81    3/4/24  1. He will be getting a cologuard kit next week - wanted to know my thoughts on this testing for CRC screening purposes. No FHx of CRC or known polyps.      2. Overall doing quite well. He's regaining strength and weight. Up to 185-186# feels stronger with better endurance. Good appetite. No falls. Not using cane which he feels is actually help challenge his recovery in a good way. No dizziness, presyncope. In cariopulm rehab - tolerating sessions.     3. No episodes of any bleeding - nose bleeds, hemotopysis, dark stools, bloody stools, hematuria, easy bruising. No chest discomfort, sob, dizziness, pallor.   Continues on daily iron pill - tolerating. Does have some nausea but no emesis which is relieved with unknown pill. He is not having stomach upset or  constipation  Having 1 BM a day - he is focusing on diet and lifestyle recommendations to help with stooling.     4. Seen with cards yesterday - might be taken off ASA 81 mg.     5. No other questions or concerns    ROS - no fevers, chills, dysphagia, odynphagia, vomiting, abd pain, blood loss.     12/2023    Reports had symptomatic anemia for the last few month ago - sx of fatigue, pallor, feeling cool   Seen at ED 11/14 for anemia w/ Hgb 9.9, a 1.3 point drop from 10/26 (11.2 hgb). Stool guaiac was NEG. Given IVG.     Denies overt bleeding, such as  nosebleeds, hematemesis, easy bruising, hematuria, hematochezia, melena.  Denied hx of GI bleed in past. No hx of falls.   continues on daily on aspirin 81 mg, no other thinners.   Used NSAIDs daily x 2 weeks (ibuprofen 200 mg) for some joint pain, stopped late nov 2023. Now not on any NSAIDs.     May - admitted to [facility] complicated by stroke. Newly dx EGPA and started on nucala which made him very nauseated and caused him to vomit. It was held with resolution of N/V.  During admission, had acute blood loss anemia 2/2 epistaxis s/p transfusion and IV venofer.      Reported 2 mo history of N/V after start of azathioprine for his EGPA - after it was held, N/V resolved. Required IVF.     Typically runs constipated, [place] two to three. In the last few months, he has been passing a [place] 4 every 3-4 after trying to focus on increasing dietary and take of fiber. He denies straining, abdominal discomfort, abdominal distress, bloating. Feels overall good.     He worked as a paramedic for 20 years.  Shares that he has seen multiple injuries related to endoscopic procedures, particularly colonoscopies and would prefer to avoid this procedure for his care if at all possible.   Never had Cscope. Cologuard negative 2021. No fhx of CRC or colonic polyps.      Appetite good  Weights - below     Wt Readings from Last 10 Encounters:   02/28/24 83.9 kg (185 lb)   02/16/24 83.9 kg  (185 lb)   02/13/24 84.2 kg (185 lb 9.6 oz)   01/25/24 86 kg (189 lb 9.6 oz)   01/16/24 85.7 kg (188 lb 15.7 oz)   12/13/23 90.1 kg (198 lb 11.2 oz)   12/06/23 89.8 kg (198 lb)   11/30/23 85.7 kg (189 lb)   11/27/23 85.7 kg (189 lb)   11/15/23 84.6 kg (186 lb 9.6 oz)     ROS  Denies fevers, chills, nausea, emesis, dysphagia, odynophagia, heartburn, regurgitation, abdominal pain, bloating/fullness, post prandial bloating, black tarrying stools, bloody stools, rectal pain, rectal fullness, rectal itching.     Family Hx  No other known family history or GI related malignancy (esophageal, gastric, pancreatic, liver or colon) or family history of IBD/celiac disease.     Social Hx   Never smoker. occ etoh, no drugs     PROBLEM LIST  Patient Active Problem List    Diagnosis Date Noted     Nasal polyp 02/12/2024     Priority: Medium     Hip pain, right 12/21/2023     Priority: Medium     Immunosuppression due to drug therapy  (H24) 11/15/2023     Priority: Medium     Chronic systolic heart failure (H) 11/15/2023     Priority: Medium     Eosinophilic granulomatosis with polyangiitis (EGPA) (H) 07/20/2023     Priority: Medium     History of CVA (cerebrovascular accident) 07/20/2023     Priority: Medium     Noted during hospitalization in 5/2023 at [facility]  Multifocal acute areas       Chronic obstructive pulmonary disease, unspecified COPD type (H) 03/15/2023     Priority: Medium     Hyperlipidemia LDL goal <70 10/30/2019     Priority: Medium     Type 2 diabetes mellitus with stage 3a chronic kidney disease, without long-term current use of insulin (H) 01/09/2018     Priority: Medium       PERTINENT PAST MEDICAL HISTORY:  Past Medical History:   Diagnosis Date     Chronic obstructive pulmonary disease, unspecified COPD type (H) 3/15/2023     Chronic systolic heart failure (H) 11/15/2023     Diabetes (H)      History of CVA (cerebrovascular accident) 7/20/2023       PREVIOUS SURGERIES:  No past surgical history on  file.    ALLERGIES:     Allergies   Allergen Reactions     Azathioprine Nausea and Vomiting     Dulaglutide Nausea       PERTINENT MEDICATIONS:    Current Outpatient Medications:      ACCU-CHEK FASTCLIX LANCET DRUM, [ACCU-CHEK FASTCLIX LANCET DRUM] TEST BLOOD SUGARS 3 TIMES DAILY, Disp: 300 each, Rfl: 3     ASPIRIN LOW DOSE 81 MG chewable tablet, Take 81 mg by mouth daily, Disp: , Rfl:      atorvastatin (LIPITOR) 20 MG tablet, Take 1 tablet (20 mg) by mouth every evening, Disp: 90 tablet, Rfl: 3     BD ALEXANDRA U/F 32G X 4 MM insulin pen needle, , Disp: , Rfl:      blood glucose test (ACCU-CHEK SMARTVIEW TEST STRIP) strips, [BLOOD GLUCOSE TEST (ACCU-CHEK SMARTVIEW TEST STRIP) STRIPS] USE TO CHECK BLOOD SUGARS TWICE DAILY., Disp: 200 strip, Rfl: 3     Continuous Blood Gluc Sensor (FREESTYLE LIVIA 2 SENSOR) MISC, 1 each every 14 days, Disp: 2 each, Rfl: 11     Continuous Blood Gluc Sensor (FREESTYLE LIVIA 3 SENSOR) MISC, 1 each every 14 days, Disp: 2 each, Rfl: 11     dulaglutide (TRULICITY) 0.75 MG/0.5ML pen, Inject 0.75 mg Subcutaneous every 7 days (Patient not taking: Reported on 2/28/2024), Disp: 2 mL, Rfl: 3     ferrous sulfate (FEROSUL) 325 (65 Fe) MG tablet, Take 1 tablet (325 mg) by mouth daily (with breakfast), Disp: 90 tablet, Rfl: 3     fluticasone-vilanterol (BREO ELLIPTA) 100-25 MCG/ACT inhaler, Inhale 1 puff into the lungs daily, Disp: , Rfl:      gabapentin (NEURONTIN) 300 MG capsule, Take 300 mg by mouth At Bedtime, Disp: , Rfl:      insulin glargine (LANTUS PEN) 100 UNIT/ML pen, Inject 6-8 Units Subcutaneous at bedtime, Disp: , Rfl:      lisinopril (ZESTRIL) 2.5 MG tablet, Take 1 tablet (2.5 mg) by mouth daily, Disp: 90 tablet, Rfl: 3     melatonin 3 MG tablet, Take 9 mg by mouth nightly as needed for sleep, Disp: , Rfl:      Mepolizumab 100 MG/ML SOSY, Inject 3 mLs (300 mg) Subcutaneous every 30 days, Disp: 3 mL, Rfl: 11     metFORMIN (GLUCOPHAGE XR) 500 MG 24 hr tablet, Take 1 tablet (500 mg) by mouth  daily (with dinner), Disp: 360 tablet, Rfl: 3     metFORMIN (GLUCOPHAGE) 1000 MG tablet, Take 1 tablet (1,000 mg) by mouth daily (with breakfast), Disp: 90 tablet, Rfl: 6     metoprolol succinate ER (TOPROL XL) 25 MG 24 hr tablet, Take 0.5 tablets (12.5 mg) by mouth every morning (Patient not taking: Reported on 2/28/2024), Disp: 45 tablet, Rfl: 6     mometasone (NASONEX) 50 MCG/ACT nasal spray, Spray 2 sprays into both nostrils daily, Disp: 17 g, Rfl: 3     omeprazole (PRILOSEC) 20 MG DR capsule, TAKE 1 CAPSULE BY MOUTH 2 TIMES DAILY. TAKE 1 HOUR BEFORE A MEAL., Disp: , Rfl:      tiotropium (SPIRIVA) 18 MCG inhaled capsule, Inhale 1 capsule (18 mcg) into the lungs daily, Disp: 18 capsule, Rfl: 3     traZODone (DESYREL) 50 MG tablet, Take 50 mg by mouth daily as needed for sleep, Disp: , Rfl:     SOCIAL HISTORY:  Social History     Socioeconomic History     Marital status:      Spouse name: Not on file     Number of children: Not on file     Years of education: Not on file     Highest education level: Not on file   Occupational History     Not on file   Tobacco Use     Smoking status: Never     Passive exposure: Never     Smokeless tobacco: Never   Vaping Use     Vaping Use: Never used   Substance and Sexual Activity     Alcohol use: Yes     Comment: a beer or wine every few months     Drug use: Never     Sexual activity: Not Currently     Partners: Female   Other Topics Concern     Parent/sibling w/ CABG, MI or angioplasty before 65F 55M? No   Social History Narrative     Not on file     Social Determinants of Health     Financial Resource Strain: Low Risk  (2/2/2024)    Financial Resource Strain      Within the past 12 months, have you or your family members you live with been unable to get utilities (heat, electricity) when it was really needed?: No   Food Insecurity: High Risk (2/2/2024)    Food Insecurity      Within the past 12 months, did you worry that your food would run out before you got money to  "buy more?: Yes      Within the past 12 months, did the food you bought just not last and you didn t have money to get more?: No   Transportation Needs: Low Risk  (2/2/2024)    Transportation Needs      Within the past 12 months, has lack of transportation kept you from medical appointments, getting your medicines, non-medical meetings or appointments, work, or from getting things that you need?: No   Physical Activity: Insufficiently Active (2/2/2024)    Exercise Vital Sign      Days of Exercise per Week: 3 days      Minutes of Exercise per Session: 30 min   Stress: No Stress Concern Present (2/2/2024)    [place] [employer] - Occupational Stress Questionnaire      Feeling of Stress : Not at all   Social Connections: Socially Isolated (2/2/2024)    Social Connection and Isolation Panel [NHANES]      Frequency of Communication with Friends and Family: Once a week      Frequency of Social Gatherings with Friends and Family: More than three times a week      Attends [employer] Services: Never      Active Member of Clubs or Organizations: No      Attends Club or Organization Meetings: Never      Marital Status:    Interpersonal Safety: Not on file   Housing Stability: Low Risk  (2/2/2024)    Housing Stability      Do you have housing? : Yes      Are you worried about losing your housing?: No   Recent Concern: Housing Stability - High Risk (11/14/2023)    Housing Stability      Do you have housing? : Yes      Are you worried about losing your housing?: Yes       FAMILY HISTORY:  Family History   Problem Relation Age of Onset     Heart Disease Mother      Diabetes Mother      Asthma Mother      Alzheimer Disease Father      No Known Problems Brother        Past/family/social history reviewed and no changes    PHYSICAL EXAMINATION:  Vitals reviewed: Ht 1.854 m (6' 1\")   Wt 84.4 kg (186 lb)   BMI 24.54 kg/m    Wt:   Wt Readings from Last 2 Encounters:   03/04/24 84.4 kg (186 lb)   02/28/24 " 83.9 kg (185 lb)      Video physical exam  General: Patient appears well in no acute distress.   Skin: No visualized rash or lesions on visualized skin  Eyes: EOMI, no erythema, sclera icterus or discharge noted  Resp: Appears to be breathing comfortably without accessory muscle usage, speaking in full sentences, no cough  MSK: Appears to have normal range of motion based on visualized movements  Neurologic: No apparent tremors, facial movements symmetric  Psych: affect normal , alert and oriented      PERTINENT STUDIES:    Lab on 11/30/2023   Component Date Value Ref Range Status     Sodium 11/30/2023 140  135 - 145 mmol/L Final     Potassium 11/30/2023 4.5  3.4 - 5.3 mmol/L Final     Chloride 11/30/2023 101  98 - 107 mmol/L Final     Carbon Dioxide (CO2) 11/30/2023 28  22 - 29 mmol/L Final     Anion Gap 11/30/2023 11  7 - 15 mmol/L Final     Glucose 11/30/2023 130 (H)  70 - 99 mg/dL Final     Urea Nitrogen 11/30/2023 24.5 (H)  8.0 - 23.0 mg/dL Final     Creatinine 11/30/2023 1.64 (H)  0.67 - 1.17 mg/dL Final     GFR Estimate 11/30/2023 46 (L)  >60 mL/min/1.73m2 Final     Calcium 11/30/2023 9.9  8.8 - 10.2 mg/dL Final     Albumin 11/30/2023 4.2  3.5 - 5.2 g/dL Final     Phosphorus 11/30/2023 3.7  2.5 - 4.5 mg/dL Final     Total Protein Urine mg/dL 11/30/2023 29.6    mg/dL Final     Total Protein Urine mg/mg Creat 11/30/2023 0.22 (H)  0.00 - 0.20 mg/mg Cr Final     Creatinine Urine mg/dL 11/30/2023 137.0  mg/dL Final     Color Urine 11/30/2023 Light Yellow  Colorless, Straw, Light Yellow, Yellow Final     Appearance Urine 11/30/2023 Clear  Clear Final     Glucose Urine 11/30/2023 Negative  Negative mg/dL Final     Bilirubin Urine 11/30/2023 Negative  Negative Final     Ketones Urine 11/30/2023 Negative  Negative mg/dL Final     Specific Gravity Urine 11/30/2023 1.015  1.003 - 1.035 Final     Blood Urine 11/30/2023 Negative  Negative Final     pH Urine 11/30/2023 5.5  5.0 - 7.0 Final     Protein Albumin Urine  11/30/2023 20 (A)  Negative mg/dL Final     Urobilinogen Urine 11/30/2023 Normal  Normal, 2.0 mg/dL Final     Nitrite Urine 11/30/2023 Negative  Negative Final     Leukocyte Esterase Urine 11/30/2023 Trace (A)  Negative Final     Mucus Urine 11/30/2023 Present (A)  None Seen /LPF Final     RBC Urine 11/30/2023 2  <=2 /HPF Final     WBC Urine 11/30/2023 3  <=5 /HPF Final     Squamous Epithelials Urine 11/30/2023 1  <=1 /HPF Final     MPO Janice IgG Instrument Value 11/30/2023 0.3  <3.5 U/mL Final     Myeloperoxidase Antibody IgG 11/30/2023 Negative  Negative Final     Proteinase 3 Janice IgG Instrument Va* 11/30/2023 <1.0  <2.0 U/mL Final     Proteinase 3 Antibody IgG 11/30/2023 Negative  Negative Final     Cystatin C 11/30/2023 1.9 (H)  0.6 - 1.0 mg/L Final     GFR Calculated with Cystatin C 11/30/2023 33 (L)  >=60 mL/min/1.73m2 Final     WBC Count 11/30/2023 7.2  4.0 - 11.0 10e3/uL Final     RBC Count 11/30/2023 4.57  4.40 - 5.90 10e6/uL Final     Hemoglobin 11/30/2023 11.0 (L)  13.3 - 17.7 g/dL Final     Hematocrit 11/30/2023 35.4 (L)  40.0 - 53.0 % Final     MCV 11/30/2023 78  78 - 100 fL Final     MCH 11/30/2023 24.1 (L)  26.5 - 33.0 pg Final     MCHC 11/30/2023 31.1 (L)  31.5 - 36.5 g/dL Final     RDW 11/30/2023 16.9 (H)  10.0 - 15.0 % Final     Platelet Count 11/30/2023 374  150 - 450 10e3/uL Final     % Neutrophils 11/30/2023 58  % Final     % Lymphocytes 11/30/2023 30  % Final     % Monocytes 11/30/2023 9  % Final     % Eosinophils 11/30/2023 1  % Final     % Basophils 11/30/2023 1  % Final     % Immature Granulocytes 11/30/2023 1  % Final     NRBCs per 100 WBC 11/30/2023 0  <1 /100 Final     Absolute Neutrophils 11/30/2023 4.2  1.6 - 8.3 10e3/uL Final     Absolute Lymphocytes 11/30/2023 2.1  0.8 - 5.3 10e3/uL Final     Absolute Monocytes 11/30/2023 0.7  0.0 - 1.3 10e3/uL Final     Absolute Eosinophils 11/30/2023 0.1  0.0 - 0.7 10e3/uL Final     Absolute Basophils 11/30/2023 0.1  0.0 - 0.2 10e3/uL Final      Absolute Immature Granulocytes 11/30/2023 0.0  <=0.4 10e3/uL Final     Absolute NRBCs 11/30/2023 0.0  10e3/uL Final        Lab Results   Component Value Date    WBC 5.9 01/25/2024    WBC 7.2 11/30/2023    WBC 8.9 11/15/2023    HGB 12.1 (L) 01/25/2024    HGB 11.0 (L) 11/30/2023    HGB 9.9 (L) 11/15/2023     01/25/2024     11/30/2023     (H) 11/15/2023    CHOL 152 04/26/2023    CHOL 156 01/27/2021    CHOL 141 10/30/2019    TRIG 97 04/26/2023    TRIG 123 01/27/2021    TRIG 67 10/30/2019    HDL 64 04/26/2023    HDL 57 01/27/2021    HDL 56 10/30/2019    ALT 22 02/16/2024    ALT 22 10/26/2023    ALT 23 04/26/2023    AST 24 02/16/2024    AST 20 10/26/2023    AST 24 04/26/2023     02/16/2024     01/25/2024     11/30/2023    BUN 37.7 (H) 02/16/2024    BUN 23.1 (H) 01/25/2024    BUN 24.5 (H) 11/30/2023    CO2 23 02/16/2024    CO2 28 01/25/2024    CO2 28 11/30/2023    TSH 0.43 11/15/2023    TSH 0.61 03/21/2022    TSH 0.54 01/08/2018        Liver Function Studies -   Recent Labs   Lab Test 11/30/23  1209 10/26/23  1147   PROTTOTAL  --  7.9   ALBUMIN 4.2 4.2   BILITOTAL  --  0.3   ALKPHOS  --  246*   AST  --  20   ALT  --  22        PREVIOUS ENDOSCOPY    No results found for this or any previous visit.    ASSESSMENT/PLAN:  Ned Moore is a 63 year old year old male with PMHx of COPD, systolic HF, DM and h/o CVA and EGPA w/ cardiac involvement (following with rheum, nephro, cards) presenting to following with the [facility] GI group for new anemia. He denied hx GIB, had a NEG guiac test in the ED 11/2023 and a negative cologuard in 2021. He is on ASA 81 mg (might be getting off, pending cards eval). Most recent H/H  With uptrend to 12.1 from 11.0 now on daily iron supplement, tolerating w/ some nausea w/o emesis. stooling daily. MCV 81 (from 78).     #new anemia   Most recent Hgb with uptrend to 12.1 which is reassuring    We've had an extensive conversation about GI work - up and indication.  This was reviewed today. We've discussed R/B/ potential complications. I reviewed that my recommendation is still bi-directional endoscopy for evaluation of new anemia. I informed him that I recommend endoscopy over Cologuard. He is not interested in endoscopy. Given this preference, Cologuard is better than no screening. Shared decision making - proceed with cologuard, if abnormal he understands diagnostics colonoscopy would be the next step for evaluation. If colonoscopy is pursued, I encouraged him to schedule EGD as well. He will reach out for any bleeding episodes of drops in Hgb, in which case he understands that I recommend endoscopy (reviewed w/ him that both orders are still active in system. Will give scheduling line in AVS).     ER precautions reviewed     Future consideration -   -bidirectional endoscopy, if patient willing   -video capsule endoscopy if scopes are unremarkable  -consider restart of PPI after h.pylori     # stooling pattern  Consistent with constipation secondary to dietary/ lifestyle. Differential includes functional constipation, medication induced , intraluminal mass. TSH and metabolic panel WNL.     - stooling daily with dietary/lifestyle changes. cont  - okay to try fiber and PRN miralax     Future consideration   -miralax   -can consider celiac serology as part of work up in future if constipation continues     No orders of the defined types were placed in this encounter.    Colorectal cancer screening: he is planning to proceed with cologuard     RTC - we agreed on PRN with [facility]. He will reach out for any questions or concerns in meantime. He has a great care team already in place     Thank you for this consultation.  It was a pleasure to participate in the care of this patient; please contact me with any further questions.     Sandrine Moise PA-C    Follow up: As planned above. Today, I personally spent 15 minutes in direct face to face time with the patient, of which greater than  50% of the time was spent in patient education and counseling as described above. Approximately 5 inutes were spent on indirect care associated with the patient's consultation including but not limited to review of: patient medical records to date, clinic visits, hospital records, lab results, imaging studies, procedural documentation, and coordinating care with other providers. The findings from this review are summarized in the above note. All of the above accounted for a cumulative time of 20 minutes and was performed on the date of service.       Again, thank you for allowing me to participate in the care of your patient.        Sincerely,        Sandrine Moise PA-C

## 2024-03-04 NOTE — PATIENT INSTRUCTIONS
It was a pleasure taking care of you today.  I've included a brief summary of our discussion and care plan from today's visit below.  Please review this information with your primary care provider.  _______________________________________________________________________    My recommendations are summarized as follows:    Please see below # for scheduling endoscopies (orders are still active in system) [phone] option 2  Cont diet and lifestyle recommendations for stooling. OK to take as needed miralax (0.5 cap a day, especially for 1-2 days without stooling)  Ensure hydration - keep water bottle with you   Let my team know of any bleeding episodes, drops in hemoglobin, or symptoms of symptomatic anemia - in which case, schedule endoscopy and give me a heads up     Please call our clinic or send a Medic Trace message to us if you have any questions or concerns. [employer] messages are answered by your nurse or doctor typically within 24 hours.  Please allow extra time on weekends and holidays    Return to GI Clinic in (PRN) to review your progress.    _______________________________________________________________________    How do I schedule labs, imaging studies, or procedures that were ordered in clinic today?      Labs: To schedule lab appointment at the [facility], use my chart or call [phone]. If you have a [place] lab closer to home where you are regularly seen you can give them a call.      Procedures: If a colonoscopy, upper endoscopy, breath test, esophageal manometry, or pH impedence was ordered today, our endoscopy team will call you to schedule this. If you have not heard from our endoscopy team within a week, please call [phone] option 2 to schedule.      Imaging Studies: If you were scheduled for a CT scan, X-ray, MRI, ultrasound, HIDA scan or other imaging study, please call [phone] to have this scheduled.      Referral: If a referral to another specialty was ordered,  expect a phone call or follow instructions above. If you have not heard from anyone regarding your referral in a week, please call our clinic to check the status.      Who do I call with any questions after my visit?  Please be in touch if there are any further questions that arise following today's visit.  There are multiple ways to contact your gastroenterology care team.       During business hours, you may reach a Gastroenterology nurse at [phone]     To schedule or reschedule an appointment, please call [phone].      You can always send a secure message through [employer].  [employer] messages are answered by your nurse or doctor typically within 24 hours.  Please allow extra time on weekends and holidays.       For urgent/emergent questions after business hours, you may reach the on-call GI Fellow by contacting the [facility]  at [phone].     How will I get the results of any tests ordered?    You will receive all of your results.  If you have signed up for [employer], any tests ordered at your visit will be available to you after your physician reviews them.  Typically this takes 1-2 weeks.  If there are urgent results that require a change in your care plan, your physician or nurse will call you to discuss the next steps.       What is [employer]?  [employer] is a secure way for you to access all of your healthcare records from the [facility].  It is a web based computer program, so you can sign on to it from any location.  It also allows you to send secure messages to your care team.  I recommend signing up for [employer] access if you have not already done so and are comfortable with using a computer.       How to I schedule a follow-up visit?  If you did not schedule a follow-up visit today, please call [phone] to schedule a follow-up office visit.      Sincerely,    Sandrine Moise PA-C  Gastroenterology

## 2024-03-04 NOTE — PROGRESS NOTES
Virtual Visit Details    Type of service:  Video Visit     Originating Location (pt. Location): Home in [place]     Distant Location (provider location):  Off-site  Platform used for Video Visit: Duong     Joined the call at 3/4/2024, 8:01:41 am.  Left the call at 3/4/2024, 8:17:25 am.  You were on the call for 15 minutes 43 seconds .    GI CLINIC VISIT    Chart Review  GI eval for new anemia (1 mo ago, 9-11.2 from baseline 15-17). MCV 78.   Nephrology Dr Tripp 11/30    Cologuard NEG on 3/10/21    HPI  Ned Moore is a 63 year old year old male with PMHx of COPD, systolic HF, DM and h/o CVA and EGPA w/ cardiac involvement (following with rheum, nephro, cards) following with the [facility] GI group for new anemia.     Interval History   -est'd care with me 12/2023 - recommended bidirectional endoscopy. He wanted to think it over.     -1/2024 Hgb with climb to 12.1 from 11.0 (11/30). MCV 81    3/4/24  1. He will be getting a cologuard kit next week - wanted to know my thoughts on this testing for CRC screening purposes. No FHx of CRC or known polyps.      2. Overall doing quite well. He's regaining strength and weight. Up to 185-186# feels stronger with better endurance. Good appetite. No falls. Not using cane which he feels is actually help challenge his recovery in a good way. No dizziness, presyncope. In cariopulm rehab - tolerating sessions.     3. No episodes of any bleeding - nose bleeds, hemotopysis, dark stools, bloody stools, hematuria, easy bruising. No chest discomfort, sob, dizziness, pallor.   Continues on daily iron pill - tolerating. Does have some nausea but no emesis which is relieved with unknown pill. He is not having stomach upset or constipation  Having 1 BM a day - he is focusing on diet and lifestyle recommendations to help with stooling.     4. Seen with cards yesterday - might be taken off ASA 81 mg.     5. No other questions or concerns    ROS - no fevers, chills, dysphagia, odynphagia, vomiting,  abd pain, blood loss.     12/2023    Reports had symptomatic anemia for the last few month ago - sx of fatigue, pallor, feeling cool   Seen at ED 11/14 for anemia w/ Hgb 9.9, a 1.3 point drop from 10/26 (11.2 hgb). Stool guaiac was NEG. Given IVG.     Denies overt bleeding, such as  nosebleeds, hematemesis, easy bruising, hematuria, hematochezia, melena.  Denied hx of GI bleed in past. No hx of falls.   continues on daily on aspirin 81 mg, no other thinners.   Used NSAIDs daily x 2 weeks (ibuprofen 200 mg) for some joint pain, stopped late nov 2023. Now not on any NSAIDs.     May - admitted to [facility] complicated by stroke. Newly dx EGPA and started on nucala which made him very nauseated and caused him to vomit. It was held with resolution of N/V.  During admission, had acute blood loss anemia 2/2 epistaxis s/p transfusion and IV venofer.      Reported 2 mo history of N/V after start of azathioprine for his EGPA - after it was held, N/V resolved. Required IVF.     Typically runs constipated, Atlantic two to three. In the last few months, he has been passing a Atlantic 4 every 3-4 after trying to focus on increasing dietary and take of fiber. He denies straining, abdominal discomfort, abdominal distress, bloating. Feels overall good.     He worked as a paramedic for 20 years.  Shares that he has seen multiple injuries related to endoscopic procedures, particularly colonoscopies and would prefer to avoid this procedure for his care if at all possible.   Never had Cscope. Cologuard negative 2021. No fhx of CRC or colonic polyps.      Appetite good  Weights - below     Wt Readings from Last 10 Encounters:   02/28/24 83.9 kg (185 lb)   02/16/24 83.9 kg (185 lb)   02/13/24 84.2 kg (185 lb 9.6 oz)   01/25/24 86 kg (189 lb 9.6 oz)   01/16/24 85.7 kg (188 lb 15.7 oz)   12/13/23 90.1 kg (198 lb 11.2 oz)   12/06/23 89.8 kg (198 lb)   11/30/23 85.7 kg (189 lb)   11/27/23 85.7 kg (189 lb)   11/15/23 84.6 kg (186 lb 9.6 oz)      ROS  Denies fevers, chills, nausea, emesis, dysphagia, odynophagia, heartburn, regurgitation, abdominal pain, bloating/fullness, post prandial bloating, black tarrying stools, bloody stools, rectal pain, rectal fullness, rectal itching.     Family Hx  No other known family history or GI related malignancy (esophageal, gastric, pancreatic, liver or colon) or family history of IBD/celiac disease.     Social Hx   Never smoker. occ etoh, no drugs     PROBLEM LIST  Patient Active Problem List    Diagnosis Date Noted     Nasal polyp 02/12/2024     Priority: Medium     Hip pain, right 12/21/2023     Priority: Medium     Immunosuppression due to drug therapy  (H24) 11/15/2023     Priority: Medium     Chronic systolic heart failure (H) 11/15/2023     Priority: Medium     Eosinophilic granulomatosis with polyangiitis (EGPA) (H) 07/20/2023     Priority: Medium     History of CVA (cerebrovascular accident) 07/20/2023     Priority: Medium     Noted during hospitalization in 5/2023 at [facility]  Multifocal acute areas       Chronic obstructive pulmonary disease, unspecified COPD type (H) 03/15/2023     Priority: Medium     Hyperlipidemia LDL goal <70 10/30/2019     Priority: Medium     Type 2 diabetes mellitus with stage 3a chronic kidney disease, without long-term current use of insulin (H) 01/09/2018     Priority: Medium       PERTINENT PAST MEDICAL HISTORY:  Past Medical History:   Diagnosis Date     Chronic obstructive pulmonary disease, unspecified COPD type (H) 3/15/2023     Chronic systolic heart failure (H) 11/15/2023     Diabetes (H)      History of CVA (cerebrovascular accident) 7/20/2023       PREVIOUS SURGERIES:  No past surgical history on file.    ALLERGIES:     Allergies   Allergen Reactions     Azathioprine Nausea and Vomiting     Dulaglutide Nausea       PERTINENT MEDICATIONS:    Current Outpatient Medications:      ACCU-CHEK FASTCLIX LANCET DRUM, [ACCU-CHEK FASTCLIX LANCET DRUM] TEST BLOOD SUGARS 3 TIMES  DAILY, Disp: 300 each, Rfl: 3     ASPIRIN LOW DOSE 81 MG chewable tablet, Take 81 mg by mouth daily, Disp: , Rfl:      atorvastatin (LIPITOR) 20 MG tablet, Take 1 tablet (20 mg) by mouth every evening, Disp: 90 tablet, Rfl: 3     BD ALEXANDRA U/F 32G X 4 MM insulin pen needle, , Disp: , Rfl:      blood glucose test (ACCU-CHEK SMARTVIEW TEST STRIP) strips, [BLOOD GLUCOSE TEST (ACCU-CHEK SMARTVIEW TEST STRIP) STRIPS] USE TO CHECK BLOOD SUGARS TWICE DAILY., Disp: 200 strip, Rfl: 3     Continuous Blood Gluc Sensor (FREESTYLE LIVIA 2 SENSOR) MISC, 1 each every 14 days, Disp: 2 each, Rfl: 11     Continuous Blood Gluc Sensor (FREESTYLE LIVIA 3 SENSOR) MISC, 1 each every 14 days, Disp: 2 each, Rfl: 11     dulaglutide (TRULICITY) 0.75 MG/0.5ML pen, Inject 0.75 mg Subcutaneous every 7 days (Patient not taking: Reported on 2/28/2024), Disp: 2 mL, Rfl: 3     ferrous sulfate (FEROSUL) 325 (65 Fe) MG tablet, Take 1 tablet (325 mg) by mouth daily (with breakfast), Disp: 90 tablet, Rfl: 3     fluticasone-vilanterol (BREO ELLIPTA) 100-25 MCG/ACT inhaler, Inhale 1 puff into the lungs daily, Disp: , Rfl:      gabapentin (NEURONTIN) 300 MG capsule, Take 300 mg by mouth At Bedtime, Disp: , Rfl:      insulin glargine (LANTUS PEN) 100 UNIT/ML pen, Inject 6-8 Units Subcutaneous at bedtime, Disp: , Rfl:      lisinopril (ZESTRIL) 2.5 MG tablet, Take 1 tablet (2.5 mg) by mouth daily, Disp: 90 tablet, Rfl: 3     melatonin 3 MG tablet, Take 9 mg by mouth nightly as needed for sleep, Disp: , Rfl:      Mepolizumab 100 MG/ML SOSY, Inject 3 mLs (300 mg) Subcutaneous every 30 days, Disp: 3 mL, Rfl: 11     metFORMIN (GLUCOPHAGE XR) 500 MG 24 hr tablet, Take 1 tablet (500 mg) by mouth daily (with dinner), Disp: 360 tablet, Rfl: 3     metFORMIN (GLUCOPHAGE) 1000 MG tablet, Take 1 tablet (1,000 mg) by mouth daily (with breakfast), Disp: 90 tablet, Rfl: 6     metoprolol succinate ER (TOPROL XL) 25 MG 24 hr tablet, Take 0.5 tablets (12.5 mg) by mouth every  morning (Patient not taking: Reported on 2/28/2024), Disp: 45 tablet, Rfl: 6     mometasone (NASONEX) 50 MCG/ACT nasal spray, Spray 2 sprays into both nostrils daily, Disp: 17 g, Rfl: 3     omeprazole (PRILOSEC) 20 MG DR capsule, TAKE 1 CAPSULE BY MOUTH 2 TIMES DAILY. TAKE 1 HOUR BEFORE A MEAL., Disp: , Rfl:      tiotropium (SPIRIVA) 18 MCG inhaled capsule, Inhale 1 capsule (18 mcg) into the lungs daily, Disp: 18 capsule, Rfl: 3     traZODone (DESYREL) 50 MG tablet, Take 50 mg by mouth daily as needed for sleep, Disp: , Rfl:     SOCIAL HISTORY:  Social History     Socioeconomic History     Marital status:      Spouse name: Not on file     Number of children: Not on file     Years of education: Not on file     Highest education level: Not on file   Occupational History     Not on file   Tobacco Use     Smoking status: Never     Passive exposure: Never     Smokeless tobacco: Never   Vaping Use     Vaping Use: Never used   Substance and Sexual Activity     Alcohol use: Yes     Comment: a beer or wine every few months     Drug use: Never     Sexual activity: Not Currently     Partners: Female   Other Topics Concern     Parent/sibling w/ CABG, MI or angioplasty before 65F 55M? No   Social History Narrative     Not on file     Social Determinants of Health     Financial Resource Strain: Low Risk  (2/2/2024)    Financial Resource Strain      Within the past 12 months, have you or your family members you live with been unable to get utilities (heat, electricity) when it was really needed?: No   Food Insecurity: High Risk (2/2/2024)    Food Insecurity      Within the past 12 months, did you worry that your food would run out before you got money to buy more?: Yes      Within the past 12 months, did the food you bought just not last and you didn t have money to get more?: No   Transportation Needs: Low Risk  (2/2/2024)    Transportation Needs      Within the past 12 months, has lack of transportation kept you from  "medical appointments, getting your medicines, non-medical meetings or appointments, work, or from getting things that you need?: No   Physical Activity: Insufficiently Active (2/2/2024)    Exercise Vital Sign      Days of Exercise per Week: 3 days      Minutes of Exercise per Session: 30 min   Stress: No Stress Concern Present (2/2/2024)    [employer] [place] [employer] - Occupational Stress Questionnaire      Feeling of Stress : Not at all   Social Connections: Socially Isolated (2/2/2024)    Social Connection and Isolation Panel [NHANES]      Frequency of Communication with Friends and Family: Once a week      Frequency of Social Gatherings with Friends and Family: More than three times a week      Attends [employer] Services: Never      Active Member of Clubs or Organizations: No      Attends Club or Organization Meetings: Never      Marital Status:    Interpersonal Safety: Not on file   Housing Stability: Low Risk  (2/2/2024)    Housing Stability      Do you have housing? : Yes      Are you worried about losing your housing?: No   Recent Concern: Housing Stability - High Risk (11/14/2023)    Housing Stability      Do you have housing? : Yes      Are you worried about losing your housing?: Yes       FAMILY HISTORY:  Family History   Problem Relation Age of Onset     Heart Disease Mother      Diabetes Mother      Asthma Mother      Alzheimer Disease Father      No Known Problems Brother        Past/family/social history reviewed and no changes    PHYSICAL EXAMINATION:  Vitals reviewed: Ht 1.854 m (6' 1\")   Wt 84.4 kg (186 lb)   BMI 24.54 kg/m    Wt:   Wt Readings from Last 2 Encounters:   03/04/24 84.4 kg (186 lb)   02/28/24 83.9 kg (185 lb)      Video physical exam  General: Patient appears well in no acute distress.   Skin: No visualized rash or lesions on visualized skin  Eyes: EOMI, no erythema, sclera icterus or discharge noted  Resp: Appears to be breathing comfortably without accessory " muscle usage, speaking in full sentences, no cough  MSK: Appears to have normal range of motion based on visualized movements  Neurologic: No apparent tremors, facial movements symmetric  Psych: affect normal , alert and oriented      PERTINENT STUDIES:    Lab on 11/30/2023   Component Date Value Ref Range Status     Sodium 11/30/2023 140  135 - 145 mmol/L Final     Potassium 11/30/2023 4.5  3.4 - 5.3 mmol/L Final     Chloride 11/30/2023 101  98 - 107 mmol/L Final     Carbon Dioxide (CO2) 11/30/2023 28  22 - 29 mmol/L Final     Anion Gap 11/30/2023 11  7 - 15 mmol/L Final     Glucose 11/30/2023 130 (H)  70 - 99 mg/dL Final     Urea Nitrogen 11/30/2023 24.5 (H)  8.0 - 23.0 mg/dL Final     Creatinine 11/30/2023 1.64 (H)  0.67 - 1.17 mg/dL Final     GFR Estimate 11/30/2023 46 (L)  >60 mL/min/1.73m2 Final     Calcium 11/30/2023 9.9  8.8 - 10.2 mg/dL Final     Albumin 11/30/2023 4.2  3.5 - 5.2 g/dL Final     Phosphorus 11/30/2023 3.7  2.5 - 4.5 mg/dL Final     Total Protein Urine mg/dL 11/30/2023 29.6    mg/dL Final     Total Protein Urine mg/mg Creat 11/30/2023 0.22 (H)  0.00 - 0.20 mg/mg Cr Final     Creatinine Urine mg/dL 11/30/2023 137.0  mg/dL Final     Color Urine 11/30/2023 Light Yellow  Colorless, Straw, Light Yellow, Yellow Final     Appearance Urine 11/30/2023 Clear  Clear Final     Glucose Urine 11/30/2023 Negative  Negative mg/dL Final     Bilirubin Urine 11/30/2023 Negative  Negative Final     Ketones Urine 11/30/2023 Negative  Negative mg/dL Final     Specific Gravity Urine 11/30/2023 1.015  1.003 - 1.035 Final     Blood Urine 11/30/2023 Negative  Negative Final     pH Urine 11/30/2023 5.5  5.0 - 7.0 Final     Protein Albumin Urine 11/30/2023 20 (A)  Negative mg/dL Final     Urobilinogen Urine 11/30/2023 Normal  Normal, 2.0 mg/dL Final     Nitrite Urine 11/30/2023 Negative  Negative Final     Leukocyte Esterase Urine 11/30/2023 Trace (A)  Negative Final     Mucus Urine 11/30/2023 Present (A)  None Seen /LPF  Final     RBC Urine 11/30/2023 2  <=2 /HPF Final     WBC Urine 11/30/2023 3  <=5 /HPF Final     Squamous Epithelials Urine 11/30/2023 1  <=1 /HPF Final     MPO Janice IgG Instrument Value 11/30/2023 0.3  <3.5 U/mL Final     Myeloperoxidase Antibody IgG 11/30/2023 Negative  Negative Final     Proteinase 3 Janice IgG Instrument Va* 11/30/2023 <1.0  <2.0 U/mL Final     Proteinase 3 Antibody IgG 11/30/2023 Negative  Negative Final     Cystatin C 11/30/2023 1.9 (H)  0.6 - 1.0 mg/L Final     GFR Calculated with Cystatin C 11/30/2023 33 (L)  >=60 mL/min/1.73m2 Final     WBC Count 11/30/2023 7.2  4.0 - 11.0 10e3/uL Final     RBC Count 11/30/2023 4.57  4.40 - 5.90 10e6/uL Final     Hemoglobin 11/30/2023 11.0 (L)  13.3 - 17.7 g/dL Final     Hematocrit 11/30/2023 35.4 (L)  40.0 - 53.0 % Final     MCV 11/30/2023 78  78 - 100 fL Final     MCH 11/30/2023 24.1 (L)  26.5 - 33.0 pg Final     MCHC 11/30/2023 31.1 (L)  31.5 - 36.5 g/dL Final     RDW 11/30/2023 16.9 (H)  10.0 - 15.0 % Final     Platelet Count 11/30/2023 374  150 - 450 10e3/uL Final     % Neutrophils 11/30/2023 58  % Final     % Lymphocytes 11/30/2023 30  % Final     % Monocytes 11/30/2023 9  % Final     % Eosinophils 11/30/2023 1  % Final     % Basophils 11/30/2023 1  % Final     % Immature Granulocytes 11/30/2023 1  % Final     NRBCs per 100 WBC 11/30/2023 0  <1 /100 Final     Absolute Neutrophils 11/30/2023 4.2  1.6 - 8.3 10e3/uL Final     Absolute Lymphocytes 11/30/2023 2.1  0.8 - 5.3 10e3/uL Final     Absolute Monocytes 11/30/2023 0.7  0.0 - 1.3 10e3/uL Final     Absolute Eosinophils 11/30/2023 0.1  0.0 - 0.7 10e3/uL Final     Absolute Basophils 11/30/2023 0.1  0.0 - 0.2 10e3/uL Final     Absolute Immature Granulocytes 11/30/2023 0.0  <=0.4 10e3/uL Final     Absolute NRBCs 11/30/2023 0.0  10e3/uL Final        Lab Results   Component Value Date    WBC 5.9 01/25/2024    WBC 7.2 11/30/2023    WBC 8.9 11/15/2023    HGB 12.1 (L) 01/25/2024    HGB 11.0 (L) 11/30/2023    HGB  9.9 (L) 11/15/2023     01/25/2024     11/30/2023     (H) 11/15/2023    CHOL 152 04/26/2023    CHOL 156 01/27/2021    CHOL 141 10/30/2019    TRIG 97 04/26/2023    TRIG 123 01/27/2021    TRIG 67 10/30/2019    HDL 64 04/26/2023    HDL 57 01/27/2021    HDL 56 10/30/2019    ALT 22 02/16/2024    ALT 22 10/26/2023    ALT 23 04/26/2023    AST 24 02/16/2024    AST 20 10/26/2023    AST 24 04/26/2023     02/16/2024     01/25/2024     11/30/2023    BUN 37.7 (H) 02/16/2024    BUN 23.1 (H) 01/25/2024    BUN 24.5 (H) 11/30/2023    CO2 23 02/16/2024    CO2 28 01/25/2024    CO2 28 11/30/2023    TSH 0.43 11/15/2023    TSH 0.61 03/21/2022    TSH 0.54 01/08/2018        Liver Function Studies -   Recent Labs   Lab Test 11/30/23  1209 10/26/23  1147   PROTTOTAL  --  7.9   ALBUMIN 4.2 4.2   BILITOTAL  --  0.3   ALKPHOS  --  246*   AST  --  20   ALT  --  22        PREVIOUS ENDOSCOPY    No results found for this or any previous visit.    ASSESSMENT/PLAN:  Ned Moore is a 63 year old year old male with PMHx of COPD, systolic HF, DM and h/o CVA and EGPA w/ cardiac involvement (following with rheum, nephro, cards) presenting to following with the [facility] GI group for new anemia. He denied hx GIB, had a NEG guiac test in the ED 11/2023 and a negative cologuard in 2021. He is on ASA 81 mg (might be getting off, pending cards eval). Most recent H/H  With uptrend to 12.1 from 11.0 now on daily iron supplement, tolerating w/ some nausea w/o emesis. stooling daily. MCV 81 (from 78).     #new anemia   Most recent Hgb with uptrend to 12.1 which is reassuring    We've had an extensive conversation about GI work - up and indication. This was reviewed today. We've discussed R/B/ potential complications. I reviewed that my recommendation is still bi-directional endoscopy for evaluation of new anemia. I informed him that I recommend endoscopy over Cologuard. He is not interested in endoscopy. Given this preference,  Cologuard is better than no screening. Shared decision making - proceed with cologuard, if abnormal he understands diagnostics colonoscopy would be the next step for evaluation. If colonoscopy is pursued, I encouraged him to schedule EGD as well. He will reach out for any bleeding episodes of drops in Hgb, in which case he understands that I recommend endoscopy (reviewed w/ him that both orders are still active in system. Will give scheduling line in AVS).     ER precautions reviewed     Future consideration -   -bidirectional endoscopy, if patient willing   -video capsule endoscopy if scopes are unremarkable  -consider restart of PPI after h.pylori     # stooling pattern  Consistent with constipation secondary to dietary/ lifestyle. Differential includes functional constipation, medication induced , intraluminal mass. TSH and metabolic panel WNL.     - stooling daily with dietary/lifestyle changes. cont  - okay to try fiber and PRN miralax     Future consideration   -miralax   -can consider celiac serology as part of work up in future if constipation continues     No orders of the defined types were placed in this encounter.    Colorectal cancer screening: he is planning to proceed with cologuard     RTC - we agreed on PRN with [facility]. He will reach out for any questions or concerns in meantime. He has a great care team already in place     Thank you for this consultation.  It was a pleasure to participate in the care of this patient; please contact me with any further questions.     Sandrine Moise PA-C    Follow up: As planned above. Today, I personally spent 15 minutes in direct face to face time with the patient, of which greater than 50% of the time was spent in patient education and counseling as described above. Approximately 5 inutes were spent on indirect care associated with the patient's consultation including but not limited to review of: patient medical records to date, clinic visits, hospital records, lab  results, imaging studies, procedural documentation, and coordinating care with other providers. The findings from this review are summarized in the above note. All of the above accounted for a cumulative time of 20 minutes and was performed on the date of service.

## 2024-03-04 NOTE — NURSING NOTE
Is the patient currently in the state of [place]? YES    Visit mode:VIDEO    If the visit is dropped, the patient can be reconnected by: VIDEO VISIT: Text to cell phone:   Telephone Information:   Mobile [phone]       Will anyone else be joining the visit? NO  (If patient encounters technical issues they should call [phone]    How would you like to obtain your AVS? MyChart    Are changes needed to the allergy or medication list? No    Reason for visit: Video Visit (Recheck)    Nely ROUSE

## 2024-03-06 ENCOUNTER — OFFICE VISIT (OUTPATIENT)
Dept: RHEUMATOLOGY | Facility: CLINIC | Age: 64
End: 2024-03-06
Attending: STUDENT IN AN ORGANIZED HEALTH CARE EDUCATION/TRAINING PROGRAM
Payer: COMMERCIAL

## 2024-03-06 VITALS
SYSTOLIC BLOOD PRESSURE: 135 MMHG | DIASTOLIC BLOOD PRESSURE: 74 MMHG | HEART RATE: 64 BPM | BODY MASS INDEX: 24.82 KG/M2 | WEIGHT: 188.1 LBS | TEMPERATURE: 97.8 F | OXYGEN SATURATION: 97 %

## 2024-03-06 DIAGNOSIS — D84.821 IMMUNOSUPPRESSION DUE TO DRUG THERAPY (H): ICD-10-CM

## 2024-03-06 DIAGNOSIS — Z79.899 IMMUNOSUPPRESSION DUE TO DRUG THERAPY (H): ICD-10-CM

## 2024-03-06 DIAGNOSIS — M30.1 EOSINOPHILIC GRANULOMATOSIS WITH POLYANGIITIS (EGPA) (H): Primary | ICD-10-CM

## 2024-03-06 DIAGNOSIS — D72.18 EOSINOPHILIC GRANULOMATOSIS WITH POLYANGIITIS (EGPA) (H): Primary | ICD-10-CM

## 2024-03-06 PROCEDURE — 99213 OFFICE O/P EST LOW 20 MIN: CPT | Mod: GC | Performed by: STUDENT IN AN ORGANIZED HEALTH CARE EDUCATION/TRAINING PROGRAM

## 2024-03-06 PROCEDURE — 99213 OFFICE O/P EST LOW 20 MIN: CPT | Performed by: STUDENT IN AN ORGANIZED HEALTH CARE EDUCATION/TRAINING PROGRAM

## 2024-03-06 NOTE — NURSING NOTE
Chief Complaint   Patient presents with    Follow Up     /74   Pulse 64   Temp 97.8  F (36.6  C) (Oral)   Wt 85.3 kg (188 lb 1.6 oz)   SpO2 97%   BMI 24.82 kg/m    Glenn Johnson MA on 3/6/2024 at 9:46 AM

## 2024-03-06 NOTE — PATIENT INSTRUCTIONS
Please repeat monitoring blood work and urine tests in 3 months  Please follow up in 6 months  Cardiology placed order for echocardiogram which will be important to monitor your heart function

## 2024-03-06 NOTE — LETTER
3/6/2024       RE: Ned Moore  [address]  [place] [address]     Dear Colleague,    Thank you for referring your patient, Ned Moore, to the [facility] [place] at [facility]. Please see a copy of my visit note below.    [facility] Outpatient Rheumatology Consultation  Date of service: March 6, 2024    Patient name: Ned Moore  YOB: 1960  MRN: [id number]    Reason for consult: f/up EGPA  Last visit in 12/2023    ---------------------------------------------------------  HISTORY OF PRESENT ILLNESS:  Ned is a 63yoM w/ PMHx of COPD, systolic HF, DM and h/o CVA and EGPA w/ cardiac involvement who is presenting to rheumatology clinic for management of EGPA.     Rheumatological History:  - Admitted May of 2023 with abdominal pain, epistaxis, 30 lb weight loss and found to have patchy pulmonary opacities consistent with bronchopneumonia, marked peripheral eosinophilia (>400,000), myocarditis with reduced ejection fraction (48%), moderate proteinuria with mild hematuria, and multifocal ischemia on MRI brain with right sided LEweakness. 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 Inpatient Rheumatology diagnosed him with EGPA and started him on steroid taper and mepolizumab.   - Pt did have some past occupation exposures including to fiberglass dust and resin in his 20s, later worked as a tech aide at [employer] and was sensitized to acetate.   - Follows with pulmonology, last visit on 11/27/23 - planning to get repeat PFTs in April. Last PFTs in January 2023 - severe fixed obstructive physiology on pulmonary function testing, with FEV1 1.26 L (36%), air trapping without hyperinflation, and mild DLCO reduction.     Pertinent Medications/interventions:   - Azathioprine: discontinued due to nausea, vomiting  - Prednisone taper, no longer on prednisone  - Mepolizumab: 6/26/23 - current      Previously followed by rheumatology through [facility]. Diagnosed with EGPA in 05/2023 at [facility]. He was last seen in 10/2023 at [facility] and plan was to continue monitoring. In 9/8/23 cyclophosphamide considered as next step while on mepolizumab and glucocorticoids however pt was concerned of side-effects/cost. Treatment was to be escalated if future CM showed disease activity though no evidence of this. Has tapered off prednisone. Presented to ED in 10/2023 with weakness, nausea and vomiting temporally related to starting azathioprine and improved once discontinued so this was stopped. At last rheumatology visit his breathing, cardiopulmonary issues were not worsening. No paresthesias and his foot drop was noted to be improved.    12/2023 - 3/6/2024 the patient saw GI for anemia and was recommended endoscopy which he has currently declined as he is concerned an issue may arise. Saw PCP and having falls related to his weakness. Also saw PM&R. Doing cardiac rehab. Pursuing disability. Continues to have nasal congestion. Saw ENT and recommended NS rinses and intranasal steroids. Saw rheumatology at [facility] and will be transitioning all cares to [facility]. Saw Nephrology and felt his EGPA was stable and recommended continuing with mepolizumab. Saw ENT again and noted significant improvement with mometasone spray. Had neuropsychiatric testing which revealed mild cognitive disorder that was likely a result of his CVA. Saw cardiology and started on lisinopril and heart monitor.      Today:  Sinuses are so much better now after inhaled steroids. Getting his taste back.    Not aware of any GI bleeding, not feeling weak, dizzy, not more pale than normal. Sees his hemoglobin is increasing. Doesn't have family history of colon issues. He will be getting a cologuard test soon.    Hasn't had a fall for close to a month now. Just about walked in here without the need of his cane but he will bring it  with himself if he goes out in public in case he needs the support. Right leg continues to be weak and he continues to do exercises he learned from PT. Still doing cardiac rehab.    He denies any new vision or hearing changes. No headaches. No sinus congestion or infections recently. No rashes anywhere. No f/c/s. No chest pain or pressure. Thinks the bradycardia was due to his metoprolol. Has had zio patch on and has two days left. Feels PVCs infrequently, felt like they were more frequent before.     Still teaching EMT and paramedic students. He's better but not all the way back to himself due to fatigue.    Injecting mepolizumab on first of month. Things are going fine with this. No adverse effects.    ROS: Negative, otherwise as above.    Past Medical History:  Past Medical History:   Diagnosis Date    Chronic obstructive pulmonary disease, unspecified COPD type (H) 3/15/2023    Chronic systolic heart failure (H) 11/15/2023    Diabetes (H)     History of CVA (cerebrovascular accident) 7/20/2023     Past Surgical History:  No past surgical history on file.    Medications:  Current Outpatient Medications   Medication    ACCU-CHEK FASTCLIX LANCET DRUM    ASPIRIN LOW DOSE 81 MG chewable tablet    atorvastatin (LIPITOR) 20 MG tablet    BD ALEXANDRA U/F 32G X 4 MM insulin pen needle    blood glucose test (ACCU-CHEK SMARTVIEW TEST STRIP) strips    Continuous Blood Gluc Sensor (FREESTYLE LIVIA 3 SENSOR) MISC    dulaglutide (TRULICITY) 0.75 MG/0.5ML pen    ferrous sulfate (FEROSUL) 325 (65 Fe) MG tablet    fluticasone-vilanterol (BREO ELLIPTA) 100-25 MCG/ACT inhaler    gabapentin (NEURONTIN) 300 MG capsule    insulin glargine (LANTUS PEN) 100 UNIT/ML pen    lisinopril (ZESTRIL) 2.5 MG tablet    melatonin 3 MG tablet    Mepolizumab 100 MG/ML SOSY    metFORMIN (GLUCOPHAGE XR) 500 MG 24 hr tablet    metFORMIN (GLUCOPHAGE) 1000 MG tablet    metoprolol succinate ER (TOPROL XL) 25 MG 24 hr tablet    mometasone (NASONEX) 50 MCG/ACT  nasal spray    omeprazole (PRILOSEC) 20 MG DR capsule    tiotropium (SPIRIVA) 18 MCG inhaled capsule    traZODone (DESYREL) 50 MG tablet    Continuous Blood Gluc Sensor (FREESTYLE LIVIA 2 SENSOR) [facility]     No current facility-administered medications for this visit.     Allergies:  Allergies   Allergen Reactions    Azathioprine Nausea and Vomiting    Dulaglutide Nausea     ---------------------------------------------------------  OBJECTIVE:  /74   Pulse 64   Temp 97.8  F (36.6  C) (Oral)   Wt 85.3 kg (188 lb 1.6 oz)   SpO2 97%   BMI 24.82 kg/m       GEN: NAD, well-appearing  HEENT: No facial rash, sclera clear, no oral or nasal ulcers, no inflammatory nasal bridge/external ear changes, good saliva pool, good tear meniscus, no sinus tenderness to palpation  CV: RRR, normal S1 and S2, no m/r/g bilateral upper extremity pulses 2+, no LE edema  Pulm: CTAB, no crackles, wheezing or rhonchi, no dullness to percussion  Abdomen: soft, non-tender, not distended, no masses  Extremities: Full active and passive ROM of b/l shoulders, elbows, wrists, MCPs, PIPs, DIPs, hips, knees, ankles. Makes full fists b/l with good strength. No synovitis of any joints. No dactylitis. No digital pitting. No nail changes. No sclerodactyly.  Skin: No acute cutaneous changes  Neuro: diminished strength greater right leg compared to left, ambulating with walker    Labs:  WBC Count   Date Value Ref Range Status   01/25/2024 5.9 4.0 - 11.0 10e3/uL Final     Hemoglobin   Date Value Ref Range Status   01/25/2024 12.1 (L) 13.3 - 17.7 g/dL Final     Platelet Count   Date Value Ref Range Status   01/25/2024 237 150 - 450 10e3/uL Final     Creatinine   Date Value Ref Range Status   02/16/2024 1.75 (H) 0.67 - 1.17 mg/dL Final     Lab Results   Component Value Date    ALKPHOS 88 02/16/2024     AST   Date Value Ref Range Status   02/16/2024 24 0 - 45 U/L Final     Comment:     Reference intervals for this test were updated on 6/12/2023 to more  "accurately reflect our healthy population. There may be differences in the flagging of prior results with similar values performed with this method. Interpretation of those prior results can be made in the context of the updated reference intervals.     Lab Results   Component Value Date    ALT 22 02/16/2024     No results found for: \"SED\"  No results found for: \"CRP\"  UA RESULTS:  Recent Labs   Lab Test 01/25/24  1537   COLOR Dark Yellow*   APPEARANCE Clear   URINEGLC 150*   URINEBILI Negative   URINEKETONE Negative   SG 1.028   UBLD Negative   URINEPH 5.0   PROTEIN 10*   NITRITE Negative   LEUKEST Moderate*   RBCU 3*   WBCU 16*      Myeloperoxidase Antibody IgG   Date Value Ref Range Status   01/25/2024 Negative Negative Final     Proteinase 3 Antibody IgG   Date Value Ref Range Status   01/25/2024 Negative Negative Final     Imaging:   CT sinus 01/2024  Impression: Increased opacification of the ethmoid air cells and nasal  vault suggesting acute sinusitis. 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    ---------------------------------------------------------    ASSESSMENT & PLAN    Ned Moore is a 63yoM who is presenting to rheumatology clinic for management of EGPA.     EGPA in remission - Dx in 05/2023 at [facility]. MPO/PR3 negative. Symptoms began in 11/2022 with question of new onset COPD vs. asthma and a non-smoker. Constitutional symptoms then started and symptoms progressed to the point where he presented to the hospital. Admitted with abdominal pain, epistaxis, 30 lb weight loss and found to have patchy pulmonary opacities consistent with bronchopneumonia, marked peripheral eosinophilia (>400,000), elevated IgE, myocarditis with reduced ejection fraction (48%)  biopsy c/w eosinophilic myocarditis, moderate proteinuria with mild hematuria, and multifocal ischemia on MRI brain with right sided LEweakness. Subsequently started on steroid taper and mepolizumab. Currently off prednisone since " 09/2023. Continues treatment with mepolizumab 300 mg every 4 weeks - tolerating well. Cytoxan previously discussed if needing to escalate cares but patient c/f cost/side-effects and right now no clear indication for this. Nephrology also considering rituximab in future as reserve. Follows with pulmonology and is getting repeat PFTs.  Follows with nephrology for his CKD which predates his illness and depending on persistence of proteinuria/hematuria would consider kidney biopsy in future and additional immunosuppression if this were to be the case.  Proteinuria and hematuria have improved.     Cardiomyopathy is an independent predictor of mortality in EGPA and cyclophosphamide is the preferred agent especially when serologies are negative. ANCA-negative patient more frequently manifest cardiomyopathy and lung involvement. Whereas ANCA-positive patients have more periphral neuropathy, renal involvement and skin findings. ANCA-negative patients with less relapses but higher morbidity 2/2 cardiac involvement. Five-Factor Score was 2 at diagnosis with cardiac involvement and proteinuria which warrants more aggressive treatment. Maintenance of remission can be achieved with GCs, mepolizumab, rituximab and/or DMARDs.     Reference - https://doi.[email or web address]/[email or web address]     High risk medication use - mepolizumab     Chronic rhinosinusitis - seen by our ENT colleagues and doing much better with inhaled steroids     Plan (discussed with patient):  -- Continue mepolizumab 300 mg every 4 weeks for remission maintenance - tolerating well, being prescribed by pulmonology  -- Monitoring labs in 3 months - UA, UPCR, CBC w/ diff, CRP/ESR  -- Continue to follow with pulmonology  -- PFTs in April  -- For cardiac involvement, sx's were absent (i.e. no chest pain, pleuritis, palpitations) but no evidence of disease flare at this time. Monitor closely. In future should we have c/f flare would order CMR. Agree with repeat  echocardiogram that has been ordered.  -- For neurologic symptoms r/t stroke - continue with leg strengthening exercises. Continues to wear AFO for right foot drop and this helps.    Follow-up: 6 months    Patient seen and staffed with Dr. Lorraine Strong, DO  Rheumatology Fellow  PGY4        Attestation signed by Karla Peters MD at 3/8/2024 11:09 AM:  Attending Attestation     Patient seen and discussed with Dr. Strong. I confirmed key aspects of history and physical examination and personally reviewed the vital signs, medications, labs, and imaging. I was directly involved in medical decision making and management of this patient, and I agree with the documentation, findings, and plan.    Karla Peters MD  Staff Rheumatologist, [facility]   Division of Rheumatic and Autoimmune diseases   Pager - [phone]

## 2024-03-06 NOTE — PROGRESS NOTES
[facility] Outpatient Rheumatology Consultation  Date of service: March 6, 2024    Patient name: Ned Moore  YOB: 1960  MRN: [id number]    Reason for consult: f/up EGPA  Last visit in 12/2023    ---------------------------------------------------------  HISTORY OF PRESENT ILLNESS:  Ned is a 63yoM w/ PMHx of COPD, systolic HF, DM and h/o CVA and EGPA w/ cardiac involvement who is presenting to rheumatology clinic for management of EGPA.     Rheumatological History:  - Admitted May of 2023 with abdominal pain, epistaxis, 30 lb weight loss and found to have patchy pulmonary opacities consistent with bronchopneumonia, marked peripheral eosinophilia (>400,000), myocarditis with reduced ejection fraction (48%), moderate proteinuria with mild hematuria, and multifocal ischemia on MRI brain with right sided LEweakness. 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 Inpatient Rheumatology diagnosed him with EGPA and started him on steroid taper and mepolizumab.   - Pt did have some past occupation exposures including to fiberglass dust and resin in his 20s, later worked as a tech aide at [employer] and was sensitized to acetate.   - Follows with pulmonology, last visit on 11/27/23 - planning to get repeat PFTs in April. Last PFTs in January 2023 - severe fixed obstructive physiology on pulmonary function testing, with FEV1 1.26 L (36%), air trapping without hyperinflation, and mild DLCO reduction.     Pertinent Medications/interventions:   - Azathioprine: discontinued due to nausea, vomiting  - Prednisone taper, no longer on prednisone  - Mepolizumab: 6/26/23 - current     Previously followed by rheumatology [facility]. Diagnosed with EGPA in 05/2023 at [facility]. He was last seen in 10/2023 at [facility] and plan was to continue monitoring. In 9/8/23 cyclophosphamide considered as next step while on mepolizumab and glucocorticoids however pt was concerned of  side-effects/cost. Treatment was to be escalated if future CM showed disease activity though no evidence of this. Has tapered off prednisone. Presented to ED in 10/2023 with weakness, nausea and vomiting temporally related to starting azathioprine and improved once discontinued so this was stopped. At last rheumatology visit his breathing, cardiopulmonary issues were not worsening. No paresthesias and his foot drop was noted to be improved.    12/2023 - 3/6/2024 the patient saw GI for anemia and was recommended endoscopy which he has currently declined as he is concerned an issue may arise. Saw PCP and having falls related to his weakness. Also saw PM&R. Doing cardiac rehab. Pursuing disability. Continues to have nasal congestion. Saw ENT and recommended NS rinses and intranasal steroids. Saw [facility]ology at [facility] and will be transitioning all cares to [facility]. Saw Nephrology and felt his EGPA was stable and recommended continuing with mepolizumab. Saw ENT again and noted significant improvement with mometasone spray. Had neuropsychiatric testing which revealed mild cognitive disorder that was likely a result of his CVA. Saw cardiology and started on lisinopril and heart monitor.      Today:  Sinuses are so much better now after inhaled steroids. Getting his taste back.    Not aware of any GI bleeding, not feeling weak, dizzy, not more pale than normal. Sees his hemoglobin is increasing. Doesn't have family history of colon issues. He will be getting a cologuard test soon.    Hasn't had a fall for close to a month now. Just about walked in here without the need of his cane but he will bring it with himself if he goes out in public in case he needs the support. Right leg continues to be weak and he continues to do exercises he learned from PT. Still doing cardiac rehab.    He denies any new vision or hearing changes. No headaches. No sinus congestion or infections recently. No rashes anywhere. No  f/c/s. No chest pain or pressure. Thinks the bradycardia was due to his metoprolol. Has had zio patch on and has two days left. Feels PVCs infrequently, felt like they were more frequent before.     Still teaching EMT and paramedic students. He's better but not all the way back to himself due to fatigue.    Injecting mepolizumab on first of month. Things are going fine with this. No adverse effects.    ROS: Negative, otherwise as above.    Past Medical History:  Past Medical History:   Diagnosis Date    Chronic obstructive pulmonary disease, unspecified COPD type (H) 3/15/2023    Chronic systolic heart failure (H) 11/15/2023    Diabetes (H)     History of CVA (cerebrovascular accident) 7/20/2023     Past Surgical History:  No past surgical history on file.    Medications:  Current Outpatient Medications   Medication    ACCU-CHEK FASTCLIX LANCET DRUM    ASPIRIN LOW DOSE 81 MG chewable tablet    atorvastatin (LIPITOR) 20 MG tablet    BD ALEXANDRA U/F 32G X 4 MM insulin pen needle    blood glucose test (ACCU-CHEK SMARTVIEW TEST STRIP) strips    Continuous Blood Gluc Sensor (FREESTYLE LIVIA 3 SENSOR) [facility]    dulaglutide (TRULICITY) 0.75 MG/0.5ML pen    ferrous sulfate (FEROSUL) 325 (65 Fe) MG tablet    fluticasone-vilanterol (BREO ELLIPTA) 100-25 MCG/ACT inhaler    gabapentin (NEURONTIN) 300 MG capsule    insulin glargine (LANTUS PEN) 100 UNIT/ML pen    lisinopril (ZESTRIL) 2.5 MG tablet    melatonin 3 MG tablet    Mepolizumab 100 MG/ML SOSY    metFORMIN (GLUCOPHAGE XR) 500 MG 24 hr tablet    metFORMIN (GLUCOPHAGE) 1000 MG tablet    metoprolol succinate ER (TOPROL XL) 25 MG 24 hr tablet    mometasone (NASONEX) 50 MCG/ACT nasal spray    omeprazole (PRILOSEC) 20 MG DR capsule    tiotropium (SPIRIVA) 18 MCG inhaled capsule    traZODone (DESYREL) 50 MG tablet    Continuous Blood Gluc Sensor (FREESTYLE LIVIA 2 SENSOR) [facility]     No current facility-administered medications for this visit.     Allergies:  Allergies   Allergen  Reactions    Azathioprine Nausea and Vomiting    Dulaglutide Nausea     ---------------------------------------------------------  OBJECTIVE:  /74   Pulse 64   Temp 97.8  F (36.6  C) (Oral)   Wt 85.3 kg (188 lb 1.6 oz)   SpO2 97%   BMI 24.82 kg/m       GEN: NAD, well-appearing  HEENT: No facial rash, sclera clear, no oral or nasal ulcers, no inflammatory nasal bridge/external ear changes, good saliva pool, good tear meniscus, no sinus tenderness to palpation  CV: RRR, normal S1 and S2, no m/r/g bilateral upper extremity pulses 2+, no LE edema  Pulm: CTAB, no crackles, wheezing or rhonchi, no dullness to percussion  Abdomen: soft, non-tender, not distended, no masses  Extremities: Full active and passive ROM of b/l shoulders, elbows, wrists, MCPs, PIPs, DIPs, hips, knees, ankles. Makes full fists b/l with good strength. No synovitis of any joints. No dactylitis. No digital pitting. No nail changes. No sclerodactyly.  Skin: No acute cutaneous changes  Neuro: diminished strength greater right leg compared to left, ambulating with walker    Labs:  WBC Count   Date Value Ref Range Status   01/25/2024 5.9 4.0 - 11.0 10e3/uL Final     Hemoglobin   Date Value Ref Range Status   01/25/2024 12.1 (L) 13.3 - 17.7 g/dL Final     Platelet Count   Date Value Ref Range Status   01/25/2024 237 150 - 450 10e3/uL Final     Creatinine   Date Value Ref Range Status   02/16/2024 1.75 (H) 0.67 - 1.17 mg/dL Final     Lab Results   Component Value Date    ALKPHOS 88 02/16/2024     AST   Date Value Ref Range Status   02/16/2024 24 0 - 45 U/L Final     Comment:     Reference intervals for this test were updated on 6/12/2023 to more accurately reflect our healthy population. There may be differences in the flagging of prior results with similar values performed with this method. Interpretation of those prior results can be made in the context of the updated reference intervals.     Lab Results   Component Value Date    ALT 22  "02/16/2024     No results found for: \"SED\"  No results found for: \"CRP\"  UA RESULTS:  Recent Labs   Lab Test 01/25/24  1537   COLOR Dark Yellow*   APPEARANCE Clear   URINEGLC 150*   URINEBILI Negative   URINEKETONE Negative   SG 1.028   UBLD Negative   URINEPH 5.0   PROTEIN 10*   NITRITE Negative   LEUKEST Moderate*   RBCU 3*   WBCU 16*      Myeloperoxidase Antibody IgG   Date Value Ref Range Status   01/25/2024 Negative Negative Final     Proteinase 3 Antibody IgG   Date Value Ref Range Status   01/25/2024 Negative Negative Final     Imaging:   CT sinus 01/2024  Impression: Increased opacification of the ethmoid air cells and nasal  vault suggesting acute sinusitis. 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    ---------------------------------------------------------    ASSESSMENT & PLAN    Ned Moore is a 63yoM who is presenting to rheumatology clinic for management of EGPA.     EGPA in remission - Dx in 05/2023 at [facility]. MPO/PR3 negative. Symptoms began in 11/2022 with question of new onset COPD vs. asthma and a non-smoker. Constitutional symptoms then started and symptoms progressed to the point where he presented to the hospital. Admitted with abdominal pain, epistaxis, 30 lb weight loss and found to have patchy pulmonary opacities consistent with bronchopneumonia, marked peripheral eosinophilia (>400,000), elevated IgE, myocarditis with reduced ejection fraction (48%)  biopsy c/w eosinophilic myocarditis, moderate proteinuria with mild hematuria, and multifocal ischemia on MRI brain with right sided LEweakness. Subsequently started on steroid taper and mepolizumab. Currently off prednisone since 09/2023. Continues treatment with mepolizumab 300 mg every 4 weeks - tolerating well. Cytoxan previously discussed if needing to escalate cares but patient c/f cost/side-effects and right now no clear indication for this. Nephrology also considering rituximab in future as reserve. Follows with " pulmonology and is getting repeat PFTs.  Follows with nephrology for his CKD which predates his illness and depending on persistence of proteinuria/hematuria would consider kidney biopsy in future and additional immunosuppression if this were to be the case.  Proteinuria and hematuria have improved.     Cardiomyopathy is an independent predictor of mortality in EGPA and cyclophosphamide is the preferred agent especially when serologies are negative. ANCA-negative patient more frequently manifest cardiomyopathy and lung involvement. Whereas ANCA-positive patients have more periphral neuropathy, renal involvement and skin findings. ANCA-negative patients with less relapses but higher morbidity 2/2 cardiac involvement. Five-Factor Score was 2 at diagnosis with cardiac involvement and proteinuria which warrants more aggressive treatment. Maintenance of remission can be achieved with GCs, mepolizumab, rituximab and/or DMARDs.     Reference - https://doi.org/10.1038/[email or web address]     High risk medication use - mepolizumab     Chronic rhinosinusitis - seen by our ENT colleagues and doing much better with inhaled steroids     Plan (discussed with patient):  -- Continue mepolizumab 300 mg every 4 weeks for remission maintenance - tolerating well, being prescribed by pulmonology  -- Monitoring labs in 3 months - UA, UPCR, CBC w/ diff, CRP/ESR  -- Continue to follow with pulmonology  -- PFTs in April  -- For cardiac involvement, sx's were absent (i.e. no chest pain, pleuritis, palpitations) but no evidence of disease flare at this time. Monitor closely. In future should we have c/f flare would order CMR. Agree with repeat echocardiogram that has been ordered.  -- For neurologic symptoms r/t stroke - continue with leg strengthening exercises. Continues to wear AFO for right foot drop and this helps.    Follow-up: 6 months    Patient seen and staffed with Dr. Lorraine Strong,   Rheumatology Fellow   PGY4

## 2024-03-09 ENCOUNTER — MYC MEDICAL ADVICE (OUTPATIENT)
Dept: INTERNAL MEDICINE | Facility: CLINIC | Age: 64
End: 2024-03-09
Payer: COMMERCIAL

## 2024-03-09 DIAGNOSIS — Z12.11 COLON CANCER SCREENING: Primary | ICD-10-CM

## 2024-03-11 ENCOUNTER — ORDERS ONLY (AUTO-RELEASED) (OUTPATIENT)
Dept: INTERNAL MEDICINE | Facility: CLINIC | Age: 64
End: 2024-03-11
Payer: COMMERCIAL

## 2024-03-11 DIAGNOSIS — Z12.11 COLON CANCER SCREENING: ICD-10-CM

## 2024-03-11 NOTE — TELEPHONE ENCOUNTER
MD please review [employer] message/response.     Cologuard pended however please link appropriate Dx code as needed.

## 2024-03-12 ENCOUNTER — TRANSFERRED RECORDS (OUTPATIENT)
Dept: MULTI SPECIALTY CLINIC | Facility: CLINIC | Age: 64
End: 2024-03-12

## 2024-03-12 LAB — RETINOPATHY: NORMAL

## 2024-03-13 ENCOUNTER — VIRTUAL VISIT (OUTPATIENT)
Dept: PHARMACY | Facility: CLINIC | Age: 64
End: 2024-03-13
Payer: COMMERCIAL

## 2024-03-13 ENCOUNTER — HOSPITAL ENCOUNTER (OUTPATIENT)
Dept: CARDIAC REHAB | Facility: CLINIC | Age: 64
Discharge: HOME | End: 2024-03-13
Attending: INTERNAL MEDICINE
Payer: COMMERCIAL

## 2024-03-13 ENCOUNTER — VIRTUAL VISIT (OUTPATIENT)
Dept: INTERNAL MEDICINE | Facility: CLINIC | Age: 64
End: 2024-03-13
Payer: COMMERCIAL

## 2024-03-13 DIAGNOSIS — E11.40 TYPE 2 DIABETES MELLITUS WITH DIABETIC NEUROPATHY, UNSPECIFIED WHETHER LONG TERM INSULIN USE (H): Primary | ICD-10-CM

## 2024-03-13 DIAGNOSIS — D72.18 EOSINOPHILIC GRANULOMATOSIS WITH POLYANGIITIS (EGPA) (H): Primary | ICD-10-CM

## 2024-03-13 DIAGNOSIS — M30.1 EOSINOPHILIC GRANULOMATOSIS WITH POLYANGIITIS (EGPA) (H): Primary | ICD-10-CM

## 2024-03-13 PROCEDURE — 99213 OFFICE O/P EST LOW 20 MIN: CPT | Mod: 95 | Performed by: INTERNAL MEDICINE

## 2024-03-13 PROCEDURE — G2211 COMPLEX E/M VISIT ADD ON: HCPCS | Mod: 95 | Performed by: INTERNAL MEDICINE

## 2024-03-13 PROCEDURE — G0239 OTH RESP PROC, GROUP: HCPCS

## 2024-03-13 PROCEDURE — 99606 MTMS BY PHARM EST 15 MIN: CPT | Mod: 93

## 2024-03-13 PROCEDURE — 99607 MTMS BY PHARM ADDL 15 MIN: CPT | Mod: 93

## 2024-03-13 RX ORDER — GLIPIZIDE 5 MG/1
5 TABLET, FILM COATED, EXTENDED RELEASE ORAL
Qty: 90 TABLET | Refills: 3 | Status: SHIPPED | OUTPATIENT
Start: 2024-03-13 | End: 2024-06-20

## 2024-03-13 NOTE — PROGRESS NOTES
Medication Therapy Management (MTM) Encounter    ASSESSMENT:                            Medication Adherence/Access: No issues identified    Type 2 Diabetes: Patient is not meeting A1c goal of < 7%. Most of the CGM readings are within the target range (70 - 180) 48 %, well below goal of > 70%.  Sugar readings increased from the previous follow up likely due to lowering Trulicity dose or using maría 3 that showed the complete picture of the sugar profile throughout the day. Considering higher sugar readings appropriate to increase insulin, and also since sugar is going higher after breakfast and dinner appropriate to add glipizide with breakfast for now. Will assess in the next follow up.       PLAN:                             Increase Semglee dose from 2 -5 units to 6 - 8 units   Start taking glipizide XL 5 mg with breakfast     Follow-up: Due on 04/16/2024    SUBJECTIVE/OBJECTIVE:                          Ned Moore is a 63 year old male called for a follow up visit from 12/06/2023.    Reason for visit: Medication Review Initial.    Allergies/ADRs: Reviewed in chart  Past Medical History: Reviewed in chart  Tobacco: He reports that he has never smoked. He has never been exposed to tobacco smoke. He has never used smokeless tobacco.    Medication Adherence/Access: no issues reported    Type 2 Diabetes: Currently taking metformin 500 mg XR with supper and metformin 1000 mg with breakfast, and Semglee 2 - 5 units units.  Patient stopped taking Ozempic due to weight loss, and taking Trulicity 0.75 mg weekly. Trulicity dose was reduced due injection site pain.     Aspirin 81mg daily  Blood sugar monitoring: Continuous Glucose Monitor.Check below  Complained of the difference between the maría 3 readings and the glucometer sometimes up to 51 points difference. Noted he does not sleep on the side where the sensor is.   Denies any low sugar symptoms  Current diabetes symptoms: Fatigue.   Diet/Exercise: He is eating  three times a day and he does not have the appetite he used to have. This might be due to his stuffed nose. He has a little of nausea during the day. This is affecting his diet. Will consider to lower dose or stop the medication.   Eye exam: up to date  Foot exam: due  Urine Albumin:         Lab Results   Component Value Date     UMALCR 39.06 (H) 11/15/2023            Lab Results   Component Value Date     A1C 8.0 11/15/2023     A1C 8.2 04/26/2023         Wt Readings from Last 2 Encounters:   12/06/23 198 lb (89.8 kg)   11/30/23 189 lb (85.7 kg)      - 185 Pounds. Patient does not want to lose weight.  Lost about 2 - 3 pounds this last month.    Patient started using maría 3 sensors.            Today's Vitals: There were no vitals taken for this visit.  ----------------      I spent 20 minutes with this patient today. All changes were made via collaborative practice agreement with Mark Briggs MD. A copy of the visit note was provided to the patient's provider(s).    A summary of these recommendations was sent via [employer].      Telemedicine Visit Details  Type of service:  Telephone visit  Start Time:  09:00 AM  End Time:  09: 20 AM     Medication Therapy Recommendations  Type 2 diabetes mellitus with diabetic neuropathy, unspecified whether long term insulin use (H)    Current Medication: glipiZIDE (GLUCOTROL XL) 5 MG 24 hr tablet   Rationale: Synergistic therapy - Needs additional medication therapy - Indication   Recommendation: Start Medication - glipiZIDE 5 MG 24 hr tablet   Status: Accepted per CPA         Type 2 diabetes mellitus with stage 3a chronic kidney disease, without long-term current use of insulin (H)    Current Medication: insulin glargine (LANTUS PEN) 100 UNIT/ML pen   Rationale: Dose too low - Dosage too low - Effectiveness   Recommendation: Increase Dose - Increase Semglee dose from 2 -5 units to 6 - 8 units   Status: Accepted per [facility]              Syed Borjas, PharmD      Medication Therapy Management (MTM) Pharmacist     [phone]     [email or web address]

## 2024-03-13 NOTE — PATIENT INSTRUCTIONS
"Recommendations from today's MTM visit:                                                      MTM (medication therapy management) is a service provided by a clinical pharmacist designed to help you get the most of out of your medicines.   Today we reviewed what your medicines are for, how to know if they are working, that your medicines are safe and how to make your medicine regimen as easy as possible.       Increase Semglee dose from 2 -5 units to 6 - 8 units   Start taking glipizide XL 5 mg with breakfast     Follow-up: Due on 04/16/2024    It was great speaking with you today.  I value your experience and would be very thankful for your time in providing feedback in our clinic survey. In the next few days, you may receive an email or text message from [facility] with a link to a survey related to your  clinical pharmacist.\"     To schedule another MTM appointment, please call the clinic directly or you may call the MTM scheduling line at [phone].    My Clinical Pharmacist's contact information:                                                      Please feel free to contact me with any questions or concerns you have.        Syed Borjas, PharmD     Medication Therapy Management (MTM) Pharmacist     [phone]     [email or web address]    "

## 2024-03-13 NOTE — PROGRESS NOTES
Ned is a 63 year old who is being evaluated via a billable video visit.    How would you like to obtain your AVS? MyChart  If the video visit is dropped, the invitation should be resent by: Send to e-mail at: [email or web address]  Will anyone else be joining your video visit? No      Assessment & Plan     (M30.1,  D72.18) Eosinophilic granulomatosis with polyangiitis (EGPA) (H)  (primary encounter diagnosis)  Comment: cardiopulmonary complications, followed closely by specialty services  Plan: reviewed FMLA request though per patient he has already exhausted his FMLA time and is not able to return to work due to his ongoing disability related to this condition and its manifestations. 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 He will be meeting with HR again in the near future to confirm and finalize these plans. He will ask about short term disability. His workability prognosis is unclear to me, though.      The longitudinal plan of care for the diagnosis(es)/condition(s) as documented were addressed during this visit. Due to the added complexity in care, I will continue to support Ned in the subsequent management and with ongoing continuity of care.                  Subjective   Ned is a 63 year old, presenting for the following health issues:  Forms (Discuss FMLA and return to work letter/note for employer )      3/13/2024     1:45 PM   Additional Questions   Roomed by LEONIE Conrad   Accompanied by alone     History of Present Illness       Reason for visit:  Forms/[employer] Discussion    He eats 2-3 servings of fruits and vegetables daily.He consumes 0 sweetened beverage(s) daily.He exercises with enough effort to increase his heart rate 10 to 19 minutes per day.  He exercises with enough effort to increase his heart rate 3 or less days per week.   He is taking medications regularly.                   Objective           Vitals:  No  vitals were obtained today due to virtual visit.    Physical Exam   GENERAL: alert and no distress  EYES: Eyes grossly normal to inspection.  No discharge or erythema, or obvious scleral/conjunctival abnormalities.  RESP: No audible wheeze, cough, or visible cyanosis.    SKIN: Visible skin clear. No significant rash, abnormal pigmentation or lesions.  NEURO: Cranial nerves grossly intact.  Mentation and speech appropriate for age.  PSYCH: Appropriate affect, tone, and pace of words          Video-Visit Details    Type of service:  Video Visit   Originating Location (pt. Location): Home    Distant Location (provider location):  On-site  Platform used for Video Visit: Duong  Signed Electronically by: Mark Briggs MD

## 2024-03-14 PROCEDURE — 93244 EXT ECG>48HR<7D REV&INTERPJ: CPT | Performed by: INTERNAL MEDICINE

## 2024-03-18 ENCOUNTER — HOSPITAL ENCOUNTER (OUTPATIENT)
Dept: CARDIAC REHAB | Facility: CLINIC | Age: 64
Discharge: HOME | End: 2024-03-18
Attending: INTERNAL MEDICINE
Payer: COMMERCIAL

## 2024-03-18 PROCEDURE — G0239 OTH RESP PROC, GROUP: HCPCS

## 2024-03-19 ENCOUNTER — HOSPITAL ENCOUNTER (OUTPATIENT)
Dept: CARDIOLOGY | Facility: HOSPITAL | Age: 64
Discharge: HOME | End: 2024-03-19
Attending: PHYSICIAN ASSISTANT | Admitting: PHYSICIAN ASSISTANT
Payer: COMMERCIAL

## 2024-03-19 DIAGNOSIS — I50.22 CHRONIC SYSTOLIC HEART FAILURE (H): ICD-10-CM

## 2024-03-19 DIAGNOSIS — I49.3 PVC'S (PREMATURE VENTRICULAR CONTRACTIONS): ICD-10-CM

## 2024-03-19 DIAGNOSIS — E78.5 HYPERLIPIDEMIA LDL GOAL <70: ICD-10-CM

## 2024-03-19 LAB — LVEF ECHO: NORMAL

## 2024-03-19 PROCEDURE — 93306 TTE W/DOPPLER COMPLETE: CPT

## 2024-03-19 PROCEDURE — 93306 TTE W/DOPPLER COMPLETE: CPT | Mod: 26 | Performed by: INTERNAL MEDICINE

## 2024-03-20 ENCOUNTER — HOSPITAL ENCOUNTER (OUTPATIENT)
Dept: CARDIAC REHAB | Facility: CLINIC | Age: 64
Discharge: HOME | End: 2024-03-20
Attending: INTERNAL MEDICINE
Payer: COMMERCIAL

## 2024-03-20 PROCEDURE — G0239 OTH RESP PROC, GROUP: HCPCS

## 2024-03-24 LAB — NONINV COLON CA DNA+OCC BLD SCRN STL QL: NEGATIVE

## 2024-03-25 ENCOUNTER — HOSPITAL ENCOUNTER (OUTPATIENT)
Dept: CARDIAC REHAB | Facility: CLINIC | Age: 64
Discharge: HOME | End: 2024-03-25
Attending: INTERNAL MEDICINE
Payer: COMMERCIAL

## 2024-03-25 PROCEDURE — G0239 OTH RESP PROC, GROUP: HCPCS

## 2024-03-27 ENCOUNTER — PATIENT OUTREACH (OUTPATIENT)
Dept: NURSING | Facility: CLINIC | Age: 64
End: 2024-03-27
Payer: COMMERCIAL

## 2024-03-27 ENCOUNTER — HOSPITAL ENCOUNTER (OUTPATIENT)
Dept: CARDIAC REHAB | Facility: CLINIC | Age: 64
Discharge: HOME | End: 2024-03-27
Attending: INTERNAL MEDICINE
Payer: COMMERCIAL

## 2024-03-27 DIAGNOSIS — Z86.73 HISTORY OF CVA (CEREBROVASCULAR ACCIDENT): ICD-10-CM

## 2024-03-27 DIAGNOSIS — J44.9 CHRONIC OBSTRUCTIVE PULMONARY DISEASE, UNSPECIFIED COPD TYPE (H): Primary | ICD-10-CM

## 2024-03-27 DIAGNOSIS — N18.31 TYPE 2 DIABETES MELLITUS WITH STAGE 3A CHRONIC KIDNEY DISEASE, WITHOUT LONG-TERM CURRENT USE OF INSULIN (H): ICD-10-CM

## 2024-03-27 DIAGNOSIS — E11.22 TYPE 2 DIABETES MELLITUS WITH STAGE 3A CHRONIC KIDNEY DISEASE, WITHOUT LONG-TERM CURRENT USE OF INSULIN (H): ICD-10-CM

## 2024-03-27 PROCEDURE — G0239 OTH RESP PROC, GROUP: HCPCS

## 2024-03-27 NOTE — PROGRESS NOTES
Clinic Care Coordination Contact    Situation: Patient chart reviewed by care coordinator.    Background: Scheduled call was made to patient today per his request.     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 He said his goal is to get back to work at he makes progress through Cardiac Rehab. He stated he walking now without a cane. Patient reported he still get's tired after walking. 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 Patient reported his insurance will run out at the end of the month. He stated he will have to pay his insurance through [employer]. Patient is interested in discuss alternative insurance plans he may eligible for at this time. Writer will send referral to Financial Resource Worker to discuss possible insurance plans.     Plan/Recommendations: Writer will send referral to Financial Resources Worker.     David Myhre, RICHARD   CCC RN

## 2024-03-29 ENCOUNTER — PATIENT OUTREACH (OUTPATIENT)
Dept: CARE COORDINATION | Facility: CLINIC | Age: 64
End: 2024-03-29
Payer: COMMERCIAL

## 2024-04-01 ENCOUNTER — HOSPITAL ENCOUNTER (OUTPATIENT)
Dept: CARDIAC REHAB | Facility: CLINIC | Age: 64
Discharge: HOME | End: 2024-04-01
Attending: INTERNAL MEDICINE
Payer: COMMERCIAL

## 2024-04-01 PROCEDURE — G0239 OTH RESP PROC, GROUP: HCPCS

## 2024-04-03 ENCOUNTER — HOSPITAL ENCOUNTER (OUTPATIENT)
Dept: CARDIAC REHAB | Facility: CLINIC | Age: 64
Discharge: HOME | End: 2024-04-03
Attending: INTERNAL MEDICINE
Payer: COMMERCIAL

## 2024-04-03 PROCEDURE — G0239 OTH RESP PROC, GROUP: HCPCS

## 2024-04-08 ENCOUNTER — HOSPITAL ENCOUNTER (OUTPATIENT)
Dept: CARDIAC REHAB | Facility: CLINIC | Age: 64
Discharge: HOME | End: 2024-04-08
Attending: INTERNAL MEDICINE
Payer: COMMERCIAL

## 2024-04-08 PROCEDURE — G0239 OTH RESP PROC, GROUP: HCPCS

## 2024-04-10 ENCOUNTER — HOSPITAL ENCOUNTER (OUTPATIENT)
Dept: CARDIAC REHAB | Facility: CLINIC | Age: 64
Discharge: HOME | End: 2024-04-10
Attending: INTERNAL MEDICINE
Payer: COMMERCIAL

## 2024-04-10 PROCEDURE — G0239 OTH RESP PROC, GROUP: HCPCS

## 2024-04-15 ENCOUNTER — HOSPITAL ENCOUNTER (OUTPATIENT)
Dept: CARDIAC REHAB | Facility: CLINIC | Age: 64
Discharge: HOME | End: 2024-04-15
Attending: INTERNAL MEDICINE
Payer: COMMERCIAL

## 2024-04-15 PROCEDURE — G0239 OTH RESP PROC, GROUP: HCPCS

## 2024-04-16 ENCOUNTER — VIRTUAL VISIT (OUTPATIENT)
Dept: PHARMACY | Facility: CLINIC | Age: 64
End: 2024-04-16
Payer: COMMERCIAL

## 2024-04-16 DIAGNOSIS — N18.31 TYPE 2 DIABETES MELLITUS WITH STAGE 3A CHRONIC KIDNEY DISEASE, WITHOUT LONG-TERM CURRENT USE OF INSULIN (H): Primary | ICD-10-CM

## 2024-04-16 DIAGNOSIS — E11.22 TYPE 2 DIABETES MELLITUS WITH STAGE 3A CHRONIC KIDNEY DISEASE, WITHOUT LONG-TERM CURRENT USE OF INSULIN (H): Primary | ICD-10-CM

## 2024-04-16 PROCEDURE — 99606 MTMS BY PHARM EST 15 MIN: CPT

## 2024-04-16 NOTE — PROGRESS NOTES
Medication Therapy Management (MTM) Encounter    ASSESSMENT:                            Medication Adherence/Access: No issues identified    Type 2 Diabetes: Patient is not meeting A1c goal of < 7%. Most of the CGM readings are within the target range (70 - 180) 74 %, within  goal of > 70%.  Sugar readings improved from the previous. Patient is due for another A1C and BMP. Considering sugar readings are within goal reasonable to continue current medications.   PLAN:                              Continue taking current medications as prescribed    Follow-up: Due on 05/16/2024    SUBJECTIVE/OBJECTIVE:                          Ned Moore is a 63 year old male called for a follow up visit from 12/06/2023.    Reason for visit: Medication Review Initial.    Allergies/ADRs: Reviewed in chart  Past Medical History: Reviewed in chart  Tobacco: He reports that he has never smoked. He has never been exposed to tobacco smoke. He has never used smokeless tobacco.    Medication Adherence/Access: no issues reported    Type 2 Diabetes: Currently taking metformin 500 mg XR with supper and metformin 1000 mg with breakfast, and Semglee 2 - 5 units.  Patient stopped taking Ozempic due to weight loss, and taking Trulicity 0.75 mg weekly. Trulicity dose was reduced due injection site pain. He has not been injecting his semglee for sometime now.     Aspirin 81mg daily  Blood sugar monitoring: Continuous Glucose Monitor.Check below  Complained of the difference between the maría 3 readings and the glucometer sometimes up to 51 points difference. Noted he does not sleep on the side where the sensor is.   Denies any low sugar symptoms  Current diabetes symptoms: Fatigue.   Diet/Exercise: He is eating three times a day and he does not have the appetite he used to have. This might be due to his stuffed nose. He has a little of nausea during the day. This is affecting his diet. Will consider to lower dose or stop the medication. He is doing a  bit more exercises; going up and down the stairs.  Eye exam: up to date  Foot exam: due  Urine Albumin:     Lab Results   Component Value Date    A1C 8.0 11/15/2023    A1C 8.2 04/26/2023    A1C 8.0 10/10/2022    A1C 8.2 03/21/2022       Wt Readings from Last 2 Encounters:   12/06/23 198 lb (89.8 kg)   11/30/23 189 lb (85.7 kg)      - His weight is 185 Pounds. Patient does not want to lose weight.  Lost about 2 - 3 pounds this last month.             Today's Vitals: There were no vitals taken for this visit.  ----------------      I spent 15 minutes with this patient today. All changes were made via collaborative practice agreement with Mark Briggs MD. A copy of the visit note was provided to the patient's provider(s).    A summary of these recommendations was sent via [employer].      Telemedicine Visit Details  Type of service:  Telephone visit  Start Time:  09:00 AM  End Time: 9:15 AM     Medication Therapy Recommendations  No medication therapy recommendations to display     Syed Borjas, PharmD     Medication Therapy Management (MTM) Pharmacist     [phone]     [email or web address]

## 2024-04-18 NOTE — PATIENT INSTRUCTIONS
"Recommendations from today's MTM visit:                                                      MTM (medication therapy management) is a service provided by a clinical pharmacist designed to help you get the most of out of your medicines.   Today we reviewed what your medicines are for, how to know if they are working, that your medicines are safe and how to make your medicine regimen as easy as possible.      Continue taking current medications as prescribed    Follow-up: Due on 05/16/2024    It was great speaking with you today.  I value your experience and would be very thankful for your time in providing feedback in our clinic survey. In the next few days, you may receive an email or text message from [facility] with a link to a survey related to your  clinical pharmacist.\"     To schedule another MTM appointment, please call the clinic directly or you may call the MTM scheduling line at [phone].    My Clinical Pharmacist's contact information:                                                      Please feel free to contact me with any questions or concerns you have.        Syed Borjas, PharmD     Medication Therapy Management (MTM) Pharmacist     [phone]     [email or web address]     [facility]    "

## 2024-04-19 ENCOUNTER — TELEPHONE (OUTPATIENT)
Dept: RHEUMATOLOGY | Facility: CLINIC | Age: 64
End: 2024-04-19
Payer: MEDICARE

## 2024-04-19 ENCOUNTER — MYC MEDICAL ADVICE (OUTPATIENT)
Dept: INTERNAL MEDICINE | Facility: CLINIC | Age: 64
End: 2024-04-19
Payer: MEDICARE

## 2024-04-19 ENCOUNTER — TELEPHONE (OUTPATIENT)
Dept: INTERNAL MEDICINE | Facility: CLINIC | Age: 64
End: 2024-04-19
Payer: MEDICARE

## 2024-04-19 NOTE — TELEPHONE ENCOUNTER
Called pt and discussed COVID treatment options. Pt does not currently have active insurance. Writer went over Paxlovid and pt wants to hold off due to potential cost. Discussed home treatments to utilize in the mean time. Pt will call back if things worsen or changes mind regarding Paxlovid treatment. Pt is looking in to assistance as well.     Rebecca Kramer RN BSN  [facility]

## 2024-04-19 NOTE — TELEPHONE ENCOUNTER
See telephone encounter. Pt was called and discussed options.    Rebecca Kramer RN BSN  [facility]

## 2024-05-02 ENCOUNTER — LAB (OUTPATIENT)
Dept: LAB | Facility: CLINIC | Age: 64
End: 2024-05-02
Attending: INTERNAL MEDICINE
Payer: COMMERCIAL

## 2024-05-02 ENCOUNTER — OFFICE VISIT (OUTPATIENT)
Dept: NEPHROLOGY | Facility: CLINIC | Age: 64
End: 2024-05-02
Attending: INTERNAL MEDICINE
Payer: COMMERCIAL

## 2024-05-02 VITALS
OXYGEN SATURATION: 96 % | WEIGHT: 190.3 LBS | BODY MASS INDEX: 25.11 KG/M2 | SYSTOLIC BLOOD PRESSURE: 135 MMHG | DIASTOLIC BLOOD PRESSURE: 77 MMHG | HEART RATE: 61 BPM

## 2024-05-02 DIAGNOSIS — D64.9 NORMOCYTIC ANEMIA: ICD-10-CM

## 2024-05-02 DIAGNOSIS — M30.1 EOSINOPHILIC GRANULOMATOSIS WITH POLYANGIITIS (EGPA) (H): ICD-10-CM

## 2024-05-02 DIAGNOSIS — N18.32 HYPERTENSION ASSOCIATED WITH STAGE 3B CHRONIC KIDNEY DISEASE DUE TO TYPE 2 DIABETES MELLITUS (H): ICD-10-CM

## 2024-05-02 DIAGNOSIS — E11.22 TYPE 2 DIABETES MELLITUS WITH STAGE 3 CHRONIC KIDNEY DISEASE, UNSPECIFIED WHETHER LONG TERM INSULIN USE, UNSPECIFIED WHETHER STAGE 3A OR 3B CKD (H): ICD-10-CM

## 2024-05-02 DIAGNOSIS — D72.18 EOSINOPHILIC GRANULOMATOSIS WITH POLYANGIITIS (EGPA) (H): ICD-10-CM

## 2024-05-02 DIAGNOSIS — N18.32 STAGE 3B CHRONIC KIDNEY DISEASE (H): ICD-10-CM

## 2024-05-02 DIAGNOSIS — M30.1 EOSINOPHILIC GRANULOMATOSIS WITH POLYANGIITIS (EGPA) (H): Primary | ICD-10-CM

## 2024-05-02 DIAGNOSIS — D72.18 EOSINOPHILIC GRANULOMATOSIS WITH POLYANGIITIS (EGPA) (H): Primary | ICD-10-CM

## 2024-05-02 DIAGNOSIS — E11.22 HYPERTENSION ASSOCIATED WITH STAGE 3B CHRONIC KIDNEY DISEASE DUE TO TYPE 2 DIABETES MELLITUS (H): ICD-10-CM

## 2024-05-02 DIAGNOSIS — D84.9 IMMUNOSUPPRESSION (H): ICD-10-CM

## 2024-05-02 DIAGNOSIS — N18.30 TYPE 2 DIABETES MELLITUS WITH STAGE 3 CHRONIC KIDNEY DISEASE, UNSPECIFIED WHETHER LONG TERM INSULIN USE, UNSPECIFIED WHETHER STAGE 3A OR 3B CKD (H): ICD-10-CM

## 2024-05-02 DIAGNOSIS — I12.9 HYPERTENSION ASSOCIATED WITH STAGE 3B CHRONIC KIDNEY DISEASE DUE TO TYPE 2 DIABETES MELLITUS (H): ICD-10-CM

## 2024-05-02 LAB
ALBUMIN MFR UR ELPH: 24 MG/DL
ALBUMIN SERPL BCG-MCNC: 4.3 G/DL (ref 3.5–5.2)
ALBUMIN UR-MCNC: NEGATIVE MG/DL
ANION GAP SERPL CALCULATED.3IONS-SCNC: 11 MMOL/L (ref 7–15)
APPEARANCE UR: CLEAR
BASOPHILS # BLD AUTO: 0 10E3/UL (ref 0–0.2)
BASOPHILS NFR BLD AUTO: 0 %
BILIRUB UR QL STRIP: NEGATIVE
BUN SERPL-MCNC: 37.1 MG/DL (ref 8–23)
CALCIUM SERPL-MCNC: 9.4 MG/DL (ref 8.8–10.2)
CD19 B CELL COMMENT: ABNORMAL
CD19 CELLS # BLD: 104 CELLS/UL (ref 107–698)
CD19 CELLS NFR BLD: 7 % (ref 6–27)
CHLORIDE SERPL-SCNC: 101 MMOL/L (ref 98–107)
COLOR UR AUTO: YELLOW
CREAT SERPL-MCNC: 1.73 MG/DL (ref 0.67–1.17)
CREAT UR-MCNC: 127 MG/DL
CRP SERPL-MCNC: <3 MG/L
DEPRECATED HCO3 PLAS-SCNC: 27 MMOL/L (ref 22–29)
EGFRCR SERPLBLD CKD-EPI 2021: 43 ML/MIN/1.73M2
EOSINOPHIL # BLD AUTO: 0.1 10E3/UL (ref 0–0.7)
EOSINOPHIL NFR BLD AUTO: 1 %
ERYTHROCYTE [DISTWIDTH] IN BLOOD BY AUTOMATED COUNT: 12.9 % (ref 10–15)
ERYTHROCYTE [SEDIMENTATION RATE] IN BLOOD BY WESTERGREN METHOD: 10 MM/HR (ref 0–20)
FERRITIN SERPL-MCNC: 58 NG/ML (ref 31–409)
GLUCOSE SERPL-MCNC: 208 MG/DL (ref 70–99)
GLUCOSE UR STRIP-MCNC: 200 MG/DL
HCT VFR BLD AUTO: 40.2 % (ref 40–53)
HGB BLD-MCNC: 13.6 G/DL (ref 13.3–17.7)
HGB UR QL STRIP: NEGATIVE
IMM GRANULOCYTES # BLD: 0 10E3/UL
IMM GRANULOCYTES NFR BLD: 1 %
IRON BINDING CAPACITY (ROCHE): 283 UG/DL (ref 240–430)
IRON SATN MFR SERPL: 22 % (ref 15–46)
IRON SERPL-MCNC: 61 UG/DL (ref 61–157)
KETONES UR STRIP-MCNC: NEGATIVE MG/DL
LEUKOCYTE ESTERASE UR QL STRIP: ABNORMAL
LYMPHOCYTES # BLD AUTO: 1.7 10E3/UL (ref 0.8–5.3)
LYMPHOCYTES NFR BLD AUTO: 23 %
MCH RBC QN AUTO: 28.5 PG (ref 26.5–33)
MCHC RBC AUTO-ENTMCNC: 33.8 G/DL (ref 31.5–36.5)
MCV RBC AUTO: 84 FL (ref 78–100)
MONOCYTES # BLD AUTO: 0.6 10E3/UL (ref 0–1.3)
MONOCYTES NFR BLD AUTO: 8 %
NEUTROPHILS # BLD AUTO: 5.1 10E3/UL (ref 1.6–8.3)
NEUTROPHILS NFR BLD AUTO: 67 %
NITRATE UR QL: NEGATIVE
NRBC # BLD AUTO: 0 10E3/UL
NRBC BLD AUTO-RTO: 0 /100
PH UR STRIP: 5 [PH] (ref 5–7)
PHOSPHATE SERPL-MCNC: 3.8 MG/DL (ref 2.5–4.5)
PLATELET # BLD AUTO: 251 10E3/UL (ref 150–450)
POTASSIUM SERPL-SCNC: 5 MMOL/L (ref 3.4–5.3)
PROT/CREAT 24H UR: 0.19 MG/MG CR (ref 0–0.2)
RBC # BLD AUTO: 4.78 10E6/UL (ref 4.4–5.9)
RBC URINE: 2 /HPF
SODIUM SERPL-SCNC: 139 MMOL/L (ref 135–145)
SP GR UR STRIP: 1.02 (ref 1–1.03)
SQUAMOUS EPITHELIAL: <1 /HPF
UROBILINOGEN UR STRIP-MCNC: NORMAL MG/DL
WBC # BLD AUTO: 7.5 10E3/UL (ref 4–11)
WBC URINE: 8 /HPF

## 2024-05-02 PROCEDURE — 80069 RENAL FUNCTION PANEL: CPT | Performed by: PATHOLOGY

## 2024-05-02 PROCEDURE — 85652 RBC SED RATE AUTOMATED: CPT | Performed by: PATHOLOGY

## 2024-05-02 PROCEDURE — 83550 IRON BINDING TEST: CPT | Performed by: PATHOLOGY

## 2024-05-02 PROCEDURE — 86140 C-REACTIVE PROTEIN: CPT | Performed by: PATHOLOGY

## 2024-05-02 PROCEDURE — 86355 B CELLS TOTAL COUNT: CPT | Performed by: INTERNAL MEDICINE

## 2024-05-02 PROCEDURE — 99213 OFFICE O/P EST LOW 20 MIN: CPT | Performed by: INTERNAL MEDICINE

## 2024-05-02 PROCEDURE — 87086 URINE CULTURE/COLONY COUNT: CPT | Performed by: INTERNAL MEDICINE

## 2024-05-02 PROCEDURE — 81001 URINALYSIS AUTO W/SCOPE: CPT | Performed by: PATHOLOGY

## 2024-05-02 PROCEDURE — 36415 COLL VENOUS BLD VENIPUNCTURE: CPT | Performed by: PATHOLOGY

## 2024-05-02 PROCEDURE — 83540 ASSAY OF IRON: CPT | Performed by: PATHOLOGY

## 2024-05-02 PROCEDURE — 86160 COMPLEMENT ANTIGEN: CPT | Performed by: INTERNAL MEDICINE

## 2024-05-02 PROCEDURE — 84156 ASSAY OF PROTEIN URINE: CPT | Performed by: PATHOLOGY

## 2024-05-02 PROCEDURE — 99000 SPECIMEN HANDLING OFFICE-LAB: CPT | Performed by: PATHOLOGY

## 2024-05-02 PROCEDURE — 99214 OFFICE O/P EST MOD 30 MIN: CPT | Performed by: INTERNAL MEDICINE

## 2024-05-02 PROCEDURE — 82728 ASSAY OF FERRITIN: CPT | Performed by: PATHOLOGY

## 2024-05-02 PROCEDURE — 85025 COMPLETE CBC W/AUTO DIFF WBC: CPT | Performed by: PATHOLOGY

## 2024-05-02 NOTE — LETTER
5/2/2024       RE: Ned Moore  [address]  [place] [address]     Dear Colleague,    Thank you for referring your patient, Ned Moore, to the [facility] [place] at [facility]. Please see a copy of my visit note below.    Nephrology Clinic    Ned Moore MRN:[id number] YOB: 1960  Date of Service: 05/02/2024  Primary care provider: Mark Briggs  Requesting physician: Mark Briggs   Pulmonologist: Dr Biswas at [facility]  Cardiologist: Dr Efren Farias       REASON FOR CONSULT: establish care for EGPA at [facility]    HISTORY OF PRESENT ILLNESS:   Ned Moore is a 63 year old male who presents establish care for EGPA at [facility] and have all of his care within the same system.  Mr Moore's history of present illness started in Dec 2022, at which time he developed a new onset of respiratory illness consistent with asthma with wheezing and HAYNES.  He had never had any respiratory disease prior to that, and has never smoked.  He was treated with a short course of prednisone with marked improvement , but the symptoms kept recurring after the prednisone was discontinued.  Inhalers did not seem to keep the asthma under control.   He was able to work only on and off in the winter for 2023  In May 2023, he suffered from a severe epistaxis episode (requiring blood transfusion, and associated with hypotension) that prompted him to go to the ED.  He was admitted to [facility] and had a prolonged hospitalization during which he had , where he was initially found to have severe eosinophilia, hyponatremia and troponin elevation,  myocarditis, multifocal acute strokes, liver lesions concerning for abscesses, highest suspicion for EPGA. He underwent an extensive work up with cardiology, rheumatology, GI, neurology infectious disease, pulmonary and hematology. 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 Marked improvment with steroids and meprolizumab (5/26).   - completed 3 days 500 mg solumedrol; decreased to 60 mg prednisone started 5/25.  Eosinophilic myocarditis confirmed with biopsy. Presenting with ST depressions and elevated troponin. Cardiology evaluation, elevated troponin more consistent with demand ischemia. TTE w/o WMA. Cardiac MRI obtained with findings consistent with myocarditis.     With respect to multifocal acute cerebral infarcts, stroke code was called on 05/20 with acute gaze deviation, worsening right-sided weakness, seen by Neuro Critical Care, felt possibly secondary to an episode of orthostatic hypotension.   He describes amnesia related to the events.  He experienced slurred speech, weakness of the R arm and leg, and left lower quadrantanopsia.  He was transferred to rehab on 6/8/2023 until June 30.   In rehab and since then, he reports regaining significant amount of strength in the right arm/.  The R leg is also improved, but remains weak.  He is now able to drive, walk without a walker, but still appears to have RLExt proximal muscle weakness.  He remains on mepolizumab monthly.  He was started on Azathioprine on 9/8/23 -> but this resulted in recurrent episodes of sever N/V and associated dehydration until he stopped the azathioprine.    Today, Mr Moore reports feeling generally well.   He denies SOB, HAYNES, Cough.  No CP.   Denies GI or  symptoms.  Has nocturia 1x/night  No melena or hematochezia.  No gross hematuria  Reports nasal congestion on the R side , without purulent nasal discharge.  Residual R leg weakness.     January 25, 2024  IN PT.  L Ext strength is improving.  No wheezing, SOB, CP  Cardiac rehab.  Reports good O2 sats HR in low 100's  Saw ENT.  Had CT. Had severe congestion.   Has a polyp    On topical steroid and daily steroid rinses.    Sense of smell in improving  No GI spms.  Diarrhea resolved after  stopping aza  On Fe supplement.  "-> gets some N with it,  No  spms.  Nocturia once a night.  No hesitancy, straining or frequency.    BP was 110 at cardiac rehab a week ago.  Reports \"dehydration\" and SBP in 80's a few weeks ago.    Is scheduled to have EGD and Colonoscopy    May 2, 2024  FEELS generally well.  No epistaxis, oral ulcers, SOB, CP.  Gaining strength in l ext  Able to walk at least 30 min.   Can climb a flight of stair with HAYNES, but needs to hold to railing bec of weakness.  No GI,  spms   nocturia usually at 3 am.      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    PAST MEDICAL HISTORY:  Past Medical History:   Diagnosis Date     Chronic obstructive pulmonary disease, unspecified COPD type (H) 3/15/2023     Chronic systolic heart failure (H) 11/15/2023     Diabetes (H)      History of CVA (cerebrovascular accident) 7/20/2023   Diabetes diagnosed about 8 years ago, but this was already associated with L ext neuropathy suggesting that onset of disease was probably a decade before. He was previously on empagliflozin from Feb 2023 until Sept/Oct 2023, after a UTI requiring antibiotic treatment.     ?PSVT during hospitalization     He never had a colonoscopy, but has been tested with cologuard -> reportedly neg.       PAST SURGICAL HISTORY:  No past surgical history on file.  MEDICATIONS:    Current Outpatient Medications  reviewed with patient May 2, 2024   Medication Sig Dispense Refill     ACCU-CHEK FASTCLIX LANCET DRUM [ACCU-CHEK FASTCLIX LANCET DRUM] TEST BLOOD SUGARS 3 TIMES DAILY 300 each 3            atorvastatin (LIPITOR) 20 MG tablet TAKE 1 TABLET BY MOUTH EVERY DAY IN THE EVENING 90 tablet 1     BD ALEXANDRA U/F 32G X 4 MM insulin pen needle        blood glucose test (ACCU-CHEK SMARTVIEW TEST STRIP) strips [BLOOD GLUCOSE TEST (ACCU-CHEK SMARTVIEW TEST STRIP) STRIPS] USE TO CHECK BLOOD SUGARS TWICE DAILY. 200 strip 3     Continuous Blood Gluc Sensor (FREESTYLE LIVIA 2 SENSOR) MISC 1 each every 14 days 2 each 11     " dulaglutide (TRULICITY) 1.5 MG/0.5ML pen    Currently on 0.75 mg weekly,  plan to increase in 2 weeks. Inject 1.5 mg Subcutaneous every 7 days 2 mL 0     ferrous sulfate (FEROSUL) 325 (65 Fe) MG tablet Take 1 tablet (325 mg) by mouth daily (with breakfast) 90 tablet 3     fluticasone-vilanterol (BREO ELLIPTA) 100-25 MCG/ACT inhaler Inhale 1 puff into the lungs daily 60 each 6     gabapentin (NEURONTIN) 300 MG capsule Take 300 mg by mouth At Bedtime              melatonin 3 MG tablet Take 9 mg by mouth nightly as needed for sleep       Mepolizumab 100 MG/ML SOSY Inject 3 mLs (300 mg) Subcutaneous every 30 days 3 mL 11     metFORMIN (GLUCOPHAGE XR) 500 MG 24 hr tablet Take 1 tablet (500 mg) by mouth daily (with dinner) 360 tablet 3     metFORMIN (GLUCOPHAGE) 1000 MG tablet Take 1 tablet (1,000 mg) by mouth daily (with breakfast) 90 tablet 6     metoprolol succinate ER (TOPROL XL) 25 MG 24 hr tablet Take 0.5 tablets (12.5 mg) by mouth every morning 45 tablet 6     mometasone (NASONEX) 50 MCG/ACT nasal spray Spray 2 sprays into both nostrils daily 17 g 3     omeprazole (PRILOSEC) 20 MG DR capsule TAKE 1 CAPSULE BY MOUTH 2 TIMES DAILY. TAKE 1 HOUR BEFORE A MEAL.       tiotropium (SPIRIVA) 18 MCG inhaled capsule Inhale 1 capsule (18 mcg) into the lungs daily 18 capsule 3     traZODone (DESYREL) 50 MG tablet Take 50 mg by mouth daily as needed for sleep           ALLERGIES:    Allergies   Allergen Reactions     Azathioprine Nausea and Vomiting     Dulaglutide Nausea     REVIEW OF SYSTEMS:  A comprehensive review of systems was performed and found to be negative except as described here or above.  SOCIAL HISTORY:   Social History     Socioeconomic History     Marital status:      Spouse name: Not on file     Number of children: Not on file     Years of education: Not on file     Highest education level: Not on file   Occupational History     Not on file   Tobacco Use     Smoking status: Never     Passive exposure:  Never     Smokeless tobacco: Never   Vaping Use     Vaping status: Never Used   Substance and Sexual Activity     Alcohol use: Yes     Comment: a beer or wine every few months     Drug use: Never     Sexual activity: Not Currently     Partners: Female   Other Topics Concern     Parent/sibling w/ CABG, MI or angioplasty before 65F 55M? No   Social History Narrative     Not on file     Social Determinants of Health     Financial Resource Strain: Low Risk  (2/2/2024)    Financial Resource Strain      Within the past 12 months, have you or your family members you live with been unable to get utilities (heat, electricity) when it was really needed?: No   Food Insecurity: High Risk (2/2/2024)    Food Insecurity      Within the past 12 months, did you worry that your food would run out before you got money to buy more?: Yes      Within the past 12 months, did the food you bought just not last and you didn t have money to get more?: No   Transportation Needs: Low Risk  (2/2/2024)    Transportation Needs      Within the past 12 months, has lack of transportation kept you from medical appointments, getting your medicines, non-medical meetings or appointments, work, or from getting things that you need?: No   Physical Activity: Insufficiently Active (2/2/2024)    Exercise Vital Sign      Days of Exercise per Week: 3 days      Minutes of Exercise per Session: 30 min   Stress: No Stress Concern Present (2/2/2024)    [employer] [place] [employer] - Occupational Stress Questionnaire      Feeling of Stress : Not at all   Social Connections: Socially Isolated (2/2/2024)    Social Connection and Isolation Panel [NHANES]      Frequency of Communication with Friends and Family: Once a week      Frequency of Social Gatherings with Friends and Family: More than three times a week      Attends Oriental orthodox Services: Never      Active Member of Clubs or Organizations: No      Attends Club or Organization Meetings: Never       Marital Status:    Interpersonal Safety: Not on file   Housing Stability: Low Risk  (2/2/2024)    Housing Stability      Do you have housing? : Yes      Are you worried about losing your housing?: No   Recent Concern: Housing Stability - High Risk (11/14/2023)    Housing Stability      Do you have housing? : Yes      Are you worried about losing your housing?: Yes     FAMILY MEDICAL HISTORY:   Family History   Problem Relation Age of Onset     Heart Disease Mother      Diabetes Mother      Asthma Mother      Alzheimer Disease Father      No Known Problems Brother      PHYSICAL EXAM:   /77   Pulse 61   Wt 86.3 kg (190 lb 4.8 oz)   SpO2 96%   BMI 25.11 kg/m    GENERAL APPEARANCE: alert and no distress  EYES: nonicteric  NECK: supple, no adenopathy  Carotids 2+ bilat without bruits  RESP: lungs clear to auscultation   CV: regular rhythm, normal rate, no rub  ABDOMEN: soft, nontender, normal bowel sounds, no HSM   Extremities: trace-1+ ankle and distal shin edema on R.  None on L.  MS: no evidence of inflammation in joints, no muscle tenderness  SKIN: no rash.   NEURO: mentation intact and speech normal.  R Leg proximal weakness.  Feet:  L dorsiflexion > R.  PSYCH: affect normal/bright   LABS:   Recent Results (from the past 672 hour(s))   Renal panel    Collection Time: 05/02/24  2:19 PM   Result Value Ref Range    Sodium 139 135 - 145 mmol/L    Potassium 5.0 3.4 - 5.3 mmol/L    Chloride 101 98 - 107 mmol/L    Carbon Dioxide (CO2) 27 22 - 29 mmol/L    Anion Gap 11 7 - 15 mmol/L    Glucose 208 (H) 70 - 99 mg/dL    Urea Nitrogen 37.1 (H) 8.0 - 23.0 mg/dL    Creatinine 1.73 (H) 0.67 - 1.17 mg/dL    GFR Estimate 43 (L) >60 mL/min/1.73m2    Calcium 9.4 8.8 - 10.2 mg/dL    Albumin 4.3 3.5 - 5.2 g/dL    Phosphorus 3.8 2.5 - 4.5 mg/dL   Iron and iron binding capacity    Collection Time: 05/02/24  2:19 PM   Result Value Ref Range    Iron 61 61 - 157 ug/dL    Iron Binding Capacity 283 240 - 430 ug/dL    Iron  Sat Index 22 15 - 46 %   CRP inflammation    Collection Time: 05/02/24  2:19 PM   Result Value Ref Range    CRP Inflammation <3.00 <5.00 mg/L   CBC with platelets and differential    Collection Time: 05/02/24  2:19 PM   Result Value Ref Range    WBC Count 7.5 4.0 - 11.0 10e3/uL    RBC Count 4.78 4.40 - 5.90 10e6/uL    Hemoglobin 13.6 13.3 - 17.7 g/dL    Hematocrit 40.2 40.0 - 53.0 %    MCV 84 78 - 100 fL    MCH 28.5 26.5 - 33.0 pg    MCHC 33.8 31.5 - 36.5 g/dL    RDW 12.9 10.0 - 15.0 %    Platelet Count 251 150 - 450 10e3/uL    % Neutrophils 67 %    % Lymphocytes 23 %    % Monocytes 8 %    % Eosinophils 1 %    % Basophils 0 %    % Immature Granulocytes 1 %    NRBCs per 100 WBC 0 <1 /100    Absolute Neutrophils 5.1 1.6 - 8.3 10e3/uL    Absolute Lymphocytes 1.7 0.8 - 5.3 10e3/uL    Absolute Monocytes 0.6 0.0 - 1.3 10e3/uL    Absolute Eosinophils 0.1 0.0 - 0.7 10e3/uL    Absolute Basophils 0.0 0.0 - 0.2 10e3/uL    Absolute Immature Granulocytes 0.0 <=0.4 10e3/uL    Absolute NRBCs 0.0 10e3/uL   Protein  random urine    Collection Time: 05/02/24  2:35 PM   Result Value Ref Range    Total Protein Urine mg/dL 24.0   mg/dL    Total Protein Urine mg/mg Creat 0.19 0.00 - 0.20 mg/mg Cr    Creatinine Urine mg/dL 127.0 mg/dL         CMP  Recent Labs   Lab Test 05/02/24  1419 02/16/24  1518 01/25/24  1533 11/30/23  1209 10/26/23  1149 10/26/23  1147 04/26/23  0824 02/07/23  0915 07/05/22  0933 03/21/22  1112 03/21/22  1112 08/23/21  1152 01/27/21  1047 10/30/19  0923 07/29/19  0851 04/15/19  1126    137 140 140   < > 133* 139  --  140  --  140   < > 140 139 138 138   POTASSIUM 5.0 4.7 5.0 4.5   < > 4.6 4.9  --  4.6   < > 4.5   < > 4.5 4.3 4.6 4.4   CHLORIDE 101 101 103 101   < > 94* 99  --  103   < > 102   < > 102 102 102 100   CO2 27 23 28 28   < > 22 27  --  25   < > 25   < > 27 29 24 28   ANIONGAP 11 13 9 11   < > 17* 13  --  12   < > 13   < > 11 8 12 10   * 187* 183* 130*   < > 255* 173*  --  103*   < > 193*   <  > 118 139* 152* 163*   BUN 37.1* 37.7* 23.1* 24.5*   < > 24.9* 22.5  --  23.2*   < > 18   < > 24* 21 24* 21   CR 1.73* 1.75* 1.69* 1.64*   < > 1.65* 1.35*   < > 1.37*  --  1.51*   < > 1.41* 1.39* 1.37* 1.44*   GFRESTIMATED 43* 43* 45* 46*   < > 46* 59*   < > 58*  --  52*   < > 51* 52* 53* 50*   GFRESTBLACK  --   --   --   --   --   --   --   --   --   --   --   --  >60 >60 >60 >60   ANAI 9.4 9.3 9.4 9.9   < > 9.9 9.4  --  9.5  --  9.9   < > 9.4 9.6 9.7 10.0   MAG  --  2.5*  --   --   --   --   --   --   --   --   --   --   --   --   --   --    PHOS 3.8  --  3.4 3.7  --   --   --   --  3.9  --   --   --   --   --   --   --    PROTTOTAL  --  7.1  --   --   --  7.9 7.2  --   --   --  7.1  --  7.6  --   --  7.5   ALBUMIN 4.3 4.3 4.4 4.2  --  4.2 4.1  --  4.2   < > 3.9  --  4.6  --   --  4.5   BILITOTAL  --  0.3  --   --   --  0.3 0.3  --   --   --  0.5  --  0.7  --   --  0.6   ALKPHOS  --  88  --   --   --  246* 103  --   --   --  98  --  75  --   --  75   AST  --  24  --   --   --  20 24  --   --   --  17  --  22  --   --  29   ALT  --  22  --   --   --  22 23  --   --   --  19  --  31  --   --  38    < > = values in this interval not displayed.     CBC  Recent Labs   Lab Test 05/02/24  1419 01/25/24  1533 11/30/23  1209 11/15/23  1608   HGB 13.6 12.1* 11.0* 9.9*   WBC 7.5 5.9 7.2 8.9   RBC 4.78 4.73 4.57 4.19*   HCT 40.2 38.5* 35.4* 32.7*   MCV 84 81 78 78    237 374 487*     INRNo lab results found.  ABGNo lab results found.   URINE STUDIES  Recent Labs   Lab Test 01/25/24  1537 11/30/23  1213 10/26/23  1212   APPEARANCE Clear Clear Clear   URINEGLC 150* Negative >1000*   URINEBILI Negative Negative Negative   URINEKETONE Negative Negative Negative   SG 1.028 1.015 1.022   UBLD Negative Negative 0.03 mg/dL*   URINEPH 5.0 5.5 5.5   PROTEIN 10* 20* 70*   NITRITE Negative Negative Negative   LEUKEST Moderate* Trace* 250 Kathleen/uL*   RBCU 3* 2 3*   WBCU 16* 3 7*     No lab results found.    ASSESSMENT AND PLAN:     Oscar is seen to establish EGPA care  at the [facility].  He has had a complex course since Dec 2022, and a severe complicated illness starting in May 2023.  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 He was treated successfully with high dose corticosteroids and mepolizumab -> with excellent control of his pulmonary symptoms. His ANCA test has been negative (only about 50% of EGPA is ANCA pos).        May 2, 2024  Mr Moore presents today with no acute complaints.  His BP in under acceptable but not optimal control, on metoprolol .  No change in dose today    He has chronic kidney disease which antedates the acute illness in May - Is stable.      He has no symptoms or signs of active EGPA at the present time, on  the current therapy with mepolizumab only -> continue with current regimen.    I will plan for seeing him again in about 4 months for follow up      Josue Tripp MD  Division of Renal Disease and Hypertension            Again, thank you for allowing me to participate in the care of your patient.      Sincerely,    Josue Tripp MD

## 2024-05-02 NOTE — PROGRESS NOTES
Nephrology Clinic    Ned Moore MRN:[id number] YOB: 1960  Date of Service: 05/02/2024  Primary care provider: Mark Briggs  Requesting physician: Mark Briggs   Pulmonologist: Dr Biswas at [facility]  Cardiologist: Dr Efren Farias       REASON FOR CONSULT: establish care for EGPA at [facility]    HISTORY OF PRESENT ILLNESS:   Ned Moore is a 63 year old male who presents establish care for EGPA at [facility] and have all of his care within the same system.  Mr Moore's history of present illness started in Dec 2022, at which time he developed a new onset of respiratory illness consistent with asthma with wheezing and HAYNES.  He had never had any respiratory disease prior to that, and has never smoked.  He was treated with a short course of prednisone with marked improvement , but the symptoms kept recurring after the prednisone was discontinued.  Inhalers did not seem to keep the asthma under control.   He was able to work only on and off in the winter for 2023  In May 2023, he suffered from a severe epistaxis episode (requiring blood transfusion, and associated with hypotension) that prompted him to go to the ED.  He was admitted to [facility] and had a prolonged hospitalization during which he had , where he was initially found to have severe eosinophilia, hyponatremia and troponin elevation,  myocarditis, multifocal acute strokes, liver lesions concerning for abscesses, highest suspicion for EPGA. He underwent an extensive work up with cardiology, rheumatology, GI, neurology infectious disease, pulmonary and hematology. 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 Marked improvment with steroids and meprolizumab (5/26).   - completed 3 days 500 mg solumedrol; decreased to 60 mg prednisone started 5/25.  Eosinophilic myocarditis confirmed with biopsy. Presenting with ST depressions and elevated troponin. Cardiology evaluation, elevated troponin more  "consistent with demand ischemia. TTE w/o WMA. Cardiac MRI obtained with findings consistent with myocarditis.     With respect to multifocal acute cerebral infarcts, stroke code was called on 05/20 with acute gaze deviation, worsening right-sided weakness, seen by Neuro Critical Care, felt possibly secondary to an episode of orthostatic hypotension.   He describes amnesia related to the events.  He experienced slurred speech, weakness of the R arm and leg, and left lower quadrantanopsia.  He was transferred to rehab on 6/8/2023 until June 30.   In rehab and since then, he reports regaining significant amount of strength in the right arm/.  The R leg is also improved, but remains weak.  He is now able to drive, walk without a walker, but still appears to have RLExt proximal muscle weakness.  He remains on mepolizumab monthly.  He was started on Azathioprine on 9/8/23 -> but this resulted in recurrent episodes of sever N/V and associated dehydration until he stopped the azathioprine.    Today, Mr Moore reports feeling generally well.   He denies SOB, HAYNES, Cough.  No CP.   Denies GI or  symptoms.  Has nocturia 1x/night  No melena or hematochezia.  No gross hematuria  Reports nasal congestion on the R side , without purulent nasal discharge.  Residual R leg weakness.     January 25, 2024  IN PT.  L Ext strength is improving.  No wheezing, SOB, CP  Cardiac rehab.  Reports good O2 sats HR in low 100's  Saw ENT.  Had CT. Had severe congestion.   Has a polyp    On topical steroid and daily steroid rinses.    Sense of smell in improving  No GI spms.  Diarrhea resolved after  stopping aza  On Fe supplement. -> gets some N with it,  No  spms.  Nocturia once a night.  No hesitancy, straining or frequency.    BP was 110 at cardiac rehab a week ago.  Reports \"dehydration\" and SBP in 80's a few weeks ago.    Is scheduled to have EGD and Colonoscopy    May 2, 2024  FEELS generally well.  No epistaxis, oral ulcers, SOB, " CP.  Gaining strength in l ext  Able to walk at least 30 min.   Can climb a flight of stair with HAYNES, but needs to hold to railing bec of weakness.  No GI,  spms   nocturia usually at 3 am.      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    PAST MEDICAL HISTORY:  Past Medical History:   Diagnosis Date    Chronic obstructive pulmonary disease, unspecified COPD type (H) 3/15/2023    Chronic systolic heart failure (H) 11/15/2023    Diabetes (H)     History of CVA (cerebrovascular accident) 7/20/2023   Diabetes diagnosed about 8 years ago, but this was already associated with L ext neuropathy suggesting that onset of disease was probably a decade before. He was previously on empagliflozin from Feb 2023 until Sept/Oct 2023, after a UTI requiring antibiotic treatment.     ?PSVT during hospitalization     He never had a colonoscopy, but has been tested with cologuard -> reportedly neg.       PAST SURGICAL HISTORY:  No past surgical history on file.  MEDICATIONS:    Current Outpatient Medications  reviewed with patient May 2, 2024   Medication Sig Dispense Refill    ACCU-CHEK FASTCLIX LANCET DRUM [ACCU-CHEK FASTCLIX LANCET DRUM] TEST BLOOD SUGARS 3 TIMES DAILY 300 each 3           atorvastatin (LIPITOR) 20 MG tablet TAKE 1 TABLET BY MOUTH EVERY DAY IN THE EVENING 90 tablet 1    BD ALEXANDRA U/F 32G X 4 MM insulin pen needle       blood glucose test (ACCU-CHEK SMARTVIEW TEST STRIP) strips [BLOOD GLUCOSE TEST (ACCU-CHEK SMARTVIEW TEST STRIP) STRIPS] USE TO CHECK BLOOD SUGARS TWICE DAILY. 200 strip 3    Continuous Blood Gluc Sensor (FREESTYLE LIIVA 2 SENSOR) MISC 1 each every 14 days 2 each 11    dulaglutide (TRULICITY) 1.5 MG/0.5ML pen    Currently on 0.75 mg weekly,  plan to increase in 2 weeks. Inject 1.5 mg Subcutaneous every 7 days 2 mL 0    ferrous sulfate (FEROSUL) 325 (65 Fe) MG tablet Take 1 tablet (325 mg) by mouth daily (with breakfast) 90 tablet 3    fluticasone-vilanterol (BREO ELLIPTA) 100-25 MCG/ACT inhaler  Inhale 1 puff into the lungs daily 60 each 6    gabapentin (NEURONTIN) 300 MG capsule Take 300 mg by mouth At Bedtime             melatonin 3 MG tablet Take 9 mg by mouth nightly as needed for sleep      Mepolizumab 100 MG/ML SOSY Inject 3 mLs (300 mg) Subcutaneous every 30 days 3 mL 11    metFORMIN (GLUCOPHAGE XR) 500 MG 24 hr tablet Take 1 tablet (500 mg) by mouth daily (with dinner) 360 tablet 3    metFORMIN (GLUCOPHAGE) 1000 MG tablet Take 1 tablet (1,000 mg) by mouth daily (with breakfast) 90 tablet 6    metoprolol succinate ER (TOPROL XL) 25 MG 24 hr tablet Take 0.5 tablets (12.5 mg) by mouth every morning 45 tablet 6    mometasone (NASONEX) 50 MCG/ACT nasal spray Spray 2 sprays into both nostrils daily 17 g 3    omeprazole (PRILOSEC) 20 MG DR capsule TAKE 1 CAPSULE BY MOUTH 2 TIMES DAILY. TAKE 1 HOUR BEFORE A MEAL.      tiotropium (SPIRIVA) 18 MCG inhaled capsule Inhale 1 capsule (18 mcg) into the lungs daily 18 capsule 3    traZODone (DESYREL) 50 MG tablet Take 50 mg by mouth daily as needed for sleep           ALLERGIES:    Allergies   Allergen Reactions    Azathioprine Nausea and Vomiting    Dulaglutide Nausea     REVIEW OF SYSTEMS:  A comprehensive review of systems was performed and found to be negative except as described here or above.  SOCIAL HISTORY:   Social History     Socioeconomic History    Marital status:      Spouse name: Not on file    Number of children: Not on file    Years of education: Not on file    Highest education level: Not on file   Occupational History    Not on file   Tobacco Use    Smoking status: Never     Passive exposure: Never    Smokeless tobacco: Never   Vaping Use    Vaping status: Never Used   Substance and Sexual Activity    Alcohol use: Yes     Comment: a beer or wine every few months    Drug use: Never    Sexual activity: Not Currently     Partners: Female   Other Topics Concern    Parent/sibling w/ CABG, MI or angioplasty before 65F 55M? No   Social History  Narrative    Not on file     Social Determinants of Health     Financial Resource Strain: Low Risk  (2/2/2024)    Financial Resource Strain     Within the past 12 months, have you or your family members you live with been unable to get utilities (heat, electricity) when it was really needed?: No   Food Insecurity: High Risk (2/2/2024)    Food Insecurity     Within the past 12 months, did you worry that your food would run out before you got money to buy more?: Yes     Within the past 12 months, did the food you bought just not last and you didn t have money to get more?: No   Transportation Needs: Low Risk  (2/2/2024)    Transportation Needs     Within the past 12 months, has lack of transportation kept you from medical appointments, getting your medicines, non-medical meetings or appointments, work, or from getting things that you need?: No   Physical Activity: Insufficiently Active (2/2/2024)    Exercise Vital Sign     Days of Exercise per Week: 3 days     Minutes of Exercise per Session: 30 min   Stress: No Stress Concern Present (2/2/2024)    [employer] - Occupational Stress Questionnaire     Feeling of Stress : Not at all   Social Connections: Socially Isolated (2/2/2024)    Social Connection and Isolation Panel [NHANES]     Frequency of Communication with Friends and Family: Once a week     Frequency of Social Gatherings with Friends and Family: More than three times a week     Attends [employer] Services: Never     Active Member of Clubs or Organizations: No     Attends Club or Organization Meetings: Never     Marital Status:    Interpersonal Safety: Not on file   Housing Stability: Low Risk  (2/2/2024)    Housing Stability     Do you have housing? : Yes     Are you worried about losing your housing?: No   Recent Concern: Housing Stability - High Risk (11/14/2023)    Housing Stability     Do you have housing? : Yes     Are you worried about losing your housing?: Yes      FAMILY MEDICAL HISTORY:   Family History   Problem Relation Age of Onset    Heart Disease Mother     Diabetes Mother     Asthma Mother     Alzheimer Disease Father     No Known Problems Brother      PHYSICAL EXAM:   /77   Pulse 61   Wt 86.3 kg (190 lb 4.8 oz)   SpO2 96%   BMI 25.11 kg/m    GENERAL APPEARANCE: alert and no distress  EYES: nonicteric  NECK: supple, no adenopathy  Carotids 2+ bilat without bruits  RESP: lungs clear to auscultation   CV: regular rhythm, normal rate, no rub  ABDOMEN: soft, nontender, normal bowel sounds, no HSM   Extremities: trace-1+ ankle and distal shin edema on R.  None on L.  MS: no evidence of inflammation in joints, no muscle tenderness  SKIN: no rash.   NEURO: mentation intact and speech normal.  R Leg proximal weakness.  Feet:  L dorsiflexion > R.  PSYCH: affect normal/bright   LABS:   Recent Results (from the past 672 hour(s))   Renal panel    Collection Time: 05/02/24  2:19 PM   Result Value Ref Range    Sodium 139 135 - 145 mmol/L    Potassium 5.0 3.4 - 5.3 mmol/L    Chloride 101 98 - 107 mmol/L    Carbon Dioxide (CO2) 27 22 - 29 mmol/L    Anion Gap 11 7 - 15 mmol/L    Glucose 208 (H) 70 - 99 mg/dL    Urea Nitrogen 37.1 (H) 8.0 - 23.0 mg/dL    Creatinine 1.73 (H) 0.67 - 1.17 mg/dL    GFR Estimate 43 (L) >60 mL/min/1.73m2    Calcium 9.4 8.8 - 10.2 mg/dL    Albumin 4.3 3.5 - 5.2 g/dL    Phosphorus 3.8 2.5 - 4.5 mg/dL   Iron and iron binding capacity    Collection Time: 05/02/24  2:19 PM   Result Value Ref Range    Iron 61 61 - 157 ug/dL    Iron Binding Capacity 283 240 - 430 ug/dL    Iron Sat Index 22 15 - 46 %   CRP inflammation    Collection Time: 05/02/24  2:19 PM   Result Value Ref Range    CRP Inflammation <3.00 <5.00 mg/L   CBC with platelets and differential    Collection Time: 05/02/24  2:19 PM   Result Value Ref Range    WBC Count 7.5 4.0 - 11.0 10e3/uL    RBC Count 4.78 4.40 - 5.90 10e6/uL    Hemoglobin 13.6 13.3 - 17.7 g/dL    Hematocrit 40.2 40.0 -  53.0 %    MCV 84 78 - 100 fL    MCH 28.5 26.5 - 33.0 pg    MCHC 33.8 31.5 - 36.5 g/dL    RDW 12.9 10.0 - 15.0 %    Platelet Count 251 150 - 450 10e3/uL    % Neutrophils 67 %    % Lymphocytes 23 %    % Monocytes 8 %    % Eosinophils 1 %    % Basophils 0 %    % Immature Granulocytes 1 %    NRBCs per 100 WBC 0 <1 /100    Absolute Neutrophils 5.1 1.6 - 8.3 10e3/uL    Absolute Lymphocytes 1.7 0.8 - 5.3 10e3/uL    Absolute Monocytes 0.6 0.0 - 1.3 10e3/uL    Absolute Eosinophils 0.1 0.0 - 0.7 10e3/uL    Absolute Basophils 0.0 0.0 - 0.2 10e3/uL    Absolute Immature Granulocytes 0.0 <=0.4 10e3/uL    Absolute NRBCs 0.0 10e3/uL   Protein  random urine    Collection Time: 05/02/24  2:35 PM   Result Value Ref Range    Total Protein Urine mg/dL 24.0   mg/dL    Total Protein Urine mg/mg Creat 0.19 0.00 - 0.20 mg/mg Cr    Creatinine Urine mg/dL 127.0 mg/dL         CMP  Recent Labs   Lab Test 05/02/24  1419 02/16/24  1518 01/25/24  1533 11/30/23  1209 10/26/23  1149 10/26/23  1147 04/26/23  0824 02/07/23  0915 07/05/22  0933 03/21/22  1112 03/21/22  1112 08/23/21  1152 01/27/21  1047 10/30/19  0923 07/29/19  0851 04/15/19  1126    137 140 140   < > 133* 139  --  140  --  140   < > 140 139 138 138   POTASSIUM 5.0 4.7 5.0 4.5   < > 4.6 4.9  --  4.6   < > 4.5   < > 4.5 4.3 4.6 4.4   CHLORIDE 101 101 103 101   < > 94* 99  --  103   < > 102   < > 102 102 102 100   CO2 27 23 28 28   < > 22 27  --  25   < > 25   < > 27 29 24 28   ANIONGAP 11 13 9 11   < > 17* 13  --  12   < > 13   < > 11 8 12 10   * 187* 183* 130*   < > 255* 173*  --  103*   < > 193*   < > 118 139* 152* 163*   BUN 37.1* 37.7* 23.1* 24.5*   < > 24.9* 22.5  --  23.2*   < > 18   < > 24* 21 24* 21   CR 1.73* 1.75* 1.69* 1.64*   < > 1.65* 1.35*   < > 1.37*  --  1.51*   < > 1.41* 1.39* 1.37* 1.44*   GFRESTIMATED 43* 43* 45* 46*   < > 46* 59*   < > 58*  --  52*   < > 51* 52* 53* 50*   GFRESTBLACK  --   --   --   --   --   --   --   --   --   --   --   --  >60 >60 >60  >60   ANAI 9.4 9.3 9.4 9.9   < > 9.9 9.4  --  9.5  --  9.9   < > 9.4 9.6 9.7 10.0   MAG  --  2.5*  --   --   --   --   --   --   --   --   --   --   --   --   --   --    PHOS 3.8  --  3.4 3.7  --   --   --   --  3.9  --   --   --   --   --   --   --    PROTTOTAL  --  7.1  --   --   --  7.9 7.2  --   --   --  7.1  --  7.6  --   --  7.5   ALBUMIN 4.3 4.3 4.4 4.2  --  4.2 4.1  --  4.2   < > 3.9  --  4.6  --   --  4.5   BILITOTAL  --  0.3  --   --   --  0.3 0.3  --   --   --  0.5  --  0.7  --   --  0.6   ALKPHOS  --  88  --   --   --  246* 103  --   --   --  98  --  75  --   --  75   AST  --  24  --   --   --  20 24  --   --   --  17  --  22  --   --  29   ALT  --  22  --   --   --  22 23  --   --   --  19  --  31  --   --  38    < > = values in this interval not displayed.     CBC  Recent Labs   Lab Test 05/02/24  1419 01/25/24  1533 11/30/23  1209 11/15/23  1608   HGB 13.6 12.1* 11.0* 9.9*   WBC 7.5 5.9 7.2 8.9   RBC 4.78 4.73 4.57 4.19*   HCT 40.2 38.5* 35.4* 32.7*   MCV 84 81 78 78    237 374 487*     INRNo lab results found.  ABGNo lab results found.   URINE STUDIES  Recent Labs   Lab Test 01/25/24  1537 11/30/23  1213 10/26/23  1212   APPEARANCE Clear Clear Clear   URINEGLC 150* Negative >1000*   URINEBILI Negative Negative Negative   URINEKETONE Negative Negative Negative   SG 1.028 1.015 1.022   UBLD Negative Negative 0.03 mg/dL*   URINEPH 5.0 5.5 5.5   PROTEIN 10* 20* 70*   NITRITE Negative Negative Negative   LEUKEST Moderate* Trace* 250 Kathleen/uL*   RBCU 3* 2 3*   WBCU 16* 3 7*     No lab results found.    ASSESSMENT AND PLAN:   Mr Moore is seen to establish EGPA care  at the [facility].  He has had a complex course since Dec 2022, and a severe complicated illness starting in May 2023.  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 He was treated successfully with high dose corticosteroids and mepolizumab -> with excellent control of his pulmonary symptoms. His ANCA test has  been negative (only about 50% of EGPA is ANCA pos).        May 2, 2024  Mr Moore presents today with no acute complaints.  His BP in under acceptable but not optimal control, on metoprolol .  No change in dose today    He has chronic kidney disease which antedates the acute illness in May - Is stable.      He has no symptoms or signs of active EGPA at the present time, on  the current therapy with mepolizumab only -> continue with current regimen.    I will plan for seeing him again in about 4 months for follow up      Josue Tripp MD  Division of Renal Disease and Hypertension

## 2024-05-03 LAB
BACTERIA UR CULT: NORMAL
C3 SERPL-MCNC: 117 MG/DL (ref 81–157)

## 2024-05-14 ENCOUNTER — OFFICE VISIT (OUTPATIENT)
Dept: OTOLARYNGOLOGY | Facility: CLINIC | Age: 64
End: 2024-05-14
Payer: COMMERCIAL

## 2024-05-14 VITALS — HEIGHT: 73 IN | WEIGHT: 190.3 LBS | BODY MASS INDEX: 25.22 KG/M2

## 2024-05-14 DIAGNOSIS — D72.18 EOSINOPHILIC GRANULOMATOSIS WITH POLYANGIITIS (EGPA) (H): ICD-10-CM

## 2024-05-14 DIAGNOSIS — M30.1 EOSINOPHILIC GRANULOMATOSIS WITH POLYANGIITIS (EGPA) (H): ICD-10-CM

## 2024-05-14 DIAGNOSIS — J33.9 NASAL POLYP: Primary | ICD-10-CM

## 2024-05-14 PROCEDURE — 31231 NASAL ENDOSCOPY DX: CPT | Performed by: STUDENT IN AN ORGANIZED HEALTH CARE EDUCATION/TRAINING PROGRAM

## 2024-05-14 PROCEDURE — 99212 OFFICE O/P EST SF 10 MIN: CPT | Mod: 25 | Performed by: STUDENT IN AN ORGANIZED HEALTH CARE EDUCATION/TRAINING PROGRAM

## 2024-05-14 ASSESSMENT — PAIN SCALES - GENERAL: PAINLEVEL: NO PAIN (0)

## 2024-05-14 NOTE — PATIENT INSTRUCTIONS
You were seen in the ENT Clinic today by Dr. Samuel. If you have any questions or concerns after your appointment, please contact us (see below)    Please return to clinic in 6 months    How to Contact Us:  Send a [email or web address] message to your provider. Our team will respond to you via [email or web address]. Occasionally, we will need to call you to get further information.  For urgent matters (Monday-Friday), call the ENT Clinic: [phone] and speak with a call center team member - they will route your call appropriately.   If you'd like to speak directly with a nurse, please find our contact information below. We do our best to check voicemail frequently throughout the day, and will work to call you back within 1-2 days. For urgent matters, please use the general clinic phone numbers listed above.    Bethany NETTLES RN, BSN   RN Care Coordinator, ENT Clinic  [facility]  Direct: [phone]  Radha PRICE LPN  Direct: [phone]

## 2024-05-14 NOTE — PROGRESS NOTES
"Chief Complaint   Patient presents with    Sinus Problem     Height 1.854 m (6' 1\"), weight 86.3 kg (190 lb 4.8 oz).  Radha Auguste LPN    "

## 2024-05-14 NOTE — LETTER
"5/14/2024       RE: Ned Moore  [address]  [place] [address]     Dear Colleague,    Thank you for referring your patient, Ned Moore, to the [facility] [place] [facility]. Please see a copy of my visit note below.    Chief Complaint   Patient presents with     Sinus Problem     Height 1.854 m (6' 1\"), weight 86.3 kg (190 lb 4.8 oz).  Radha Auguste LPN      Encounter date:   5/14/2024    Assessment, Decision Making, and Plan:  (J33.9) Nasal polyp  (primary encounter diagnosis)  (M30.1,  D72.18) Eosinophilic granulomatosis with polyangiitis (EGPA) (H)  Comment:   --stable on nasonex, PRN saline irrigation, and mepolizumab  --no sinonasal symptoms  Plan: IMAGESTREAM RECORDING ORDER  --continue current care plan  --follow up 6 months      Interval History:  Doing well   No facial pain pressure, no drainage PND, smell good, no difficulty breathing    HPI (copied from initial consultation):   Complicated presentation of EGPA   Currently under control  Nucala , no steroids, no current synthetic DMARDS     3 months ago felt that nose started feeling blocked  Difficulty with air flow  ++rhinorrhea/post nasal drip  Starting to mouth breathe - significantly affecting his sleep  Saline spray, fluticasone  Smell and taste are gone  Has not used afrin    Review of systems: A 14-point review of systems has been conducted and was negative for any notable symptoms, except as dictated in the history of present illness.     Medical History:  Past Medical History:   Diagnosis Date     Chronic obstructive pulmonary disease, unspecified COPD type (H) 3/15/2023     Chronic systolic heart failure (H) 11/15/2023     Diabetes (H)      History of CVA (cerebrovascular accident) 7/20/2023        Surgical History:   No past surgical history on file.     Family History:  Family History   Problem Relation Age of Onset     Heart Disease Mother  "     Diabetes Mother      Asthma Mother      Alzheimer Disease Father      No Known Problems Brother         Social History:   Social History     Socioeconomic History     Marital status:    Tobacco Use     Smoking status: Never     Passive exposure: Never     Smokeless tobacco: Never   Vaping Use     Vaping Use: Never used   Substance and Sexual Activity     Alcohol use: Yes     Comment: a beer or wine every few months     Drug use: Never     Sexual activity: Not Currently     Partners: Female   Other Topics Concern     Parent/sibling w/ CABG, MI or angioplasty before 65F 55M? No     Social Determinants of Health     Financial Resource Strain: Low Risk  (2/2/2024)    Financial Resource Strain      Within the past 12 months, have you or your family members you live with been unable to get utilities (heat, electricity) when it was really needed?: No   Food Insecurity: High Risk (2/2/2024)    Food Insecurity      Within the past 12 months, did you worry that your food would run out before you got money to buy more?: Yes      Within the past 12 months, did the food you bought just not last and you didn t have money to get more?: No   Transportation Needs: Low Risk  (2/2/2024)    Transportation Needs      Within the past 12 months, has lack of transportation kept you from medical appointments, getting your medicines, non-medical meetings or appointments, work, or from getting things that you need?: No   Physical Activity: Insufficiently Active (2/2/2024)    Exercise Vital Sign      Days of Exercise per Week: 3 days      Minutes of Exercise per Session: 30 min   Stress: No Stress Concern Present (2/2/2024)    [employer] - Occupational Stress Questionnaire      Feeling of Stress : Not at all   Social Connections: Socially Isolated (2/2/2024)    Social Connection and Isolation Panel [NHANES]      Frequency of Communication with Friends and Family: Once a week      Frequency of Social  "Gatherings with Friends and Family: More than three times a week      Attends Yarsani Services: Never      Active Member of Clubs or Organizations: No      Attends Club or Organization Meetings: Never      Marital Status:    Housing Stability: Low Risk  (2/2/2024)    Housing Stability      Do you have housing? : Yes      Are you worried about losing your housing?: No   Recent Concern: Housing Stability - High Risk (11/14/2023)    Housing Stability      Do you have housing? : Yes      Are you worried about losing your housing?: Yes        Physical Exam:  Vital signs: BP (!) 144/56 (BP Location: Right arm, Patient Position: Sitting, Cuff Size: Adult Regular)   Ht 1.854 m (6' 1\")   Wt 84.2 kg (185 lb 9.6 oz)   SpO2 99%   BMI 24.49 kg/m     General Appearance: No acute distress, appropriate demeanor, conversant  Eyes: moist conjunctivae; EOMI; pupils symmetric; visual acuity grossly intact; no proptosis  Head: normocephalic; overall symmetric appearance without deformity  Face: overall symmetric without deformity  Ears: Normal appearance of external ear; external meatus normal in appearance; TMs intact without perforation bilaterally;   Nose: No external deformity; septum slight DNS to the right ; inferior turbinates (non hypertrophic)   Oral Cavity/oropharynx: Normal appearance of mucosa; tongue midline; no mass or lesions; oropharynx without obvious mucosal abnormality  Neck: no palpable lymphadenopathy; thyroid without palpable nodules  Lungs: symmetric chest rise; no wheezing  CV: Good distal perfusion; normal hear rate  Extremities: No deformity  Neurologic Exam: Cranial nerves II-XII are grossly intact; no focal deficit    Procedure Note  Procedure performed: Rigid nasal endoscopy  Indication: To evaluate for sinonasal pathology not visualized on routine anterior rhinoscopy  Anesthesia: 4% topical lidocaine with 0.05% oxymetazoline  Description of procedure: A 30 degree, 3 mm rigid endoscope was " inserted into bilateral nasal cavities and the nasal valves, nasal cavity, middle meatus, sphenoethmoid recess, and nasopharynx were thoroughly evaluated for evidence of obstruction, edema, purulence, polyps and/or mass/lesion.     Findings:    No polyps, MM SER NP clear    The patient tolerated the procedure well without complication.     Imaging Review:  CT 2024 primary review - ethmoid predominant opacfication and polyposis    Faizan Samuel MD    [facility]  Rhinology  Endoscopic Skull Base Surgery  [facility]  Department of Otolaryngology - Head & Neck Surgery

## 2024-05-14 NOTE — PROGRESS NOTES
Encounter date:   5/14/2024    Assessment, Decision Making, and Plan:  (J33.9) Nasal polyp  (primary encounter diagnosis)  (M30.1,  D72.18) Eosinophilic granulomatosis with polyangiitis (EGPA) (H)  Comment:   --stable on nasonex, PRN saline irrigation, and mepolizumab  --no sinonasal symptoms  Plan: IMAGESTREAM RECORDING ORDER  --continue current care plan  --follow up 6 months      Interval History:  Doing well   No facial pain pressure, no drainage PND, smell good, no difficulty breathing    HPI (copied from initial consultation):   Complicated presentation of EGPA   Currently under control  Nucala , no steroids, no current synthetic DMARDS     3 months ago felt that nose started feeling blocked  Difficulty with air flow  ++rhinorrhea/post nasal drip  Starting to mouth breathe - significantly affecting his sleep  Saline spray, fluticasone  Smell and taste are gone  Has not used afrin    Review of systems: A 14-point review of systems has been conducted and was negative for any notable symptoms, except as dictated in the history of present illness.     Medical History:  Past Medical History:   Diagnosis Date    Chronic obstructive pulmonary disease, unspecified COPD type (H) 3/15/2023    Chronic systolic heart failure (H) 11/15/2023    Diabetes (H)     History of CVA (cerebrovascular accident) 7/20/2023        Surgical History:   No past surgical history on file.     Family History:  Family History   Problem Relation Age of Onset    Heart Disease Mother     Diabetes Mother     Asthma Mother     Alzheimer Disease Father     No Known Problems Brother         Social History:   Social History     Socioeconomic History    Marital status:    Tobacco Use    Smoking status: Never     Passive exposure: Never    Smokeless tobacco: Never   Vaping Use    Vaping Use: Never used   Substance and Sexual Activity    Alcohol use: Yes     Comment: a beer or wine every few months    Drug use: Never    Sexual activity: Not  Currently     Partners: Female   Other Topics Concern    Parent/sibling w/ CABG, MI or angioplasty before 65F 55M? No     Social Determinants of Health     Financial Resource Strain: Low Risk  (2/2/2024)    Financial Resource Strain     Within the past 12 months, have you or your family members you live with been unable to get utilities (heat, electricity) when it was really needed?: No   Food Insecurity: High Risk (2/2/2024)    Food Insecurity     Within the past 12 months, did you worry that your food would run out before you got money to buy more?: Yes     Within the past 12 months, did the food you bought just not last and you didn t have money to get more?: No   Transportation Needs: Low Risk  (2/2/2024)    Transportation Needs     Within the past 12 months, has lack of transportation kept you from medical appointments, getting your medicines, non-medical meetings or appointments, work, or from getting things that you need?: No   Physical Activity: Insufficiently Active (2/2/2024)    Exercise Vital Sign     Days of Exercise per Week: 3 days     Minutes of Exercise per Session: 30 min   Stress: No Stress Concern Present (2/2/2024)    [place] of Occupational Health - Occupational Stress Questionnaire     Feeling of Stress : Not at all   Social Connections: Socially Isolated (2/2/2024)    Social Connection and Isolation Panel [NHANES]     Frequency of Communication with Friends and Family: Once a week     Frequency of Social Gatherings with Friends and Family: More than three times a week     Attends Anglican Services: Never     Active Member of Clubs or Organizations: No     Attends Club or Organization Meetings: Never     Marital Status:    Housing Stability: Low Risk  (2/2/2024)    Housing Stability     Do you have housing? : Yes     Are you worried about losing your housing?: No   Recent Concern: Housing Stability - High Risk (11/14/2023)    Housing Stability     Do you have housing? : Yes  "    Are you worried about losing your housing?: Yes        Physical Exam:  Vital signs: BP (!) 144/56 (BP Location: Right arm, Patient Position: Sitting, Cuff Size: Adult Regular)   Ht 1.854 m (6' 1\")   Wt 84.2 kg (185 lb 9.6 oz)   SpO2 99%   BMI 24.49 kg/m     General Appearance: No acute distress, appropriate demeanor, conversant  Eyes: moist conjunctivae; EOMI; pupils symmetric; visual acuity grossly intact; no proptosis  Head: normocephalic; overall symmetric appearance without deformity  Face: overall symmetric without deformity  Ears: Normal appearance of external ear; external meatus normal in appearance; TMs intact without perforation bilaterally;   Nose: No external deformity; septum slight DNS to the right ; inferior turbinates (non hypertrophic)   Oral Cavity/oropharynx: Normal appearance of mucosa; tongue midline; no mass or lesions; oropharynx without obvious mucosal abnormality  Neck: no palpable lymphadenopathy; thyroid without palpable nodules  Lungs: symmetric chest rise; no wheezing  CV: Good distal perfusion; normal hear rate  Extremities: No deformity  Neurologic Exam: Cranial nerves II-XII are grossly intact; no focal deficit    Procedure Note  Procedure performed: Rigid nasal endoscopy  Indication: To evaluate for sinonasal pathology not visualized on routine anterior rhinoscopy  Anesthesia: 4% topical lidocaine with 0.05% oxymetazoline  Description of procedure: A 30 degree, 3 mm rigid endoscope was inserted into bilateral nasal cavities and the nasal valves, nasal cavity, middle meatus, sphenoethmoid recess, and nasopharynx were thoroughly evaluated for evidence of obstruction, edema, purulence, polyps and/or mass/lesion.     Findings:    No polyps, MM SER NP clear    The patient tolerated the procedure well without complication.     Imaging Review:  CT 2024 primary review - ethmoid predominant opacfication and polyposis    Faizan Samuel MD    [facility]  Rhinology  Endoscopic Skull Base Surgery  [facility]  Department of Otolaryngology - Head & Neck Surgery

## 2024-05-16 ENCOUNTER — VIRTUAL VISIT (OUTPATIENT)
Dept: PHARMACY | Facility: CLINIC | Age: 64
End: 2024-05-16
Payer: COMMERCIAL

## 2024-05-16 DIAGNOSIS — N18.31 TYPE 2 DIABETES MELLITUS WITH STAGE 3A CHRONIC KIDNEY DISEASE, WITHOUT LONG-TERM CURRENT USE OF INSULIN (H): Primary | ICD-10-CM

## 2024-05-16 DIAGNOSIS — E11.22 TYPE 2 DIABETES MELLITUS WITH STAGE 3A CHRONIC KIDNEY DISEASE, WITHOUT LONG-TERM CURRENT USE OF INSULIN (H): Primary | ICD-10-CM

## 2024-05-16 PROCEDURE — 99606 MTMS BY PHARM EST 15 MIN: CPT

## 2024-05-16 RX ORDER — DULAGLUTIDE 1.5 MG/.5ML
INJECTION, SOLUTION SUBCUTANEOUS
COMMUNITY
Start: 2024-04-30

## 2024-05-16 NOTE — PATIENT INSTRUCTIONS
"Recommendations from today's MTM visit:                                                      MTM (medication therapy management) is a service provided by a clinical pharmacist designed to help you get the most of out of your medicines.   Today we reviewed what your medicines are for, how to know if they are working, that your medicines are safe and how to make your medicine regimen as easy as possible.      Continue taking current medications as prescribed    Follow-up: Due on 06/20/2024    It was great speaking with you today.  I value your experience and would be very thankful for your time in providing feedback in our clinic survey. In the next few days, you may receive an email or text message from [facility] with a link to a survey related to your  clinical pharmacist.\"     To schedule another MTM appointment, please call the clinic directly or you may call the MTM scheduling line at [phone].    My Clinical Pharmacist's contact information:                                                      Please feel free to contact me with any questions or concerns you have.        Syed Borjas, PharmD     Medication Therapy Management (MTM) Pharmacist     [phone]     [email or web address]     [facility]    "

## 2024-05-16 NOTE — PROGRESS NOTES
Medication Therapy Management (MTM) Encounter    ASSESSMENT:                            Medication Adherence/Access: No issues identified    Type 2 Diabetes: Patient is not meeting A1c goal of < 7%, but recently improved (A1C checked at [facility]) . Most of the CGM readings are within the target range (70 - 180) 76 %, within  goal of > 70%.  Sugar readings improved from the previous. Considering sugar readings are within goal reasonable to continue current medications.   PLAN:                              Continue taking current medications as prescribed    Follow-up: Due on 05/16/2024    SUBJECTIVE/OBJECTIVE:                          Ned Moore is a 63 year old male called for a follow up visit from 12/06/2023.    Reason for visit: Medication Review Initial.    Allergies/ADRs: Reviewed in chart  Past Medical History: Reviewed in chart  Tobacco: He reports that he has never smoked. He has never been exposed to tobacco smoke. He has never used smokeless tobacco.    Medication Adherence/Access: no issues reported    Type 2 Diabetes: Currently taking metformin 500 mg XR with supper and metformin 1000 mg with breakfast, and Semglee 2 - 5 units.  Patient stopped taking Ozempic due to weight loss, and taking Trulicity 0.75 mg weekly. Trulicity dose was reduced due injection site pain. He has not been injecting his semglee for sometime now.     Aspirin 81mg daily  Blood sugar monitoring: Continuous Glucose Monitor.Check below  Complained of the difference between the maría 3 readings and the glucometer sometimes up to 51 points difference. Noted he does not sleep on the side where the sensor is.   Denies any low sugar symptoms  Current diabetes symptoms: Fatigue.   Diet/Exercise: He is eating three times a day and he does not have the appetite he used to have. This might be due to his stuffed nose. He has a little of nausea during the day. This is affecting his diet. Will consider to lower dose or stop the  medication. He is doing a bit more exercises; going up and down the stairs.  Eye exam: up to date  Foot exam: due  Urine Albumin:             Wt Readings from Last 2 Encounters:   12/06/23 198 lb (89.8 kg)   11/30/23 189 lb (85.7 kg)      - His weight is 185 Pounds. Patient does not want to lose weight.  Lost about 2 - 3 pounds this last month.             Today's Vitals: There were no vitals taken for this visit.  ----------------      I spent 11 minutes with this patient today. All changes were made via collaborative practice agreement with Mark Briggs MD. A copy of the visit note was provided to the patient's provider(s).    A summary of these recommendations was sent via [employer].      Telemedicine Visit Details  Type of service:  Telephone visit  Start Time:  09:32 AM  End Time: 9:43 AM     Medication Therapy Recommendations  No medication therapy recommendations to display     Syed Borjas, PharmD     Medication Therapy Management (MTM) Pharmacist     [phone]     [email or web address]

## 2024-05-17 DIAGNOSIS — D72.18 EOSINOPHILIC GRANULOMATOSIS WITH POLYANGIITIS (EGPA) (H): ICD-10-CM

## 2024-05-17 DIAGNOSIS — M30.1 EOSINOPHILIC GRANULOMATOSIS WITH POLYANGIITIS (EGPA) (H): ICD-10-CM

## 2024-05-31 SDOH — HEALTH STABILITY: PHYSICAL HEALTH: ON AVERAGE, HOW MANY DAYS PER WEEK DO YOU ENGAGE IN MODERATE TO STRENUOUS EXERCISE (LIKE A BRISK WALK)?: 3 DAYS

## 2024-05-31 SDOH — HEALTH STABILITY: PHYSICAL HEALTH: ON AVERAGE, HOW MANY MINUTES DO YOU ENGAGE IN EXERCISE AT THIS LEVEL?: 20 MIN

## 2024-05-31 ASSESSMENT — SOCIAL DETERMINANTS OF HEALTH (SDOH): HOW OFTEN DO YOU GET TOGETHER WITH FRIENDS OR RELATIVES?: MORE THAN THREE TIMES A WEEK

## 2024-05-31 NOTE — COMMUNITY RESOURCES LIST (ENGLISH)
May 31, 2024           YOUR PERSONALIZED LIST OF SERVICES & PROGRAMS           NAVIGATION    Eligibility Screening      Action Partnership (CAP) [facility] application assistance  [address] [place] [address] (Distance: 1.8 miles)  Phone: [phone]  Language: English  Fee: Free  Accessibility: Translation services      [facility] Services  [address] [place] [address] (Distance: 1.5 miles)  Phone: [phone]  Website: https://[facility] Is Good.[employer]/seniors/  Language: English, Stateless  Fee: Free, Self pay, Sliding scale  Accessibility: Translation services      [employer] [place] - SNAP (formerly food stamps) Screening and Application help  Phone: (360) 825-5737  Website: https://www.[place].org/programs/[place]-food-helpline/  Language: English  Hours: Mon 10:00 AM - 5:00 PM Tue 10:00 AM - 5:00 PM Wed 10:00 AM - 5:00 PM Thu 10:00 AM - 5:00 PM Fri 10:00 AM - 5:00 PM  Fee: Free  Accessibility: Ada accessible, Blind accommodation, Deaf or hard of hearing, Translation services        ASSISTANCE    [facility] application assistance  [address] [place] [address] (Distance: 3.4 miles)  Phone: [phone]  Language: English  Fee: Free      [facility] (CAP) [facility] - [facility] application assistance  [address] [place] [address] (Distance: 1.8 miles)  Phone: [phone]  Language: English  Fee: Free  Accessibility: Translation services      [employer] [place] - SNAP (formerly food stamps) Screening and Application help  Phone: (197) 817-2730  Website: https://www.[place].org/programs/[place]-food-helpline/  Language: English  Hours: Mon 10:00 AM - 5:00 PM Tue 10:00 AM - 5:00 PM Wed 10:00 AM - 5:00 PM Thu 10:00 AM - 5:00 PM Fri 10:00 AM - 5:00 PM  Fee: Free  Accessibility: Ada accessible, Blind accommodation, Deaf or  hard of hearing, Translation services    Pantry      in the [place] - Food in the '[facility]  [address] [place] [address] (Distance: 7.6 miles)  Phone: (315) [phone]  Website: https://www.goodinthehood.org/our-programs/feeding-the-future/food-in-the-[place]ford/  Language: English  Fee: Free  Accessibility: Ada accessible      [facility] Food [facility]f - Food pantry  [address] [place] [address] (Distance: 6.2 miles)  Phone: ([phone]) [phone]  Website: http://www.[employer]/  Language: English  Fee: Free      [facility] - [facility]  Phone: (794) 309-7971  Website: https://www.advocatefor"[employer]  Language: English, Kinyarwanda  Hours: Mon 8:00 AM - 5:00 PM Tue 8:00 AM - 5:00 PM Wed 8:00 AM - 5:00 PM Thu 8:00 AM - 5:00 PM Fri 8:00 AM - 5:00 PM  Fee: Free  Accessibility: Ada accessible        & SHELTER    Case Management      [facility] - Coordinated Access  [address] [place] [address] (Distance: 1.8 miles)  Phone: [phone]) [phone]  Language: English  Fee: Free  Accessibility: Translation services, Ada accessible      [facility] - Housing search assistance  [address] [place] [address] (Distance: 1.8 miles)  Phone: [phone]) [phone]  Language: English, Salvadorean, Sinhala, Hmong  Fee: Free  Accessibility: Ada accessible, Blind accommodation, Deaf or hard of hearing, Translation services      [employer] - Housing Stabilization Services  Phone: (301) 573-8001  Website: https://homebasemn.com/  Language: English  Hours: Mon 8:00 AM - 4:00 PM Tue 8:00 AM - 4:00 PM Wed 8:00 AM - 4:00 PM Thu 8:00 AM - 4:00 PM Fri 8:00 AM - 4:00 PM  Fee: Free  Accessibility: Blind accommodation, Deaf or hard of hearing  Transportation Options: Free transportation    Payment Assistance      [facility] - Security Deposit Assistance  [address] [place] [address] (Distance: 3.4 miles)  Phone: [phone]  Language:  English  Fee: Free      [facility] deposit assistance  [address] [place] [address] (Distance: 3.4 miles)  Phone: [phone]) [phone]  Language: English, Barbadian, Belizean, Hmong  Fee: Free  Accessibility: Ada accessible, Translation services      [employer] - Keep the Key  Phone: (913) 506-5541  Website: https://www.icd53-lodchckxnemjpe.org/programs.html  Language: English  Hours: Mon 7:00 AM - 7:00 PM Tue 7:00 AM - 7:00 PM Wed 7:00 AM - 7:00 PM Thu 7:00 AM - 7:00 PM Fri 7:00 AM - 7:00 PM  Fee: Free    Mediation & Eviction Prevention      Living - Housing Stabilization Services  [address] [place] [address] (Distance: 6.5 miles)  Phone: (841) 348-6888  Website: https://wwwSnaptee  Language: Georgian, English, Belizean  Fee: Insurance, Self pay      [facility] for [employer] - Mortgage Foreclosure Prevention  [address] [place] [address] (Distance: 2.4 miles)  Phone: (961) [phone]  Language: English  Fee: Free  Accessibility: Ada accessible      Line - Tenant Rights / Eviction Prevention  Website: https://[facility].org/l-stfd-ll-/  Language: English, Barbadian               IMPORTANT NUMBERS & WEBSITES        Emergency Services  911  .   [facility]  211 http://211unitedway.org  .   Poison Control  (855) 509-6935 http://[place]poison.org http://wisconsinpoison.org  .     Suicide and Crisis Lifeline  988 http://988lifeline.org  .   Childhelp National Child Abuse Hotline  345.494.4478 http://Childhelphotline.org   .   National Sexual Assault Hotline  (628) 844-9329 (HOPE) http://Rainn.org   .     National Runaway Safeline  (385) 336-1180 (RUNAWAY) http://TrueLensrunaHabit Labs.org  .   Pregnancy & Postpartum Support  Call/text 845-247-3682  [place]: http://ppsupportmn.org  [place]: http://psichapters.com/wi  .   Substance Abuse National Helpline ([facility])  800-622-HELP (7049) http://Findtreatment.gov   .                DISCLAIMER: These resources have been generated via  the [employer]. [facility] does not endorse any service providers mentioned in this resource list. [employer] does not guarantee that the services mentioned in this resource list will be available to you or will improve your health or wellness.    [facility]

## 2024-06-01 ENCOUNTER — MYC MEDICAL ADVICE (OUTPATIENT)
Dept: INTERNAL MEDICINE | Facility: CLINIC | Age: 64
End: 2024-06-01
Payer: MEDICARE

## 2024-06-01 DIAGNOSIS — N18.31 TYPE 2 DIABETES MELLITUS WITH STAGE 3A CHRONIC KIDNEY DISEASE, WITHOUT LONG-TERM CURRENT USE OF INSULIN (H): Primary | ICD-10-CM

## 2024-06-01 DIAGNOSIS — E11.22 TYPE 2 DIABETES MELLITUS WITH STAGE 3A CHRONIC KIDNEY DISEASE, WITHOUT LONG-TERM CURRENT USE OF INSULIN (H): Primary | ICD-10-CM

## 2024-06-03 ENCOUNTER — OFFICE VISIT (OUTPATIENT)
Dept: PULMONOLOGY | Facility: CLINIC | Age: 64
End: 2024-06-03
Payer: COMMERCIAL

## 2024-06-03 ENCOUNTER — OFFICE VISIT (OUTPATIENT)
Dept: PULMONOLOGY | Facility: CLINIC | Age: 64
End: 2024-06-03
Attending: INTERNAL MEDICINE
Payer: COMMERCIAL

## 2024-06-03 VITALS
WEIGHT: 191 LBS | OXYGEN SATURATION: 98 % | HEART RATE: 62 BPM | SYSTOLIC BLOOD PRESSURE: 114 MMHG | BODY MASS INDEX: 25.2 KG/M2 | DIASTOLIC BLOOD PRESSURE: 68 MMHG

## 2024-06-03 DIAGNOSIS — D72.18 EOSINOPHILIC GRANULOMATOSIS WITH POLYANGIITIS (EGPA) (H): Primary | ICD-10-CM

## 2024-06-03 DIAGNOSIS — M30.1 EOSINOPHILIC GRANULOMATOSIS WITH POLYANGIITIS (EGPA) (H): Primary | ICD-10-CM

## 2024-06-03 DIAGNOSIS — M30.1 EOSINOPHILIC GRANULOMATOSIS WITH POLYANGIITIS (EGPA) (H): ICD-10-CM

## 2024-06-03 DIAGNOSIS — D72.18 EOSINOPHILIC GRANULOMATOSIS WITH POLYANGIITIS (EGPA) (H): ICD-10-CM

## 2024-06-03 LAB — HGB BLD-MCNC: 13.6 G/DL

## 2024-06-03 PROCEDURE — 99214 OFFICE O/P EST MOD 30 MIN: CPT | Mod: 25 | Performed by: INTERNAL MEDICINE

## 2024-06-03 PROCEDURE — 94729 DIFFUSING CAPACITY: CPT | Mod: 26 | Performed by: INTERNAL MEDICINE

## 2024-06-03 PROCEDURE — 94726 PLETHYSMOGRAPHY LUNG VOLUMES: CPT | Mod: 26 | Performed by: INTERNAL MEDICINE

## 2024-06-03 PROCEDURE — 85018 HEMOGLOBIN: CPT | Mod: QW | Performed by: INTERNAL MEDICINE

## 2024-06-03 PROCEDURE — 94375 RESPIRATORY FLOW VOLUME LOOP: CPT | Mod: 26 | Performed by: INTERNAL MEDICINE

## 2024-06-03 RX ORDER — GABAPENTIN 300 MG/1
300 CAPSULE ORAL AT BEDTIME
Qty: 90 CAPSULE | Refills: 1 | Status: SHIPPED | OUTPATIENT
Start: 2024-06-03

## 2024-06-03 NOTE — PATIENT INSTRUCTIONS
It was good to see you today. This is what we discussed:    Continue Nucala.  Continue Spiriva, inhale the content of one capsule daily.  Continue Breo one inhalation daily. Rinse, gargle, and spit water after use.  Look into coverage for Spiriva (tiotropium) Respimat.  Stay active with exercise.  I will see you in one year with repeat lung function testing.  Contact me with questions or concerns.    Jose Biswas MD  Pulmonary and Critical Care Medicine  [facility]  Office [phone]

## 2024-06-03 NOTE — PROGRESS NOTES
Pulmonary Clinic Follow-up Visit    Assessment and Plan:   64 year old male never smoker with a history of EGPA with pulmonary and cardiac involvement, multifocal strokes, DM2, CKD3, dyslipidemia, presenting for follow-up.     Eosinophilic granulomatosis with polyangiitis: Ned is doing very well, currently no dyspnea or cough. He had his first COVID-19 infection 1.5 months ago, but is up to date with vaccination and only had a few sniffles. Completed pulmonary rehabilitation at [facility]. Continues on mepolizumab, low-dose fluticasone-vilanterol, and tiotropium handihaler. Discussed possible inhaler de-escalation, but given the stability he has achieved and good tolerance of inhalers, will continue them in addition to the mepolizumab. I think he has had great response to the mepolizumab. His FEV1 today is 2.66 L compared 1.26 L in January 2023. Normal TLC and DLCO. Recall that I met Ned for initial consultation in January 2023. At that time he had marked obstructive physiology with frequent exacerbations. 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 He was started on LAMA-LABA. He had prominent upper airway symptoms. He was subsequently hospitalized at [facility] for almost a month from May-June 2023 with myocarditis with heart failure, multifocal ischemic strokes, anemia, found to have severe hypereosinophilia and was diagnosed with EGPA. He was seen by multiple consulting teams and underwent myocardial biopsy showing eosinophilic myocarditis. ANCA negative. He improved markedly with steroids, and was started on mepolizumab. In January 2023 he had severe fixed obstructive physiology on pulmonary function testing, with FEV1 1.26 L (z -3.80), air trapping without hyperinflation, and mild DLCO reduction; PFT in June 2024 shows FEV1 2.66 L (z -1.35), normal TLC and DLCO. Ned ary  EMTs/paramedics at [facility], and is getting more involved with work again as he recovers.     Plan:  - continue mepolizumab  - continue fluticasone-vilanterol 100-25 mcg one inhalation daily; rinse/gargle/spit water after use  - continue tiotropium handihaler 18 mcg daily; he will look into respimat to see if that is covered as it would be more convenient to use  - completed pulmonary rehabilitation at [facility] and stays active with exercise as able  - ongoing follow-up with rheumatology  - follow up in one year with spirometry/DLCO  - recommend remaining up to date with respiratory vaccinations  - encouraged him to contact us with questions or concerning symptoms    Jose Biswas MD  Pulmonary and Critical Care Medicine  [facility]  Office [phone]  he/him    CCx: Eosinophilic granulomatosis with polyangiitis    HPI: 64 year old male never smoker with a history of EGPA with pulmonary and cardiac involvement, multifocal strokes, DM2, CKD3, dyslipidemia, presenting for follow-up. Ned is doing very well, currently no dyspnea or cough. He had his first COVID-19 infection 1.5 months ago, but is up to date with vaccination and only had a few sniffles. Completed pulmonary rehabilitation at [facility]. Continues on mepolizumab, low-dose fluticasone-vilanterol, and tiotropium handihaler. Discussed possible inhaler de-escalation, but given the stability he has achieved and good tolerance of inhalers, will continue them in addition to the mepolizumab. I think he has had great response to the mepolizumab. His FEV1 today is 2.66 L compared 1.26 L in January 2023. Normal TLC and DLCO. Recall that I met Ned for initial consultation in January 2023. At that time he had marked obstructive physiology with frequent exacerbations. He did have some past occupation exposures including to fiberglass dust and resin in his 20s, later worked as a tech aide at [employer] and was sensitized to  acetate. He was started on LAMA-LABA. He had prominent upper airway symptoms. He was subsequently hospitalized at [facility] for almost a month from May-June 2023 with myocarditis with heart failure, multifocal ischemic strokes, anemia, found to have severe hypereosinophilia and was diagnosed with EGPA. He was seen by multiple consulting teams and underwent myocardial biopsy showing eosinophilic myocarditis. ANCA negative. He improved markedly with steroids, and was started on mepolizumab. In January 2023 he had severe fixed obstructive physiology on pulmonary function testing, with FEV1 1.26 L (z -3.80), air trapping without hyperinflation, and mild DLCO reduction; PFT in June 2024 shows FEV1 2.66 L (z -1.35), normal TLC and DLCO.    ROS:  A 12-system review was obtained and was negative with the exception of the symptoms endorsed in the history of present illness.    PMH:  never smoker with a history of EGPA with pulmonary and cardiac involvement, multifocal strokes, DM2, CKD3, dyslipidemia    PSH:  No past surgical history on file.    Allergies:  Allergies   Allergen Reactions    Azathioprine Nausea and Vomiting    Dulaglutide Nausea       Family HX:  Family History   Problem Relation Age of Onset    Heart Disease Mother     Diabetes Mother     Asthma Mother     Alzheimer Disease Father     No Known Problems Brother        Social Hx:  Social History     Socioeconomic History    Marital status:      Spouse name: Not on file    Number of children: Not on file    Years of education: Not on file    Highest education level: Not on file   Occupational History    Not on file   Tobacco Use    Smoking status: Never     Passive exposure: Never    Smokeless tobacco: Never   Vaping Use    Vaping status: Never Used   Substance and Sexual Activity    Alcohol use: Yes     Comment: a beer or wine every few months    Drug use: Never    Sexual activity: Not Currently     Partners: Female   Other Topics Concern     Parent/sibling w/ CABG, MI or angioplasty before 65F 55M? No   Social History Narrative    Not on file     Social Determinants of Health     Financial Resource Strain: Low Risk  (5/31/2024)    Financial Resource Strain     Within the past 12 months, have you or your family members you live with been unable to get utilities (heat, electricity) when it was really needed?: No   Food Insecurity: High Risk (5/31/2024)    Food Insecurity     Within the past 12 months, did you worry that your food would run out before you got money to buy more?: Yes     Within the past 12 months, did the food you bought just not last and you didn t have money to get more?: Yes   Transportation Needs: Low Risk  (5/31/2024)    Transportation Needs     Within the past 12 months, has lack of transportation kept you from medical appointments, getting your medicines, non-medical meetings or appointments, work, or from getting things that you need?: No   Physical Activity: Insufficiently Active (5/31/2024)    Exercise Vital Sign     Days of Exercise per Week: 3 days     Minutes of Exercise per Session: 20 min   Stress: No Stress Concern Present (5/31/2024)    [employer] [place] [employer] - Occupational Stress Questionnaire     Feeling of Stress : Not at all   Social Connections: Socially Isolated (5/31/2024)    Social Connection and Isolation Panel [NHANES]     Frequency of Communication with Friends and Family: Once a week     Frequency of Social Gatherings with Friends and Family: More than three times a week     Attends Druze Services: Never     Active Member of Clubs or Organizations: No     Attends Club or Organization Meetings: Never     Marital Status:    Interpersonal Safety: Not on file   Housing Stability: High Risk (5/31/2024)    Housing Stability     Do you have housing? : Yes     Are you worried about losing your housing?: Yes       Current Meds:  Current Outpatient Medications   Medication Sig Dispense  Refill    ACCU-CHEK FASTCLIX LANCET DRUM [ACCU-CHEK FASTCLIX LANCET DRUM] TEST BLOOD SUGARS 3 TIMES DAILY 300 each 3    atorvastatin (LIPITOR) 20 MG tablet Take 1 tablet (20 mg) by mouth every evening 90 tablet 3    BD ALEXANDRA U/F 32G X 4 MM insulin pen needle       blood glucose test (ACCU-CHEK SMARTVIEW TEST STRIP) strips [BLOOD GLUCOSE TEST (ACCU-CHEK SMARTVIEW TEST STRIP) STRIPS] USE TO CHECK BLOOD SUGARS TWICE DAILY. 200 strip 3    Continuous Blood Gluc Sensor (FREESTYLE LIVIA 3 SENSOR) MISC 1 each every 14 days 2 each 11    ferrous sulfate (FEROSUL) 325 (65 Fe) MG tablet Take 1 tablet (325 mg) by mouth daily (with breakfast) 90 tablet 3    fluticasone-vilanterol (BREO ELLIPTA) 100-25 MCG/ACT inhaler Inhale 1 puff into the lungs daily      gabapentin (NEURONTIN) 300 MG capsule Take 1 capsule (300 mg) by mouth at bedtime 90 capsule 1    glipiZIDE (GLUCOTROL XL) 5 MG 24 hr tablet Take 1 tablet (5 mg) by mouth daily (with breakfast) 90 tablet 3    lisinopril (ZESTRIL) 2.5 MG tablet Take 1 tablet (2.5 mg) by mouth daily 90 tablet 3    melatonin 3 MG tablet Take 9 mg by mouth nightly as needed for sleep      mepolizumab (NUCALA) 100 MG/ML injection (prefilled syringe) Inject 3 mLs (300 mg) Subcutaneous every 30 days 3 mL 11    metFORMIN (GLUCOPHAGE XR) 500 MG 24 hr tablet Take 1 tablet (500 mg) by mouth daily (with dinner) 360 tablet 3    metFORMIN (GLUCOPHAGE) 1000 MG tablet Take 1 tablet (1,000 mg) by mouth daily (with breakfast) 90 tablet 6    metoprolol succinate ER (TOPROL XL) 25 MG 24 hr tablet Take 0.5 tablets (12.5 mg) by mouth every morning 45 tablet 6    mometasone (NASONEX) 50 MCG/ACT nasal spray Spray 2 sprays into both nostrils daily 17 g 3    omeprazole (PRILOSEC) 20 MG DR capsule TAKE 1 CAPSULE BY MOUTH 2 TIMES DAILY. TAKE 1 HOUR BEFORE A MEAL.      tiotropium (SPIRIVA) 18 MCG inhaled capsule Inhale 1 capsule (18 mcg) into the lungs daily 18 capsule 3    traZODone (DESYREL) 50 MG tablet Take 50 mg by  mouth daily as needed for sleep      TRULICITY 1.5 MG/0.5ML pen INJECT 1.5 MG SUBCUTANEOUSLY ONCE A WEEK      ASPIRIN LOW DOSE 81 MG chewable tablet Take 81 mg by mouth daily (Patient not taking: Reported on 6/3/2024)      insulin glargine (LANTUS PEN) 100 UNIT/ML pen Inject 6-8 Units Subcutaneous at bedtime (Patient not taking: Reported on 6/3/2024)         Physical Exam:  /68 (BP Location: Right arm, Patient Position: Chair, Cuff Size: Adult Regular)   Pulse 62   Wt 86.6 kg (191 lb)   SpO2 98%   BMI 25.20 kg/m    Gen: alert, oriented, no distress  HEENT: nasal mucosa is unremarkable, no oropharyngeal lesions, no cervical or supraclavicular lymphadenopathy  CV: RRR, no M/G/R  Resp: CTAB, no focal crackles or wheezes  Skin: no apparent rashes  Ext: no cyanosis, clubbing or edema  Neuro: alert, nonfocal    Labs:  reviewed    Imaging studies:  Chest CTA (May 2023):  - images directly reviewed, formal interpretation follows:  FINDINGS:   ANGIOGRAM CHEST: Normal caliber thoracic aorta. Conventional arch anatomy. Normal caliber pulmonary artery. No pulmonary artery filling defects. No vessel wall thickening. No findings to suggest acute aortic syndrome.     LUNGS AND PLEURA: There are symmetric patchy peripheral centrilobular distribution nodules with indistinct borders. In the lateral left lower lobe opacities are slightly bandlike and confluent consistent with a mixture of inflammation and atelectasis. Trachea and central airways are patent and normal caliber. The subsegmental airways in the peripheral 3 cm of the lung have wall thickening and/or visible endoluminal material. No pleural effusion or thickening.     MEDIASTINUM: Cardiac chambers are normal in size. Physiologic pericardial fluid. Symmetric mildly enlarged hilar and subcarinal lymph nodes are likely reactive. Prevascular, paratracheal, and paraesophageal nodes are normal in size. Esophagus is decompressed. Imaged thyroid gland is normal.  "    CORONARY ARTERY CALCIFICATION: Moderate.     HEPATOBILIARY: The liver is normal in size. There are scattered low attenuations randomly distributed in the liver without appreciable enhancement incompletely characterized. Normal contrast opacification of the portal and hepatic veins. Smooth liver capsule. Normal gallbladder and bile ducts.     PANCREAS: Normal.     SPLEEN: Normal.     ADRENAL GLANDS: Normal.     KIDNEYS/BLADDER: Kidneys are normal in size with symmetric enhancement and normal cortex thickness. No nephrolithiasis or hydronephrosis. Urinary bladder is distended but has a smooth wall of uniform thickness.     BOWEL: Normal.     LYMPH NODES: Normal.     VASCULATURE: Minimal mixed attenuation atheroma in the infrarenal abdominal aorta. No aneurysm.     PELVIC ORGANS: Normal.     MUSCULOSKELETAL: Small thoracic and lumbar degenerative osteophytes. No aggressive or destructive bone lesions.     IMPRESSION:     1. No acute pulmonary embolism or aortopathy.   2.  Peripheral airway centric lung opacities in both lungs consistent with bronchopneumonia.   3.  Reactive lymph nodes in the angelica and subcarinal mediastinum.   4.  No focal inflammatory process in the abdomen or pelvis.   5.  Scattered hypoattenuating liver lesions, incompletely characterized. These most likely represent benign cysts or small hemangioma.     CPET (April 2023):    A cardiopulmonary stress test was performed following a modified Linda protocol with the patient exercising for 9 minutes and 0 seconds under the supervision of Dr. Radha Oconnor.  Blood pressure and heart rate demonstrated a normal response to exercise.    The stress electrocardiogram is negative for inducible ischemic EKG changes.    There is no prior study for comparison.    The patient's exercise capacity is moderately impaired.    The patient technically gave a \"submaximal effort.\"  There is likely a pulmonary limitation to exercise with the breathing reserve being " negative at peak, the lack of doubling ot the tidal volume at max and abnormal baseline spirometry. A cardiac limitation to exercise cannot be ruled out.  The peak VO2 and RPP were lower than expected.  The Ve/VCO2 was also elevated at 39.  The blood pressure at rest revealed an orthostatic response to change in positions (no symptoms).  In recovery the blood pressure remained elevated.     TTE (June 2023):  Summary    1. Normal LV size with normal wall thickness. Calculated bi-planes LVEF   64%.   No regional wall motion abnormalities. (Normal function). Normal diastolic   function.    2. Normal RV size with normal systolic function.     3. Trace to mild valvular regurgitations only.     4. The estimated pulmonary arterial systolic pressure is 23 mmHg,   consistent   with normal pulmonary pressures.     5. Compared to the prior study from 5/27/2023, LV function has improved (was 45-50% in May 2023)     Pulmonary Function Testing  January 2023:  FVC 3.60 (z -1.40)  FEV1 1.26 (z -3.80)  FEV1/FVC 0.35  No significant bronchodilator response  RV 4.80 (z +5.47)  TLC 8.29 (z +1.02)  DLCOc (z -1.92)  Most inspiratory efforts lead to flattening of the inspiratory limb; one does not.    June 2024  FVC 3.80 (z -0.95)  FEV1 2.66 (z -1.35)  FEV1/FVC 0.70  RV 3.54 (z +2.28)  TLC 7.51 (z -0.10)  DLCOc (z -1.40)    Time spent on chart and image review, meeting with the patient to obtain history, perform physical exam, discuss test results, diagnostic possibilities, further testing options, treatment plan options, and care coordination: 37 minutes

## 2024-06-03 NOTE — LETTER
6/3/2024         RE: Ned Moore  [address] [place] [address]        Dear Colleague,    Thank you for referring your patient, Ned Moore, to the [facility]. Please see a copy of my visit note below.    Pulmonary Clinic Follow-up Visit    Assessment and Plan:   64 year old male never smoker with a history of EGPA with pulmonary and cardiac involvement, multifocal strokes, DM2, CKD3, dyslipidemia, presenting for follow-up.     Eosinophilic granulomatosis with polyangiitis: Ned is doing very well, currently no dyspnea or cough. He had his first COVID-19 infection 1.5 months ago, but is up to date with vaccination and only had a few sniffles. Completed pulmonary rehabilitation at [facility]. Continues on mepolizumab, low-dose fluticasone-vilanterol, and tiotropium handihaler. Discussed possible inhaler de-escalation, but given the stability he has achieved and good tolerance of inhalers, will continue them in addition to the mepolizumab. I think he has had great response to the mepolizumab. His FEV1 today is 2.66 L compared 1.26 L in January 2023. Normal TLC and DLCO. Recall that I met Ned for initial consultation in January 2023. At that time he had marked obstructive physiology with frequent exacerbations. 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 He was started on LAMA-LABA. He had prominent upper airway symptoms. He was subsequently hospitalized at [facility] for almost a month from May-June 2023 with myocarditis with heart failure, multifocal ischemic strokes, anemia, found to have severe hypereosinophilia and was diagnosed with EGPA. He was seen by multiple consulting teams and underwent myocardial biopsy showing eosinophilic myocarditis. ANCA negative. He improved markedly with steroids, and was started on mepolizumab. In January 2023 he had  severe fixed obstructive physiology on pulmonary function testing, with FEV1 1.26 L (z -3.80), air trapping without hyperinflation, and mild DLCO reduction; PFT in June 2024 shows FEV1 2.66 L (z -1.35), normal TLC and DLCO. 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     Plan:  - continue mepolizumab  - continue fluticasone-vilanterol 100-25 mcg one inhalation daily; rinse/gargle/spit water after use  - continue tiotropium handihaler 18 mcg daily; he will look into respimat to see if that is covered as it would be more convenient to use  - completed pulmonary rehabilitation at [facility] and stays active with exercise as able  - ongoing follow-up with rheumatology  - follow up in one year with spirometry/DLCO  - recommend remaining up to date with respiratory vaccinations  - encouraged him to contact us with questions or concerning symptoms    Jose Biswas MD  Pulmonary and Critical Care Medicine  [facility]  Office [phone]  he/him    CCx: Eosinophilic granulomatosis with polyangiitis    HPI: 64 year old male never smoker with a history of EGPA with pulmonary and cardiac involvement, multifocal strokes, DM2, CKD3, dyslipidemia, presenting for follow-up. Ned is doing very well, currently no dyspnea or cough. He had his first COVID-19 infection 1.5 months ago, but is up to date with vaccination and only had a few sniffles. Completed pulmonary rehabilitation at [facility]. Continues on mepolizumab, low-dose fluticasone-vilanterol, and tiotropium handihaler. Discussed possible inhaler de-escalation, but given the stability he has achieved and good tolerance of inhalers, will continue them in addition to the mepolizumab. I think he has had great response to the mepolizumab. His FEV1 today is 2.66 L compared 1.26 L in January 2023. Normal TLC and DLCO. Recall that I met Ned for initial consultation in January  2023. At that time he had marked obstructive physiology with frequent exacerbations. 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 He was started on LAMA-LABA. He had prominent upper airway symptoms. He was subsequently hospitalized at [facility] for almost a month from May-June 2023 with myocarditis with heart failure, multifocal ischemic strokes, anemia, found to have severe hypereosinophilia and was diagnosed with EGPA. He was seen by multiple consulting teams and underwent myocardial biopsy showing eosinophilic myocarditis. ANCA negative. He improved markedly with steroids, and was started on mepolizumab. In January 2023 he had severe fixed obstructive physiology on pulmonary function testing, with FEV1 1.26 L (z -3.80), air trapping without hyperinflation, and mild DLCO reduction; PFT in June 2024 shows FEV1 2.66 L (z -1.35), normal TLC and DLCO.    ROS:  A 12-system review was obtained and was negative with the exception of the symptoms endorsed in the history of present illness.    PMH:  never smoker with a history of EGPA with pulmonary and cardiac involvement, multifocal strokes, DM2, CKD3, dyslipidemia    PSH:  No past surgical history on file.    Allergies:  Allergies   Allergen Reactions     Azathioprine Nausea and Vomiting     Dulaglutide Nausea       Family HX:  Family History   Problem Relation Age of Onset     Heart Disease Mother      Diabetes Mother      Asthma Mother      Alzheimer Disease Father      No Known Problems Brother        Social Hx:  Social History     Socioeconomic History     Marital status:      Spouse name: Not on file     Number of children: Not on file     Years of education: Not on file     Highest education level: Not on file   Occupational History     Not on file   Tobacco Use     Smoking status: Never     Passive exposure: Never     Smokeless tobacco: Never    Vaping Use     Vaping status: Never Used   Substance and Sexual Activity     Alcohol use: Yes     Comment: a beer or wine every few months     Drug use: Never     Sexual activity: Not Currently     Partners: Female   Other Topics Concern     Parent/sibling w/ CABG, MI or angioplasty before 65F 55M? No   Social History Narrative     Not on file     Social Determinants of Health     Financial Resource Strain: Low Risk  (5/31/2024)    Financial Resource Strain      Within the past 12 months, have you or your family members you live with been unable to get utilities (heat, electricity) when it was really needed?: No   Food Insecurity: High Risk (5/31/2024)    Food Insecurity      Within the past 12 months, did you worry that your food would run out before you got money to buy more?: Yes      Within the past 12 months, did the food you bought just not last and you didn t have money to get more?: Yes   Transportation Needs: Low Risk  (5/31/2024)    Transportation Needs      Within the past 12 months, has lack of transportation kept you from medical appointments, getting your medicines, non-medical meetings or appointments, work, or from getting things that you need?: No   Physical Activity: Insufficiently Active (5/31/2024)    Exercise Vital Sign      Days of Exercise per Week: 3 days      Minutes of Exercise per Session: 20 min   Stress: No Stress Concern Present (5/31/2024)    [employer] [place] [employer] - Occupational Stress Questionnaire      Feeling of Stress : Not at all   Social Connections: Socially Isolated (5/31/2024)    Social Connection and Isolation Panel [NHANES]      Frequency of Communication with Friends and Family: Once a week      Frequency of Social Gatherings with Friends and Family: More than three times a week      Attends Yazidi Services: Never      Active Member of Clubs or Organizations: No      Attends Club or Organization Meetings: Never      Marital Status:     Interpersonal Safety: Not on file   Housing Stability: High Risk (5/31/2024)    Housing Stability      Do you have housing? : Yes      Are you worried about losing your housing?: Yes       Current Meds:  Current Outpatient Medications   Medication Sig Dispense Refill     ACCU-CHEK FASTCLIX LANCET DRUM [ACCU-CHEK FASTCLIX LANCET DRUM] TEST BLOOD SUGARS 3 TIMES DAILY 300 each 3     atorvastatin (LIPITOR) 20 MG tablet Take 1 tablet (20 mg) by mouth every evening 90 tablet 3     BD ALEXANDRA U/F 32G X 4 MM insulin pen needle        blood glucose test (ACCU-CHEK SMARTVIEW TEST STRIP) strips [BLOOD GLUCOSE TEST (ACCU-CHEK SMARTVIEW TEST STRIP) STRIPS] USE TO CHECK BLOOD SUGARS TWICE DAILY. 200 strip 3     Continuous Blood Gluc Sensor (FREESTYLE LIVIA 3 SENSOR) MISC 1 each every 14 days 2 each 11     ferrous sulfate (FEROSUL) 325 (65 Fe) MG tablet Take 1 tablet (325 mg) by mouth daily (with breakfast) 90 tablet 3     fluticasone-vilanterol (BREO ELLIPTA) 100-25 MCG/ACT inhaler Inhale 1 puff into the lungs daily       gabapentin (NEURONTIN) 300 MG capsule Take 1 capsule (300 mg) by mouth at bedtime 90 capsule 1     glipiZIDE (GLUCOTROL XL) 5 MG 24 hr tablet Take 1 tablet (5 mg) by mouth daily (with breakfast) 90 tablet 3     lisinopril (ZESTRIL) 2.5 MG tablet Take 1 tablet (2.5 mg) by mouth daily 90 tablet 3     melatonin 3 MG tablet Take 9 mg by mouth nightly as needed for sleep       mepolizumab (NUCALA) 100 MG/ML injection (prefilled syringe) Inject 3 mLs (300 mg) Subcutaneous every 30 days 3 mL 11     metFORMIN (GLUCOPHAGE XR) 500 MG 24 hr tablet Take 1 tablet (500 mg) by mouth daily (with dinner) 360 tablet 3     metFORMIN (GLUCOPHAGE) 1000 MG tablet Take 1 tablet (1,000 mg) by mouth daily (with breakfast) 90 tablet 6     metoprolol succinate ER (TOPROL XL) 25 MG 24 hr tablet Take 0.5 tablets (12.5 mg) by mouth every morning 45 tablet 6     mometasone (NASONEX) 50 MCG/ACT nasal spray Spray 2 sprays into both nostrils  daily 17 g 3     omeprazole (PRILOSEC) 20 MG DR capsule TAKE 1 CAPSULE BY MOUTH 2 TIMES DAILY. TAKE 1 HOUR BEFORE A MEAL.       tiotropium (SPIRIVA) 18 MCG inhaled capsule Inhale 1 capsule (18 mcg) into the lungs daily 18 capsule 3     traZODone (DESYREL) 50 MG tablet Take 50 mg by mouth daily as needed for sleep       TRULICITY 1.5 MG/0.5ML pen INJECT 1.5 MG SUBCUTANEOUSLY ONCE A WEEK       ASPIRIN LOW DOSE 81 MG chewable tablet Take 81 mg by mouth daily (Patient not taking: Reported on 6/3/2024)       insulin glargine (LANTUS PEN) 100 UNIT/ML pen Inject 6-8 Units Subcutaneous at bedtime (Patient not taking: Reported on 6/3/2024)         Physical Exam:  /68 (BP Location: Right arm, Patient Position: Chair, Cuff Size: Adult Regular)   Pulse 62   Wt 86.6 kg (191 lb)   SpO2 98%   BMI 25.20 kg/m    Gen: alert, oriented, no distress  HEENT: nasal mucosa is unremarkable, no oropharyngeal lesions, no cervical or supraclavicular lymphadenopathy  CV: RRR, no M/G/R  Resp: CTAB, no focal crackles or wheezes  Skin: no apparent rashes  Ext: no cyanosis, clubbing or edema  Neuro: alert, nonfocal    Labs:  reviewed    Imaging studies:  Chest CTA (May 2023):  - images directly reviewed, formal interpretation follows:  FINDINGS:   ANGIOGRAM CHEST: Normal caliber thoracic aorta. Conventional arch anatomy. Normal caliber pulmonary artery. No pulmonary artery filling defects. No vessel wall thickening. No findings to suggest acute aortic syndrome.     LUNGS AND PLEURA: There are symmetric patchy peripheral centrilobular distribution nodules with indistinct borders. In the lateral left lower lobe opacities are slightly bandlike and confluent consistent with a mixture of inflammation and atelectasis. Trachea and central airways are patent and normal caliber. The subsegmental airways in the peripheral 3 cm of the lung have wall thickening and/or visible endoluminal material. No pleural effusion or thickening.     MEDIASTINUM:  Cardiac chambers are normal in size. Physiologic pericardial fluid. Symmetric mildly enlarged hilar and subcarinal lymph nodes are likely reactive. Prevascular, paratracheal, and paraesophageal nodes are normal in size. Esophagus is decompressed. Imaged thyroid gland is normal.     CORONARY ARTERY CALCIFICATION: Moderate.     HEPATOBILIARY: The liver is normal in size. There are scattered low attenuations randomly distributed in the liver without appreciable enhancement incompletely characterized. Normal contrast opacification of the portal and hepatic veins. Smooth liver capsule. Normal gallbladder and bile ducts.     PANCREAS: Normal.     SPLEEN: Normal.     ADRENAL GLANDS: Normal.     KIDNEYS/BLADDER: Kidneys are normal in size with symmetric enhancement and normal cortex thickness. No nephrolithiasis or hydronephrosis. Urinary bladder is distended but has a smooth wall of uniform thickness.     BOWEL: Normal.     LYMPH NODES: Normal.     VASCULATURE: Minimal mixed attenuation atheroma in the infrarenal abdominal aorta. No aneurysm.     PELVIC ORGANS: Normal.     MUSCULOSKELETAL: Small thoracic and lumbar degenerative osteophytes. No aggressive or destructive bone lesions.     IMPRESSION:     1. No acute pulmonary embolism or aortopathy.   2.  Peripheral airway centric lung opacities in both lungs consistent with bronchopneumonia.   3.  Reactive lymph nodes in the angelica and subcarinal mediastinum.   4.  No focal inflammatory process in the abdomen or pelvis.   5.  Scattered hypoattenuating liver lesions, incompletely characterized. These most likely represent benign cysts or small hemangioma.     CPET (April 2023):     A cardiopulmonary stress test was performed following a modified Linda protocol with the patient exercising for 9 minutes and 0 seconds under the supervision of Dr. Radha Oconnor.  Blood pressure and heart rate demonstrated a normal response to exercise.     The stress electrocardiogram is negative  "for inducible ischemic EKG changes.     There is no prior study for comparison.     The patient's exercise capacity is moderately impaired.     The patient technically gave a \"submaximal effort.\"  There is likely a pulmonary limitation to exercise with the breathing reserve being negative at peak, the lack of doubling ot the tidal volume at max and abnormal baseline spirometry. A cardiac limitation to exercise cannot be ruled out.  The peak VO2 and RPP were lower than expected.  The Ve/VCO2 was also elevated at 39.  The blood pressure at rest revealed an orthostatic response to change in positions (no symptoms).  In recovery the blood pressure remained elevated.     TTE (June 2023):  Summary    1. Normal LV size with normal wall thickness. Calculated bi-planes LVEF   64%.   No regional wall motion abnormalities. (Normal function). Normal diastolic   function.    2. Normal RV size with normal systolic function.     3. Trace to mild valvular regurgitations only.     4. The estimated pulmonary arterial systolic pressure is 23 mmHg,   consistent   with normal pulmonary pressures.     5. Compared to the prior study from 5/27/2023, LV function has improved (was 45-50% in May 2023)     Pulmonary Function Testing  January 2023:  FVC 3.60 (z -1.40)  FEV1 1.26 (z -3.80)  FEV1/FVC 0.35  No significant bronchodilator response  RV 4.80 (z +5.47)  TLC 8.29 (z +1.02)  DLCOc (z -1.92)  Most inspiratory efforts lead to flattening of the inspiratory limb; one does not.    June 2024  FVC 3.80 (z -0.95)  FEV1 2.66 (z -1.35)  FEV1/FVC 0.70  RV 3.54 (z +2.28)  TLC 7.51 (z -0.10)  DLCOc (z -1.40)    Time spent on chart and image review, meeting with the patient to obtain history, perform physical exam, discuss test results, diagnostic possibilities, further testing options, treatment plan options, and care coordination: 37 minutes      Again, thank you for allowing me to participate in the care of your patient.  "       Sincerely,        Jose Biswas MD

## 2024-06-05 ENCOUNTER — ANCILLARY PROCEDURE (OUTPATIENT)
Dept: GENERAL RADIOLOGY | Facility: CLINIC | Age: 64
End: 2024-06-05
Attending: INTERNAL MEDICINE
Payer: COMMERCIAL

## 2024-06-05 ENCOUNTER — OFFICE VISIT (OUTPATIENT)
Dept: INTERNAL MEDICINE | Facility: CLINIC | Age: 64
End: 2024-06-05
Attending: INTERNAL MEDICINE
Payer: COMMERCIAL

## 2024-06-05 VITALS
WEIGHT: 190.4 LBS | RESPIRATION RATE: 16 BRPM | TEMPERATURE: 98.3 F | DIASTOLIC BLOOD PRESSURE: 60 MMHG | OXYGEN SATURATION: 97 % | HEIGHT: 74 IN | HEART RATE: 69 BPM | SYSTOLIC BLOOD PRESSURE: 118 MMHG | BODY MASS INDEX: 24.43 KG/M2

## 2024-06-05 DIAGNOSIS — D72.18 EOSINOPHILIC GRANULOMATOSIS WITH POLYANGIITIS (EGPA) (H): ICD-10-CM

## 2024-06-05 DIAGNOSIS — M25.551 HIP PAIN, RIGHT: ICD-10-CM

## 2024-06-05 DIAGNOSIS — E11.22 TYPE 2 DIABETES MELLITUS WITH STAGE 3A CHRONIC KIDNEY DISEASE, WITHOUT LONG-TERM CURRENT USE OF INSULIN (H): ICD-10-CM

## 2024-06-05 DIAGNOSIS — E78.5 HYPERLIPIDEMIA LDL GOAL <70: ICD-10-CM

## 2024-06-05 DIAGNOSIS — M30.1 EOSINOPHILIC GRANULOMATOSIS WITH POLYANGIITIS (EGPA) (H): ICD-10-CM

## 2024-06-05 DIAGNOSIS — Z86.73 HISTORY OF CVA (CEREBROVASCULAR ACCIDENT): ICD-10-CM

## 2024-06-05 DIAGNOSIS — Z00.00 MEDICARE WELCOME VISIT: Primary | ICD-10-CM

## 2024-06-05 DIAGNOSIS — N18.31 TYPE 2 DIABETES MELLITUS WITH STAGE 3A CHRONIC KIDNEY DISEASE, WITHOUT LONG-TERM CURRENT USE OF INSULIN (H): ICD-10-CM

## 2024-06-05 DIAGNOSIS — J44.9 CHRONIC OBSTRUCTIVE PULMONARY DISEASE, UNSPECIFIED COPD TYPE (H): ICD-10-CM

## 2024-06-05 DIAGNOSIS — I50.22 CHRONIC SYSTOLIC HEART FAILURE (H): ICD-10-CM

## 2024-06-05 LAB — HBA1C MFR BLD: 7.5 % (ref 0–5.6)

## 2024-06-05 PROCEDURE — 73502 X-RAY EXAM HIP UNI 2-3 VIEWS: CPT | Mod: TC | Performed by: RADIOLOGY

## 2024-06-05 PROCEDURE — 80061 LIPID PANEL: CPT | Performed by: INTERNAL MEDICINE

## 2024-06-05 PROCEDURE — 36415 COLL VENOUS BLD VENIPUNCTURE: CPT | Performed by: INTERNAL MEDICINE

## 2024-06-05 PROCEDURE — 83036 HEMOGLOBIN GLYCOSYLATED A1C: CPT | Performed by: INTERNAL MEDICINE

## 2024-06-05 PROCEDURE — 99397 PER PM REEVAL EST PAT 65+ YR: CPT | Performed by: INTERNAL MEDICINE

## 2024-06-05 PROCEDURE — 99214 OFFICE O/P EST MOD 30 MIN: CPT | Mod: 25 | Performed by: INTERNAL MEDICINE

## 2024-06-05 ASSESSMENT — PAIN SCALES - GENERAL: PAINLEVEL: NO PAIN (0)

## 2024-06-05 NOTE — PROGRESS NOTES
Preventive Care Visit  [facility]  Mark Briggs MD, Internal Medicine  Jun 5, 2024      Assessment & Plan     Wellness visit  Patient has no identifiable cognitive impairment based on our visit today through our conversation and examination  Independent, some family support only needed after his prolonged hospitalization related to his EGPA, see below. 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    (E11.22,  N18.31) Type 2 diabetes mellitus with stage 3a chronic kidney disease, without long-term current use of insulin (H)  Comment: unknown, takes his glipizide as needed and just restarted his Trulicity--some insurance issues over the last few months.  Plan: Lipid panel reflex to direct LDL Non-fasting,         HEMOGLOBIN A1C        Apparently does not have a reliable CGM. Last A1c was 7.8 three months ago. I am suspicious that it is not much better at this point. Goal of less than 7%. Will follow up with him after results are back. Preferably, he would use his glipizide regularly instead of PRN. He will be seeing both endo ([facility] system) and MTM at our clinic in the coming two weeks.  See again in 3-4 months.        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 Slow recovery, still some physical disability, mostly in right hip/leg weakness s/p prolonged rehab. Interested in rehab for his right leg to be restarted, insurance caused a break in this therapy, per patient.  Plan: referral placed--he is hoping for e-stim. Also will get hip xray. Based on exam, even if there is some OA, suspect this is more neurologic.   Some immunosuppression related to his NUCALA Infusions, ongoing. Has been somewhat resistant to immunizations. Will continue to follow up with his multiple specialists.    (Z86.73) History of CVA (cerebrovascular  accident)  Comment: MRI Brain (5/2023) IMPRESSION:   1. Innumerable, tiny acute, nonhemorrhagic bihemispheric supratentorial infarcts. Overall distribution of the lesions favors acute border zone ischemia. Is that any history of recent hypotensive event /cardiovascular acute insult? No mass effect, hemorrhage or shift.   Plan: going suspicion is that he development above findings and associated sx due to a hypotensive event around his new onset EGPA--hospitalized in May 2023 at  (see above). Some generalized weakness and right leg pain/disability--still unclear relationship. Regardless, I did recommend he continue low dose asa for now given an overall higher risk CV status.    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 Continues to follow with cardiology.   Plan: treating underlying condition. Otherwise appropriate med management, last seen in 2/2024 by cardiology, 6 month follow up with them was recommended, repeat imaging at that time.    (E78.5) Hyperlipidemia LDL goal <70  Comment: on statin  Plan: Will plan to continue on previous medication without changes     (J44.9) Chronic obstructive pulmonary disease, unspecified COPD type (H)  Comment: hx of  Plan: inhalers, EGPA involvement. Followed closely by pulmonology.    (M25.551) Hip pain, right  Comment: ongoing, likely multifactorial  Plan: Physical Therapy  Referral, XR Hip         Right 2-3 Views        Xray ordered. Physical therapy also reordered.     See me in 4 months            Counseling  Appropriate preventive services were discussed with this patient, including applicable screening as appropriate for fall prevention, nutrition, physical activity, Tobacco-use cessation, weight loss and cognition.  Checklist reviewing preventive services available has been given to the patient.  Reviewed patient's diet, addressing concerns and/or  questions.   He is at risk for lack of exercise and has been provided with information to increase physical activity for the benefit of his well-being.   Patient reported safety concerns were addressed today.The patient was provided with written information regarding signs of hearing loss.           Lupe Marino is a 64 year old, presenting for the following:  Wellness Visit and Recheck Medication (Pt is here to complete his annual wellness physical.)        6/5/2024    10:49 AM   Additional Questions   Roomed by camilla   Accompanied by alone         6/5/2024    10:49 AM   Patient Reported Additional Medications   Patient reports taking the following new medications none        Health Care Directive  Patient does not have a Health Care Directive or Living Will: Discussed advance care planning with patient; however, patient declined at this time.    HPI  Pt is here for follow up, wellness visit.            5/31/2024   General Health   How would you rate your overall physical health? (!) FAIR   Feel stress (tense, anxious, or unable to sleep) Not at all         5/31/2024   Nutrition   Diet: Diabetic         5/31/2024   Exercise   Days per week of moderate/strenous exercise 3 days   Average minutes spent exercising at this level 20 min         5/31/2024   Social Factors   Frequency of gathering with friends or relatives More than three times a week   Worry food won't last until get money to buy more Yes   Food not last or not have enough money for food? Yes   Do you have housing?  Yes   Are you worried about losing your housing? Yes   Lack of transportation? No   Unable to get utilities (heat,electricity)? No   Want help with housing or utility concern? No   (!) FOOD SECURITY CONCERN PRESENT(!) HOUSING CONCERN PRESENT      6/5/2024   Fall Risk   Gait Speed Test (Document in seconds) 4.69   Gait Speed Test Interpretation Less than or equal to 5.00 seconds - PASS          5/31/2024   Activities of Daily Living-  "Home Safety   Needs help with the following daily activites None of the above   Safety concerns in the home No grab bars in the bathroom         5/31/2024   Dental   Dentist two times every year? Yes         5/31/2024   Hearing Screening   Hearing concerns? (!) IT'S HARD TO FOLLOW A CONVERSATION IN A NOISY RESTAURANT OR CROWDED ROOM.    (!) TROUBLE UNDERSTANDING SOFT OR WHISPERED SPEECH.         5/31/2024   Driving Risk Screening   Patient/family members have concerns about driving No         5/31/2024   General Alertness/Fatigue Screening   Have you been more tired than usual lately? No         5/31/2024   Urinary Incontinence Screening   Bothered by leaking urine in past 6 months No         5/31/2024   TB Screening   Were you born outside of the [place]? No           Today's PHQ-2 Score:       3/4/2024     7:37 AM   PHQ-2 ( 1999 [employer])   Q1: Little interest or pleasure in doing things 0   Q2: Feeling down, depressed or hopeless 0   PHQ-2 Score 0         5/31/2024   Substance Use   Alcohol more than 3/day or more than 7/wk Not Applicable   Do you have a current opioid prescription? No   How severe/bad is pain from 1 to 10? 0/10 (No Pain)   Do you use any other substances recreationally? No     Social History     Tobacco Use    Smoking status: Never     Passive exposure: Never    Smokeless tobacco: Never   Vaping Use    Vaping status: Never Used   Substance Use Topics    Alcohol use: Yes     Comment: a beer or wine every few months    Drug use: Never           5/31/2024   AAA Screening   Family history of Abdominal Aortic Aneurysm (AAA)? No   Last PSA: No results found for: \"PSA\"  ASCVD Risk   The ASCVD Risk score (Jody DK, et al., 2019) failed to calculate for the following reasons:    The patient has a prior MI or stroke diagnosis            Reviewed and updated as needed this visit by Provider                      Current providers sharing in care for this patient include:  Patient Care Team:  [place], " Mark Reyez MD as PCP - General  Jose Biswas MD as Assigned Pulmonology Provider  Mark Briggs MD as Assigned PCP  Syed Borjas RPH as Pharmacist  Syed Borjas RPH as Assigned MTM Pharmacist  Holly Kirkland RN as Specialty Care Coordinator (Nephrology)  Sandrine Moise PA-C as Physician Assistant (Physician Assistant - Medical)  Josue Tripp MD as Assigned Nephrology Provider  Roney Freeman MD as MD (Cardiovascular Disease)  Faizan Samuel MD as Physician  Faizan Samuel MD as Assigned Surgical Provider  Sandrine Moise PA-C as Assigned Gastroenterology Provider  Ирина Cortés PA-C as Assigned Heart and Vascular Provider    The following health maintenance items are reviewed in Epic and correct as of today:  Health Maintenance   Topic Date Due    HF ACTION PLAN  Never done    DIABETIC FOOT EXAM  Never done    COPD ACTION PLAN  Never done    DTAP/TDAP/TD IMMUNIZATION (2 - Td or Tdap) 03/27/2023    ANNUAL REVIEW OF HM ORDERS  12/01/2023    COVID-19 Vaccine (7 - 2023-24 season) 03/03/2024    MEDICARE ANNUAL WELLNESS VISIT  04/20/2024    LIPID  04/26/2024    A1C  05/15/2024    EYE EXAM  07/21/2024    BMP  11/02/2024    MICROALBUMIN  11/15/2024    ALT  02/16/2025    CBC  05/02/2025    HEMOGLOBIN  05/02/2025    FALL RISK ASSESSMENT  06/05/2025    COLORECTAL CANCER SCREENING  03/18/2027    ADVANCE CARE PLANNING  05/30/2027    TSH W/FREE T4 REFLEX  Completed    SPIROMETRY  Completed    HEPATITIS C SCREENING  Completed    HIV SCREENING  Completed    PHQ-2 (once per calendar year)  Completed    INFLUENZA VACCINE  Completed    Pneumococcal Vaccine: 65+ Years  Completed    URINALYSIS  Completed    ZOSTER IMMUNIZATION  Completed    RSV VACCINE (Pregnancy & 60+)  Completed    IPV IMMUNIZATION  Aged Out    HPV IMMUNIZATION  Aged Out    MENINGITIS IMMUNIZATION  Aged Out    RSV MONOCLONAL ANTIBODY  Aged Out            Objective    Exam  /60 (BP Location: Left arm, Patient Position:  "Sitting, Cuff Size: Adult Regular)   Pulse 69   Temp 98.3  F (36.8  C) (Tympanic)   Resp 16   Ht 1.88 m (6' 2\")   Wt 86.4 kg (190 lb 6.4 oz)   SpO2 97%   BMI 24.45 kg/m     Estimated body mass index is 24.45 kg/m  as calculated from the following:    Height as of this encounter: 1.88 m (6' 2\").    Weight as of this encounter: 86.4 kg (190 lb 6.4 oz).    Physical Exam          6/5/2024   Mini Cog   Clock Draw Score 2 Normal   3 Item Recall 3 objects recalled   Mini Cog Total Score 5             Signed Electronically by: Mark Briggs MD    "

## 2024-06-06 LAB
CHOLEST SERPL-MCNC: 142 MG/DL
DLCOCOR-%PRED-PRE: 77 %
DLCOCOR-PRE: 22.14 ML/MIN/MMHG
DLCOUNC-%PRED-PRE: 75 %
DLCOUNC-PRE: 21.48 ML/MIN/MMHG
DLCOUNC-PRED: 28.43 ML/MIN/MMHG
ERV-%PRED-PRE: 49 %
ERV-PRE: 0.81 L
ERV-PRED: 1.64 L
EXPTIME-PRE: 9.66 SEC
FASTING STATUS PATIENT QL REPORTED: NO
FEF2575-%PRED-PRE: 60 %
FEF2575-PRE: 1.65 L/SEC
FEF2575-PRED: 2.7 L/SEC
FEFMAX-%PRED-PRE: 72 %
FEFMAX-PRE: 6.72 L/SEC
FEFMAX-PRED: 9.32 L/SEC
FEV1-%PRED-PRE: 78 %
FEV1-PRE: 2.66 L
FEV1FEV6-PRE: 72 %
FEV1FEV6-PRED: 78 %
FEV1FVC-PRE: 70 %
FEV1FVC-PRED: 77 %
FEV1SVC-PRE: 67 %
FEV1SVC-PRED: 74 %
FIFMAX-PRE: 7.14 L/SEC
FRCPLETH-%PRED-PRE: 114 %
FRCPLETH-PRE: 4.35 L
FRCPLETH-PRED: 3.79 L
FVC-%PRED-PRE: 86 %
FVC-PRE: 3.8 L
FVC-PRED: 4.41 L
HDLC SERPL-MCNC: 48 MG/DL
IC-%PRED-PRE: 96 %
IC-PRE: 3.16 L
IC-PRED: 3.28 L
LDLC SERPL CALC-MCNC: 57 MG/DL
NONHDLC SERPL-MCNC: 94 MG/DL
RVPLETH-%PRED-PRE: 135 %
RVPLETH-PRE: 3.54 L
RVPLETH-PRED: 2.61 L
TLCPLETH-%PRED-PRE: 99 %
TLCPLETH-PRE: 7.51 L
TLCPLETH-PRED: 7.57 L
TRIGL SERPL-MCNC: 185 MG/DL
VA-%PRED-PRE: 87 %
VA-PRE: 6.13 L
VC-%PRED-PRE: 86 %
VC-PRE: 3.97 L
VC-PRED: 4.57 L

## 2024-06-18 ENCOUNTER — TELEPHONE (OUTPATIENT)
Dept: PULMONOLOGY | Facility: CLINIC | Age: 64
End: 2024-06-18
Payer: MEDICARE

## 2024-06-18 NOTE — TELEPHONE ENCOUNTER
PA Initiation    Medication: NUCALA 100 MG/ML SC [facility]  Insurance Company: [employer] - Phone [phone] Fax [phone]  Pharmacy Filling the Rx: [facility] - [place], [place] - [facility] [address]  Filling Pharmacy Phone: [phone]  Filling Pharmacy Fax:    Start Date: 6/18/2024    Key: [id number]

## 2024-06-20 ENCOUNTER — VIRTUAL VISIT (OUTPATIENT)
Dept: PHARMACY | Facility: CLINIC | Age: 64
End: 2024-06-20
Payer: COMMERCIAL

## 2024-06-20 DIAGNOSIS — N18.31 TYPE 2 DIABETES MELLITUS WITH STAGE 3A CHRONIC KIDNEY DISEASE, WITHOUT LONG-TERM CURRENT USE OF INSULIN (H): Primary | ICD-10-CM

## 2024-06-20 DIAGNOSIS — E11.22 TYPE 2 DIABETES MELLITUS WITH STAGE 3A CHRONIC KIDNEY DISEASE, WITHOUT LONG-TERM CURRENT USE OF INSULIN (H): Primary | ICD-10-CM

## 2024-06-20 PROCEDURE — 99606 MTMS BY PHARM EST 15 MIN: CPT

## 2024-06-20 PROCEDURE — 99607 MTMS BY PHARM ADDL 15 MIN: CPT

## 2024-06-20 RX ORDER — GLIPIZIDE 10 MG/1
10 TABLET, FILM COATED, EXTENDED RELEASE ORAL DAILY
Qty: 90 TABLET | Refills: 3 | Status: SHIPPED | OUTPATIENT
Start: 2024-06-20

## 2024-06-20 NOTE — PATIENT INSTRUCTIONS
"Recommendations from today's MTM visit:                                                      MTM (medication therapy management) is a service provided by a clinical pharmacist designed to help you get the most of out of your medicines.   Today we reviewed what your medicines are for, how to know if they are working, that your medicines are safe and how to make your medicine regimen as easy as possible.      Increase glipizide XL dose from 5 mg to 10 mg daily   Continue current medications as prescribed     Follow-up: Due on  07/22/2024    It was great speaking with you today.  I value your experience and would be very thankful for your time in providing feedback in our clinic survey. In the next few days, you may receive an email or text message from [facility] [employer] with a link to a survey related to your  clinical pharmacist.\"     To schedule another MTM appointment, please call the clinic directly or you may call the MTM scheduling line at [phone].    My Clinical Pharmacist's contact information:                                                      Please feel free to contact me with any questions or concerns you have.        Syed Borjas, PharmD     Medication Therapy Management (MTM) Pharmacist     [phone]     [email or web address]    "

## 2024-06-20 NOTE — PROGRESS NOTES
Medication Therapy Management (MTM) Encounter    ASSESSMENT:                            Medication Adherence/Access: No issues identified    Type 2 Diabetes: Patient is not meeting A1c goal of < 7%, but recently improved ([facility]C [facility]) . Most of the CGM readings are within the target range (70 - 180) 70 %, within  goal of > 70%, but sugar readings increased from the previous follow up;therefore, reasonable to increase glipizide dose. Will follow up closely.      PLAN:                            Increase glipizide XL dose from 5 mg to 10 mg daily   Continue current medications as prescribed     Follow-up: Due on  07/22/2024    SUBJECTIVE/OBJECTIVE:                          Ned Moore is a 63 year old male called for a follow up visit from 12/06/2023.    Reason for visit: Medication Review Initial.    Allergies/ADRs: Reviewed in chart  Past Medical History: Reviewed in chart  Tobacco: He reports that he has never smoked. He has never been exposed to tobacco smoke. He has never used smokeless tobacco.    Medication Adherence/Access: no issues reported    Type 2 Diabetes: Currently taking metformin 500 mg XR with supper and metformin 1000 mg with breakfast, and Semglee 2 - 5 units.  Patient stopped taking Ozempic due to weight loss, and taking Trulicity 1.5 mg weekly. Trulicity dose was reduced due injection site pain. He has not been injecting his semglee for sometime now.     Aspirin 81mg daily  Blood sugar monitoring: Continuous Glucose Monitor.Check below  Complained of the difference between the maría 3 readings and the glucometer sometimes up to 51 points difference. Noted he does not sleep on the side where the sensor is.   Denies any low sugar symptoms  Current diabetes symptoms: Fatigue.   Diet/Exercise: He is eating three times a day and he does not have the appetite he used to have. This might be due to his stuffed nose. He has a little of nausea during the day. This is affecting his diet.  Will consider to lower dose or stop the medication. He is doing a bit more exercises; going up and down the stairs.  Eye exam: up to date  Foot exam: due  Urine Albumin:             Wt Readings from Last 2 Encounters:   12/06/23 198 lb (89.8 kg)   11/30/23 189 lb (85.7 kg)      - His weight is 185 Pounds. Patient does not want to lose weight.  Lost about 2 - 3 pounds this last month.               Today's Vitals: There were no vitals taken for this visit.  ----------------      I spent 25 minutes with this patient today. All changes were made via collaborative practice agreement with Mark Briggs MD. A copy of the visit note was provided to the patient's provider(s).    A summary of these recommendations was sent via [email or web address].    Telemedicine Visit Details  Type of service:  Telephone visit  Start Time:  09:30 AM  End Time:  09:55 AM     Medication Therapy Recommendations  No medication therapy recommendations to display     Syed Borjas, PharmD     Medication Therapy Management (MTM) Pharmacist     [phone]     [email or web address]

## 2024-06-21 NOTE — TELEPHONE ENCOUNTER
Prior Authorization Approval    Medication: NUCALA 100 MG/ML SC [facility]  Authorization Effective Date: 6/20/2024  Authorization Expiration Date: 6/19/2025  Approved Dose/Quantity: #3 syg per 28 days  Reference #: Key: [id number]   Insurance Company: [employer] - Phone [phone] Fax [phone]  Expected CoPay: $ 0  CoPay Card Available:    Yes  Financial Assistance Needed: No  Which Pharmacy is filling the prescription: [facility] - [place], [place] - [address]  Pharmacy Notified: No, renewal  Patient Notified: No, renewal

## 2024-07-09 DIAGNOSIS — E11.22 TYPE 2 DIABETES MELLITUS WITH STAGE 3A CHRONIC KIDNEY DISEASE, WITHOUT LONG-TERM CURRENT USE OF INSULIN (H): Primary | ICD-10-CM

## 2024-07-09 DIAGNOSIS — N18.31 TYPE 2 DIABETES MELLITUS WITH STAGE 3A CHRONIC KIDNEY DISEASE, WITHOUT LONG-TERM CURRENT USE OF INSULIN (H): Primary | ICD-10-CM

## 2024-07-10 RX ORDER — LANCETS
EACH MISCELLANEOUS
Qty: 300 EACH | Refills: 3 | Status: SHIPPED | OUTPATIENT
Start: 2024-07-10

## 2024-07-10 NOTE — TELEPHONE ENCOUNTER
Per chart review, patient has not been checking blood sugars.  Routing to pharmacist for Rx for supplies.

## 2024-07-10 NOTE — TELEPHONE ENCOUNTER
He usually checks his sugar with finger poke to confirm if the maría sensor is reading correctly.  I made checking once daily.    Thanks,  Syed

## 2024-07-22 ENCOUNTER — VIRTUAL VISIT (OUTPATIENT)
Dept: PHARMACY | Facility: CLINIC | Age: 64
End: 2024-07-22
Payer: COMMERCIAL

## 2024-07-22 DIAGNOSIS — E11.22 TYPE 2 DIABETES MELLITUS WITH STAGE 3A CHRONIC KIDNEY DISEASE, WITHOUT LONG-TERM CURRENT USE OF INSULIN (H): Primary | ICD-10-CM

## 2024-07-22 DIAGNOSIS — N18.31 TYPE 2 DIABETES MELLITUS WITH STAGE 3A CHRONIC KIDNEY DISEASE, WITHOUT LONG-TERM CURRENT USE OF INSULIN (H): Primary | ICD-10-CM

## 2024-07-22 DIAGNOSIS — G47.00 INSOMNIA, UNSPECIFIED TYPE: ICD-10-CM

## 2024-07-22 PROCEDURE — 99606 MTMS BY PHARM EST 15 MIN: CPT

## 2024-07-22 PROCEDURE — 99607 MTMS BY PHARM ADDL 15 MIN: CPT

## 2024-07-22 NOTE — PATIENT INSTRUCTIONS
"Recommendations from today's MTM visit:                                                      MTM (medication therapy management) is a service provided by a clinical pharmacist designed to help you get the most of out of your medicines.   Today we reviewed what your medicines are for, how to know if they are working, that your medicines are safe and how to make your medicine regimen as easy as possible.      Consider holding trazodone for about three nights, and if no difference on sleep then stop taking   We will consider checking all his medications next visit   PharmD to communicate with PCP on the replacement of atorvastatin     Follow-up: Due on 08/22/2024    It was great speaking with you today.  I value your experience and would be very thankful for your time in providing feedback in our clinic survey. In the next few days, you may receive an email or text message from [employer] with a link to a survey related to your  clinical pharmacist.\"     To schedule another MTM appointment, please call the clinic directly or you may call the MTM scheduling line at [phone].    My Clinical Pharmacist's contact information:                                                      Please feel free to contact me with any questions or concerns you have.        Syed Borjas, PharmD     Medication Therapy Management (MTM) Pharmacist     [phone]     [email or web address]     [facility]    "

## 2024-07-22 NOTE — PROGRESS NOTES
Medication Therapy Management (MTM) Encounter    ASSESSMENT:                            Medication Adherence/Access: No issues identified    Type 2 Diabetes: Patient is not meeting A1c goal of < 7%.  Most of the CGM readings are within the target range (70 - 180) 78 %, within  goal of > 70%, but sugar readings improved from the previous follow up;therefore, reasonable to continue current medications. Will follow up closely.     Insomnia: Considering patient not using melatonin and also thinking trazodone is not helping or could sleep fine without it reasonable to hold trazodone for three days. If no difference in sleep pattern is shown then patient to stop taking trazodone.      PLAN:                            Consider holding trazodone for about three nights, and if no difference on sleep then stop taking   We will consider checking all his medications next visit   PharmD to communicate with PCP on the replacement of atorvastatin     Follow-up: Due on 08/22/2024    SUBJECTIVE/OBJECTIVE:                          Ned Moore is a 63 year old male called for a follow up visit from 12/06/2023.    Reason for visit: Medication Review Initial.    Allergies/ADRs: Reviewed in chart  Past Medical History: Reviewed in chart  Tobacco: He reports that he has never smoked. He has never been exposed to tobacco smoke. He has never used smokeless tobacco.    Medication Adherence/Access: no issues reported    Type 2 Diabetes: Currently taking metformin 500 mg XR with supper and metformin 1000 mg with breakfast, and Semglee 2 - 5 units.  Patient stopped taking Ozempic due to weight loss, and taking Trulicity 1.5 mg weekly. Trulicity dose was reduced due injection site pain. He has not been injecting his semglee for sometime now.     Aspirin 81mg daily  Blood sugar monitoring: Continuous Glucose Monitor.Check below  Complained of the difference between the maría 3 readings and the glucometer sometimes up to 51 points difference.  Noted he does not sleep on the side where the sensor is.   Denies any low sugar symptoms  Current diabetes symptoms: Fatigue.   Diet/Exercise: He is eating three times a day and he does not have the appetite he used to have. This might be due to his stuffed nose. He has a little of nausea during the day. This is affecting his diet. Will consider to lower dose or stop the medication. He is doing a bit more exercises; going up and down the stairs.  Eye exam: up to date  Foot exam: due  Urine Albumin:             Wt Readings from Last 2 Encounters:   23 198 lb (89.8 kg)   23 189 lb (85.7 kg)      - His weight is 185 Pounds. Patient does not want to lose weight.  Lost about 2 - 3 pounds this last month.             Insomnia:   Trazodone 50 mg at bedtime as needed     Melatonin 3 mg at bedtime as needed   Patient said he is sleeping better at this time. Noted his not taking melatonin and thinks he might also not need trazodone.       Today's Vitals: There were no vitals taken for this visit.  ----------------      I spent 13 minutes with this patient today. All changes were made via collaborative practice agreement with Mark Briggs MD. A copy of the visit note was provided to the patient's provider(s).    A summary of these recommendations was sent via [employer].      Telemedicine Visit Details  Type of service:  Telephone visit  Start Time:  09:31 AM  End Time: 9:44 AM     Medication Therapy Recommendations  Insomnia, unspecified type    Current Medication: traZODone (DESYREL) 50 MG tablet ()   Rationale: No medical indication at this time - Unnecessary medication therapy - Indication   Recommendation: Discontinue Medication - Hold trazodone for 3 days and if no difference in sleep pattern please discontinue the medication   Status: Accepted per CPA              Syed Borjas, PharmD     Medication Therapy Management (MTM) Pharmacist     [phone]     [email or web address]      [facility]

## 2024-07-31 ENCOUNTER — THERAPY VISIT (OUTPATIENT)
Dept: PHYSICAL THERAPY | Facility: CLINIC | Age: 64
End: 2024-07-31
Payer: MEDICARE

## 2024-07-31 DIAGNOSIS — M25.551 HIP PAIN, RIGHT: ICD-10-CM

## 2024-07-31 PROCEDURE — 97161 PT EVAL LOW COMPLEX 20 MIN: CPT | Mod: GP | Performed by: PHYSICAL THERAPIST

## 2024-07-31 PROCEDURE — 97032 APPL MODALITY 1+ESTIM EA 15: CPT | Mod: GP | Performed by: PHYSICAL THERAPIST

## 2024-07-31 PROCEDURE — 97110 THERAPEUTIC EXERCISES: CPT | Mod: GP | Performed by: PHYSICAL THERAPIST

## 2024-07-31 ASSESSMENT — ACTIVITIES OF DAILY LIVING (ADL)
ADL_SCORE(%): 0
WALKING_UP_STEEP_HILLS: SLIGHT DIFFICULTY
JUMPING: UNABLE TO DO
HEAVY_WORK: MODERATE DIFFICULTY
GOING DOWN 1 FLIGHT OF STAIRS: SLIGHT DIFFICULTY
GETTING INTO AND OUT OF AN AVERAGE CAR: NO DIFFICULTY AT ALL
RECREATIONAL_ACTIVITIES: MODERATE DIFFICULTY
WALKING_DOWN_STEEP_HILLS: SLIGHT DIFFICULTY
LANDING: UNABLE TO DO
CUTTING/LATERAL_MOVEMENTS: SLIGHT DIFFICULTY
ADL_COUNT: 17
SITTING FOR 15 MINUTES: NO DIFFICULTY AT ALL
GETTING_INTO_AND_OUT_OF_A_BATHTUB: NO DIFFICULTY AT ALL
SPORTS_COUNT: 9
HEAVY_WORK: MODERATE DIFFICULTY
ADL_HIGHEST_POTENTIAL_SCORE: 68
WALKING_APPROXIMATELY_10_MINUTES: NO DIFFICULTY AT ALL
WALKING_15_MINUTES_OR_GREATER: SLIGHT DIFFICULTY
TWISTING/PIVOTING_ON_INVOLVED_LEG: SLIGHT DIFFICULTY
WALKING_FOR_APPROXIMATELY_10_MINUTES: NO DIFFICULTY AT ALL
GOING UP 1 FLIGHT OF STAIRS: SLIGHT DIFFICULTY
RECREATIONAL ACTIVITIES: MODERATE DIFFICULTY
GETTING_INTO_AND_OUT_OF_AN_AVERAGE_CAR: NO DIFFICULTY AT ALL
STARTING_AND_STOPPING_QUICKLY: MODERATE DIFFICULTY
SPORTS_HIGHEST_POTENTIAL_SCORE: 36
SPORTS_SCORE(%): 0
WALKING_UP_STEEP_HILLS: SLIGHT DIFFICULTY
PLEASE_INDICATE_YOR_PRIMARY_REASON_FOR_REFERRAL_TO_THERAPY:: HIP
WALKING_DOWN_STEEP_HILLS: SLIGHT DIFFICULTY
GOING_UP_1_FLIGHT_OF_STAIRS: SLIGHT DIFFICULTY
GOING_DOWN_1_FLIGHT_OF_STAIRS: SLIGHT DIFFICULTY
GETTING_INTO_AND_OUT_OF_A_BATHTUB: NO DIFFICULTY AT ALL
STEPPING UP AND DOWN CURBS: NO DIFFICULTY AT ALL
WALKING_INITIALLY: SLIGHT DIFFICULTY
ROLLING OVER IN BED: NO DIFFICULTY AT ALL
DEEP SQUATTING: MODERATE DIFFICULTY
LOW_IMPACT_ACTIVITIES_LIKE_FAST_WALKING: MODERATE DIFFICULTY
TWISTING/PIVOTING ON INVOLVED LEG: SLIGHT DIFFICULTY
STEPPING_UP_AND_DOWN_CURBS: NO DIFFICULTY AT ALL
ADL_TOTAL_ITEM_SCORE: 0
HOW_WOULD_YOU_RATE_YOUR_CURRENT_LEVEL_OF_FUNCTION?: ABNORMAL
HOS_ADL_HIGHEST_POTENTIAL_SCORE: 68
LIGHT_TO_MODERATE_WORK: SLIGHT DIFFICULTY
STANDING FOR 15 MINUTES: NO DIFFICULTY AT ALL
WALKING_INITIALLY: SLIGHT DIFFICULTY
RUNNING_ONE_MILE: UNABLE TO DO
HOS_ADL_SCORE(%): 79.41
PUTTING_ON_SOCKS_AND_SHOES: NO DIFFICULTY AT ALL
STANDING_FOR_15_MINUTES: NO DIFFICULTY AT ALL
DEEP_SQUATTING: MODERATE DIFFICULTY
ABILITY_TO_PERFORM_ACTIVITY_WITH_YOUR_NORMAL_TECHNIQUE: UNABLE TO DO
HOS_ADL_ITEM_SCORE_TOTAL: 54
PUTTING ON SOCKS AND SHOES: NO DIFFICULTY AT ALL
WALKING_15_MINUTES_OR_GREATER: SLIGHT DIFFICULTY
ROLLING_OVER_IN_BED: NO DIFFICULTY AT ALL
ABILITY_TO_PARTICIPATE_IN_YOUR_DESIRED_SPORT_AS_LONG_AS_YOU_WOULD_LIKE: UNABLE TO DO
SITTING_FOR_15_MINUTES: NO DIFFICULTY AT ALL
LIGHT_TO_MODERATE_WORK: SLIGHT DIFFICULTY
SPORTS_TOTAL_ITEM_SCORE: 0

## 2024-07-31 NOTE — PROGRESS NOTES
PHYSICAL THERAPY EVALUATION  Type of Visit: Evaluation             Subjective       Presenting condition or subjective complaint: Slow improvement Pt presents to PT with a chief complaint of R lateral hip pain with gradual worsening over the past few months. Pain worse with prolonged positions primarily. Pt has a history of a CVA in 05/2023 with R hemiparesis and has a history of R gluteal pain afterwards. Radiographs reveal mild-moderate OA at R hip. Pt recognizes his R hip weakness and would like to try NMES as it was helpful in the past     Date of onset: 06/05/24    Relevant medical history: Arthritis; Diabetes; Fibromyalgia; Kidney disease; Stroke   Dates & types of surgery:      Prior diagnostic imaging/testing results: MRI; X-ray     Prior therapy history for the same diagnosis, illness or injury: Yes Spring 2024      Living Environment  Social support: With family members   Type of home: House; Multi-level   Stairs to enter the home: Yes 12 Is there a railing: Yes     Ramp: No   Stairs inside the home: Yes 12 Is there a railing: Yes     Help at home: Home and Yard maintenance tasks  Equipment owned:       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  Hobbies/Interests: Computer,reading, home repair    Patient goals for therapy:      Pain assessment: See objective evaluation for additional pain details     Objective   HIP EVALUATION  PAIN: Pain Level at Rest: 1/10  Pain Level with Use: 3/10  Pain Location: hip and R lateral hip  Pain Quality: Aching and Sharp  Pain Frequency: intermittent  Pain is Worst: worse in the evening  Pain is Exacerbated By: prolonged positions (sitting>standing)  Pain is Relieved By: NSAIDs and use  Pain Progression: Worsened  INTEGUMENTARY (edema, incisions):   POSTURE:   GAIT:   Weightbearing Status: WBAT  Assistive Device(s): None  Gait Deviations:  R trendelenburg; impaired coordination with R foot placement; crosses midline  BALANCE/PROPRIOCEPTION: Single Leg Stance Eyes Open (seconds): R <3  sec  WEIGHTBEARING ALIGNMENT:   NON-WEIGHTBEARING ALIGNMENT:    ROM:   (Degrees) Left AROM Left PROM  Right AROM Right PROM   Hip Flexion    WNL   Hip Extension       Hip Abduction       Hip Adduction       Hip Internal Rotation    WNL   Hip External Rotation    Min loss, pain +   Knee Flexion       Knee Extension       Lumbar Side glide     Lumbar Flexion    Lumbar Extension    Pain:   End feel:     PELVIC/SI SCREEN:   STRENGTH:   Pain: - none + mild ++ moderate +++ severe  Strength Scale: 0-5/5 Left Right   Hip Flexion 5 3+   Hip Extension 5 2   Hip Abduction 5- 2   Hip Adduction     Hip Internal Rotation     Hip External Rotation 4 2   Knee Flexion     Knee Extension 5 4     LE FLEXIBILITY:   SPECIAL TESTS:   FUNCTIONAL TESTS: Double Leg Squat: Knee valgus, Hip internal rotation, and Improper use of glutes/hips  PALPATION:     Assessment & Plan   CLINICAL IMPRESSIONS  Medical Diagnosis: R hip pain/OA    Treatment Diagnosis: R hip pain and weakness   Impression/Assessment: Patient is a 64 year old male with R hip complaints.  The following significant findings have been identified: Pain, Decreased ROM/flexibility, Decreased strength, Impaired gait, and Impaired muscle performance. These impairments interfere with their ability to perform self care tasks, work tasks, recreational activities, household chores, household mobility, and community mobility as compared to previous level of function.     Clinical Decision Making (Complexity):  Clinical Presentation: Stable/Uncomplicated  Clinical Presentation Rationale: based on medical and personal factors listed in PT evaluation  Clinical Decision Making (Complexity): Low complexity    PLAN OF CARE  Treatment Interventions:  Interventions: Gait Training, Manual Therapy, Neuromuscular Re-education, Therapeutic Activity, Therapeutic Exercise    Long Term Goals     PT Goal 1  Goal Identifier: Stairs  Goal Description: Pt will be able to ascend/descend stairs reciprocally  and w/o HR  Rationale: to maximize safety and independence with performance of ADLs and functional tasks;to maximize safety and independence within the home;to maximize safety and independence within the community  Goal Progress: weakness/loss of control w/ step downs  Target Date: 10/23/24      Frequency of Treatment: 2x week  Duration of Treatment: 4 weeks then 1 x week for 4 weeks then 2 x month for 1 month    Recommended Referrals to Other Professionals:   Education Assessment:   Learner/Method: Patient;No Barriers to Learning    Risks and benefits of evaluation/treatment have been explained.   Patient/Family/caregiver agrees with Plan of Care.     Evaluation Time:     PT Eval, Low Complexity Minutes (85821): 20       Signing Clinician: Tommie Posada PT

## 2024-08-01 ENCOUNTER — MYC MEDICAL ADVICE (OUTPATIENT)
Dept: PULMONOLOGY | Facility: CLINIC | Age: 64
End: 2024-08-01
Payer: MEDICARE

## 2024-08-01 DIAGNOSIS — J44.1 COPD EXACERBATION (H): ICD-10-CM

## 2024-08-01 DIAGNOSIS — R06.2 WHEEZING: ICD-10-CM

## 2024-08-01 DIAGNOSIS — J44.9 CHRONIC OBSTRUCTIVE PULMONARY DISEASE, UNSPECIFIED COPD TYPE (H): Primary | ICD-10-CM

## 2024-08-01 RX ORDER — FLUTICASONE FUROATE AND VILANTEROL 100; 25 UG/1; UG/1
1 POWDER RESPIRATORY (INHALATION) DAILY
Qty: 60 EACH | Refills: 3 | Status: SHIPPED | OUTPATIENT
Start: 2024-08-01

## 2024-08-02 ENCOUNTER — THERAPY VISIT (OUTPATIENT)
Dept: PHYSICAL THERAPY | Facility: CLINIC | Age: 64
End: 2024-08-02
Attending: INTERNAL MEDICINE
Payer: COMMERCIAL

## 2024-08-02 DIAGNOSIS — M25.551 HIP PAIN, RIGHT: Primary | ICD-10-CM

## 2024-08-02 PROCEDURE — 97032 APPL MODALITY 1+ESTIM EA 15: CPT | Mod: GP | Performed by: PHYSICAL THERAPIST

## 2024-08-02 PROCEDURE — 97110 THERAPEUTIC EXERCISES: CPT | Mod: GP | Performed by: PHYSICAL THERAPIST

## 2024-08-06 PROBLEM — M25.551 HIP PAIN, RIGHT: Status: ACTIVE | Noted: 2023-12-21

## 2024-08-13 ENCOUNTER — THERAPY VISIT (OUTPATIENT)
Dept: PHYSICAL THERAPY | Facility: CLINIC | Age: 64
End: 2024-08-13
Attending: INTERNAL MEDICINE
Payer: MEDICARE

## 2024-08-13 DIAGNOSIS — M25.551 HIP PAIN, RIGHT: Primary | ICD-10-CM

## 2024-08-13 PROCEDURE — 97110 THERAPEUTIC EXERCISES: CPT | Mod: GP | Performed by: PHYSICAL THERAPIST

## 2024-08-13 PROCEDURE — 97032 APPL MODALITY 1+ESTIM EA 15: CPT | Mod: GP | Performed by: PHYSICAL THERAPIST

## 2024-08-16 ENCOUNTER — THERAPY VISIT (OUTPATIENT)
Dept: PHYSICAL THERAPY | Facility: CLINIC | Age: 64
End: 2024-08-16
Attending: INTERNAL MEDICINE
Payer: MEDICARE

## 2024-08-16 DIAGNOSIS — M25.551 HIP PAIN, RIGHT: Primary | ICD-10-CM

## 2024-08-16 PROCEDURE — 97110 THERAPEUTIC EXERCISES: CPT | Mod: GP | Performed by: PHYSICAL THERAPIST

## 2024-08-16 PROCEDURE — 97032 APPL MODALITY 1+ESTIM EA 15: CPT | Mod: GP | Performed by: PHYSICAL THERAPIST

## 2024-08-20 NOTE — PROGRESS NOTES
[facility]                                                                                   OUTPATIENT PHYSICAL THERAPY    PLAN OF TREATMENT FOR OUTPATIENT REHABILITATION   Patient's Last Name, First Name, Ned Sanches YOB: 1960   Provider's Name   [facility]   Medical Record No.  [id number]     Onset Date: 06/05/24  Start of Care Date: 07/31/24     Medical Diagnosis:  R hip pain/OA      PT Treatment Diagnosis:  R hip pain and weakness Plan of Treatment  Frequency/Duration: 2x week/ 4 weeks then 1 x week for 4 weeks then 2 x month for 1 month    Certification date from 07/31/24 to 10/23/24         See note for plan of treatment details and functional goals     Tommie Posada, PT                         I CERTIFY THE NEED FOR THESE SERVICES FURNISHED UNDER        THIS PLAN OF TREATMENT AND WHILE UNDER MY CARE     (Physician attestation of this document indicates review and certification of the therapy plan).              Referring Provider:  Mark Briggs    Initial Assessment  See Epic Evaluation- Start of Care Date: 07/31/24

## 2024-08-22 ENCOUNTER — VIRTUAL VISIT (OUTPATIENT)
Dept: PHARMACY | Facility: CLINIC | Age: 64
End: 2024-08-22

## 2024-08-22 DIAGNOSIS — N18.31 TYPE 2 DIABETES MELLITUS WITH STAGE 3A CHRONIC KIDNEY DISEASE, WITHOUT LONG-TERM CURRENT USE OF INSULIN (H): Primary | ICD-10-CM

## 2024-08-22 DIAGNOSIS — G47.00 INSOMNIA, UNSPECIFIED TYPE: ICD-10-CM

## 2024-08-22 DIAGNOSIS — E11.22 TYPE 2 DIABETES MELLITUS WITH STAGE 3A CHRONIC KIDNEY DISEASE, WITHOUT LONG-TERM CURRENT USE OF INSULIN (H): Primary | ICD-10-CM

## 2024-08-22 PROCEDURE — 99207 PR NO CHARGE LOS: CPT | Mod: 93

## 2024-08-22 NOTE — PROGRESS NOTES
Medication Therapy Management (MTM) Encounter    ASSESSMENT:                            Medication Adherence/Access: No issues identified    Type 2 Diabetes: Patient is not meeting A1c goal of < 7%.  Most of the CGM readings are within the target range (70 - 180) 64 %, below  goal of > 70%, and sugar readings increased likely due to diet. Patient plan to control sugar using diet. If sugar continues to elevate will consider increasing diabetes medications.     Insomnia: Since trazodone and melatonin are not making any difference on patient's sleep reasonable to discontinue them. Both medications were taken out of patient's medication list.     PLAN:                            PharmD to order labs for anemia due to blood loss, and patient to do blood work with his next renal appointment   If hemoglobin and iron levels are within goal discontinue iron supplement and also considering tapering of omeprazole slowly     Follow-up: Due on 09/26/2024    SUBJECTIVE/OBJECTIVE:                          Ned Moore is a 63 year old male called for a follow up visit from 12/06/2023.    Reason for visit: Medication Review Initial.    Allergies/ADRs: Reviewed in chart  Past Medical History: Reviewed in chart  Tobacco: He reports that he has never smoked. He has never been exposed to tobacco smoke. He has never used smokeless tobacco.    Medication Adherence/Access: no issues reported    Type 2 Diabetes: Currently taking metformin 500 mg XR with supper and metformin 1000 mg with breakfast, and Semglee 2 - 5 units.  Patient stopped taking Ozempic due to weight loss, and taking Trulicity 1.5 mg weekly. Trulicity dose was reduced due injection site pain. He has not been injecting his semglee for sometime now.     Aspirin 81mg daily  Blood sugar monitoring: Continuous Glucose Monitor.Check below  Complained of the difference between the maría 3 readings and the glucometer sometimes up to 51 points difference. Noted he does not sleep on  the side where the sensor is.   Denies any low sugar symptoms  Current diabetes symptoms: Fatigue.   Diet/Exercise: He is eating three times a day and he does not have the appetite he used to have. This might be due to his stuffed nose. He has a little of nausea during the day. This is affecting his diet. Will consider to lower dose or stop the medication. He is doing a bit more exercises; going up and down the stairs.  Eye exam: up to date  Foot exam: due  Urine Albumin:               Wt Readings from Last 2 Encounters:   12/06/23 198 lb (89.8 kg)   11/30/23 189 lb (85.7 kg)      - His weight is 185 Pounds. Patient does not want to lose weight.  Lost about 2 - 3 pounds this last month.           - Noted he has not been eating right and that is why his sugar is a bit higher than before; said he will change his diet.       Insomnia:   Trazodone 50 mg at bedtime as needed     Melatonin 3 mg at bedtime as needed   Patient noted he is not taking both medications and he did not see any difference in his sleep pattern.         Today's Vitals: There were no vitals taken for this visit.  ----------------      I spent 20 minutes with this patient today. All changes were made via collaborative practice agreement with Mark Briggs MD. A copy of the visit note was provided to the patient's provider(s).    A summary of these recommendations was sent via [employer].        Telemedicine Visit Details  Type of service:  Telephone visit  Start Time:  09:31 AM  End Time: 9:51 AM     Medication Therapy Recommendations  Insomnia, unspecified type    Current Medication: traZODone (DESYREL) 50 MG tablet (Discontinued)   Rationale: No medical indication at this time - Unnecessary medication therapy - Indication   Recommendation: Discontinue Medication   Status: Accepted per [facility]              Syed Borjas, PharmD     Medication Therapy Management (MTM) Pharmacist     [phone]     [email or web address]  [facility]

## 2024-08-22 NOTE — PATIENT INSTRUCTIONS
"Recommendations from today's MTM visit:                                                      MTM (medication therapy management) is a service provided by a clinical pharmacist designed to help you get the most of out of your medicines.   Today we reviewed what your medicines are for, how to know if they are working, that your medicines are safe and how to make your medicine regimen as easy as possible.      PharmD to order labs for anemia due to blood loss, and patient to do blood work with his next renal appointment   If hemoglobin and iron levels are within goal discontinue iron supplement and also considering tapering of omeprazole slowly     Follow-up: Due on 09/26/2024    It was great speaking with you today.  I value your experience and would be very thankful for your time in providing feedback in our clinic survey. In the next few days, you may receive an email or text message from [employer] with a link to a survey related to your  clinical pharmacist.\"     To schedule another MTM appointment, please call the clinic directly or you may call the MTM scheduling line at [phone].    My Clinical Pharmacist's contact information:                                                      Please feel free to contact me with any questions or concerns you have.        Syed Borjas, PharmD     Medication Therapy Management (MTM) Pharmacist     [phone]     [email or web address]     [facility]    "

## 2024-08-29 NOTE — TELEPHONE ENCOUNTER
[employer] message from patient:  I'm up for trying Ozempic and see what it can do.    Response:  Ok. I will send a request to  to send a prescription to your pharmacy if he agrees with our plan. The dosing is 0.25mg once a week for 4 weeks, if you are tolerating it ok then you increase to 0.5mg weekly. When starting a new medication it is a good idea to ask the pharmacist to go over the medication when you pick it up.  Make sure to go on the website and download the coupon before going to the pharmacy.  Even though your overall glucose levels are high, if you respond well to the Ozempic you can be at risk for low blood sugar while taking the glipizide. I would recommend you stop the glipizide and focus on healthy food choices to go along with the Ozempic.     If you are tolerating the Ozempic and doing well on the medication, after the 4 weeks on Ozempic 0.5mg we would prescribe the 1mg dose. We can discuss this at your follow up appointment.     Gayathri JACOBON, RN, PHN, CDCES    Detail Level: Zone Render Risk Assessment In Note?: no Additional Notes: Advised patient to give time in between applications of nail polish to allow time for nail to fully grow out.

## 2024-09-04 ENCOUNTER — VIRTUAL VISIT (OUTPATIENT)
Dept: RHEUMATOLOGY | Facility: CLINIC | Age: 64
End: 2024-09-04
Attending: STUDENT IN AN ORGANIZED HEALTH CARE EDUCATION/TRAINING PROGRAM
Payer: MEDICARE

## 2024-09-04 ENCOUNTER — TELEPHONE (OUTPATIENT)
Dept: PULMONOLOGY | Facility: CLINIC | Age: 64
End: 2024-09-04

## 2024-09-04 DIAGNOSIS — M30.1 EOSINOPHILIC GRANULOMATOSIS WITH POLYANGIITIS (EGPA) (H): Primary | ICD-10-CM

## 2024-09-04 DIAGNOSIS — D72.18 EOSINOPHILIC GRANULOMATOSIS WITH POLYANGIITIS (EGPA) (H): Primary | ICD-10-CM

## 2024-09-04 DIAGNOSIS — Z79.899 IMMUNOSUPPRESSION DUE TO DRUG THERAPY (H): ICD-10-CM

## 2024-09-04 DIAGNOSIS — D84.821 IMMUNOSUPPRESSION DUE TO DRUG THERAPY (H): ICD-10-CM

## 2024-09-04 PROCEDURE — 99442 PR PHYSICIAN TELEPHONE EVALUATION 11-20 MIN: CPT | Mod: 93 | Performed by: STUDENT IN AN ORGANIZED HEALTH CARE EDUCATION/TRAINING PROGRAM

## 2024-09-04 NOTE — TELEPHONE ENCOUNTER
PA Initiation    Medication: NUCALA 100 MG/ML [facility]  Insurance Company: [employer] - Phone [phone] Fax [phone]  Pharmacy Filling the Rx: [facility] - [place], [place] - [facility] [address]  Filling Pharmacy Phone: [phone]  Filling Pharmacy Fax: [phone]  Start Date: 9/4/2024    Key: [id number]

## 2024-09-04 NOTE — NURSING NOTE
Chief Complaint   Patient presents with    Follow Up     6 month      .Karol Cuevas MA on 9/4/2024 at 10:30 AM

## 2024-09-04 NOTE — LETTER
9/4/2024       RE: Ned Moore  [address]  [place] [address]     Dear Colleague,    Thank you for referring your patient, Ned Moore, to the [facility] RHEUMATOLOGY CLINIC [place] at [facility]. Please see a copy of my visit note below.    [facility] Outpatient Rheumatology Telephone visit Follow-up  Date of service: September 4, 2024    Patient name: Ned Moore  YOB: 1960  MRN: [id number]    Telephone visit (Televisit did not function) f/up: EGPA  Total time spent on phone: 12 minutes    I evaluated this patient with the Rheumatology Fellow by telephone. I agree with the findings and recommendations.    Khanh Del Cid M.D.  Staff Rheumatologist, [facility]  Pager [phone]      ---------------------------------------------------    HISTORY OF PRESENT ILLNESS:    Ned is a 63yoM w/ PMHx of COPD, systolic HF, DM and h/o CVA and EGPA w/ cardiac involvement who is presenting to rheumatology clinic for management of EGPA.     Rheumatological History:  - Admitted May of 2023 with abdominal pain, epistaxis, 30 lb weight loss and found to have patchy pulmonary opacities consistent with bronchopneumonia, marked peripheral eosinophilia (>400,000), myocarditis with reduced ejection fraction (48%), moderate proteinuria with mild hematuria, and multifocal ischemia on MRI brain with right sided LEweakness. 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 Inpatient Rheumatology diagnosed him with EGPA and started him on steroid taper and mepolizumab.   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   - Follows with pulmonology, last visit on 11/27/23 - planning to get repeat PFTs in April. Last PFTs in January 2023 - severe fixed obstructive physiology on pulmonary function testing, with FEV1 1.26 L  (36%), air trapping without hyperinflation, and mild DLCO reduction.     Pertinent Medications/interventions:   - Azathioprine: discontinued due to nausea, vomiting  - Prednisone taper, no longer on prednisone  - Mepolizumab: 6/26/23 - current     Previously followed by rheumatology through [facility]. Diagnosed with EGPA in 05/2023 at [facility]. He was last seen in 10/2023 at [facility] and plan was to continue monitoring. In 9/8/23 cyclophosphamide considered as next step while on mepolizumab and glucocorticoids however pt was concerned of side-effects/cost. Treatment was to be escalated if future CM showed disease activity though no evidence of this. Has tapered off prednisone. Presented to ED in 10/2023 with weakness, nausea and vomiting temporally related to starting azathioprine and improved once discontinued so this was stopped. At last rheumatology visit his breathing, cardiopulmonary issues were not worsening. No paresthesias and his foot drop was noted to be improved.     12/2023 - 3/6/2024 the patient saw GI for anemia and was recommended endoscopy which he has currently declined as he is concerned an issue may arise. Saw PCP and having falls related to his weakness. Also saw PM&R. Doing cardiac rehab. Pursuing disability. Continues to have nasal congestion. Saw ENT and recommended NS rinses and intranasal steroids. Saw rheumatology at [facility] and will be transitioning all cares to [facility]. Saw Nephrology and felt his EGPA was stable and recommended continuing with mepolizumab. Saw ENT again and noted significant improvement with mometasone spray. Had neuropsychiatric testing which revealed mild cognitive disorder that was likely a result of his CVA. Saw cardiology and started on lisinopril and heart monitor.     Interval history:  Doing pretty well. Working on Medicare. Has had part A/B for a few months and part D just got started this month. He is hoping it will help cover more  of mepolizumab as it is just too expensive for him.    Taking mepolizumab without missing it and no adverse effects. Sinuses are better, not using inhaled steroids either.  No issues with joints. [place] and doing lots of standing/walking and felt tired. Feels deconditioned. Exercises when he can, stretching, weight lifting. No f/c/s. No malaise. Thinks his hip arthritis is holding him back a bit. No rashes. No inflammatory eye symptoms. No cough, wheezing. No pleurisy. Uses stool softeners. No urinary issues.    Past Medical History:  Past Medical History:   Diagnosis Date     Chronic obstructive pulmonary disease, unspecified COPD type (H) 3/15/2023     Chronic systolic heart failure (H) 11/15/2023     Diabetes (H)      History of CVA (cerebrovascular accident) 7/20/2023     Past Surgical History:  No past surgical history on file.    Medications:  Current Outpatient Medications   Medication Sig Dispense Refill     ASPIRIN LOW DOSE 81 MG chewable tablet Take 81 mg by mouth daily       atorvastatin (LIPITOR) 20 MG tablet Take 1 tablet (20 mg) by mouth every evening 90 tablet 3     BD ALEXANDRA U/F 32G X 4 MM insulin pen needle        blood glucose monitoring (ACCU-CHEK FASTCLIX) lancets Use to test blood sugar once daily or as directed. 300 each 3     blood glucose test (ACCU-CHEK SMARTVIEW TEST STRIP) strips [BLOOD GLUCOSE TEST (ACCU-CHEK SMARTVIEW TEST STRIP) STRIPS] USE TO CHECK BLOOD SUGARS TWICE DAILY. 200 strip 3     Continuous Blood Gluc Sensor (FREESTYLE LIVIA 3 SENSOR) MISC 1 each every 14 days 2 each 11     ferrous sulfate (FEROSUL) 325 (65 Fe) MG tablet Take 1 tablet (325 mg) by mouth daily (with breakfast) 90 tablet 3     fluticasone-vilanterol (BREO ELLIPTA) 100-25 MCG/ACT inhaler Inhale 1 puff into the lungs daily 60 each 3     gabapentin (NEURONTIN) 300 MG capsule Take 1 capsule (300 mg) by mouth at bedtime 90 capsule 1     glipiZIDE (GLUCOTROL XL) 10 MG 24 hr tablet Take 1  tablet (10 mg) by mouth daily 90 tablet 3     lisinopril (ZESTRIL) 2.5 MG tablet Take 1 tablet (2.5 mg) by mouth daily 90 tablet 3     mepolizumab (NUCALA) 100 MG/ML injection (prefilled syringe) Inject 3 mLs (300 mg) Subcutaneous every 30 days 3 mL 11     metFORMIN (GLUCOPHAGE XR) 500 MG 24 hr tablet Take 1 tablet (500 mg) by mouth daily (with dinner) 360 tablet 3     metFORMIN (GLUCOPHAGE) 1000 MG tablet Take 1 tablet (1,000 mg) by mouth daily (with breakfast) 90 tablet 6     metoprolol succinate ER (TOPROL XL) 25 MG 24 hr tablet Take 0.5 tablets (12.5 mg) by mouth every morning 45 tablet 6     mometasone (NASONEX) 50 MCG/ACT nasal spray Spray 2 sprays into both nostrils daily 17 g 3     omeprazole (PRILOSEC) 20 MG DR capsule TAKE 1 CAPSULE BY MOUTH 2 TIMES DAILY. TAKE 1 HOUR BEFORE A MEAL.       tiotropium (SPIRIVA) 18 MCG inhaled capsule Inhale 1 capsule (18 mcg) into the lungs daily 18 capsule 3     TRULICITY 1.5 MG/0.5ML pen INJECT 1.5 MG SUBCUTANEOUSLY ONCE A WEEK       No current facility-administered medications for this visit.     Allergies:  Allergies   Allergen Reactions     Azathioprine Nausea and Vomiting     Dulaglutide Nausea     ---------------------------------------------------    OBJECTIVE:  There were no vitals taken for this visit.    No examination as visit done over the phone.    Labs:  WBC Count   Date Value Ref Range Status   05/02/2024 7.5 4.0 - 11.0 10e3/uL Final     Hemoglobin   Date Value Ref Range Status   05/02/2024 13.6 13.3 - 17.7 g/dL Final     Hemoglobin POCT   Date Value Ref Range Status   06/03/2024 13.6 g/dL Final     Platelet Count   Date Value Ref Range Status   05/02/2024 251 150 - 450 10e3/uL Final     Creatinine   Date Value Ref Range Status   05/02/2024 1.73 (H) 0.67 - 1.17 mg/dL Final     Lab Results   Component Value Date    ALKPHOS 88 02/16/2024     AST   Date Value Ref Range Status   02/16/2024 24 0 - 45 U/L Final     Comment:     Reference intervals for this test  "were updated on 6/12/2023 to more accurately reflect our healthy population. There may be differences in the flagging of prior results with similar values performed with this method. Interpretation of those prior results can be made in the context of the updated reference intervals.     Lab Results   Component Value Date    ALT 22 02/16/2024     Erythrocyte Sedimentation Rate   Date Value Ref Range Status   05/02/2024 10 0 - 20 mm/hr Final     No results found for: \"CRP\"  UA RESULTS:  Recent Labs   Lab Test 05/02/24  1435   COLOR Yellow   APPEARANCE Clear   URINEGLC 200*   URINEBILI Negative   URINEKETONE Negative   SG 1.025   UBLD Negative   URINEPH 5.0   PROTEIN Negative   NITRITE Negative   LEUKEST Moderate*   RBCU 2   WBCU 8*      Myeloperoxidase Antibody IgG   Date Value Ref Range Status   01/25/2024 Negative Negative Final     Proteinase 3 Antibody IgG   Date Value Ref Range Status   01/25/2024 Negative Negative Final     Imaging:   CT sinus 01/2024  Impression: Increased opacification of the ethmoid air cells and nasal  vault suggesting acute sinusitis. 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    PFTs in 06/2024 were normal.     ---------------------------------------------------------     ASSESSMENT & PLAN     Ned Moore is a 63yoM who is presenting to rheumatology clinic for management of EGPA.     EGPA in remission - Dx in 05/2023 at [facility]. MPO/PR3 negative. Symptoms began in 11/2022 with question of new onset COPD vs. asthma in a non-smoker. Constitutional symptoms then started and symptoms progressed to the point where he presented to the hospital. Admitted with abdominal pain, epistaxis, 30 lb weight loss and found to have patchy pulmonary opacities consistent with bronchopneumonia, marked peripheral eosinophilia (>400,000), elevated IgE, myocarditis with reduced ejection fraction (48%)  biopsy c/w eosinophilic myocarditis, moderate proteinuria with mild hematuria, and multifocal " ischemia on MRI brain with right sided LEweakness. Subsequently started on steroid taper and mepolizumab. Currently off prednisone since 09/2023. Continues treatment with mepolizumab 300 mg every 4 weeks - tolerating well. Cytoxan previously discussed if needing to escalate cares but patient c/f cost/side-effects and right now no clear indication for this. Nephrology also considering rituximab in future as reserve. Follows with pulmonology and repeat PFTs are normal.  Follows with nephrology for his CKD which predates his illness and depending on persistence of proteinuria/hematuria would consider kidney biopsy in future and additional immunosuppression if this were to be the case.     Cardiomyopathy is an independent predictor of mortality in EGPA and cyclophosphamide is the preferred agent especially when serologies are negative. ANCA-negative patient more frequently manifest cardiomyopathy and lung involvement. Whereas ANCA-positive patients have more periphral neuropathy, renal involvement and skin findings. ANCA-negative patients with less relapses but higher morbidity 2/2 cardiac involvement. Five-Factor Score was 2 at diagnosis with cardiac involvement and proteinuria which warrants more aggressive treatment. Maintenance of remission can be achieved with GCs, mepolizumab, rituximab and/or DMARDs.     Reference - https://doi.org/10.1038/[email or web address]     High risk medication use - mepolizumab     Chronic rhinosinusitis - doing better, not using inhaled GC as much     Plan (discussed with patient):  -- Continue mepolizumab 300 mg every 4 weeks for remission maintenance - tolerating well, being prescribed by pulmonology  -- Monitoring labs to be done tomorrow  -- Continue to follow with pulmonology and nephrology  -- For cardiac involvement, no c/f flare at this time (no CP, dyspnea, pleurisy, edema). Monitor closely. In future should we have c/f flare would order CMR.  -- For neurologic symptoms - continue  with leg strengthening exercises. Continues to wear AFO for right foot drop and this helps.     Follow-up: 6 months     Patient seen and staffed with Dr. Nehemias Strong DO  Rheumatology Fellow  PGY5        Again, thank you for allowing me to participate in the care of your patient.      Sincerely,    Jareth Strong DO

## 2024-09-04 NOTE — TELEPHONE ENCOUNTER
[facility] Prior Authorization Team   Phone: [phone]  Fax: [phone]    Patient has new insurance with [employer] mehta

## 2024-09-04 NOTE — PROGRESS NOTES
[facility] Outpatient Rheumatology Telephone visit Follow-up  Date of service: September 4, 2024    Patient name: Ned Moore  YOB: 1960  MRN: [id number]    Telephone visit (Televisit did not function) f/up: EGPA  Total time spent on phone: 12 minutes    I evaluated this patient with the Rheumatology Fellow by telephone. I agree with the findings and recommendations.    Khanh Del Cid M.D.  Staff Rheumatologist, [facility]  Pager [phone]      ---------------------------------------------------    HISTORY OF PRESENT ILLNESS:    Ned is a 63yoM w/ PMHx of COPD, systolic HF, DM and h/o CVA and EGPA w/ cardiac involvement who is presenting to rheumatology clinic for management of EGPA.     Rheumatological History:  - Admitted May of 2023 with abdominal pain, epistaxis, 30 lb weight loss and found to have patchy pulmonary opacities consistent with bronchopneumonia, marked peripheral eosinophilia (>400,000), myocarditis with reduced ejection fraction (48%), moderate proteinuria with mild hematuria, and multifocal ischemia on MRI brain with right sided LEweakness. 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 Inpatient Rheumatology diagnosed him with EGPA and started him on steroid taper and mepolizumab.   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   - Follows with pulmonology, last visit on 11/27/23 - planning to get repeat PFTs in April. Last PFTs in January 2023 - severe fixed obstructive physiology on pulmonary function testing, with FEV1 1.26 L (36%), air trapping without hyperinflation, and mild DLCO reduction.     Pertinent Medications/interventions:   - Azathioprine: discontinued due to nausea, vomiting  - Prednisone taper, no longer on prednisone  - Mepolizumab: 6/26/23 - current     Previously followed by rheumatology through [facility].  Diagnosed with EGPA in 05/2023 at [facility]. He was last seen in 10/2023 at [facility] and plan was to continue monitoring. In 9/8/23 cyclophosphamide considered as next step while on mepolizumab and glucocorticoids however pt was concerned of side-effects/cost. Treatment was to be escalated if future CM showed disease activity though no evidence of this. Has tapered off prednisone. Presented to ED in 10/2023 with weakness, nausea and vomiting temporally related to starting azathioprine and improved once discontinued so this was stopped. At last rheumatology visit his breathing, cardiopulmonary issues were not worsening. No paresthesias and his foot drop was noted to be improved.     12/2023 - 3/6/2024 the patient saw GI for anemia and was recommended endoscopy which he has currently declined as he is concerned an issue may arise. Saw PCP and having falls related to his weakness. Also saw PM&R. Doing cardiac rehab. Pursuing disability. Continues to have nasal congestion. Saw ENT and recommended NS rinses and intranasal steroids. Saw rheumatology at [facility] and will be transitioning all cares to [facility]. Saw Nephrology and felt his EGPA was stable and recommended continuing with mepolizumab. Saw ENT again and noted significant improvement with mometasone spray. Had neuropsychiatric testing which revealed mild cognitive disorder that was likely a result of his CVA. Saw cardiology and started on lisinopril and heart monitor.     Interval history:  Doing pretty well. Working on Medicare. Has had part A/B for a few months and part D just got started this month. He is hoping it will help cover more of mepolizumab as it is just too expensive for him.    Taking mepolizumab without missing it and no adverse effects. Sinuses are better, not using inhaled steroids either.  No issues with joints. [place] and doing lots of standing/walking and felt tired. Feels deconditioned.  Exercises when he can, stretching, weight lifting. No f/c/s. No malaise. Thinks his hip arthritis is holding him back a bit. No rashes. No inflammatory eye symptoms. No cough, wheezing. No pleurisy. Uses stool softeners. No urinary issues.    Past Medical History:  Past Medical History:   Diagnosis Date    Chronic obstructive pulmonary disease, unspecified COPD type (H) 3/15/2023    Chronic systolic heart failure (H) 11/15/2023    Diabetes (H)     History of CVA (cerebrovascular accident) 7/20/2023     Past Surgical History:  No past surgical history on file.    Medications:  Current Outpatient Medications   Medication Sig Dispense Refill    ASPIRIN LOW DOSE 81 MG chewable tablet Take 81 mg by mouth daily      atorvastatin (LIPITOR) 20 MG tablet Take 1 tablet (20 mg) by mouth every evening 90 tablet 3    BD ALEXANDRA U/F 32G X 4 MM insulin pen needle       blood glucose monitoring (ACCU-CHEK FASTCLIX) lancets Use to test blood sugar once daily or as directed. 300 each 3    blood glucose test (ACCU-CHEK SMARTVIEW TEST STRIP) strips [BLOOD GLUCOSE TEST (ACCU-CHEK SMARTVIEW TEST STRIP) STRIPS] USE TO CHECK BLOOD SUGARS TWICE DAILY. 200 strip 3    Continuous Blood Gluc Sensor (FREESTYLE LIVIA 3 SENSOR) MISC 1 each every 14 days 2 each 11    ferrous sulfate (FEROSUL) 325 (65 Fe) MG tablet Take 1 tablet (325 mg) by mouth daily (with breakfast) 90 tablet 3    fluticasone-vilanterol (BREO ELLIPTA) 100-25 MCG/ACT inhaler Inhale 1 puff into the lungs daily 60 each 3    gabapentin (NEURONTIN) 300 MG capsule Take 1 capsule (300 mg) by mouth at bedtime 90 capsule 1    glipiZIDE (GLUCOTROL XL) 10 MG 24 hr tablet Take 1 tablet (10 mg) by mouth daily 90 tablet 3    lisinopril (ZESTRIL) 2.5 MG tablet Take 1 tablet (2.5 mg) by mouth daily 90 tablet 3    mepolizumab (NUCALA) 100 MG/ML injection (prefilled syringe) Inject 3 mLs (300 mg) Subcutaneous every 30 days 3 mL 11    metFORMIN (GLUCOPHAGE XR) 500 MG 24 hr tablet Take 1 tablet (500  mg) by mouth daily (with dinner) 360 tablet 3    metFORMIN (GLUCOPHAGE) 1000 MG tablet Take 1 tablet (1,000 mg) by mouth daily (with breakfast) 90 tablet 6    metoprolol succinate ER (TOPROL XL) 25 MG 24 hr tablet Take 0.5 tablets (12.5 mg) by mouth every morning 45 tablet 6    mometasone (NASONEX) 50 MCG/ACT nasal spray Spray 2 sprays into both nostrils daily 17 g 3    omeprazole (PRILOSEC) 20 MG DR capsule TAKE 1 CAPSULE BY MOUTH 2 TIMES DAILY. TAKE 1 HOUR BEFORE A MEAL.      tiotropium (SPIRIVA) 18 MCG inhaled capsule Inhale 1 capsule (18 mcg) into the lungs daily 18 capsule 3    TRULICITY 1.5 MG/0.5ML pen INJECT 1.5 MG SUBCUTANEOUSLY ONCE A WEEK       No current facility-administered medications for this visit.     Allergies:  Allergies   Allergen Reactions    Azathioprine Nausea and Vomiting    Dulaglutide Nausea     ---------------------------------------------------    OBJECTIVE:  There were no vitals taken for this visit.    No examination as visit done over the phone.    Labs:  WBC Count   Date Value Ref Range Status   05/02/2024 7.5 4.0 - 11.0 10e3/uL Final     Hemoglobin   Date Value Ref Range Status   05/02/2024 13.6 13.3 - 17.7 g/dL Final     Hemoglobin POCT   Date Value Ref Range Status   06/03/2024 13.6 g/dL Final     Platelet Count   Date Value Ref Range Status   05/02/2024 251 150 - 450 10e3/uL Final     Creatinine   Date Value Ref Range Status   05/02/2024 1.73 (H) 0.67 - 1.17 mg/dL Final     Lab Results   Component Value Date    ALKPHOS 88 02/16/2024     AST   Date Value Ref Range Status   02/16/2024 24 0 - 45 U/L Final     Comment:     Reference intervals for this test were updated on 6/12/2023 to more accurately reflect our healthy population. There may be differences in the flagging of prior results with similar values performed with this method. Interpretation of those prior results can be made in the context of the updated reference intervals.     Lab Results   Component Value Date    ALT 22  "02/16/2024     Erythrocyte Sedimentation Rate   Date Value Ref Range Status   05/02/2024 10 0 - 20 mm/hr Final     No results found for: \"CRP\"  UA RESULTS:  Recent Labs   Lab Test 05/02/24  1435   COLOR Yellow   APPEARANCE Clear   URINEGLC 200*   URINEBILI Negative   URINEKETONE Negative   SG 1.025   UBLD Negative   URINEPH 5.0   PROTEIN Negative   NITRITE Negative   LEUKEST Moderate*   RBCU 2   WBCU 8*      Myeloperoxidase Antibody IgG   Date Value Ref Range Status   01/25/2024 Negative Negative Final     Proteinase 3 Antibody IgG   Date Value Ref Range Status   01/25/2024 Negative Negative Final     Imaging:   CT sinus 01/2024  Impression: Increased opacification of the ethmoid air cells and nasal  vault suggesting acute sinusitis. 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    PFTs in 06/2024 were normal.     ---------------------------------------------------------     ASSESSMENT & PLAN     Ned Moore is a 63yoM who is presenting to rheumatology clinic for management of EGPA.     EGPA in remission - Dx in 05/2023 at [facility]. MPO/PR3 negative. Symptoms began in 11/2022 with question of new onset COPD vs. asthma in a non-smoker. Constitutional symptoms then started and symptoms progressed to the point where he presented to the hospital. Admitted with abdominal pain, epistaxis, 30 lb weight loss and found to have patchy pulmonary opacities consistent with bronchopneumonia, marked peripheral eosinophilia (>400,000), elevated IgE, myocarditis with reduced ejection fraction (48%)  biopsy c/w eosinophilic myocarditis, moderate proteinuria with mild hematuria, and multifocal ischemia on MRI brain with right sided LEweakness. Subsequently started on steroid taper and mepolizumab. Currently off prednisone since 09/2023. Continues treatment with mepolizumab 300 mg every 4 weeks - tolerating well. Cytoxan previously discussed if needing to escalate cares but patient c/f cost/side-effects and right now no " clear indication for this. Nephrology also considering rituximab in future as reserve. Follows with pulmonology and repeat PFTs are normal.  Follows with nephrology for his CKD which predates his illness and depending on persistence of proteinuria/hematuria would consider kidney biopsy in future and additional immunosuppression if this were to be the case.     Cardiomyopathy is an independent predictor of mortality in EGPA and cyclophosphamide is the preferred agent especially when serologies are negative. ANCA-negative patient more frequently manifest cardiomyopathy and lung involvement. Whereas ANCA-positive patients have more periphral neuropathy, renal involvement and skin findings. ANCA-negative patients with less relapses but higher morbidity 2/2 cardiac involvement. Five-Factor Score was 2 at diagnosis with cardiac involvement and proteinuria which warrants more aggressive treatment. Maintenance of remission can be achieved with GCs, mepolizumab, rituximab and/or DMARDs.     Reference - https://doi.[email or web address]     High risk medication use - mepolizumab     Chronic rhinosinusitis - doing better, not using inhaled GC as much     Plan (discussed with patient):  -- Continue mepolizumab 300 mg every 4 weeks for remission maintenance - tolerating well, being prescribed by pulmonology  -- Monitoring labs to be done tomorrow  -- Continue to follow with pulmonology and nephrology  -- For cardiac involvement, no c/f flare at this time (no CP, dyspnea, pleurisy, edema). Monitor closely. In future should we have c/f flare would order CMR.  -- For neurologic symptoms - continue with leg strengthening exercises. Continues to wear AFO for right foot drop and this helps.     Follow-up: 6 months     Patient seen and staffed with Dr. Nehemias Strong, DO  Rheumatology Fellow  PGY5

## 2024-09-05 ENCOUNTER — LAB (OUTPATIENT)
Dept: LAB | Facility: CLINIC | Age: 64
End: 2024-09-05
Attending: INTERNAL MEDICINE
Payer: MEDICARE

## 2024-09-05 ENCOUNTER — PATIENT OUTREACH (OUTPATIENT)
Dept: NEPHROLOGY | Facility: CLINIC | Age: 64
End: 2024-09-05

## 2024-09-05 ENCOUNTER — OFFICE VISIT (OUTPATIENT)
Dept: NEPHROLOGY | Facility: CLINIC | Age: 64
End: 2024-09-05
Attending: INTERNAL MEDICINE
Payer: MEDICARE

## 2024-09-05 VITALS
TEMPERATURE: 98.1 F | HEART RATE: 83 BPM | BODY MASS INDEX: 24.09 KG/M2 | OXYGEN SATURATION: 95 % | WEIGHT: 187.6 LBS | DIASTOLIC BLOOD PRESSURE: 82 MMHG | SYSTOLIC BLOOD PRESSURE: 123 MMHG

## 2024-09-05 DIAGNOSIS — R82.81 PYURIA: ICD-10-CM

## 2024-09-05 DIAGNOSIS — D84.821 IMMUNOSUPPRESSION DUE TO DRUG THERAPY (H): ICD-10-CM

## 2024-09-05 DIAGNOSIS — R82.81 PYURIA: Primary | ICD-10-CM

## 2024-09-05 DIAGNOSIS — M30.1 EOSINOPHILIC GRANULOMATOSIS WITH POLYANGIITIS (EGPA) (H): ICD-10-CM

## 2024-09-05 DIAGNOSIS — N18.32 CKD STAGE 3B, GFR 30-44 ML/MIN (H): ICD-10-CM

## 2024-09-05 DIAGNOSIS — I15.1 HYPERTENSION SECONDARY TO OTHER RENAL DISORDERS: ICD-10-CM

## 2024-09-05 DIAGNOSIS — D84.9 IMMUNOSUPPRESSION (H): ICD-10-CM

## 2024-09-05 DIAGNOSIS — N18.32 STAGE 3B CHRONIC KIDNEY DISEASE (H): ICD-10-CM

## 2024-09-05 DIAGNOSIS — Z12.5 ENCOUNTER FOR SCREENING FOR MALIGNANT NEOPLASM OF PROSTATE: ICD-10-CM

## 2024-09-05 DIAGNOSIS — D72.18 EOSINOPHILIC GRANULOMATOSIS WITH POLYANGIITIS (EGPA) (H): ICD-10-CM

## 2024-09-05 DIAGNOSIS — Z79.899 IMMUNOSUPPRESSION DUE TO DRUG THERAPY (H): ICD-10-CM

## 2024-09-05 DIAGNOSIS — I77.6 VASCULITIS (H): ICD-10-CM

## 2024-09-05 LAB
ALBUMIN MFR UR ELPH: 36.9 MG/DL
ALBUMIN SERPL BCG-MCNC: 4.6 G/DL (ref 3.5–5.2)
ALBUMIN UR-MCNC: 30 MG/DL
ANION GAP SERPL CALCULATED.3IONS-SCNC: 12 MMOL/L (ref 7–15)
APPEARANCE UR: ABNORMAL
BASOPHILS # BLD AUTO: 0.1 10E3/UL (ref 0–0.2)
BASOPHILS NFR BLD AUTO: 1 %
BILIRUB UR QL STRIP: NEGATIVE
BUN SERPL-MCNC: 34.2 MG/DL (ref 8–23)
CALCIUM SERPL-MCNC: 10 MG/DL (ref 8.8–10.4)
CAOX CRY #/AREA URNS HPF: ABNORMAL /HPF
CHLORIDE SERPL-SCNC: 103 MMOL/L (ref 98–107)
COLOR UR AUTO: YELLOW
CREAT SERPL-MCNC: 1.9 MG/DL (ref 0.67–1.17)
CREAT UR-MCNC: 207 MG/DL
CREAT UR-MCNC: 209 MG/DL
CRP SERPL-MCNC: <3 MG/L
EGFRCR SERPLBLD CKD-EPI 2021: 39 ML/MIN/1.73M2
EOSINOPHIL # BLD AUTO: 0.1 10E3/UL (ref 0–0.7)
EOSINOPHIL NFR BLD AUTO: 1 %
ERYTHROCYTE [DISTWIDTH] IN BLOOD BY AUTOMATED COUNT: 11.9 % (ref 10–15)
ERYTHROCYTE [SEDIMENTATION RATE] IN BLOOD BY WESTERGREN METHOD: 7 MM/HR (ref 0–20)
FERRITIN SERPL-MCNC: 59 NG/ML (ref 31–409)
GLUCOSE SERPL-MCNC: 153 MG/DL (ref 70–99)
GLUCOSE UR STRIP-MCNC: 100 MG/DL
HCO3 SERPL-SCNC: 26 MMOL/L (ref 22–29)
HCT VFR BLD AUTO: 44.7 % (ref 40–53)
HGB BLD-MCNC: 15 G/DL (ref 13.3–17.7)
HGB UR QL STRIP: ABNORMAL
HYALINE CASTS: 1 /LPF
IMM GRANULOCYTES # BLD: 0 10E3/UL
IMM GRANULOCYTES NFR BLD: 0 %
IRON BINDING CAPACITY (ROCHE): 283 UG/DL (ref 240–430)
IRON SATN MFR SERPL: 22 % (ref 15–46)
IRON SERPL-MCNC: 61 UG/DL (ref 61–157)
KETONES UR STRIP-MCNC: NEGATIVE MG/DL
LEUKOCYTE ESTERASE UR QL STRIP: ABNORMAL
LYMPHOCYTES # BLD AUTO: 2.2 10E3/UL (ref 0.8–5.3)
LYMPHOCYTES NFR BLD AUTO: 27 %
MCH RBC QN AUTO: 29.1 PG (ref 26.5–33)
MCHC RBC AUTO-ENTMCNC: 33.6 G/DL (ref 31.5–36.5)
MCV RBC AUTO: 87 FL (ref 78–100)
MICROALBUMIN UR-MCNC: 58.1 MG/L
MICROALBUMIN/CREAT UR: 27.8 MG/G CR (ref 0–17)
MONOCYTES # BLD AUTO: 0.6 10E3/UL (ref 0–1.3)
MONOCYTES NFR BLD AUTO: 7 %
MUCOUS THREADS #/AREA URNS LPF: PRESENT /LPF
NEUTROPHILS # BLD AUTO: 5.1 10E3/UL (ref 1.6–8.3)
NEUTROPHILS NFR BLD AUTO: 64 %
NITRATE UR QL: NEGATIVE
NRBC # BLD AUTO: 0 10E3/UL
NRBC BLD AUTO-RTO: 0 /100
PH UR STRIP: 5 [PH] (ref 5–7)
PHOSPHATE SERPL-MCNC: 3.6 MG/DL (ref 2.5–4.5)
PLATELET # BLD AUTO: 196 10E3/UL (ref 150–450)
POTASSIUM SERPL-SCNC: 5.2 MMOL/L (ref 3.4–5.3)
PROT/CREAT 24H UR: 0.18 MG/MG CR (ref 0–0.2)
PSA SERPL DL<=0.01 NG/ML-MCNC: 0.28 NG/ML (ref 0–4.5)
RBC # BLD AUTO: 5.16 10E6/UL (ref 4.4–5.9)
RBC URINE: 8 /HPF
SODIUM SERPL-SCNC: 141 MMOL/L (ref 135–145)
SP GR UR STRIP: 1.03 (ref 1–1.03)
SPERM #/AREA URNS HPF: PRESENT /HPF
SQUAMOUS EPITHELIAL: 1 /HPF
TRANSITIONAL EPI: <1 /HPF
UROBILINOGEN UR STRIP-MCNC: NORMAL MG/DL
WBC # BLD AUTO: 8 10E3/UL (ref 4–11)
WBC URINE: 86 /HPF

## 2024-09-05 PROCEDURE — 83550 IRON BINDING TEST: CPT | Performed by: PATHOLOGY

## 2024-09-05 PROCEDURE — 82728 ASSAY OF FERRITIN: CPT | Performed by: PATHOLOGY

## 2024-09-05 PROCEDURE — 85025 COMPLETE CBC W/AUTO DIFF WBC: CPT | Performed by: PATHOLOGY

## 2024-09-05 PROCEDURE — 36415 COLL VENOUS BLD VENIPUNCTURE: CPT | Performed by: PATHOLOGY

## 2024-09-05 PROCEDURE — 83516 IMMUNOASSAY NONANTIBODY: CPT | Performed by: INTERNAL MEDICINE

## 2024-09-05 PROCEDURE — 86140 C-REACTIVE PROTEIN: CPT | Performed by: PATHOLOGY

## 2024-09-05 PROCEDURE — 82043 UR ALBUMIN QUANTITATIVE: CPT | Performed by: INTERNAL MEDICINE

## 2024-09-05 PROCEDURE — G0103 PSA SCREENING: HCPCS | Performed by: PATHOLOGY

## 2024-09-05 PROCEDURE — 83540 ASSAY OF IRON: CPT | Performed by: PATHOLOGY

## 2024-09-05 PROCEDURE — G0463 HOSPITAL OUTPT CLINIC VISIT: HCPCS | Performed by: INTERNAL MEDICINE

## 2024-09-05 PROCEDURE — 85652 RBC SED RATE AUTOMATED: CPT | Performed by: PATHOLOGY

## 2024-09-05 PROCEDURE — 84156 ASSAY OF PROTEIN URINE: CPT | Performed by: PATHOLOGY

## 2024-09-05 PROCEDURE — 80069 RENAL FUNCTION PANEL: CPT | Performed by: PATHOLOGY

## 2024-09-05 PROCEDURE — 99000 SPECIMEN HANDLING OFFICE-LAB: CPT | Performed by: PATHOLOGY

## 2024-09-05 PROCEDURE — 81001 URINALYSIS AUTO W/SCOPE: CPT | Performed by: PATHOLOGY

## 2024-09-05 PROCEDURE — 83876 ASSAY MYELOPEROXIDASE: CPT | Performed by: INTERNAL MEDICINE

## 2024-09-05 PROCEDURE — 87086 URINE CULTURE/COLONY COUNT: CPT | Performed by: INTERNAL MEDICINE

## 2024-09-05 RX ORDER — AMOXICILLIN AND CLAVULANATE POTASSIUM 500; 125 MG/1; MG/1
1 TABLET, FILM COATED ORAL 2 TIMES DAILY
Qty: 14 TABLET | Refills: 0 | Status: SHIPPED | OUTPATIENT
Start: 2024-09-05 | End: 2024-09-26

## 2024-09-05 ASSESSMENT — PAIN SCALES - GENERAL: PAINLEVEL: NO PAIN (0)

## 2024-09-05 NOTE — PROGRESS NOTES
Voicemail left for patient to schedule in Tonny Kirkland RN, BSN, PHN  [facility] Nephrology Nurse Navigator  [phone]

## 2024-09-05 NOTE — LETTER
9/5/2024       RE: Ned Moore  [address]  [place] [address]     Dear Colleague,    Thank you for referring your patient, Ned Moore, to the [facility] [place] at [facility]. Please see a copy of my visit note below.    Nephrology Clinic    Ned Moore MRN:[id number] YOB: 1960  Date of Service: 09/07/2024  Primary care provider: Mark Briggs  Requesting physician: Mark Briggs   Pulmonologist: Dr Biswas at [facility]  Cardiologist: Dr Efren Farias       REASON FOR CONSULT: establish care for EGPA at [facility]    HISTORY OF PRESENT ILLNESS:   Ned Moore is a 63 year old male who presents establish care for EGPA at [facility] and have all of his care within the same system.  Mr Moore's history of present illness started in Dec 2022, at which time he developed a new onset of respiratory illness consistent with asthma with wheezing and HAYNES.  He had never had any respiratory disease prior to that, and has never smoked.  He was treated with a short course of prednisone with marked improvement , but the symptoms kept recurring after the prednisone was discontinued.  Inhalers did not seem to keep the asthma under control.   He was able to work only on and off in the winter for 2023  In May 2023, he suffered from a severe epistaxis episode (requiring blood transfusion, and associated with hypotension) that prompted him to go to the ED.  He was admitted to [facility] and had a prolonged hospitalization during which he had , where he was initially found to have severe eosinophilia, hyponatremia and troponin elevation,  myocarditis, multifocal acute strokes, liver lesions concerning for abscesses, highest suspicion for EPGA. He underwent an extensive work up with cardiology, rheumatology, GI, neurology infectious disease, pulmonary and hematology. 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 Marked improvment with steroids and meprolizumab (5/26).   - completed 3 days 500 mg solumedrol; decreased to 60 mg prednisone started 5/25.  Eosinophilic myocarditis confirmed with biopsy. Presenting with ST depressions and elevated troponin. Cardiology evaluation, elevated troponin more consistent with demand ischemia. TTE w/o WMA. Cardiac MRI obtained with findings consistent with myocarditis.     With respect to multifocal acute cerebral infarcts, stroke code was called on 05/20 with acute gaze deviation, worsening right-sided weakness, seen by Neuro Critical Care, felt possibly secondary to an episode of orthostatic hypotension.   He describes amnesia related to the events.  He experienced slurred speech, weakness of the R arm and leg, and left lower quadrantanopsia.  He was transferred to rehab on 6/8/2023 until June 30.   In rehab and since then, he reports regaining significant amount of strength in the right arm/.  The R leg is also improved, but remains weak.  He is now able to drive, walk without a walker, but still appears to have RLExt proximal muscle weakness.  He remains on mepolizumab monthly.  He was started on Azathioprine on 9/8/23 -> but this resulted in recurrent episodes of sever N/V and associated dehydration until he stopped the azathioprine.    Today, Mr Moore reports feeling generally well.   He denies SOB, HAYNES, Cough.  No CP.   Denies GI or  symptoms.  Has nocturia 1x/night  No melena or hematochezia.  No gross hematuria  Reports nasal congestion on the R side , without purulent nasal discharge.  Residual R leg weakness.     January 25, 2024  IN PT.  L Ext strength is improving.  No wheezing, SOB, CP  Cardiac rehab.  Reports good O2 sats HR in low 100's  Saw ENT.  Had CT. Had severe congestion.   Has a polyp    On topical steroid and daily steroid rinses.    Sense of smell in improving  No GI spms.  Diarrhea resolved after  stopping aza  On Fe supplement.  "-> gets some N with it,  No  spms.  Nocturia once a night.  No hesitancy, straining or frequency.    BP was 110 at cardiac rehab a week ago.  Reports \"dehydration\" and SBP in 80's a few weeks ago.    Is scheduled to have EGD and Colonoscopy    May 2, 2024  FEELS generally well.  No epistaxis, oral ulcers, SOB, CP.  Gaining strength in l ext  Able to walk at least 30 min.   Can climb a flight of stair with HAYNES, but needs to hold to railing bec of weakness.  No GI,  spms   nocturia usually at 3 am.  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    September 5, 2024  Mr Moore is here for routine follow up.   He feels generally well.  No change in SOB.  He climbs stairs at home twice a day and walks around the block.   His insurance dropped coverage for PT, so he is mostly doing this on his own at home (weakness post CVA)  He has chronic L Ext neuropathy with tingling - long standing and appears related to underlying diabetes.  He reports no recent change.  No GI symptoms.     Mr Moore denies dysuria, frequency, gross hematuria or nocturia.  He had 1 prior episode of \"uti\" for which he received antibiotics.    PAST MEDICAL HISTORY:  Past Medical History:   Diagnosis Date     Chronic obstructive pulmonary disease, unspecified COPD type (H) 3/15/2023     Chronic systolic heart failure (H) 11/15/2023     Diabetes (H)      History of CVA (cerebrovascular accident) 7/20/2023   Diabetes diagnosed about 8 years ago, but this was already associated with L ext neuropathy suggesting that onset of disease was probably a decade before. He was previously on empagliflozin from Feb 2023 until Sept/Oct 2023, after a UTI requiring antibiotic treatment.   ?PSVT during hospitalization   He never had a colonoscopy, but has been tested with cologuard -> reportedly neg.       PAST SURGICAL HISTORY:  No past surgical history on file.  MEDICATIONS:    Current Outpatient Medications  reviewed with patient May 2, 2024   Medication Sig " Dispense Refill     ACCU-CHEK FASTCLIX LANCET DRUM [ACCU-CHEK FASTCLIX LANCET DRUM] TEST BLOOD SUGARS 3 TIMES DAILY 300 each 3            atorvastatin (LIPITOR) 20 MG tablet TAKE 1 TABLET BY MOUTH EVERY DAY IN THE EVENING 90 tablet 1     BD ALEXANDRA U/F 32G X 4 MM insulin pen needle        blood glucose test (ACCU-CHEK SMARTVIEW TEST STRIP) strips [BLOOD GLUCOSE TEST (ACCU-CHEK SMARTVIEW TEST STRIP) STRIPS] USE TO CHECK BLOOD SUGARS TWICE DAILY. 200 strip 3     Continuous Blood Gluc Sensor (FREESTYLE LIVIA 2 SENSOR) MISC 1 each every 14 days 2 each 11     dulaglutide (TRULICITY) 1.5 MG/0.5ML pen    Currently on 0.75 mg weekly,  plan to increase in 2 weeks. Inject 1.5 mg Subcutaneous every 7 days 2 mL 0     ferrous sulfate (FEROSUL) 325 (65 Fe) MG tablet Take 1 tablet (325 mg) by mouth daily (with breakfast) 90 tablet 3     fluticasone-vilanterol (BREO ELLIPTA) 100-25 MCG/ACT inhaler Inhale 1 puff into the lungs daily 60 each 6     gabapentin (NEURONTIN) 300 MG capsule Take 300 mg by mouth At Bedtime              melatonin 3 MG tablet Take 9 mg by mouth nightly as needed for sleep       Mepolizumab 100 MG/ML SOSY Inject 3 mLs (300 mg) Subcutaneous every 30 days 3 mL 11     metFORMIN (GLUCOPHAGE XR) 500 MG 24 hr tablet Take 1 tablet (500 mg) by mouth daily (with dinner) 360 tablet 3     metFORMIN (GLUCOPHAGE) 1000 MG tablet Take 1 tablet (1,000 mg) by mouth daily (with breakfast) 90 tablet 6     metoprolol succinate ER (TOPROL XL) 25 MG 24 hr tablet Take 0.5 tablets (12.5 mg) by mouth every morning 45 tablet 6     mometasone (NASONEX) 50 MCG/ACT nasal spray Spray 2 sprays into both nostrils daily 17 g 3     omeprazole (PRILOSEC) 20 MG DR capsule TAKE 1 CAPSULE BY MOUTH 2 TIMES DAILY. TAKE 1 HOUR BEFORE A MEAL.       tiotropium (SPIRIVA) 18 MCG inhaled capsule Inhale 1 capsule (18 mcg) into the lungs daily 18 capsule 3     traZODone (DESYREL) 50 MG tablet Take 50 mg by mouth daily as needed for sleep       ALLERGIES:     Allergies   Allergen Reactions     Azathioprine Nausea and Vomiting     Dulaglutide Nausea     REVIEW OF SYSTEMS:  A comprehensive review of systems was performed and found to be negative except as described here or above.  SOCIAL HISTORY:   Social History     Socioeconomic History     Marital status:      Spouse name: Not on file     Number of children: Not on file     Years of education: Not on file     Highest education level: Not on file   Occupational History     Not on file   Tobacco Use     Smoking status: Never     Passive exposure: Never     Smokeless tobacco: Never   Vaping Use     Vaping status: Never Used   Substance and Sexual Activity     Alcohol use: Not Currently     Comment: a beer or wine every few months     Drug use: Never     Sexual activity: Not Currently     Partners: Female   Other Topics Concern     Parent/sibling w/ CABG, MI or angioplasty before 65F 55M? No   Social History Narrative     Not on file     Social Determinants of Health     Financial Resource Strain: Low Risk  (5/31/2024)    Financial Resource Strain      Within the past 12 months, have you or your family members you live with been unable to get utilities (heat, electricity) when it was really needed?: No   Food Insecurity: High Risk (5/31/2024)    Food Insecurity      Within the past 12 months, did you worry that your food would run out before you got money to buy more?: Yes      Within the past 12 months, did the food you bought just not last and you didn t have money to get more?: Yes   Transportation Needs: Low Risk  (5/31/2024)    Transportation Needs      Within the past 12 months, has lack of transportation kept you from medical appointments, getting your medicines, non-medical meetings or appointments, work, or from getting things that you need?: No   Physical Activity: Insufficiently Active (5/31/2024)    Exercise Vital Sign      Days of Exercise per Week: 3 days      Minutes of Exercise per Session: 20 min    Stress: No Stress Concern Present (5/31/2024)    [employer] - Occupational Stress Questionnaire      Feeling of Stress : Not at all   Social Connections: Socially Isolated (5/31/2024)    Social Connection and Isolation Panel [NHANES]      Frequency of Communication with Friends and Family: Once a week      Frequency of Social Gatherings with Friends and Family: More than three times a week      Attends Adventism Services: Never      Active Member of Clubs or Organizations: No      Attends Club or Organization Meetings: Never      Marital Status:    Interpersonal Safety: Low Risk  (6/5/2024)    Interpersonal Safety      Do you feel physically and emotionally safe where you currently live?: Yes      Within the past 12 months, have you been hit, slapped, kicked or otherwise physically hurt by someone?: No      Within the past 12 months, have you been humiliated or emotionally abused in other ways by your partner or ex-partner?: No   Housing Stability: High Risk (5/31/2024)    Housing Stability      Do you have housing? : Yes      Are you worried about losing your housing?: Yes     FAMILY MEDICAL HISTORY:   Family History   Problem Relation Age of Onset     Heart Disease Mother      Diabetes Mother      Asthma Mother      Alzheimer Disease Father      No Known Problems Brother      PHYSICAL EXAM:   /82 (BP Location: Right arm, Patient Position: Sitting, Cuff Size: Adult Regular)   Pulse 83   Temp 98.1  F (36.7  C) (Oral)   Wt 85.1 kg (187 lb 9.6 oz)   SpO2 95%   BMI 24.09 kg/m    GENERAL APPEARANCE: alert and no distress  EYES: nonicteric  NECK: supple, no adenopathy  Carotids 2+ bilat without bruits  RESP: lungs clear to auscultation   CV: regular rhythm, normal rate, no rub  Extremities: trace-1+ ankle and distal shin edema on R.  None on L.  MS: no evidence of inflammation in joints, no muscle tenderness  SKIN: no rash.   NEURO: mentation intact and speech normal.     PSYCH: affect normal/bright   LABS:   Recent Results (from the past 672 hour(s))   CRP inflammation    Collection Time: 09/05/24  3:11 PM   Result Value Ref Range    CRP Inflammation <3.00 <5.00 mg/L   ESR    Collection Time: 09/05/24  3:11 PM   Result Value Ref Range    Erythrocyte Sedimentation Rate 7 0 - 20 mm/hr   Renal panel    Collection Time: 09/05/24  3:11 PM   Result Value Ref Range    Sodium 141 135 - 145 mmol/L    Potassium 5.2 3.4 - 5.3 mmol/L    Chloride 103 98 - 107 mmol/L    Carbon Dioxide (CO2) 26 22 - 29 mmol/L    Anion Gap 12 7 - 15 mmol/L    Glucose 153 (H) 70 - 99 mg/dL    Urea Nitrogen 34.2 (H) 8.0 - 23.0 mg/dL    Creatinine 1.90 (H) 0.67 - 1.17 mg/dL    GFR Estimate 39 (L) >60 mL/min/1.73m2    Calcium 10.0 8.8 - 10.4 mg/dL    Albumin 4.6 3.5 - 5.2 g/dL    Phosphorus 3.6 2.5 - 4.5 mg/dL   Myeloperoxidase and Proteinase 3 Panel    Collection Time: 09/05/24  3:11 PM   Result Value Ref Range    MPO Janice IgG Instrument Value <0.3 <3.5 U/mL    Myeloperoxidase Antibody IgG Negative Negative    Proteinase 3 Janice IgG Instrument Value <1.0 <2.0 U/mL    Proteinase 3 Antibody IgG Negative Negative   Ferritin    Collection Time: 09/05/24  3:11 PM   Result Value Ref Range    Ferritin 59 31 - 409 ng/mL   Iron and iron binding capacity    Collection Time: 09/05/24  3:11 PM   Result Value Ref Range    Iron 61 61 - 157 ug/dL    Iron Binding Capacity 283 240 - 430 ug/dL    Iron Sat Index 22 15 - 46 %   CBC with platelets and differential    Collection Time: 09/05/24  3:11 PM   Result Value Ref Range    WBC Count 8.0 4.0 - 11.0 10e3/uL    RBC Count 5.16 4.40 - 5.90 10e6/uL    Hemoglobin 15.0 13.3 - 17.7 g/dL    Hematocrit 44.7 40.0 - 53.0 %    MCV 87 78 - 100 fL    MCH 29.1 26.5 - 33.0 pg    MCHC 33.6 31.5 - 36.5 g/dL    RDW 11.9 10.0 - 15.0 %    Platelet Count 196 150 - 450 10e3/uL    % Neutrophils 64 %    % Lymphocytes 27 %    % Monocytes 7 %    % Eosinophils 1 %    % Basophils 1 %    % Immature Granulocytes 0 %     NRBCs per 100 WBC 0 <1 /100    Absolute Neutrophils 5.1 1.6 - 8.3 10e3/uL    Absolute Lymphocytes 2.2 0.8 - 5.3 10e3/uL    Absolute Monocytes 0.6 0.0 - 1.3 10e3/uL    Absolute Eosinophils 0.1 0.0 - 0.7 10e3/uL    Absolute Basophils 0.1 0.0 - 0.2 10e3/uL    Absolute Immature Granulocytes 0.0 <=0.4 10e3/uL    Absolute NRBCs 0.0 10e3/uL   Prostate Specific Antigen Screen    Collection Time: 09/05/24  3:11 PM   Result Value Ref Range    Prostate Specific Antigen Screen 0.28 0.00 - 4.50 ng/mL   Routine UA with microscopic    Collection Time: 09/05/24  3:18 PM   Result Value Ref Range    Color Urine Yellow Colorless, Straw, Light Yellow, Yellow    Appearance Urine Slightly Cloudy (A) Clear    Glucose Urine 100 (A) Negative mg/dL    Bilirubin Urine Negative Negative    Ketones Urine Negative Negative mg/dL    Specific Gravity Urine 1.029 1.003 - 1.035    Blood Urine Trace (A) Negative    pH Urine 5.0 5.0 - 7.0    Protein Albumin Urine 30 (A) Negative mg/dL    Urobilinogen Urine Normal Normal, 2.0 mg/dL    Nitrite Urine Negative Negative    Leukocyte Esterase Urine Large (A) Negative    Mucus Urine Present (A) None Seen /LPF    Calcium Oxalate Crystals Urine Moderate (A) None Seen /HPF    Sperm Urine Present (A) None Seen /HPF    RBC Urine 8 (H) <=2 /HPF    WBC Urine 86 (H) <=5 /HPF    Squamous Epithelials Urine 1 <=1 /HPF    Transitional Epithelials Urine <1 <=1 /HPF    Hyaline Casts Urine 1 <=2 /LPF   Protein  random urine    Collection Time: 09/05/24  3:18 PM   Result Value Ref Range    Total Protein Urine mg/dL 36.9   mg/dL    Total Protein Urine mg/mg Creat 0.18 0.00 - 0.20 mg/mg Cr    Creatinine Urine mg/dL 207.0 mg/dL   Albumin Random Urine Quantitative with Creat Ratio    Collection Time: 09/05/24  3:18 PM   Result Value Ref Range    Creatinine Urine mg/dL 209.0 mg/dL    Albumin Urine mg/L 58.1 mg/L    Albumin Urine mg/g Cr 27.80 (H) 0.00 - 17.00 mg/g Cr   Urine Culture    Collection Time: 09/05/24  3:18 PM     Specimen: Urine, Midstream   Result Value Ref Range    Culture 10,000-50,000 CFU/mL Mixture of Urogenital Catie          CMP  Recent Labs   Lab Test 09/05/24  1511 05/02/24  1419 02/16/24  1518 01/25/24  1533 11/30/23  1209 10/26/23  1149 10/26/23  1147 04/26/23  0824 07/05/22  0933 03/21/22  1112 08/23/21  1152 01/27/21  1047 10/30/19  0923 07/29/19  0851 04/15/19  1126    139 137 140 140   < > 133* 139   < > 140   < > 140 139 138 138   POTASSIUM 5.2 5.0 4.7 5.0 4.5   < > 4.6 4.9   < > 4.5   < > 4.5 4.3 4.6 4.4   CHLORIDE 103 101 101 103 101   < > 94* 99   < > 102   < > 102 102 102 100   CO2 26 27 23 28 28   < > 22 27   < > 25   < > 27 29 24 28   ANIONGAP 12 11 13 9 11   < > 17* 13   < > 13   < > 11 8 12 10   * 208* 187* 183* 130*   < > 255* 173*   < > 193*   < > 118 139* 152* 163*   BUN 34.2* 37.1* 37.7* 23.1* 24.5*   < > 24.9* 22.5   < > 18   < > 24* 21 24* 21   CR 1.90* 1.73* 1.75* 1.69* 1.64*   < > 1.65* 1.35*   < > 1.51*   < > 1.41* 1.39* 1.37* 1.44*   GFRESTIMATED 39* 43* 43* 45* 46*   < > 46* 59*   < > 52*   < > 51* 52* 53* 50*   GFRESTBLACK  --   --   --   --   --   --   --   --   --   --   --  >60 >60 >60 >60   ANAI 10.0 9.4 9.3 9.4 9.9   < > 9.9 9.4   < > 9.9   < > 9.4 9.6 9.7 10.0   MAG  --   --  2.5*  --   --   --   --   --   --   --   --   --   --   --   --    PHOS 3.6 3.8  --  3.4 3.7  --   --   --    < >  --   --   --   --   --   --    PROTTOTAL  --   --  7.1  --   --   --  7.9 7.2  --  7.1  --  7.6  --   --  7.5   ALBUMIN 4.6 4.3 4.3 4.4 4.2  --  4.2 4.1   < > 3.9  --  4.6  --   --  4.5   BILITOTAL  --   --  0.3  --   --   --  0.3 0.3  --  0.5  --  0.7  --   --  0.6   ALKPHOS  --   --  88  --   --   --  246* 103  --  98  --  75  --   --  75   AST  --   --  24  --   --   --  20 24  --  17  --  22  --   --  29   ALT  --   --  22  --   --   --  22 23  --  19  --  31  --   --  38    < > = values in this interval not displayed.     CBC  Recent Labs   Lab Test 09/05/24  1511 06/03/24  7808  05/02/24  1419 01/25/24  1533 11/30/23  1209   HGB 15.0 13.6 13.6 12.1* 11.0*   WBC 8.0  --  7.5 5.9 7.2   RBC 5.16  --  4.78 4.73 4.57   HCT 44.7  --  40.2 38.5* 35.4*   MCV 87  --  84 81 78     --  251 237 374      URINE STUDIES  Recent Labs   Lab Test 09/05/24  1518 05/02/24  1435 01/25/24  1537 11/30/23  1213   APPEARANCE Slightly Cloudy* Clear Clear Clear   URINEGLC 100* 200* 150* Negative   URINEBILI Negative Negative Negative Negative   URINEKETONE Negative Negative Negative Negative   SG 1.029 1.025 1.028 1.015   UBLD Trace* Negative Negative Negative   URINEPH 5.0 5.0 5.0 5.5   PROTEIN 30* Negative 10* 20*   NITRITE Negative Negative Negative Negative   LEUKEST Large* Moderate* Moderate* Trace*   RBCU 8* 2 3* 2   WBCU 86* 8* 16* 3     ASSESSMENT AND PLAN:   Mr Moore is seen to establish EGPA care  at the [facility].  He has had a complex course since Dec 2022, and a severe complicated illness starting in May 2023.  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 He was treated successfully with high dose corticosteroids and mepolizumab -> with excellent control of his pulmonary symptoms. His ANCA test has been negative (only about 50% of EGPA is ANCA pos).      Sept 5, 2024  Mr Moore presents today with no acute complaints.  His BP in under acceptable control.  No change in dose today    He has chronic kidney disease which antedates the acute illness in May - His Cr is slightly higher today, in the setting of pyuria.    He has no symptoms of UTI. -> I sent a urine culture which showed < 50K CFU Mixed urogenital jose   I ordered a repeat kidney ultrasound with post void residual.  PSA screen in unremarkable.  Requested a urology evaluation for bladdler dysfunction.  Augmentin BID x 7 days    He has no symptoms or signs of active EGPA at the present time, on  the current therapy with mepolizumab only -> continue with current regimen.    I want to make sure he does not have  recurrent UTIs before considering adding an SGLT2i    I will plan for seeing him again in about 3 months for follow up      Josue Tripp MD  Division of Renal Disease and Hypertension            Again, thank you for allowing me to participate in the care of your patient.      Sincerely,    Josue Tripp MD

## 2024-09-05 NOTE — TELEPHONE ENCOUNTER
Prior Authorization Approval    Medication: NUCALA 100 MG/ML SC SOSY  Authorization Effective Date: 9/4/2024  Authorization Expiration Date: 12/31/2024  Approved Dose/Quantity: #3mLs per 28 days  Reference #: Key: [id number]   Insurance Company: [employer] Phone [phone] Fax [phone]  Expected CoPay: $ 2,861.57  CoPay Card Available: No    Financial Assistance Needed: Yes?  Which Pharmacy is filling the prescription: [facility] - [place], [place] - [address]  Pharmacy Notified: Not yet, investigating if FA needed  Patient Notified: Will call tomorrow 9/6

## 2024-09-05 NOTE — PROGRESS NOTES
Nephrology Clinic    Ned Moore MRN:[id number] YOB: 1960  Date of Service: 09/07/2024  Primary care provider: Mark Briggs  Requesting physician: Mark Briggs   Pulmonologist: Dr Biswas at [facility]  Cardiologist: Dr Efren Farias       REASON FOR CONSULT: establish care for EGPA at [facility]    HISTORY OF PRESENT ILLNESS:   Ned Moore is a 63 year old male who presents establish care for EGPA at [facility] and have all of his care within the same system.  Mr Moore's history of present illness started in Dec 2022, at which time he developed a new onset of respiratory illness consistent with asthma with wheezing and HAYNES.  He had never had any respiratory disease prior to that, and has never smoked.  He was treated with a short course of prednisone with marked improvement , but the symptoms kept recurring after the prednisone was discontinued.  Inhalers did not seem to keep the asthma under control.   He was able to work only on and off in the winter for 2023  In May 2023, he suffered from a severe epistaxis episode (requiring blood transfusion, and associated with hypotension) that prompted him to go to the ED.  He was admitted to [facility] and had a prolonged hospitalization during which he had , where he was initially found to have severe eosinophilia, hyponatremia and troponin elevation,  myocarditis, multifocal acute strokes, liver lesions concerning for abscesses, highest suspicion for EPGA. He underwent an extensive work up with cardiology, rheumatology, GI, neurology infectious disease, pulmonary and hematology. 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 Marked improvment with steroids and meprolizumab (5/26).   - completed 3 days 500 mg solumedrol; decreased to 60 mg prednisone started 5/25.  Eosinophilic myocarditis confirmed with biopsy. Presenting with ST depressions and elevated troponin. Cardiology evaluation, elevated troponin more  "consistent with demand ischemia. TTE w/o WMA. Cardiac MRI obtained with findings consistent with myocarditis.     With respect to multifocal acute cerebral infarcts, stroke code was called on 05/20 with acute gaze deviation, worsening right-sided weakness, seen by Neuro Critical Care, felt possibly secondary to an episode of orthostatic hypotension.   He describes amnesia related to the events.  He experienced slurred speech, weakness of the R arm and leg, and left lower quadrantanopsia.  He was transferred to rehab on 6/8/2023 until June 30.   In rehab and since then, he reports regaining significant amount of strength in the right arm/.  The R leg is also improved, but remains weak.  He is now able to drive, walk without a walker, but still appears to have RLExt proximal muscle weakness.  He remains on mepolizumab monthly.  He was started on Azathioprine on 9/8/23 -> but this resulted in recurrent episodes of sever N/V and associated dehydration until he stopped the azathioprine.    Today, Mr Moore reports feeling generally well.   He denies SOB, HAYNES, Cough.  No CP.   Denies GI or  symptoms.  Has nocturia 1x/night  No melena or hematochezia.  No gross hematuria  Reports nasal congestion on the R side , without purulent nasal discharge.  Residual R leg weakness.     January 25, 2024  IN PT.  L Ext strength is improving.  No wheezing, SOB, CP  Cardiac rehab.  Reports good O2 sats HR in low 100's  Saw ENT.  Had CT. Had severe congestion.   Has a polyp    On topical steroid and daily steroid rinses.    Sense of smell in improving  No GI spms.  Diarrhea resolved after  stopping aza  On Fe supplement. -> gets some N with it,  No  spms.  Nocturia once a night.  No hesitancy, straining or frequency.    BP was 110 at cardiac rehab a week ago.  Reports \"dehydration\" and SBP in 80's a few weeks ago.    Is scheduled to have EGD and Colonoscopy    May 2, 2024  FEELS generally well.  No epistaxis, oral ulcers, SOB, " "CP.  Gaining strength in l ext  Able to walk at least 30 min.   Can climb a flight of stair with HAYNES, but needs to hold to railing bec of weakness.  No GI,  spms   nocturia usually at 3 am.  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    September 5, 2024  Mr Moore is here for routine follow up.   He feels generally well.  No change in SOB.  He climbs stairs at home twice a day and walks around the block.   His insurance dropped coverage for PT, so he is mostly doing this on his own at home (weakness post CVA)  He has chronic L Ext neuropathy with tingling - long standing and appears related to underlying diabetes.  He reports no recent change.  No GI symptoms.     Mr Moore denies dysuria, frequency, gross hematuria or nocturia.  He had 1 prior episode of \"uti\" for which he received antibiotics.    PAST MEDICAL HISTORY:  Past Medical History:   Diagnosis Date    Chronic obstructive pulmonary disease, unspecified COPD type (H) 3/15/2023    Chronic systolic heart failure (H) 11/15/2023    Diabetes (H)     History of CVA (cerebrovascular accident) 7/20/2023   Diabetes diagnosed about 8 years ago, but this was already associated with L ext neuropathy suggesting that onset of disease was probably a decade before. He was previously on empagliflozin from Feb 2023 until Sept/Oct 2023, after a UTI requiring antibiotic treatment.   ?PSVT during hospitalization   He never had a colonoscopy, but has been tested with cologuard -> reportedly neg.       PAST SURGICAL HISTORY:  No past surgical history on file.  MEDICATIONS:    Current Outpatient Medications  reviewed with patient May 2, 2024   Medication Sig Dispense Refill    ACCU-CHEK FASTCLIX LANCET DRUM [ACCU-CHEK FASTCLIX LANCET DRUM] TEST BLOOD SUGARS 3 TIMES DAILY 300 each 3           atorvastatin (LIPITOR) 20 MG tablet TAKE 1 TABLET BY MOUTH EVERY DAY IN THE EVENING 90 tablet 1    BD ALEXANDRA U/F 32G X 4 MM insulin pen needle       blood glucose test (ACCU-CHEK " SMARTVIEW TEST STRIP) strips [BLOOD GLUCOSE TEST (ACCU-CHEK SMARTVIEW TEST STRIP) STRIPS] USE TO CHECK BLOOD SUGARS TWICE DAILY. 200 strip 3    Continuous Blood Gluc Sensor (FREESTYLE LIVIA 2 SENSOR) MISC 1 each every 14 days 2 each 11    dulaglutide (TRULICITY) 1.5 MG/0.5ML pen    Currently on 0.75 mg weekly,  plan to increase in 2 weeks. Inject 1.5 mg Subcutaneous every 7 days 2 mL 0    ferrous sulfate (FEROSUL) 325 (65 Fe) MG tablet Take 1 tablet (325 mg) by mouth daily (with breakfast) 90 tablet 3    fluticasone-vilanterol (BREO ELLIPTA) 100-25 MCG/ACT inhaler Inhale 1 puff into the lungs daily 60 each 6    gabapentin (NEURONTIN) 300 MG capsule Take 300 mg by mouth At Bedtime             melatonin 3 MG tablet Take 9 mg by mouth nightly as needed for sleep      Mepolizumab 100 MG/ML SOSY Inject 3 mLs (300 mg) Subcutaneous every 30 days 3 mL 11    metFORMIN (GLUCOPHAGE XR) 500 MG 24 hr tablet Take 1 tablet (500 mg) by mouth daily (with dinner) 360 tablet 3    metFORMIN (GLUCOPHAGE) 1000 MG tablet Take 1 tablet (1,000 mg) by mouth daily (with breakfast) 90 tablet 6    metoprolol succinate ER (TOPROL XL) 25 MG 24 hr tablet Take 0.5 tablets (12.5 mg) by mouth every morning 45 tablet 6    mometasone (NASONEX) 50 MCG/ACT nasal spray Spray 2 sprays into both nostrils daily 17 g 3    omeprazole (PRILOSEC) 20 MG DR capsule TAKE 1 CAPSULE BY MOUTH 2 TIMES DAILY. TAKE 1 HOUR BEFORE A MEAL.      tiotropium (SPIRIVA) 18 MCG inhaled capsule Inhale 1 capsule (18 mcg) into the lungs daily 18 capsule 3    traZODone (DESYREL) 50 MG tablet Take 50 mg by mouth daily as needed for sleep       ALLERGIES:    Allergies   Allergen Reactions    Azathioprine Nausea and Vomiting    Dulaglutide Nausea     REVIEW OF SYSTEMS:  A comprehensive review of systems was performed and found to be negative except as described here or above.  SOCIAL HISTORY:   Social History     Socioeconomic History    Marital status:      Spouse name: Not on  file    Number of children: Not on file    Years of education: Not on file    Highest education level: Not on file   Occupational History    Not on file   Tobacco Use    Smoking status: Never     Passive exposure: Never    Smokeless tobacco: Never   Vaping Use    Vaping status: Never Used   Substance and Sexual Activity    Alcohol use: Not Currently     Comment: a beer or wine every few months    Drug use: Never    Sexual activity: Not Currently     Partners: Female   Other Topics Concern    Parent/sibling w/ CABG, MI or angioplasty before 65F 55M? No   Social History Narrative    Not on file     Social Determinants of Health     Financial Resource Strain: Low Risk  (5/31/2024)    Financial Resource Strain     Within the past 12 months, have you or your family members you live with been unable to get utilities (heat, electricity) when it was really needed?: No   Food Insecurity: High Risk (5/31/2024)    Food Insecurity     Within the past 12 months, did you worry that your food would run out before you got money to buy more?: Yes     Within the past 12 months, did the food you bought just not last and you didn t have money to get more?: Yes   Transportation Needs: Low Risk  (5/31/2024)    Transportation Needs     Within the past 12 months, has lack of transportation kept you from medical appointments, getting your medicines, non-medical meetings or appointments, work, or from getting things that you need?: No   Physical Activity: Insufficiently Active (5/31/2024)    Exercise Vital Sign     Days of Exercise per Week: 3 days     Minutes of Exercise per Session: 20 min   Stress: No Stress Concern Present (5/31/2024)    [employer] - Occupational Stress Questionnaire     Feeling of Stress : Not at all   Social Connections: Socially Isolated (5/31/2024)    Social Connection and Isolation Panel [NHANES]     Frequency of Communication with Friends and Family: Once a week     Frequency of Social  Gatherings with Friends and Family: More than three times a week     Attends Zoroastrianism Services: Never     Active Member of Clubs or Organizations: No     Attends Club or Organization Meetings: Never     Marital Status:    Interpersonal Safety: Low Risk  (6/5/2024)    Interpersonal Safety     Do you feel physically and emotionally safe where you currently live?: Yes     Within the past 12 months, have you been hit, slapped, kicked or otherwise physically hurt by someone?: No     Within the past 12 months, have you been humiliated or emotionally abused in other ways by your partner or ex-partner?: No   Housing Stability: High Risk (5/31/2024)    Housing Stability     Do you have housing? : Yes     Are you worried about losing your housing?: Yes     FAMILY MEDICAL HISTORY:   Family History   Problem Relation Age of Onset    Heart Disease Mother     Diabetes Mother     Asthma Mother     Alzheimer Disease Father     No Known Problems Brother      PHYSICAL EXAM:   /82 (BP Location: Right arm, Patient Position: Sitting, Cuff Size: Adult Regular)   Pulse 83   Temp 98.1  F (36.7  C) (Oral)   Wt 85.1 kg (187 lb 9.6 oz)   SpO2 95%   BMI 24.09 kg/m    GENERAL APPEARANCE: alert and no distress  EYES: nonicteric  NECK: supple, no adenopathy  Carotids 2+ bilat without bruits  RESP: lungs clear to auscultation   CV: regular rhythm, normal rate, no rub  Extremities: trace-1+ ankle and distal shin edema on R.  None on L.  MS: no evidence of inflammation in joints, no muscle tenderness  SKIN: no rash.   NEURO: mentation intact and speech normal.    PSYCH: affect normal/bright   LABS:   Recent Results (from the past 672 hour(s))   CRP inflammation    Collection Time: 09/05/24  3:11 PM   Result Value Ref Range    CRP Inflammation <3.00 <5.00 mg/L   ESR    Collection Time: 09/05/24  3:11 PM   Result Value Ref Range    Erythrocyte Sedimentation Rate 7 0 - 20 mm/hr   Renal panel    Collection Time: 09/05/24  3:11 PM    Result Value Ref Range    Sodium 141 135 - 145 mmol/L    Potassium 5.2 3.4 - 5.3 mmol/L    Chloride 103 98 - 107 mmol/L    Carbon Dioxide (CO2) 26 22 - 29 mmol/L    Anion Gap 12 7 - 15 mmol/L    Glucose 153 (H) 70 - 99 mg/dL    Urea Nitrogen 34.2 (H) 8.0 - 23.0 mg/dL    Creatinine 1.90 (H) 0.67 - 1.17 mg/dL    GFR Estimate 39 (L) >60 mL/min/1.73m2    Calcium 10.0 8.8 - 10.4 mg/dL    Albumin 4.6 3.5 - 5.2 g/dL    Phosphorus 3.6 2.5 - 4.5 mg/dL   Myeloperoxidase and Proteinase 3 Panel    Collection Time: 09/05/24  3:11 PM   Result Value Ref Range    MPO Janice IgG Instrument Value <0.3 <3.5 U/mL    Myeloperoxidase Antibody IgG Negative Negative    Proteinase 3 Janice IgG Instrument Value <1.0 <2.0 U/mL    Proteinase 3 Antibody IgG Negative Negative   Ferritin    Collection Time: 09/05/24  3:11 PM   Result Value Ref Range    Ferritin 59 31 - 409 ng/mL   Iron and iron binding capacity    Collection Time: 09/05/24  3:11 PM   Result Value Ref Range    Iron 61 61 - 157 ug/dL    Iron Binding Capacity 283 240 - 430 ug/dL    Iron Sat Index 22 15 - 46 %   CBC with platelets and differential    Collection Time: 09/05/24  3:11 PM   Result Value Ref Range    WBC Count 8.0 4.0 - 11.0 10e3/uL    RBC Count 5.16 4.40 - 5.90 10e6/uL    Hemoglobin 15.0 13.3 - 17.7 g/dL    Hematocrit 44.7 40.0 - 53.0 %    MCV 87 78 - 100 fL    MCH 29.1 26.5 - 33.0 pg    MCHC 33.6 31.5 - 36.5 g/dL    RDW 11.9 10.0 - 15.0 %    Platelet Count 196 150 - 450 10e3/uL    % Neutrophils 64 %    % Lymphocytes 27 %    % Monocytes 7 %    % Eosinophils 1 %    % Basophils 1 %    % Immature Granulocytes 0 %    NRBCs per 100 WBC 0 <1 /100    Absolute Neutrophils 5.1 1.6 - 8.3 10e3/uL    Absolute Lymphocytes 2.2 0.8 - 5.3 10e3/uL    Absolute Monocytes 0.6 0.0 - 1.3 10e3/uL    Absolute Eosinophils 0.1 0.0 - 0.7 10e3/uL    Absolute Basophils 0.1 0.0 - 0.2 10e3/uL    Absolute Immature Granulocytes 0.0 <=0.4 10e3/uL    Absolute NRBCs 0.0 10e3/uL   Prostate Specific Antigen  Screen    Collection Time: 09/05/24  3:11 PM   Result Value Ref Range    Prostate Specific Antigen Screen 0.28 0.00 - 4.50 ng/mL   Routine UA with microscopic    Collection Time: 09/05/24  3:18 PM   Result Value Ref Range    Color Urine Yellow Colorless, Straw, Light Yellow, Yellow    Appearance Urine Slightly Cloudy (A) Clear    Glucose Urine 100 (A) Negative mg/dL    Bilirubin Urine Negative Negative    Ketones Urine Negative Negative mg/dL    Specific Gravity Urine 1.029 1.003 - 1.035    Blood Urine Trace (A) Negative    pH Urine 5.0 5.0 - 7.0    Protein Albumin Urine 30 (A) Negative mg/dL    Urobilinogen Urine Normal Normal, 2.0 mg/dL    Nitrite Urine Negative Negative    Leukocyte Esterase Urine Large (A) Negative    Mucus Urine Present (A) None Seen /LPF    Calcium Oxalate Crystals Urine Moderate (A) None Seen /HPF    Sperm Urine Present (A) None Seen /HPF    RBC Urine 8 (H) <=2 /HPF    WBC Urine 86 (H) <=5 /HPF    Squamous Epithelials Urine 1 <=1 /HPF    Transitional Epithelials Urine <1 <=1 /HPF    Hyaline Casts Urine 1 <=2 /LPF   Protein  random urine    Collection Time: 09/05/24  3:18 PM   Result Value Ref Range    Total Protein Urine mg/dL 36.9   mg/dL    Total Protein Urine mg/mg Creat 0.18 0.00 - 0.20 mg/mg Cr    Creatinine Urine mg/dL 207.0 mg/dL   Albumin Random Urine Quantitative with Creat Ratio    Collection Time: 09/05/24  3:18 PM   Result Value Ref Range    Creatinine Urine mg/dL 209.0 mg/dL    Albumin Urine mg/L 58.1 mg/L    Albumin Urine mg/g Cr 27.80 (H) 0.00 - 17.00 mg/g Cr   Urine Culture    Collection Time: 09/05/24  3:18 PM    Specimen: Urine, Midstream   Result Value Ref Range    Culture 10,000-50,000 CFU/mL Mixture of Urogenital Catie          CMP  Recent Labs   Lab Test 09/05/24  1511 05/02/24  1419 02/16/24  1518 01/25/24  1533 11/30/23  1209 10/26/23  1149 10/26/23  1147 04/26/23  0824 07/05/22  0933 03/21/22  1112 08/23/21  1152 01/27/21  1047 10/30/19  0923 07/29/19  0851  04/15/19  1126    139 137 140 140   < > 133* 139   < > 140   < > 140 139 138 138   POTASSIUM 5.2 5.0 4.7 5.0 4.5   < > 4.6 4.9   < > 4.5   < > 4.5 4.3 4.6 4.4   CHLORIDE 103 101 101 103 101   < > 94* 99   < > 102   < > 102 102 102 100   CO2 26 27 23 28 28   < > 22 27   < > 25   < > 27 29 24 28   ANIONGAP 12 11 13 9 11   < > 17* 13   < > 13   < > 11 8 12 10   * 208* 187* 183* 130*   < > 255* 173*   < > 193*   < > 118 139* 152* 163*   BUN 34.2* 37.1* 37.7* 23.1* 24.5*   < > 24.9* 22.5   < > 18   < > 24* 21 24* 21   CR 1.90* 1.73* 1.75* 1.69* 1.64*   < > 1.65* 1.35*   < > 1.51*   < > 1.41* 1.39* 1.37* 1.44*   GFRESTIMATED 39* 43* 43* 45* 46*   < > 46* 59*   < > 52*   < > 51* 52* 53* 50*   GFRESTBLACK  --   --   --   --   --   --   --   --   --   --   --  >60 >60 >60 >60   ANAI 10.0 9.4 9.3 9.4 9.9   < > 9.9 9.4   < > 9.9   < > 9.4 9.6 9.7 10.0   MAG  --   --  2.5*  --   --   --   --   --   --   --   --   --   --   --   --    PHOS 3.6 3.8  --  3.4 3.7  --   --   --    < >  --   --   --   --   --   --    PROTTOTAL  --   --  7.1  --   --   --  7.9 7.2  --  7.1  --  7.6  --   --  7.5   ALBUMIN 4.6 4.3 4.3 4.4 4.2  --  4.2 4.1   < > 3.9  --  4.6  --   --  4.5   BILITOTAL  --   --  0.3  --   --   --  0.3 0.3  --  0.5  --  0.7  --   --  0.6   ALKPHOS  --   --  88  --   --   --  246* 103  --  98  --  75  --   --  75   AST  --   --  24  --   --   --  20 24  --  17  --  22  --   --  29   ALT  --   --  22  --   --   --  22 23  --  19  --  31  --   --  38    < > = values in this interval not displayed.     CBC  Recent Labs   Lab Test 09/05/24  1511 06/03/24  1336 05/02/24  1419 01/25/24  1533 11/30/23  1209   HGB 15.0 13.6 13.6 12.1* 11.0*   WBC 8.0  --  7.5 5.9 7.2   RBC 5.16  --  4.78 4.73 4.57   HCT 44.7  --  40.2 38.5* 35.4*   MCV 87  --  84 81 78     --  251 237 374      URINE STUDIES  Recent Labs   Lab Test 09/05/24  1518 05/02/24  1435 01/25/24  1537 11/30/23  1213   APPEARANCE Slightly Cloudy* Clear  Clear Clear   URINEGLC 100* 200* 150* Negative   URINEBILI Negative Negative Negative Negative   URINEKETONE Negative Negative Negative Negative   SG 1.029 1.025 1.028 1.015   UBLD Trace* Negative Negative Negative   URINEPH 5.0 5.0 5.0 5.5   PROTEIN 30* Negative 10* 20*   NITRITE Negative Negative Negative Negative   LEUKEST Large* Moderate* Moderate* Trace*   RBCU 8* 2 3* 2   WBCU 86* 8* 16* 3     ASSESSMENT AND PLAN:   Mr Moore is seen to establish EGPA care  at the [facility].  He has had a complex course since Dec 2022, and a severe complicated illness starting in May 2023.  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 He was treated successfully with high dose corticosteroids and mepolizumab -> with excellent control of his pulmonary symptoms. His ANCA test has been negative (only about 50% of EGPA is ANCA pos).      Sept 5, 2024  Mr Moore presents today with no acute complaints.  His BP in under acceptable control.  No change in dose today    He has chronic kidney disease which antedates the acute illness in May - His Cr is slightly higher today, in the setting of pyuria.    He has no symptoms of UTI. -> I sent a urine culture which showed < 50K CFU Mixed urogenital jose   I ordered a repeat kidney ultrasound with post void residual.  PSA screen in unremarkable.  Requested a urology evaluation for bladdler dysfunction.  Augmentin BID x 7 days    He has no symptoms or signs of active EGPA at the present time, on  the current therapy with mepolizumab only -> continue with current regimen.    I want to make sure he does not have recurrent UTIs before considering adding an SGLT2i    I will plan for seeing him again in about 3 months for follow up      Josue Tripp MD  Division of Renal Disease and Hypertension

## 2024-09-05 NOTE — NURSING NOTE
Chief Complaint   Patient presents with    RECHECK     RETURN GLOMERULAR - EGPA four month follow up         /82 (BP Location: Right arm, Patient Position: Sitting, Cuff Size: Adult Regular)   Pulse 83   Temp 98.1  F (36.7  C) (Oral)   Wt 85.1 kg (187 lb 9.6 oz)   SpO2 95%   BMI 24.09 kg/m      Horacio Guardado CMA on 9/5/2024 at 3:57 PM

## 2024-09-05 NOTE — PATIENT INSTRUCTIONS
I have requested a referral to urology.  Placed an order for a kidney and bladder ultrasound  Also checking the prostate test.  Please start Augmentin 1 tablet twice a day for 7 days.

## 2024-09-06 ENCOUNTER — DOCUMENTATION ONLY (OUTPATIENT)
Dept: PULMONOLOGY | Facility: CLINIC | Age: 64
End: 2024-09-06
Payer: MEDICARE

## 2024-09-06 DIAGNOSIS — D64.9 NORMOCYTIC ANEMIA: ICD-10-CM

## 2024-09-06 LAB
BACTERIA UR CULT: NORMAL
MYELOPEROXIDASE AB SER IA-ACNC: <0.3 U/ML
MYELOPEROXIDASE AB SER IA-ACNC: NEGATIVE
PROTEINASE3 AB SER IA-ACNC: <1 U/ML
PROTEINASE3 AB SER IA-ACNC: NEGATIVE

## 2024-09-06 RX ORDER — FERROUS SULFATE 325(65) MG
325 TABLET ORAL
Qty: 90 TABLET | Refills: 3 | OUTPATIENT
Start: 2024-09-06

## 2024-09-06 NOTE — PROGRESS NOTES
Pulmonary Documentation    Clinical documentation relevant to patient's pulmonary care.    "[employer]Sharon message:    Benjamin Biswas. I am contacting you today because of insurance changes that have left me unable to afford Nucala from here on unless something can be done to greatly reduce my copay cost. After contacting [facility], they  directed me to download and fill out a Copay Enrollment form then send it to you for finishing. My hope is that this will result in acceptable monetary assistance for me so that I may continue to receive Nucala uninterrupted or at least with minimal delay. I have downloaded the form and will get it to you ASAP, certainly no later than Tuesday Sept 10th when I will be at [place] for a small procedure. Thanks for your attention in this matter.    Ned Moore    Reply:    Ross Marino,    This is very disappointing news. I am very sorry about this. I will be in clinic on the 11th and will complete the paperwork right away. This medication is very important for you.    Sincerely,  Jose Luis Biswas MD  Pulmonary and Critical Care Medicine  [facility]  Office [phone]    ===View-only below this line===      ----- Message -----       From:Ned Moore       Sent:9/6/2024  2:25 PM CDT         To:Jose Biswas    Subject:Nucala assistance program

## 2024-09-10 ENCOUNTER — HOSPITAL ENCOUNTER (OUTPATIENT)
Dept: ULTRASOUND IMAGING | Facility: HOSPITAL | Age: 64
Discharge: HOME | End: 2024-09-10
Attending: INTERNAL MEDICINE
Payer: MEDICARE

## 2024-09-10 ENCOUNTER — TELEPHONE (OUTPATIENT)
Dept: UROLOGY | Facility: CLINIC | Age: 64
End: 2024-09-10
Payer: MEDICARE

## 2024-09-10 DIAGNOSIS — E11.40 TYPE 2 DIABETES MELLITUS WITH DIABETIC NEUROPATHY, UNSPECIFIED WHETHER LONG TERM INSULIN USE (H): ICD-10-CM

## 2024-09-10 DIAGNOSIS — R82.81 PYURIA: ICD-10-CM

## 2024-09-10 PROCEDURE — 76770 US EXAM ABDO BACK WALL COMP: CPT

## 2024-09-10 PROCEDURE — 76857 US EXAM PELVIC LIMITED: CPT

## 2024-09-10 NOTE — TELEPHONE ENCOUNTER
Patient sent in POI that is unacceptable for FPAP review (no patient name on txt document).    Spoke with patient who will try to get a hold of better statements/POI.

## 2024-09-10 NOTE — TELEPHONE ENCOUNTER
[facility] Call Center    Phone Message    May a detailed message be left on voicemail: yes     Reason for Call: Other: pt has referral for      Pyuria [R82.81]   Nothing in protocols on how to schedule this, pt says you can call him or use the [employer]  Action Taken: Other: urology    Travel Screening: Not Applicable     Date of Service:

## 2024-09-11 ENCOUNTER — TELEPHONE (OUTPATIENT)
Dept: INTERNAL MEDICINE | Facility: CLINIC | Age: 64
End: 2024-09-11
Payer: MEDICARE

## 2024-09-11 RX ORDER — BLOOD-GLUCOSE SENSOR
EACH MISCELLANEOUS
Qty: 2 EACH | Refills: 11 | OUTPATIENT
Start: 2024-09-11

## 2024-09-11 NOTE — TELEPHONE ENCOUNTER
[facility] INITIATED    Medication: NUCALA 100 MG/ML SC SOSY  [facility] Name: [facility]  Date submitted: 9/5/2024  4:26 PM   [facility] Phone:    [facility] Fax:         Applied for [facility] with patient consent over the phone

## 2024-09-12 NOTE — TELEPHONE ENCOUNTER
MEDICAL RECORDS REQUEST   [facility]  Urology Clinic  [address] [place] [address]  PHONE: [phone]  Fax: [phone]        FUTURE VISIT INFORMATION                                                   Ned Moore, : 1960 scheduled for future visit at [facility]    APPOINTMENT INFORMATION:  Date: 2024  Provider:  Sharad Bhardwaj PA-C  Reason for Visit/Diagnosis: recurrent UTI in male    REFERRAL INFORMATION:  Referring provider:  Josue Tripp MD in  MEDICINE RENAL      RECORDS REQUESTED FOR VISIT                                                     NOTES  STATUS/DETAILS   OFFICE NOTE from referring provider  yes, 2024 -- Josue Tripp MD in  MEDICINE RENAL   MEDICATION LIST  yes   LABS     URINALYSIS (UA)  yes   IMAGES  yes, 09/10/2024 -- US BLADDER   09/10/2024 -- US RENAL     PRE-VISIT CHECKLIST      Joint diagnostic appointment coordinated correctly          (ensure right order & amount of time) Yes   RECORD COLLECTION COMPLETE Yes

## 2024-09-13 ENCOUNTER — PRE VISIT (OUTPATIENT)
Dept: UROLOGY | Facility: CLINIC | Age: 64
End: 2024-09-13

## 2024-09-13 ENCOUNTER — VIRTUAL VISIT (OUTPATIENT)
Dept: UROLOGY | Facility: CLINIC | Age: 64
End: 2024-09-13
Payer: MEDICARE

## 2024-09-13 DIAGNOSIS — R31.21 ASYMPTOMATIC MICROSCOPIC HEMATURIA: Primary | ICD-10-CM

## 2024-09-13 PROCEDURE — 99214 OFFICE O/P EST MOD 30 MIN: CPT | Mod: 95 | Performed by: STUDENT IN AN ORGANIZED HEALTH CARE EDUCATION/TRAINING PROGRAM

## 2024-09-13 ASSESSMENT — PAIN SCALES - GENERAL: PAINLEVEL: NO PAIN (0)

## 2024-09-13 NOTE — LETTER
9/13/2024       RE: Ned Moore  [address]  [place] [address]     Dear Colleague,    Thank you for referring your patient, Ned Moore, to the [facility] [place] [facility]. Please see a copy of my visit note below.    Virtual Visit Details    Type of service:  Video Visit   Video Start Time: 8:02 AM  Video End Time:8:24 AM    Originating Location (pt. Location): Home    Distant Location (provider location):  Off-site  Platform used for Video Visit: [facility]    Visit conducted via real-time audio/video technology by Sharad Bhardwaj PA-C to the patient in their home.    Subjective     REFERRING PROVIDER  Josue Tripp MD    REASON FOR VISIT  Microscopic hematuria    HISTORY OF PRESENT ILLNESS  Mr. Moore is a 64 year old male who I am speaking with today in regards to his recurrent microscopic hematuria.  His past medical history is significant for COPD, chronic systolic heart failure, type 2 diabetes, history of CVA 2023, and eosinophilic granulomatosis with polyangiitis.  I personally reviewed his most recent nephrology note from 9/5/2024 in preparation for today's visit.    According to that note, Ned has been treated for what was believed to be urinary tract infections on multiple occasions, however urine testing is never showed actual bacteria.  Urinalyses look rather abnormal, notable for microscopic hematuria dating back to 2023. it was also noted multiple times throughout the documentation that Ned has been relatively asymptomatic from a urinary standpoint throughout most of this.  Renal bladder ultrasound was obtained which did not show any evidence of hydronephrosis or significant postvoid residual.  Ultimately was referred to urology for further discussion and evaluation.    Today:  No baseline bothersome urinary symptoms   Most of the urinalyses completed were due to urinary frequency caused by new diabetes  medication changes as well as trips to the emergency department for dehydration  No history of gross hematuria   No history of kidney stones   No history of urinary retention   No history of consistent UTIs     SOCIAL HISTORY  Denies any history of or current smoking     Some on-and-off history of working around strong chemicals    FAMILY HISTORY   Denies any known family history of urologic malignancy     Objective     PHYSICAL EXAMINATION  Deferred given virtual visit.    LABS   Latest Reference Range & Units 10/26/23 12:12 11/30/23 12:13 01/25/24 15:37 05/02/24 14:35 09/05/24 15:18   Color Urine Colorless, Straw, Light Yellow, Yellow  Yellow Light Yellow Dark Yellow ! Yellow Yellow   Appearance Urine Clear  Clear Clear Clear Clear Slightly Cloudy !   Glucose Urine Negative mg/dL >1000 ! Negative 150 ! 200 ! 100 !   Bilirubin Urine Negative  Negative Negative Negative Negative Negative   Ketones Urine Negative mg/dL Negative Negative Negative Negative Negative   Specific Gravity Urine 1.003 - 1.035  1.022 1.015 1.028 1.025 1.029   pH Urine 5.0 - 7.0  5.5 5.5 5.0 5.0 5.0   Protein Albumin Urine Negative mg/dL 70 ! 20 ! 10 ! Negative 30 !   Urobilinogen mg/dL Normal, 2.0 mg/dL <2.0 Normal Normal Normal Normal   Nitrite Urine Negative  Negative Negative Negative Negative Negative   Blood Urine Negative  0.03 mg/dL ! Negative Negative Negative Trace !   Leukocyte Esterase Urine Negative  250 Kathleen/uL ! Trace ! Moderate ! Moderate ! Large !   WBC Urine <=5 /HPF 7 (H) 3 16 (H) 8 (H) 86 (H)   RBC Urine <=2 /HPF 3 (H) 2 3 (H) 2 8 (H)   Bacteria Urine None Seen /HPF Few !       sperm None Seen /HPF     Present !   Squamous Epithelial /HPF Urine <=1 /HPF <1 1 <1 <1 1   Transitional Epi <=1 /HPF     <1   Mucus Urine None Seen /LPF Present ! Present ! Present !  Present !   Hyaline Casts <=2 /LPF 1  3 (H)  1   Calcium Oxalate None Seen /HPF     Moderate !   Amorphous Crystals None Seen /HPF Few !       !: Data is abnormal  (H):  Data is abnormally high    Urine cultures associated with a few above urinalyses showed nothing but mixed jose.    Lab Results   Component Value Date    CR 1.90 (H) 09/05/2024    CR 1.73 (H) 05/02/2024    CR 1.75 (H) 02/16/2024    GFRESTIMATED 39 (L) 09/05/2024    GFRESTIMATED 43 (L) 05/02/2024    GFRESTIMATED 43 (L) 02/16/2024      IMAGING  I personally reviewed and interpreted the below renal and bladder ultrasound and agree that there is no hydronephrosis and no significant post void residual.     Narrative & Impression   EXAM: US BLADDER W PRE AND POST VOID RESIDUAL, US RENAL COMPLETE NON-VASCULAR  LOCATION: [facility]  DATE: 9/10/2024     INDICATION:  Pyuria  COMPARISON: CT 10/21/2023  TECHNIQUE: Routine Bilateral Renal and Bladder Ultrasound with imaging pre void and post void.     FINDINGS:     RIGHT KIDNEY: 9.6 cm. Normal without hydronephrosis or masses.      LEFT KIDNEY: 10.0 cm. Normal without hydronephrosis or masses.      BLADDER: Normal. Pre void bladder volume: 80 mL. Post void bladder volume: 1 mL. Percent residual: 1.25%.                                                                      IMPRESSION:  1.  Normal kidney ultrasound.  2.  No significant post void residual.     Assessment & Plan   Microscopic hematuria     It was my pleasure to speak with Mr. Moore today regarding his recently discovered microscopic hematuria. We discussed possible etiologies of microscopic hematuria including both benign and malignant causes. We discussed the American Urological Association's recommendation for workup of gross and microscopic hematuria (visible blood vs. 3 or more red blood cells on urinalysis) which would include a CT urogram, cystoscopy, and in the case of gross hematuria, a urine cytology. I described these 2-3 tests to Mr. Moore as well as the relative risks and benefits of each.    After reviewing the above information, Mr. Moore expressed understanding and agreement  with moving forward with the CT Urogram and cystoscopy.     If the hematuria evaluation is negative, Mr. Moore should undergo a repeat urinalysis in one year. If this repeat urinalysis is also negative for microscopic hematuria, no further workup is needed. If there is persistent microscopic hematuria, we will need to enter into shared decision making regarding repeat evaluation with CT Urogram and cystoscopy vs. Observation.     PLAN  First available CT urogram, being sure to push fluids before and after the scan  First available cystoscopy with me     SIGNED    Sharad Bhardwaj PA-C      I spent a total of 33 minutes spent on the date of the encounter doing chart review, history and exam, documentation, and further activities as noted above.      Again, thank you for allowing me to participate in the care of your patient.      Sincerely,    Sharad Bhardwaj PA-C

## 2024-09-13 NOTE — NURSING NOTE
Current patient location: [address] [place] [address]    Is the patient currently in the state of [place]? YES    Visit mode:VIDEO    If the visit is dropped, the patient can be reconnected by: VIDEO VISIT: Text to cell phone:   Telephone Information:   Mobile [phone]    and VIDEO VISIT: Send to e-mail at: [email or web address]    Will anyone else be joining the visit? NO  (If patient encounters technical issues they should call [phone])    How would you like to obtain your AVS? MyChart    Are changes needed to the allergy or medication list? Pt stated no changes to allergies, Pt stated no med changes, and Pt went through recent updates, all shown on current meds list    Are refills needed on medications prescribed by this physician? NO    Rooming Documentation:  Not applicable      Reason for visit: Consult ( NEW UROLOGY)    Rebecca ROUSE

## 2024-09-13 NOTE — PROGRESS NOTES
Virtual Visit Details    Type of service:  Video Visit   Video Start Time: 8:02 AM  Video End Time:8:24 AM    Originating Location (pt. Location): Home    Distant Location (provider location):  Off-site  Platform used for Video Visit: [facility]    Visit conducted via real-time audio/video technology by Sharad Bhardwaj PA-C to the patient in their home.    Subjective      REFERRING PROVIDER  Josue Tripp MD    REASON FOR VISIT  Microscopic hematuria    HISTORY OF PRESENT ILLNESS  Mr. Moore is a 64 year old male who I am speaking with today in regards to his recurrent microscopic hematuria.  His past medical history is significant for COPD, chronic systolic heart failure, type 2 diabetes, history of CVA 2023, and eosinophilic granulomatosis with polyangiitis.  I personally reviewed his most recent nephrology note from 9/5/2024 in preparation for today's visit.    According to that note, Ned has been treated for what was believed to be urinary tract infections on multiple occasions, however urine testing is never showed actual bacteria.  Urinalyses look rather abnormal, notable for microscopic hematuria dating back to 2023. it was also noted multiple times throughout the documentation that Ned has been relatively asymptomatic from a urinary standpoint throughout most of this.  Renal bladder ultrasound was obtained which did not show any evidence of hydronephrosis or significant postvoid residual.  Ultimately was referred to urology for further discussion and evaluation.    Today:  No baseline bothersome urinary symptoms   Most of the urinalyses completed were due to urinary frequency caused by new diabetes medication changes as well as trips to the emergency department for dehydration  No history of gross hematuria   No history of kidney stones   No history of urinary retention   No history of consistent UTIs     SOCIAL HISTORY  Denies any history of or current smoking     Some on-and-off history of working  around strong chemicals    FAMILY HISTORY   Denies any known family history of urologic malignancy     Objective      PHYSICAL EXAMINATION  Deferred given virtual visit.    LABS   Latest Reference Range & Units 10/26/23 12:12 11/30/23 12:13 01/25/24 15:37 05/02/24 14:35 09/05/24 15:18   Color Urine Colorless, Straw, Light Yellow, Yellow  Yellow Light Yellow Dark Yellow ! Yellow Yellow   Appearance Urine Clear  Clear Clear Clear Clear Slightly Cloudy !   Glucose Urine Negative mg/dL >1000 ! Negative 150 ! 200 ! 100 !   Bilirubin Urine Negative  Negative Negative Negative Negative Negative   Ketones Urine Negative mg/dL Negative Negative Negative Negative Negative   Specific Gravity Urine 1.003 - 1.035  1.022 1.015 1.028 1.025 1.029   pH Urine 5.0 - 7.0  5.5 5.5 5.0 5.0 5.0   Protein Albumin Urine Negative mg/dL 70 ! 20 ! 10 ! Negative 30 !   Urobilinogen mg/dL Normal, 2.0 mg/dL <2.0 Normal Normal Normal Normal   Nitrite Urine Negative  Negative Negative Negative Negative Negative   Blood Urine Negative  0.03 mg/dL ! Negative Negative Negative Trace !   Leukocyte Esterase Urine Negative  250 Kathleen/uL ! Trace ! Moderate ! Moderate ! Large !   WBC Urine <=5 /HPF 7 (H) 3 16 (H) 8 (H) 86 (H)   RBC Urine <=2 /HPF 3 (H) 2 3 (H) 2 8 (H)   Bacteria Urine None Seen /HPF Few !       sperm None Seen /HPF     Present !   Squamous Epithelial /HPF Urine <=1 /HPF <1 1 <1 <1 1   Transitional Epi <=1 /HPF     <1   Mucus Urine None Seen /LPF Present ! Present ! Present !  Present !   Hyaline Casts <=2 /LPF 1  3 (H)  1   Calcium Oxalate None Seen /HPF     Moderate !   Amorphous Crystals None Seen /HPF Few !       !: Data is abnormal  (H): Data is abnormally high    Urine cultures associated with a few above urinalyses showed nothing but mixed jose.    Lab Results   Component Value Date    CR 1.90 (H) 09/05/2024    CR 1.73 (H) 05/02/2024    CR 1.75 (H) 02/16/2024    GFRESTIMATED 39 (L) 09/05/2024    GFRESTIMATED 43 (L) 05/02/2024     GFRESTIMATED 43 (L) 02/16/2024      IMAGING  I personally reviewed and interpreted the below renal and bladder ultrasound and agree that there is no hydronephrosis and no significant post void residual.     Narrative & Impression   EXAM: US BLADDER W PRE AND POST VOID RESIDUAL, US RENAL COMPLETE NON-VASCULAR  LOCATION: [facility]  DATE: 9/10/2024     INDICATION:  Pyuria  COMPARISON: CT 10/21/2023  TECHNIQUE: Routine Bilateral Renal and Bladder Ultrasound with imaging pre void and post void.     FINDINGS:     RIGHT KIDNEY: 9.6 cm. Normal without hydronephrosis or masses.      LEFT KIDNEY: 10.0 cm. Normal without hydronephrosis or masses.      BLADDER: Normal. Pre void bladder volume: 80 mL. Post void bladder volume: 1 mL. Percent residual: 1.25%.                                                                      IMPRESSION:  1.  Normal kidney ultrasound.  2.  No significant post void residual.     Assessment & Plan    Microscopic hematuria     It was my pleasure to speak with Mr. Moore today regarding his recently discovered microscopic hematuria. We discussed possible etiologies of microscopic hematuria including both benign and malignant causes. We discussed the American Urological Association's recommendation for workup of gross and microscopic hematuria (visible blood vs. 3 or more red blood cells on urinalysis) which would include a CT urogram, cystoscopy, and in the case of gross hematuria, a urine cytology. I described these 2-3 tests to Mr. Moore as well as the relative risks and benefits of each.    After reviewing the above information, Mr. Moore expressed understanding and agreement with moving forward with the CT Urogram and cystoscopy.     If the hematuria evaluation is negative, Mr. Moore should undergo a repeat urinalysis in one year. If this repeat urinalysis is also negative for microscopic hematuria, no further workup is needed. If there is persistent microscopic  hematuria, we will need to enter into shared decision making regarding repeat evaluation with CT Urogram and cystoscopy vs. Observation.     PLAN  First available CT urogram, being sure to push fluids before and after the scan  First available cystoscopy with me     SIGNED    Sharad Bhardwaj PA-C      I spent a total of 33 minutes spent on the date of the encounter doing chart review, history and exam, documentation, and further activities as noted above.

## 2024-09-13 NOTE — Clinical Note
Good morning Dr. Tripp,  My name is Sharad Bhardwaj, a physician assistant who works here in the urology department, and I just had the pleasure of meeting with our now mutual patient Mr. Ned oMore.  We spoke today specifically regarding his recurrent microscopic hematuria, and our plan is to put him through a hematuria workup with a CT urogram and cystoscopy.  I noticed that his kidney function is bordering on an eGFR of 40 which I believe is the general threshold for which iodine contrast is safe.  My recommendations to him were to push fluids both before and after the scan, but wanted to be sure I reached out to you to confirm this is a safe plan for him.  Thank you for your input, and happy Friday!  Best,  Sharad Bhardwaj PA-C

## 2024-09-13 NOTE — PROGRESS NOTES
"Virtual Visit Details    Type of service:  Video Visit   Video Start Time: {video visit start/end time for provider to select:576222}  Video End Time:{video visit start/end time for provider to select:873810}    Originating Location (pt. Location): {video visit patient location:149409::\"Home\"}  {PROVIDER LOCATION On-site should be selected for visits conducted from your clinic location or adjoining [facility] hospital, academic office, or other nearby [facility] building. Off-site should be selected for all other provider locations, including home:176871}  Distant Location (provider location):  {virtual location provider:628126}  Platform used for Video Visit: {Virtual Visit Platforms:017379::\"[email or web address]\"}    "

## 2024-09-14 DIAGNOSIS — E11.40 TYPE 2 DIABETES MELLITUS WITH DIABETIC NEUROPATHY, UNSPECIFIED WHETHER LONG TERM INSULIN USE (H): ICD-10-CM

## 2024-09-16 ENCOUNTER — TELEPHONE (OUTPATIENT)
Dept: INTERNAL MEDICINE | Facility: CLINIC | Age: 64
End: 2024-09-16
Payer: MEDICARE

## 2024-09-16 DIAGNOSIS — E11.40 TYPE 2 DIABETES MELLITUS WITH DIABETIC NEUROPATHY, UNSPECIFIED WHETHER LONG TERM INSULIN USE (H): ICD-10-CM

## 2024-09-16 RX ORDER — BLOOD-GLUCOSE SENSOR
EACH MISCELLANEOUS
Qty: 2 EACH | Refills: 11 | OUTPATIENT
Start: 2024-09-16

## 2024-09-16 NOTE — TELEPHONE ENCOUNTER
Retail Pharmacy Prior Authorization Team   Phone: [phone]    PA Initiation    Med[facility]  Insurance Company: [employer] - Phone [phone] Fax [phone]  Pharmacy Filling the Rx: [id number] [place], [place] - [address] [place] [address]  Filling Pharmacy Phone: [phone]  Filling Pharmacy Fax: [phone]  Start Date: 9/16/2024

## 2024-09-16 NOTE — TELEPHONE ENCOUNTER
A prior authorization was already started on 9/11/24 for the maría 3 sensors. We are still waiting to see if it is approved or denied at this time.

## 2024-09-17 ENCOUNTER — THERAPY VISIT (OUTPATIENT)
Dept: PHYSICAL THERAPY | Facility: CLINIC | Age: 64
End: 2024-09-17
Payer: MEDICARE

## 2024-09-17 ENCOUNTER — MYC MEDICAL ADVICE (OUTPATIENT)
Dept: UROLOGY | Facility: CLINIC | Age: 64
End: 2024-09-17
Payer: MEDICARE

## 2024-09-17 DIAGNOSIS — M25.551 HIP PAIN, RIGHT: Primary | ICD-10-CM

## 2024-09-17 PROCEDURE — 97110 THERAPEUTIC EXERCISES: CPT | Mod: GP | Performed by: PHYSICAL THERAPIST

## 2024-09-17 NOTE — TELEPHONE ENCOUNTER
PRIOR AUTHORIZATION DENIED    Medication: FREESTYLE LIVIA 3 SENSOR [facility]  Insurance Company: [employer] - Phone [phone] Fax [phone]  Denial Date: 9/16/2024  Denial Reason(s): Pharmacy was notified to bill part B and to initiate the PA paperwork if that is required. This PA team does not handle those PAs.

## 2024-09-17 NOTE — TELEPHONE ENCOUNTER
Left Voicemail (1st Attempt) and Sent Mychart (1st Attempt) for the patient to call back and schedule the following:    Appointment type: Cystoscopy   Provider: Dr. Benton Calloway, Dr. Chambers, Dr. Woodward, Dr. Landis  Return date: Next available   Specialty phone number: [phone]  Additional appointment(s) needed: First available CT urogram prior   Additonal Notes: Per check out -    First available cystoscopy with me. If I do not have availability prior to December 12, please schedule with one of our oncology surgeons or men's health surgeons.

## 2024-09-17 NOTE — TELEPHONE ENCOUNTER
Notified patient via [employer].  Roseann Samano, Pharm.D, [facility]  Medication Therapy Management Pharmacist

## 2024-09-19 ENCOUNTER — THERAPY VISIT (OUTPATIENT)
Dept: PHYSICAL THERAPY | Facility: CLINIC | Age: 64
End: 2024-09-19
Payer: MEDICARE

## 2024-09-19 DIAGNOSIS — M25.551 HIP PAIN, RIGHT: Primary | ICD-10-CM

## 2024-09-19 PROCEDURE — 97110 THERAPEUTIC EXERCISES: CPT | Mod: GP | Performed by: PHYSICAL THERAPIST

## 2024-09-19 PROCEDURE — 97112 NEUROMUSCULAR REEDUCATION: CPT | Mod: GP | Performed by: PHYSICAL THERAPIST

## 2024-09-19 NOTE — TELEPHONE ENCOUNTER
[place] APPROVED    Medication: NUCALA 100 MG/ML SC SOSY  Amount: $    Foundation Name: [facility] Effective Date: 9/16/2024  Foundation Expiration Date: 12/31/2024  Additional Information:   Patient Notified: Yes

## 2024-09-26 ENCOUNTER — VIRTUAL VISIT (OUTPATIENT)
Dept: PHARMACY | Facility: CLINIC | Age: 64
End: 2024-09-26

## 2024-09-26 DIAGNOSIS — E11.40 TYPE 2 DIABETES MELLITUS WITH DIABETIC NEUROPATHY, UNSPECIFIED WHETHER LONG TERM INSULIN USE (H): ICD-10-CM

## 2024-09-26 PROCEDURE — 99207 PR NO CHARGE LOS: CPT | Mod: 93

## 2024-09-26 RX ORDER — BLOOD-GLUCOSE SENSOR
1 EACH MISCELLANEOUS
Qty: 2 EACH | Refills: 11 | Status: SHIPPED | OUTPATIENT
Start: 2024-09-26

## 2024-09-26 NOTE — PATIENT INSTRUCTIONS
"Recommendations from today's MTM visit:                                                      MTM (medication therapy management) is a service provided by a clinical pharmacist designed to help you get the most of out of your medicines.   Today we reviewed what your medicines are for, how to know if they are working, that your medicines are safe and how to make your medicine regimen as easy as possible.      PharmD to work with patient for ozempic PAP; send forms ([email or web address] )    Follow-up: Due on 10/25/2024 @ 9:30 AM    It was great speaking with you today.  I value your experience and would be very thankful for your time in providing feedback in our clinic survey. In the next few days, you may receive an email or text message from [facility] [employer] with a link to a survey related to your  clinical pharmacist.\"     To schedule another MTM appointment, please call the clinic directly or you may call the MTM scheduling line at [phone].    My Clinical Pharmacist's contact information:                                                      Please feel free to contact me with any questions or concerns you have.        Syde Borjas, PharmD     Medication Therapy Management (MTM) Pharmacist     [phone]     [email or web address]     [facility]    "

## 2024-09-26 NOTE — PROGRESS NOTES
Medication Therapy Management (MTM) Encounter    ASSESSMENT:                            Medication Adherence/Access: No issues identified    Type 2 Diabetes: Patient is not meeting A1c goal of < 7%.  Per patient, sugar readings has been okay and considering there is no actual home BG data reasonable to continue current medications and assess in the next follow up.    PLAN:                            PharmD to work with patient for ozempic PAP; send forms ([email or web address] )    Follow-up: Due on 10/25/2024 @ 9:30 AM    SUBJECTIVE/OBJECTIVE:                          Ned Moore is a 64 year old male called for a follow up     Reason for visit: Medication Review Initial.    Allergies/ADRs: Reviewed in chart  Past Medical History: Reviewed in chart  Tobacco: He reports that he has never smoked. He has never been exposed to tobacco smoke. He has never used smokeless tobacco.    Medication Adherence/Access: No issues reported    Type 2 Diabetes: Currently taking metformin 500 mg XR with supper and metformin 1000 mg with breakfast, and Semglee 2 - 5 units.  Patient stopped taking Ozempic due to weight loss, and taking Trulicity 1.5 mg weekly. Recently, patient's insurance has changed and Trulicity was discontinued due to higher price.   He has not been injecting his semglee for sometime now.   Aspirin 81mg daily  Blood sugar monitoring: Continuous Glucose Monitor. Not using his CGM at this time due to supply issues.  Denies any low sugar symptoms  Current diabetes symptoms: Fat  Diet/Exercise: He is eating three times a day. He is doing a bit more exercises; going up and down the stairs.  Eye exam: up to date  Foot exam: due  Urine Albumin:             Wt Readings from Last 2 Encounters:   12/06/23 198 lb (89.8 kg)   11/30/23 189 lb (85.7 kg)      - Patient was out of maría 3 sensor and had checked his sugar with glucometer and the readings has been around 100 before eating and can do up to 200 after eating.   - Patient  noted he did not have any low sugar symptoms and also high sugar symptoms.     Today's Vitals: There were no vitals taken for this visit.  ----------------    I spent 20 minutes with this patient today. All changes were made via collaborative practice agreement with Mark Briggs MD. A copy of the visit note was provided to the patient's provider(s).    A summary of these recommendations was sent via [employer].      Telemedicine Visit Details  The patient's medications can be safely assessed via a telemedicine encounter.  Type of service:  Telephone visit  Originating Location (pt. Location): Home    Distant Location (provider location):  On-site  Start Time:  9:30 AM  End Time:  09:50 AM     Medication Therapy Recommendations  No medication therapy recommendations to display     Syed Borjas, PharmD     Medication Therapy Management (MTM) Pharmacist     [phone]     [email or web address]

## 2024-10-02 ENCOUNTER — TELEPHONE (OUTPATIENT)
Dept: INTERNAL MEDICINE | Facility: CLINIC | Age: 64
End: 2024-10-02
Payer: MEDICARE

## 2024-10-02 DIAGNOSIS — E11.22 TYPE 2 DIABETES MELLITUS WITH STAGE 3A CHRONIC KIDNEY DISEASE, WITHOUT LONG-TERM CURRENT USE OF INSULIN (H): Primary | ICD-10-CM

## 2024-10-02 DIAGNOSIS — N18.31 TYPE 2 DIABETES MELLITUS WITH STAGE 3A CHRONIC KIDNEY DISEASE, WITHOUT LONG-TERM CURRENT USE OF INSULIN (H): Primary | ICD-10-CM

## 2024-10-02 RX ORDER — KETOROLAC TROMETHAMINE 30 MG/ML
1 INJECTION, SOLUTION INTRAMUSCULAR; INTRAVENOUS SEE ADMIN INSTRUCTIONS
Qty: 1 EACH | Refills: 1 | Status: SHIPPED | OUTPATIENT
Start: 2024-10-02

## 2024-10-02 RX ORDER — HYDROCHLOROTHIAZIDE 12.5 MG/1
CAPSULE ORAL
Qty: 6 EACH | Refills: 5 | Status: SHIPPED | OUTPATIENT
Start: 2024-10-02

## 2024-10-02 NOTE — TELEPHONE ENCOUNTER
I placed those orders and attested to Syed's note from 9/26.  Unfortunately, I know that the patient was NOT using insulin at the time of my 6/5/2024 visit so I am not sure what to do about that component.    Mark Briggs MD on 10/2/2024 at 5:38 PM

## 2024-10-02 NOTE — TELEPHONE ENCOUNTER
Hello,     This is [facility] requesting a few things for this patient's Medicare order of the Freestyle Agustín 3     The first is requesting for Mark Briggs to agree and attest to the plan discussed in the office visit from 9/26/24 with the MTM Syed Borjas and sign off on the encounter or an addendum to the clinic notes from 6/5/24 to include CGM and insulin usage per Medicare's guidelines below:     VISIT NOTE REQUIREMENTS: (must state the following)  Patient must be seen/clinical notes must be written in the last 6 months by the prescribing provider.  Must state that the patient is injecting insulin 1 time daily or more (Can be written that patient is injecting with insulin pump) We cannot accept medication list as insulin regimen.  Must state that the patient is a type 1 or type 2 diabetic.  Must state that patient is using CGM     The second request is for new prescriptions for Freestyle Agustín 3 . The prescription needs to be written by the provider in which the patient was seen for their diabetes.      Prescription must be written by: Mark Briggs  Must include full supply name: Freestyle Agustín 3 Adams  Quantity: 1  Refills: 1  SIG: Use as directed per 's instructions    Prescription must be written by: Mark Briggs  Must include full supply name: Freestyle Agustín 3 *Plus* Sensor  Quantity: 6  Refills: 5  SIG: Change every 15 days     As a reminder, Medicare requires patients to be seen every 3 months for insulin supplies and every 6 months for CGMs. The provider who sees the patient must be the provider on the prescription, must be written on the day of or after office visit date and office visit must include notes about diabetes and insulin regimen. The Diabetes Care Services team at [facility] may request new prescriptions before renewal dates of the prescription is filled over the allowable amount. Writing the order to match what is above  will help ensure we will only need to request every 3 or 6 months.    If you have any questions regarding these requests please call us at [phone] or send a message to the [email or web address] pool     Thank you!     [facility]  Diabetes Care Services Team  [address] [place] [address]  Provider Phone: [phone]  Patient Line: [phone]  Fax: [phone]  E-mail: [email or web address]

## 2024-10-08 ENCOUNTER — MYC MEDICAL ADVICE (OUTPATIENT)
Dept: PULMONOLOGY | Facility: CLINIC | Age: 64
End: 2024-10-08
Payer: MEDICARE

## 2024-10-08 DIAGNOSIS — R06.2 WHEEZING: ICD-10-CM

## 2024-10-08 DIAGNOSIS — J44.1 COPD EXACERBATION (H): ICD-10-CM

## 2024-10-08 DIAGNOSIS — J44.9 CHRONIC OBSTRUCTIVE PULMONARY DISEASE, UNSPECIFIED COPD TYPE (H): ICD-10-CM

## 2024-10-08 RX ORDER — FLUTICASONE FUROATE AND VILANTEROL 100; 25 UG/1; UG/1
1 POWDER RESPIRATORY (INHALATION) DAILY
Qty: 60 EACH | Refills: 8 | Status: SHIPPED | OUTPATIENT
Start: 2024-10-08 | End: 2024-10-21

## 2024-10-12 ENCOUNTER — MYC MEDICAL ADVICE (OUTPATIENT)
Dept: OTOLARYNGOLOGY | Facility: CLINIC | Age: 64
End: 2024-10-12
Payer: MEDICARE

## 2024-10-12 DIAGNOSIS — D72.18 EOSINOPHILIC GRANULOMATOSIS WITH POLYANGIITIS (EGPA) (H): Primary | ICD-10-CM

## 2024-10-12 DIAGNOSIS — M30.1 EOSINOPHILIC GRANULOMATOSIS WITH POLYANGIITIS (EGPA) (H): Primary | ICD-10-CM

## 2024-10-12 DIAGNOSIS — J33.9 NASAL POLYP: ICD-10-CM

## 2024-10-15 ENCOUNTER — MYC MEDICAL ADVICE (OUTPATIENT)
Dept: PULMONOLOGY | Facility: CLINIC | Age: 64
End: 2024-10-15
Payer: MEDICARE

## 2024-10-15 ENCOUNTER — TELEPHONE (OUTPATIENT)
Dept: OTOLARYNGOLOGY | Facility: CLINIC | Age: 64
End: 2024-10-15
Payer: MEDICARE

## 2024-10-15 DIAGNOSIS — J44.9 CHRONIC OBSTRUCTIVE PULMONARY DISEASE, UNSPECIFIED COPD TYPE (H): ICD-10-CM

## 2024-10-15 DIAGNOSIS — J44.1 COPD EXACERBATION (H): ICD-10-CM

## 2024-10-15 DIAGNOSIS — R06.2 WHEEZING: ICD-10-CM

## 2024-10-15 RX ORDER — MOMETASONE FUROATE MONOHYDRATE 50 UG/1
2 SPRAY, METERED NASAL DAILY
Qty: 17 G | Refills: 3 | Status: SHIPPED | OUTPATIENT
Start: 2024-10-15

## 2024-10-15 NOTE — TELEPHONE ENCOUNTER
Retail Pharmacy Prior Authorization Team   Phone: [phone]    PRIOR AUTHORIZATION DENIED    Medication: MOMETASONE FUROATE 50 MCG/ACT NA SUSP  Insurance Company: [employer] - Phone [phone] Fax [phone]  Denial Date: 10/15/2024  Denial Reason(s): MUST TRY/FAIL PREFERRED ALTERNATIVES - IPRATROPIUM BROMIDE NASAL SPRAY AND AZELASTINE 137MCG NASAL SPRAY      Appeal Information: IF THE PROVIDER WOULD LIKE TO APPEAL THIS DECISION PLEASE PROVIDE THE PA TEAM WITH A LETTER OF MEDICAL NECESSITY        Patient Notified: NO

## 2024-10-21 RX ORDER — FLUTICASONE FUROATE AND VILANTEROL 100; 25 UG/1; UG/1
1 POWDER RESPIRATORY (INHALATION) DAILY
Qty: 60 EACH | Refills: 8 | Status: SHIPPED | OUTPATIENT
Start: 2024-10-21 | End: 2024-10-25

## 2024-10-25 ENCOUNTER — VIRTUAL VISIT (OUTPATIENT)
Dept: PHARMACY | Facility: CLINIC | Age: 64
End: 2024-10-25
Payer: MEDICARE

## 2024-10-25 DIAGNOSIS — D72.18 EOSINOPHILIC GRANULOMATOSIS WITH POLYANGIITIS (EGPA) (H): Primary | ICD-10-CM

## 2024-10-25 DIAGNOSIS — M30.1 EOSINOPHILIC GRANULOMATOSIS WITH POLYANGIITIS (EGPA) (H): Primary | ICD-10-CM

## 2024-10-25 DIAGNOSIS — E11.40 TYPE 2 DIABETES MELLITUS WITH DIABETIC NEUROPATHY, UNSPECIFIED WHETHER LONG TERM INSULIN USE (H): Primary | ICD-10-CM

## 2024-10-25 PROCEDURE — 99207 PR NO CHARGE LOS: CPT | Mod: 93

## 2024-10-25 RX ORDER — FLUTICASONE PROPIONATE AND SALMETEROL 100; 50 UG/1; UG/1
1 POWDER RESPIRATORY (INHALATION) EVERY 12 HOURS
COMMUNITY
End: 2024-10-25

## 2024-10-25 RX ORDER — FLUTICASONE PROPIONATE AND SALMETEROL 100; 50 UG/1; UG/1
1 POWDER RESPIRATORY (INHALATION) EVERY 12 HOURS
Qty: 60 EACH | Refills: 11 | Status: SHIPPED | OUTPATIENT
Start: 2024-10-25 | End: 2024-11-12 | Stop reason: ALTCHOICE

## 2024-10-25 NOTE — PROGRESS NOTES
Medication Therapy Management (MTM) Encounter    ASSESSMENT:                            Medication Adherence/Access: No issues identified    Type 2 Diabetes: Patient is not meeting A1c goal of < 7%.  Most of the CGM readings are in the high range (> 250) 44% above goal < 25%. Considering high sugar reasonable to restart taking glipizide and also start taking ozempic. Advised patient to eat healthy to control sugar.     PLAN:                          Re-start taking glipizide XL 10 mg daily   Please consider eating more healthy: more veggies and fruits and less carbohydrates   Please start ozempic 0.25 mg weekly and stop taking Trulicity     Follow-up: Due on 11/26/2024 @ 09:30 AM     SUBJECTIVE/OBJECTIVE:                          Ned Moore is a 64 year old male called for a follow up     Reason for visit: Medication Review Initial.    Allergies/ADRs: Reviewed in chart  Past Medical History: Reviewed in chart  Tobacco: He reports that he has never smoked. He has never been exposed to tobacco smoke. He has never used smokeless tobacco.    Medication Adherence/Access: No issues reported    Type 2 Diabetes:Currently taking metformin 500 mg XR with supper and metformin 1000 mg with breakfast, and glipizide XL 10 mg daily.     Aspirin 81mg daily  Blood sugar monitoring: Continuous Glucose Monitor. Chew below  Denies any low sugar symptoms  Current diabetes symptoms: Fat  Diet/Exercise: He is eating three times a day. He is doing a bit more exercises; going up and down the stairs.  Eye exam: up to date  Foot exam: due  Urine Albumin:                 Wt Readings from Last 2 Encounters:   12/06/23 198 lb (89.8 kg)   11/30/23 189 lb (85.7 kg)      Today's Vitals: There were no vitals taken for this visit.  ----------------        I spent 24 minutes with this patient today. All changes were made via collaborative practice agreement with Mark Briggs MD. A copy of the visit note was provided to the patient's  provider(s).    A summary of these recommendations was sent via [employer].      Telemedicine Visit Details  The patient's medications can be safely assessed via a telemedicine encounter.  Type of service:  Telephone visit  Originating Location (pt. Location): Home    Distant Location (provider location):  On-site  Start Time:  09:31 AM  End Time:   9:55 AM     Medication Therapy Recommendations  Type 2 diabetes mellitus with diabetic neuropathy, unspecified whether long term insulin use (H)   1 Current Medication: TRULICITY 1.5 MG/0.5ML pen (Discontinued)   Current Medication Sig: INJECT 1.5 MG SUBCUTANEOUSLY ONCE A WEEK   Rationale: More cost-effective medication available - Cost - Adherence   Recommendation: Change Medication - Ozempic (0.25 or 0.5 MG/DOSE) 2 MG/1.5ML Sopn   Status: Accepted per CPA   Identified Date: 10/25/2024 Completed Date: 10/27/2024              Syed Borjas, PharmD     Medication Therapy Management (MTM) Pharmacist     [phone]     [email or web address]

## 2024-10-25 NOTE — PATIENT INSTRUCTIONS
"Recommendations from today's MTM visit:                                                      MTM (medication therapy management) is a service provided by a clinical pharmacist designed to help you get the most of out of your medicines.   Today we reviewed what your medicines are for, how to know if they are working, that your medicines are safe and how to make your medicine regimen as easy as possible.      Re-start taking glipizide XL 10 mg daily   Please consider eating more healthy: more veggies and fruits and less carbohydrates   Please start ozempic 0.25 mg weekly and stop taking Trulicity     Follow-up: Due on 11/26/2024 @ 09:30 AM     It was great speaking with you today.  I value your experience and would be very thankful for your time in providing feedback in our clinic survey. In the next few days, you may receive an email or text message from [employer] with a link to a survey related to your  clinical pharmacist.\"     To schedule another MTM appointment, please call the clinic directly or you may call the MTM scheduling line at [phone].    My Clinical Pharmacist's contact information:                                                      Please feel free to contact me with any questions or concerns you have.        Syed Borjas, PharmD     Medication Therapy Management (MTM) Pharmacist     [phone]     [email or web address]     [facility]  "

## 2024-10-28 ENCOUNTER — TELEPHONE (OUTPATIENT)
Dept: PULMONOLOGY | Facility: CLINIC | Age: 64
End: 2024-10-28

## 2024-10-28 NOTE — TELEPHONE ENCOUNTER
PRIOR AUTHORIZATION DENIED    Medication: FLUTICASONE-SALMETEROL 100-50 MCG/ACT IN AEPB  Insurance Company: [employer] - Phone [phone] Fax [phone]  Denial Date: 10/25/2024  Denial Reason(s): Needs to try/fail formulary alternatives      Appeal Information:   Patient Notified: No

## 2024-10-31 ENCOUNTER — OFFICE VISIT (OUTPATIENT)
Dept: INTERNAL MEDICINE | Facility: CLINIC | Age: 64
End: 2024-10-31
Payer: MEDICARE

## 2024-10-31 VITALS
TEMPERATURE: 97.1 F | DIASTOLIC BLOOD PRESSURE: 84 MMHG | HEART RATE: 82 BPM | HEIGHT: 74 IN | BODY MASS INDEX: 24.2 KG/M2 | SYSTOLIC BLOOD PRESSURE: 139 MMHG | OXYGEN SATURATION: 93 % | WEIGHT: 188.6 LBS

## 2024-10-31 DIAGNOSIS — E11.22 TYPE 2 DIABETES MELLITUS WITH STAGE 3A CHRONIC KIDNEY DISEASE, WITHOUT LONG-TERM CURRENT USE OF INSULIN (H): Primary | ICD-10-CM

## 2024-10-31 DIAGNOSIS — D72.18 EOSINOPHILIC GRANULOMATOSIS WITH POLYANGIITIS (EGPA) (H): ICD-10-CM

## 2024-10-31 DIAGNOSIS — N18.31 TYPE 2 DIABETES MELLITUS WITH STAGE 3A CHRONIC KIDNEY DISEASE, WITHOUT LONG-TERM CURRENT USE OF INSULIN (H): Primary | ICD-10-CM

## 2024-10-31 DIAGNOSIS — M30.1 EOSINOPHILIC GRANULOMATOSIS WITH POLYANGIITIS (EGPA) (H): ICD-10-CM

## 2024-10-31 PROBLEM — J82.89 PULMONARY EOSINOPHILIA (H): Status: RESOLVED | Noted: 2024-10-31 | Resolved: 2024-10-31

## 2024-10-31 PROBLEM — J82.89 PULMONARY EOSINOPHILIA (H): Status: ACTIVE | Noted: 2024-10-31

## 2024-10-31 LAB
EST. AVERAGE GLUCOSE BLD GHB EST-MCNC: 177 MG/DL
HBA1C MFR BLD: 7.8 % (ref 0–5.6)

## 2024-10-31 PROCEDURE — 36415 COLL VENOUS BLD VENIPUNCTURE: CPT | Performed by: INTERNAL MEDICINE

## 2024-10-31 PROCEDURE — G2211 COMPLEX E/M VISIT ADD ON: HCPCS | Performed by: INTERNAL MEDICINE

## 2024-10-31 PROCEDURE — 99214 OFFICE O/P EST MOD 30 MIN: CPT | Performed by: INTERNAL MEDICINE

## 2024-10-31 PROCEDURE — 83036 HEMOGLOBIN GLYCOSYLATED A1C: CPT | Performed by: INTERNAL MEDICINE

## 2024-10-31 ASSESSMENT — PAIN SCALES - GENERAL: PAINLEVEL_OUTOF10: NO PAIN (0)

## 2024-10-31 NOTE — PROGRESS NOTES
Assessment & Plan     (E11.22,  N18.31) Type 2 diabetes mellitus with stage 3a chronic kidney disease, without long-term current use of insulin (H)  (primary encounter diagnosis)  Comment: slowly improving, particularly was more difficult to control previously when he was on high dose steroids. Last A1c 7.5% in June, working with MTM  Plan: Hemoglobin A1c        Recheck today. Will have follow up with MTM in the coming month.  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  Plan: per their team, follow up scheduled.    The longitudinal plan of care for the diagnosis(es)/condition(s) as documented were addressed during this visit. Due to the added complexity in care, I will continue to support Ned in the subsequent management and with ongoing continuity of care.                 Subjective   Ned is a 64 year old, presenting for the following health issues:  Follow Up (Patient reports they are here for a 6 month  follow up for general health. )      Via the Health Maintenance questionnaire, the patient has reported the following services have been completed -Eye Exam: [facility] eye clinic 2024-03-12, this information has been sent to the abstraction team.  History of Present Illness       Reason for visit:  Follow up, review  Symptom onset:  3-4 weeks ago  Symptoms include:  Cough, runny nose  Symptom intensity:  Mild  Symptom progression:  Improving  Had these symptoms before:  Yes  Has tried/received treatment for these symptoms:  Yes  Previous treatment was successful:  Yes  Prior treatment description:  Cough syrup, alkaseltzer cold  What makes it worse:  No  What makes it better:  OTC meds   He is taking medications regularly.                     Objective    /84 (BP Location: Left arm, Patient Position:  "Sitting, Cuff Size: Adult Regular)   Pulse 82   Temp 97.1  F (36.2  C) (Tympanic)   Ht 1.88 m (6' 2\")   Wt 85.5 kg (188 lb 9.6 oz)   SpO2 93%   BMI 24.21 kg/m    Body mass index is 24.21 kg/m .  Physical Exam               Signed Electronically by: Mark Briggs MD    "

## 2024-11-01 ENCOUNTER — TELEPHONE (OUTPATIENT)
Dept: INTERNAL MEDICINE | Facility: CLINIC | Age: 64
End: 2024-11-01
Payer: MEDICARE

## 2024-11-01 NOTE — TELEPHONE ENCOUNTER
Patient picked up Ozempic from [facility] 11/1/24.     Patient requests to speak with nurse. Patient questions refrigeration, dosage, and administration. Confirmed with patient Ozempic is a subcutaneous injection (like insulin and Trulicity). Patient is aware how to administer subcutaneous injection. Informed patient that the prescription is for 0.25 mg every 7 days. Confirmed with patient that it is to be refrigerated. Syed Borjas, [facility], unavailable at this time. Offered to ask Syed to call patient, patient states he has no further questions at this time. Patient plans to read information in the Ozempic box, if he has any further questions he will contact [facility].

## 2024-11-06 ENCOUNTER — PRE VISIT (OUTPATIENT)
Dept: UROLOGY | Facility: CLINIC | Age: 64
End: 2024-11-06
Payer: MEDICARE

## 2024-11-06 NOTE — TELEPHONE ENCOUNTER
Reason for visit: cystoscopy      Dx/Hx/Sx: Asymptomatic microscopic hematuria     Records/imaging/labs/orders: in epic     At Rooming: collect urine - consent - ask pt if pt would like lido

## 2024-11-11 ENCOUNTER — MYC MEDICAL ADVICE (OUTPATIENT)
Dept: PULMONOLOGY | Facility: CLINIC | Age: 64
End: 2024-11-11
Payer: MEDICARE

## 2024-11-11 DIAGNOSIS — J44.9 CHRONIC OBSTRUCTIVE PULMONARY DISEASE, UNSPECIFIED COPD TYPE (H): Primary | ICD-10-CM

## 2024-11-12 RX ORDER — FLUTICASONE PROPIONATE AND SALMETEROL 232; 14 UG/1; UG/1
1 POWDER, METERED RESPIRATORY (INHALATION) 2 TIMES DAILY
Qty: 1 EACH | Refills: 11 | Status: SHIPPED | OUTPATIENT
Start: 2024-11-12

## 2024-11-14 PROBLEM — J34.2 DEVIATED NASAL SEPTUM: Status: ACTIVE | Noted: 2024-11-14

## 2024-11-14 PROBLEM — J34.89 NASAL OBSTRUCTION: Status: ACTIVE | Noted: 2024-11-14

## 2024-11-15 ENCOUNTER — OFFICE VISIT (OUTPATIENT)
Dept: OTOLARYNGOLOGY | Facility: CLINIC | Age: 64
End: 2024-11-15
Payer: MEDICARE

## 2024-11-15 VITALS
SYSTOLIC BLOOD PRESSURE: 138 MMHG | BODY MASS INDEX: 24.86 KG/M2 | HEART RATE: 80 BPM | HEIGHT: 74 IN | OXYGEN SATURATION: 91 % | WEIGHT: 193.7 LBS | DIASTOLIC BLOOD PRESSURE: 86 MMHG

## 2024-11-15 DIAGNOSIS — J34.89 NASAL OBSTRUCTION: ICD-10-CM

## 2024-11-15 DIAGNOSIS — J34.2 DEVIATED NASAL SEPTUM: ICD-10-CM

## 2024-11-15 DIAGNOSIS — D72.18 EOSINOPHILIC GRANULOMATOSIS WITH POLYANGIITIS (EGPA) (H): ICD-10-CM

## 2024-11-15 DIAGNOSIS — J33.9 NASAL POLYP: Primary | ICD-10-CM

## 2024-11-15 DIAGNOSIS — M30.1 EOSINOPHILIC GRANULOMATOSIS WITH POLYANGIITIS (EGPA) (H): ICD-10-CM

## 2024-11-15 ASSESSMENT — PAIN SCALES - GENERAL: PAINLEVEL_OUTOF10: NO PAIN (0)

## 2024-11-15 NOTE — NURSING NOTE
"Chief Complaint   Patient presents with    RECHECK   Blood pressure 138/86, pulse 80, height 1.88 m (6' 2\"), weight 87.9 kg (193 lb 11.2 oz), SpO2 91%. Manny White, EMT    "

## 2024-11-15 NOTE — PATIENT INSTRUCTIONS
You were seen in the ENT Clinic today by Dr. Samuel. If you have any questions or concerns after your appointment, please contact us (see below)    Please return to clinic in 1 year for follow up with Dr. Samuel     How to Contact Us:  Send a [employer] message to your provider. Our team will respond to you via [employer]. Occasionally, we will need to call you to get further information.  For urgent matters (Monday-Friday), call the ENT Clinic: [phone] and speak with a call center team member - they will route your call appropriately.   If you'd like to speak directly with a nurse, please find our contact information below. We do our best to check voicemail frequently throughout the day, and will work to call you back within 1-2 days. For urgent matters, please use the general clinic phone numbers listed above.    Bethany NETTLES RN, BSN   RN Care Coordinator, ENT Clinic  [facility]  Direct: [phone]  Radha PRICE LPN  Direct: [phone]

## 2024-11-15 NOTE — LETTER
11/15/2024       RE: Ned Moore  [address]  [place] [address]     Dear Colleague,    Thank you for referring your patient, Ned Moore, to the [facility] [place] [facility]. Please see a copy of my visit note below.    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)  Comment:   --continues on mepolizumab  --no sinonasal symptoms, endoscopy completely clean  Plan: IMAGESTREAM RECORDING ORDER  --continue current care plan  --follow up 1 year or sooner for new or worsening issues - could potentially switch to as needed given the stability of his improvement on mepo    Interval History:  Doing well   No facial pain pressure, no drainage PND, smell good, no difficulty breathing    HPI (copied from initial consultation):   Complicated presentation of EGPA   Currently under control  Nucala , no steroids, no current synthetic DMARDS     3 months ago felt that nose started feeling blocked  Difficulty with air flow  ++rhinorrhea/post nasal drip  Starting to mouth breathe - significantly affecting his sleep  Saline spray, fluticasone  Smell and taste are gone  Has not used afrin    Review of systems: A 14-point review of systems has been conducted and was negative for any notable symptoms, except as dictated in the history of present illness.     Medical History:  Past Medical History:   Diagnosis Date     Chronic obstructive pulmonary disease, unspecified COPD type (H) 3/15/2023     Chronic systolic heart failure (H) 11/15/2023     Diabetes (H)      History of CVA (cerebrovascular accident) 7/20/2023        Surgical History:   No past surgical history on file.     Family History:  Family History   Problem Relation Age of Onset     Heart Disease Mother      Diabetes Mother       Asthma Mother      Alzheimer Disease Father      No Known Problems Brother         Social History:   Social History     Socioeconomic History     Marital status:    Tobacco Use     Smoking status: Never     Passive exposure: Never     Smokeless tobacco: Never   Vaping Use     Vaping Use: Never used   Substance and Sexual Activity     Alcohol use: Yes     Comment: a beer or wine every few months     Drug use: Never     Sexual activity: Not Currently     Partners: Female   Other Topics Concern     Parent/sibling w/ CABG, MI or angioplasty before 65F 55M? No     Social Determinants of Health     Financial Resource Strain: Low Risk  (2/2/2024)    Financial Resource Strain      Within the past 12 months, have you or your family members you live with been unable to get utilities (heat, electricity) when it was really needed?: No   Food Insecurity: High Risk (2/2/2024)    Food Insecurity      Within the past 12 months, did you worry that your food would run out before you got money to buy more?: Yes      Within the past 12 months, did the food you bought just not last and you didn t have money to get more?: No   Transportation Needs: Low Risk  (2/2/2024)    Transportation Needs      Within the past 12 months, has lack of transportation kept you from medical appointments, getting your medicines, non-medical meetings or appointments, work, or from getting things that you need?: No   Physical Activity: Insufficiently Active (2/2/2024)    Exercise Vital Sign      Days of Exercise per Week: 3 days      Minutes of Exercise per Session: 30 min   Stress: No Stress Concern Present (2/2/2024)    [employer] [employer] - Occupational Stress Questionnaire      Feeling of Stress : Not at all   Social Connections: Socially Isolated (2/2/2024)    Social Connection and Isolation Panel [NHANES]      Frequency of Communication with Friends and Family: Once a week      Frequency of Social Gatherings with Friends and  "Family: More than three times a week      Attends Latter day Services: Never      Active Member of Clubs or Organizations: No      Attends Club or Organization Meetings: Never      Marital Status:    Housing Stability: Low Risk  (2/2/2024)    Housing Stability      Do you have housing? : Yes      Are you worried about losing your housing?: No   Recent Concern: Housing Stability - High Risk (11/14/2023)    Housing Stability      Do you have housing? : Yes      Are you worried about losing your housing?: Yes        Physical Exam:  /86 (BP Location: Right arm, Patient Position: Sitting, Cuff Size: Adult Regular)   Pulse 80   Ht 1.88 m (6' 2\")   Wt 87.9 kg (193 lb 11.2 oz)   SpO2 91%   BMI 24.87 kg/m      General Appearance: No acute distress, appropriate demeanor, conversant  Eyes: moist conjunctivae; EOMI; pupils symmetric; visual acuity grossly intact; no proptosis  Head: normocephalic; overall symmetric appearance without deformity  Face: overall symmetric without deformity  Ears: Normal appearance of external ear; external meatus normal in appearance; TMs intact without perforation bilaterally;   Nose: No external deformity; septum slight DNS to the right ; inferior turbinates (non hypertrophic)   Oral Cavity/oropharynx: Normal appearance of mucosa; tongue midline; no mass or lesions; oropharynx without obvious mucosal abnormality  Neck: no palpable lymphadenopathy; thyroid without palpable nodules  Lungs: symmetric chest rise; no wheezing  CV: Good distal perfusion; normal hear rate  Extremities: No deformity  Neurologic Exam: Cranial nerves II-XII are grossly intact; no focal deficit    Procedure Note  Procedure performed: Rigid nasal endoscopy  Indication: To evaluate for sinonasal pathology not visualized on routine anterior rhinoscopy  Anesthesia: 4% topical lidocaine with 0.05% oxymetazoline  Description of procedure: A 30 degree, 3 mm rigid endoscope was inserted into bilateral nasal " cavities and the nasal valves, nasal cavity, middle meatus, sphenoethmoid recess, and nasopharynx were thoroughly evaluated for evidence of obstruction, edema, purulence, polyps and/or mass/lesion.     Findings:    No polyps, MM SER NP clear    The patient tolerated the procedure well without complication.     Imaging Review:  CT 2024 primary review - ethmoid predominant opacfication and polyposis    Faizan Samuel MD    [facility]  Rhinology  Endoscopic Skull Base Surgery  [facility]  Department of Otolaryngology - Head & Neck Surgery      Again, thank you for allowing me to participate in the care of your patient.      Sincerely,    Faizan Samuel MD

## 2024-11-15 NOTE — PROGRESS NOTES
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)  Comment:   --continues on mepolizumab  --no sinonasal symptoms, endoscopy completely clean  Plan: IMAGESTREAM RECORDING ORDER  --continue current care plan  --follow up 1 year or sooner for new or worsening issues - could potentially switch to as needed given the stability of his improvement on mepo    Interval History:  Doing well   No facial pain pressure, no drainage PND, smell good, no difficulty breathing    HPI (copied from initial consultation):   Complicated presentation of EGPA   Currently under control  Nucala , no steroids, no current synthetic DMARDS     3 months ago felt that nose started feeling blocked  Difficulty with air flow  ++rhinorrhea/post nasal drip  Starting to mouth breathe - significantly affecting his sleep  Saline spray, fluticasone  Smell and taste are gone  Has not used afrin    Review of systems: A 14-point review of systems has been conducted and was negative for any notable symptoms, except as dictated in the history of present illness.     Medical History:  Past Medical History:   Diagnosis Date    Chronic obstructive pulmonary disease, unspecified COPD type (H) 3/15/2023    Chronic systolic heart failure (H) 11/15/2023    Diabetes (H)     History of CVA (cerebrovascular accident) 7/20/2023        Surgical History:   No past surgical history on file.     Family History:  Family History   Problem Relation Age of Onset    Heart Disease Mother     Diabetes Mother     Asthma Mother     Alzheimer Disease Father     No Known Problems Brother         Social History:   Social History     Socioeconomic History    Marital status:    Tobacco Use    Smoking status: Never     Passive exposure: Never    Smokeless tobacco: Never   Vaping Use    Vaping Use: Never used   Substance and Sexual  Activity    Alcohol use: Yes     Comment: a beer or wine every few months    Drug use: Never    Sexual activity: Not Currently     Partners: Female   Other Topics Concern    Parent/sibling w/ CABG, MI or angioplasty before 65F 55M? No     Social Determinants of Health     Financial Resource Strain: Low Risk  (2/2/2024)    Financial Resource Strain     Within the past 12 months, have you or your family members you live with been unable to get utilities (heat, electricity) when it was really needed?: No   Food Insecurity: High Risk (2/2/2024)    Food Insecurity     Within the past 12 months, did you worry that your food would run out before you got money to buy more?: Yes     Within the past 12 months, did the food you bought just not last and you didn t have money to get more?: No   Transportation Needs: Low Risk  (2/2/2024)    Transportation Needs     Within the past 12 months, has lack of transportation kept you from medical appointments, getting your medicines, non-medical meetings or appointments, work, or from getting things that you need?: No   Physical Activity: Insufficiently Active (2/2/2024)    Exercise Vital Sign     Days of Exercise per Week: 3 days     Minutes of Exercise per Session: 30 min   Stress: No Stress Concern Present (2/2/2024)    [employer] - Occupational Stress Questionnaire     Feeling of Stress : Not at all   Social Connections: Socially Isolated (2/2/2024)    Social Connection and Isolation Panel [NHANES]     Frequency of Communication with Friends and Family: Once a week     Frequency of Social Gatherings with Friends and Family: More than three times a week     Attends Synagogue Services: Never     Active Member of Clubs or Organizations: No     Attends Club or Organization Meetings: Never     Marital Status:    Housing Stability: Low Risk  (2/2/2024)    Housing Stability     Do you have housing? : Yes     Are you worried about losing your housing?:  "No   Recent Concern: Housing Stability - High Risk (11/14/2023)    Housing Stability     Do you have housing? : Yes     Are you worried about losing your housing?: Yes        Physical Exam:  /86 (BP Location: Right arm, Patient Position: Sitting, Cuff Size: Adult Regular)   Pulse 80   Ht 1.88 m (6' 2\")   Wt 87.9 kg (193 lb 11.2 oz)   SpO2 91%   BMI 24.87 kg/m      General Appearance: No acute distress, appropriate demeanor, conversant  Eyes: moist conjunctivae; EOMI; pupils symmetric; visual acuity grossly intact; no proptosis  Head: normocephalic; overall symmetric appearance without deformity  Face: overall symmetric without deformity  Ears: Normal appearance of external ear; external meatus normal in appearance; TMs intact without perforation bilaterally;   Nose: No external deformity; septum slight DNS to the right ; inferior turbinates (non hypertrophic)   Oral Cavity/oropharynx: Normal appearance of mucosa; tongue midline; no mass or lesions; oropharynx without obvious mucosal abnormality  Neck: no palpable lymphadenopathy; thyroid without palpable nodules  Lungs: symmetric chest rise; no wheezing  CV: Good distal perfusion; normal hear rate  Extremities: No deformity  Neurologic Exam: Cranial nerves II-XII are grossly intact; no focal deficit    Procedure Note  Procedure performed: Rigid nasal endoscopy  Indication: To evaluate for sinonasal pathology not visualized on routine anterior rhinoscopy  Anesthesia: 4% topical lidocaine with 0.05% oxymetazoline  Description of procedure: A 30 degree, 3 mm rigid endoscope was inserted into bilateral nasal cavities and the nasal valves, nasal cavity, middle meatus, sphenoethmoid recess, and nasopharynx were thoroughly evaluated for evidence of obstruction, edema, purulence, polyps and/or mass/lesion.     Findings:    No polyps, MM SER NP clear    The patient tolerated the procedure well without complication.     Imaging Review:  CT 2024 primary review - " ethmoid predominant opacfication and polyposis    Faizan Samuel MD    [facility]  Rhinology  Endoscopic Skull Base Surgery  [facility]  Department of Otolaryngology - Head & Neck Surgery

## 2024-11-26 ENCOUNTER — TELEPHONE (OUTPATIENT)
Dept: PHARMACY | Facility: CLINIC | Age: 64
End: 2024-11-26
Payer: MEDICARE

## 2024-11-26 DIAGNOSIS — E11.40 TYPE 2 DIABETES MELLITUS WITH DIABETIC NEUROPATHY, UNSPECIFIED WHETHER LONG TERM INSULIN USE (H): ICD-10-CM

## 2024-11-26 RX ORDER — ACYCLOVIR 400 MG/1
TABLET ORAL
Qty: 3 EACH | Refills: 5 | OUTPATIENT
Start: 2024-11-26

## 2024-11-26 NOTE — TELEPHONE ENCOUNTER
Disp Refills Start End ROSIO   Continuous Glucose Sensor (DEXCOM G7 SENSOR) MISC 3 each 5 11/26/2024 -- No   Sig - Route: 1 Device every 10 days. - Does not apply   Sent to pharmacy as: Dexcom G7 Sensor   Class: E-Prescribe   Order: [id number]   E-Prescribing Status: Receipt confirmed by pharmacy (11/26/2024  9:47 AM CST)     Printout Tracking

## 2024-11-29 NOTE — TELEPHONE ENCOUNTER
Central Prior Authorization Team   Phone: [phone]    PA Initiation    Medication: Dexcom G7 Sensor    Insurance Company: [employer] - Phone [phone] Fax [phone]  Pharmacy Filling the Rx: [id number] [place], [place] - [address] [place] [address]  Filling Pharmacy Phone: [phone]  Filling Pharmacy Fax:    Start Date: 11/29/2024

## 2024-12-01 DIAGNOSIS — K76.9 LIVER DISEASE, UNSPECIFIED: ICD-10-CM

## 2024-12-02 NOTE — TELEPHONE ENCOUNTER
PRIOR AUTHORIZATION DENIED    Medication: Dexcom G7 Sensor      Denial Date: 12/2/2024    Denial Rational: Denied under medicare Part D pharmacy benefits.  Pharmacy benefits through medicare do not cover diabetic testing supplies and CGM's.     This may be covered under medicare Part B medical benefit.     Please send the Dexcom Rx to [facility].  They will be able to determine if patient is eligible for Part B coverage.

## 2024-12-03 RX ORDER — ACYCLOVIR 400 MG/1
1 TABLET ORAL
Qty: 3 EACH | Refills: 5 | Status: SHIPPED | OUTPATIENT
Start: 2024-12-03

## 2024-12-04 NOTE — TELEPHONE ENCOUNTER
Spoke with patient who states he no longer takes omeprazole. States he stopped when he stopped taking his iron pills.

## 2025-01-03 ENCOUNTER — MYC MEDICAL ADVICE (OUTPATIENT)
Dept: PHARMACY | Facility: CLINIC | Age: 65
End: 2025-01-03
Payer: MEDICARE

## 2025-01-03 DIAGNOSIS — J44.9 CHRONIC OBSTRUCTIVE PULMONARY DISEASE, UNSPECIFIED COPD TYPE (H): ICD-10-CM

## 2025-01-04 RX ORDER — TIOTROPIUM BROMIDE 18 UG/1
18 CAPSULE ORAL; RESPIRATORY (INHALATION) DAILY
Qty: 30 CAPSULE | Refills: 3 | Status: SHIPPED | OUTPATIENT
Start: 2025-01-04

## 2025-01-09 ENCOUNTER — PATIENT OUTREACH (OUTPATIENT)
Dept: NEPHROLOGY | Facility: CLINIC | Age: 65
End: 2025-01-09
Payer: MEDICARE

## 2025-01-10 NOTE — PROGRESS NOTES
Reached out to patient and spouse to support rescheduling clinic that is closed.  Voicemails left instructing to return call to [facility] line.  Holly Kirkland RN, BSN, PHN  [facility] Nephrology Nurse Navigator  [phone]

## 2025-01-13 ENCOUNTER — LAB (OUTPATIENT)
Dept: LAB | Facility: CLINIC | Age: 65
End: 2025-01-13
Payer: MEDICARE

## 2025-01-13 DIAGNOSIS — M30.1 EOSINOPHILIC GRANULOMATOSIS WITH POLYANGIITIS (EGPA) (H): ICD-10-CM

## 2025-01-13 DIAGNOSIS — D72.18 EOSINOPHILIC GRANULOMATOSIS WITH POLYANGIITIS (EGPA) (H): ICD-10-CM

## 2025-01-13 DIAGNOSIS — N18.32 STAGE 3B CHRONIC KIDNEY DISEASE (H): ICD-10-CM

## 2025-01-13 LAB
ALBUMIN MFR UR ELPH: 67.5 MG/DL
ALBUMIN SERPL BCG-MCNC: 4.7 G/DL (ref 3.5–5.2)
ALBUMIN UR-MCNC: 100 MG/DL
ANION GAP SERPL CALCULATED.3IONS-SCNC: 14 MMOL/L (ref 7–15)
APPEARANCE UR: CLEAR
BASOPHILS # BLD AUTO: 0.1 10E3/UL (ref 0–0.2)
BASOPHILS NFR BLD AUTO: 1 %
BILIRUB UR QL STRIP: NEGATIVE
BUN SERPL-MCNC: 36.4 MG/DL (ref 8–23)
CALCIUM SERPL-MCNC: 10 MG/DL (ref 8.8–10.4)
CHLORIDE SERPL-SCNC: 101 MMOL/L (ref 98–107)
COLOR UR AUTO: YELLOW
CREAT SERPL-MCNC: 1.92 MG/DL (ref 0.67–1.17)
CREAT UR-MCNC: 257 MG/DL
CREAT UR-MCNC: 257 MG/DL
EGFRCR SERPLBLD CKD-EPI 2021: 38 ML/MIN/1.73M2
EOSINOPHIL # BLD AUTO: 0.1 10E3/UL (ref 0–0.7)
EOSINOPHIL NFR BLD AUTO: 1 %
ERYTHROCYTE [DISTWIDTH] IN BLOOD BY AUTOMATED COUNT: 11.7 % (ref 10–15)
GLUCOSE SERPL-MCNC: 131 MG/DL (ref 70–99)
GLUCOSE UR STRIP-MCNC: 100 MG/DL
HCO3 SERPL-SCNC: 26 MMOL/L (ref 22–29)
HCT VFR BLD AUTO: 48.2 % (ref 40–53)
HGB BLD-MCNC: 16.3 G/DL (ref 13.3–17.7)
HGB UR QL STRIP: ABNORMAL
IMM GRANULOCYTES # BLD: 0 10E3/UL
IMM GRANULOCYTES NFR BLD: 0 %
KETONES UR STRIP-MCNC: ABNORMAL MG/DL
LEUKOCYTE ESTERASE UR QL STRIP: ABNORMAL
LYMPHOCYTES # BLD AUTO: 2.5 10E3/UL (ref 0.8–5.3)
LYMPHOCYTES NFR BLD AUTO: 23 %
MCH RBC QN AUTO: 29.8 PG (ref 26.5–33)
MCHC RBC AUTO-ENTMCNC: 33.8 G/DL (ref 31.5–36.5)
MCV RBC AUTO: 88 FL (ref 78–100)
MICROALBUMIN UR-MCNC: 140 MG/L
MICROALBUMIN/CREAT UR: 54.47 MG/G CR (ref 0–17)
MONOCYTES # BLD AUTO: 0.7 10E3/UL (ref 0–1.3)
MONOCYTES NFR BLD AUTO: 7 %
NEUTROPHILS # BLD AUTO: 7.6 10E3/UL (ref 1.6–8.3)
NEUTROPHILS NFR BLD AUTO: 69 %
NITRATE UR QL: NEGATIVE
PH UR STRIP: 5.5 [PH] (ref 5–8)
PHOSPHATE SERPL-MCNC: 3.8 MG/DL (ref 2.5–4.5)
PLATELET # BLD AUTO: 238 10E3/UL (ref 150–450)
POTASSIUM SERPL-SCNC: 5 MMOL/L (ref 3.4–5.3)
PROT/CREAT 24H UR: 0.26 MG/MG CR (ref 0–0.2)
RBC # BLD AUTO: 5.47 10E6/UL (ref 4.4–5.9)
SODIUM SERPL-SCNC: 141 MMOL/L (ref 135–145)
SP GR UR STRIP: >=1.03 (ref 1–1.03)
UROBILINOGEN UR STRIP-ACNC: 0.2 E.U./DL
WBC # BLD AUTO: 11 10E3/UL (ref 4–11)

## 2025-01-13 PROCEDURE — 83516 IMMUNOASSAY NONANTIBODY: CPT

## 2025-01-13 PROCEDURE — 81003 URINALYSIS AUTO W/O SCOPE: CPT

## 2025-01-13 PROCEDURE — 85025 COMPLETE CBC W/AUTO DIFF WBC: CPT

## 2025-01-13 PROCEDURE — 83876 ASSAY MYELOPEROXIDASE: CPT

## 2025-01-13 PROCEDURE — 82043 UR ALBUMIN QUANTITATIVE: CPT

## 2025-01-13 PROCEDURE — 84156 ASSAY OF PROTEIN URINE: CPT

## 2025-01-13 PROCEDURE — 80069 RENAL FUNCTION PANEL: CPT

## 2025-01-13 PROCEDURE — 36415 COLL VENOUS BLD VENIPUNCTURE: CPT

## 2025-01-13 PROCEDURE — 82570 ASSAY OF URINE CREATININE: CPT

## 2025-01-14 LAB
MYELOPEROXIDASE AB SER IA-ACNC: <0.3 U/ML
MYELOPEROXIDASE AB SER IA-ACNC: NEGATIVE
PROTEINASE3 AB SER IA-ACNC: <1 U/ML
PROTEINASE3 AB SER IA-ACNC: NEGATIVE

## 2025-01-16 ENCOUNTER — OFFICE VISIT (OUTPATIENT)
Dept: NEPHROLOGY | Facility: CLINIC | Age: 65
End: 2025-01-16
Attending: INTERNAL MEDICINE
Payer: MEDICARE

## 2025-01-16 VITALS
TEMPERATURE: 98.1 F | RESPIRATION RATE: 18 BRPM | SYSTOLIC BLOOD PRESSURE: 138 MMHG | BODY MASS INDEX: 24.56 KG/M2 | HEIGHT: 74 IN | OXYGEN SATURATION: 97 % | DIASTOLIC BLOOD PRESSURE: 86 MMHG | WEIGHT: 191.4 LBS | HEART RATE: 87 BPM

## 2025-01-16 DIAGNOSIS — E11.40 TYPE 2 DIABETES MELLITUS WITH DIABETIC NEUROPATHY, UNSPECIFIED WHETHER LONG TERM INSULIN USE (H): ICD-10-CM

## 2025-01-16 DIAGNOSIS — N18.32 HYPERTENSION ASSOCIATED WITH STAGE 3B CHRONIC KIDNEY DISEASE DUE TO TYPE 2 DIABETES MELLITUS (H): Primary | ICD-10-CM

## 2025-01-16 DIAGNOSIS — D72.18 EOSINOPHILIC GRANULOMATOSIS WITH POLYANGIITIS (EGPA) (H): ICD-10-CM

## 2025-01-16 DIAGNOSIS — M30.1 EOSINOPHILIC GRANULOMATOSIS WITH POLYANGIITIS (EGPA) (H): ICD-10-CM

## 2025-01-16 DIAGNOSIS — N18.4 CHRONIC KIDNEY DISEASE, STAGE IV (SEVERE) (H): ICD-10-CM

## 2025-01-16 DIAGNOSIS — I12.9 HYPERTENSION ASSOCIATED WITH STAGE 3B CHRONIC KIDNEY DISEASE DUE TO TYPE 2 DIABETES MELLITUS (H): Primary | ICD-10-CM

## 2025-01-16 DIAGNOSIS — E11.22 HYPERTENSION ASSOCIATED WITH STAGE 3B CHRONIC KIDNEY DISEASE DUE TO TYPE 2 DIABETES MELLITUS (H): Primary | ICD-10-CM

## 2025-01-16 PROCEDURE — G0463 HOSPITAL OUTPT CLINIC VISIT: HCPCS | Performed by: INTERNAL MEDICINE

## 2025-01-16 PROCEDURE — 99214 OFFICE O/P EST MOD 30 MIN: CPT | Performed by: INTERNAL MEDICINE

## 2025-01-16 RX ORDER — ACYCLOVIR 400 MG/1
1 TABLET ORAL
Qty: 3 EACH | Refills: 5 | Status: SHIPPED | OUTPATIENT
Start: 2025-01-16

## 2025-01-16 RX ORDER — LISINOPRIL 5 MG/1
5 TABLET ORAL DAILY
Qty: 90 TABLET | Refills: 3 | Status: SHIPPED | OUTPATIENT
Start: 2025-01-16

## 2025-01-16 ASSESSMENT — PAIN SCALES - GENERAL: PAINLEVEL_OUTOF10: NO PAIN (0)

## 2025-01-16 NOTE — NURSING NOTE
"Chief Complaint   Patient presents with    RECHECK     Vasculitis      Vital signs:  Temp: 98.1  F (36.7  C) Temp src: Oral BP: 138/86 Pulse: 87   Resp: 18 SpO2: 97 %     Height: 188 cm (6' 2.02\") Weight: 86.8 kg (191 lb 6.4 oz)  Estimated body mass index is 24.56 kg/m  as calculated from the following:    Height as of this encounter: 1.88 m (6' 2.02\").    Weight as of this encounter: 86.8 kg (191 lb 6.4 oz).      Isaura Izquierdo, [facility]  1/16/2025 2:50 PM    "

## 2025-01-16 NOTE — PROGRESS NOTES
Nephrology Clinic    Ned Moore MRN:[id number] YOB: 1960  Date of Service: 01/16/2025  Primary care provider: Mark Briggs  Requesting physician: Mark Briggs   Pulmonologist: Dr Biswas at [facility]  Cardiologist: Dr Efren Farias       REASON FOR CONSULT: establish care for EGPA at [facility]    HISTORY OF PRESENT ILLNESS:   Ned Moore is a 63 year old male who presents establish care for EGPA at [facility] and have all of his care within the same system.  Mr Moore's history of present illness started in Dec 2022, at which time he developed a new onset of respiratory illness consistent with asthma with wheezing and HAYNES.  He had never had any respiratory disease prior to that, and has never smoked.  He was treated with a short course of prednisone with marked improvement , but the symptoms kept recurring after the prednisone was discontinued.  Inhalers did not seem to keep the asthma under control.   He was able to work only on and off in the winter for 2023  In May 2023, he suffered from a severe epistaxis episode (requiring blood transfusion, and associated with hypotension) that prompted him to go to the ED.  He was admitted to [facility] and had a prolonged hospitalization during which he had , where he was initially found to have severe eosinophilia, hyponatremia and troponin elevation,  myocarditis, multifocal acute strokes, liver lesions concerning for abscesses, highest suspicion for EPGA. He underwent an extensive work up with cardiology, rheumatology, GI, neurology infectious disease, pulmonary and hematology. 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 Marked improvment with steroids and meprolizumab (5/26).   - completed 3 days 500 mg solumedrol; decreased to 60 mg prednisone started 5/25.  Eosinophilic myocarditis confirmed with biopsy. Presenting with ST depressions and elevated troponin. Cardiology evaluation, elevated troponin more  "consistent with demand ischemia. TTE w/o WMA. Cardiac MRI obtained with findings consistent with myocarditis.     With respect to multifocal acute cerebral infarcts, stroke code was called on 05/20 with acute gaze deviation, worsening right-sided weakness, seen by Neuro Critical Care, felt possibly secondary to an episode of orthostatic hypotension.   He describes amnesia related to the events.  He experienced slurred speech, weakness of the R arm and leg, and left lower quadrantanopsia.  He was transferred to rehab on 6/8/2023 until June 30.   In rehab and since then, he reports regaining significant amount of strength in the right arm/.  The R leg is also improved, but remains weak.  He is now able to drive, walk without a walker, but still appears to have RLExt proximal muscle weakness.  He remains on mepolizumab monthly.  He was started on Azathioprine on 9/8/23 -> but this resulted in recurrent episodes of sever N/V and associated dehydration until he stopped the azathioprine.    Today, Mr Moore reports feeling generally well.   He denies SOB, HAYNES, Cough.  No CP.   Denies GI or  symptoms.  Has nocturia 1x/night  No melena or hematochezia.  No gross hematuria  Reports nasal congestion on the R side , without purulent nasal discharge.  Residual R leg weakness.     January 25, 2024  IN PT.  L Ext strength is improving.  No wheezing, SOB, CP  Cardiac rehab.  Reports good O2 sats HR in low 100's  Saw ENT.  Had CT. Had severe congestion.   Has a polyp    On topical steroid and daily steroid rinses.    Sense of smell in improving  No GI spms.  Diarrhea resolved after  stopping aza  On Fe supplement. -> gets some N with it,  No  spms.  Nocturia once a night.  No hesitancy, straining or frequency.    BP was 110 at cardiac rehab a week ago.  Reports \"dehydration\" and SBP in 80's a few weeks ago.    Is scheduled to have EGD and Colonoscopy    May 2, 2024  FEELS generally well.  No epistaxis, oral ulcers, SOB, " "CP.  Gaining strength in l ext  Able to walk at least 30 min.   Can climb a flight of stair with HAYNES, but needs to hold to railing bec of weakness.  No GI,  spms   nocturia usually at 3 am.  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    September 5, 2024  Mr Moore is here for routine follow up.   He feels generally well.  No change in SOB.  He climbs stairs at home twice a day and walks around the block.   His insurance dropped coverage for PT, so he is mostly doing this on his own at home (weakness post CVA)  He has chronic L Ext neuropathy with tingling - long standing and appears related to underlying diabetes.  He reports no recent change.  No GI symptoms.     Mr Moore denies dysuria, frequency, gross hematuria or nocturia.  He had 1 prior episode of \"uti\" for which he received antibiotics.    January 16, 2025  Feels well.  Still on mepolizumab.  Has had \"very faint end-expiratory whistle\" (his words) over the last 4 days.   Has seen a urologist.  A CT urogram was planned, but Mr Moore canceled it because he had been sick.  No numbness or tingling. Does get occasional tingling in the L Ext - but it doesn't interfere with his work.  No GI spms.  No dysuria or difficulty voiding.    PAST MEDICAL HISTORY:  Past Medical History:   Diagnosis Date    Chronic obstructive pulmonary disease, unspecified COPD type (H) 3/15/2023    Chronic systolic heart failure (H) 11/15/2023    Diabetes (H)     History of CVA (cerebrovascular accident) 7/20/2023   Diabetes diagnosed about 8 years ago, but this was already associated with L ext neuropathy suggesting that onset of disease was probably a decade before. He was previously on empagliflozin from Feb 2023 until Sept/Oct 2023, after a UTI requiring antibiotic treatment.   ?PSVT during hospitalization   He never had a colonoscopy, but has been tested with cologuard -> reportedly neg.       PAST SURGICAL HISTORY:  No past surgical history on " file.  MEDICATIONS:    Current Outpatient Medications  reviewed with patient January 16, 2025       Current Outpatient Medications   Medication Sig Dispense Refill    ASPIRIN LOW DOSE 81 MG chewable tablet Take 81 mg by mouth three times a week. 2-3 days a week.      atorvastatin (LIPITOR) 20 MG tablet Take 1 tablet (20 mg) by mouth every evening 90 tablet 3    blood glucose monitoring (ACCU-CHEK FASTCLIX) lancets Use to test blood sugar once daily or as directed. 300 each 3    blood glucose test (ACCU-CHEK SMARTVIEW TEST STRIP) strips [BLOOD GLUCOSE TEST (ACCU-CHEK SMARTVIEW TEST STRIP) STRIPS] USE TO CHECK BLOOD SUGARS TWICE DAILY. 200 strip 3    Continuous Glucose Sensor (DEXCOM G7 SENSOR) MISC 1 Device every 10 days. 3 each 5    fluticasone-salmeterol (AIRDUO RESPICLICK) 232-14 MCG/ACT inhaler Inhale 1 puff into the lungs 2 times daily. 1 each 11    gabapentin (NEURONTIN) 300 MG capsule TAKE 1 CAPSULE BY MOUTH AT BEDTIME 30 capsule 5    glipiZIDE (GLUCOTROL XL) 10 MG 24 hr tablet Take 1 tablet (10 mg) by mouth daily 90 tablet 3    lisinopril (ZESTRIL) 2.5 MG tablet Take 1 tablet (2.5 mg) by mouth daily 90 tablet 3    mepolizumab (NUCALA) 100 MG/ML injection (prefilled syringe) Inject 3 mLs (300 mg) Subcutaneous every 30 days 3 mL 11    metFORMIN (GLUCOPHAGE XR) 500 MG 24 hr tablet Take 1 tablet (500 mg) by mouth daily (with dinner). 90 tablet 3    metFORMIN (GLUCOPHAGE) 1000 MG tablet TAKE 1 TABLET BY MOUTH EVERY DAY (1000 MG) WITH BREAKFAST 90 tablet 3    metoprolol succinate ER (TOPROL XL) 25 MG 24 hr tablet TAKE 0.5 TABLETS BY MOUTH EVERY MORNING 45 tablet 3    mometasone (NASONEX) 50 MCG/ACT nasal spray Spray 2 sprays into both nostrils daily. 17 g 3    Multiple Vitamins-Minerals (MULTIVITAMIN ADULTS PO) Take 1 tablet by mouth daily.      semaglutide (OZEMPIC, 0.25 OR 0.5 MG/DOSE,) 2 MG/1.5ML SOPN pen Inject 0.5 mg subcutaneously every 7 days.      tiotropium (SPIRIVA) 18 MCG inhaled capsule Inhale 1 capsule  (18 mcg) into the lungs daily. .Please dispense a generic form of Spiriva if you can order it 30 capsule 3    Insulin Glargine-yfgn (SEMGLEE, YFGN,) 100 UNIT/ML SOPN Inject 5 Units subcutaneously daily. (Patient not taking: Reported on 1/16/2025) 15 mL 1       ALLERGIES:    Allergies   Allergen Reactions    Azathioprine Nausea and Vomiting    Dulaglutide Nausea     REVIEW OF SYSTEMS:  A comprehensive review of systems was performed and found to be negative except as described here or above.  SOCIAL HISTORY:   Social History     Socioeconomic History    Marital status:      Spouse name: Not on file    Number of children: Not on file    Years of education: Not on file    Highest education level: Not on file   Occupational History    Not on file   Tobacco Use    Smoking status: Never     Passive exposure: Never    Smokeless tobacco: Never   Vaping Use    Vaping status: Never Used   Substance and Sexual Activity    Alcohol use: Not Currently     Comment: a beer or wine every few months non-alcohol    Drug use: Never    Sexual activity: Not Currently     Partners: Female   Other Topics Concern    Parent/sibling w/ CABG, MI or angioplasty before 65F 55M? No   Social History Narrative    Not on file     Social Drivers of Health     Financial Resource Strain: Low Risk  (5/31/2024)    Financial Resource Strain     Within the past 12 months, have you or your family members you live with been unable to get utilities (heat, electricity) when it was really needed?: No   Food Insecurity: High Risk (5/31/2024)    Food Insecurity     Within the past 12 months, did you worry that your food would run out before you got money to buy more?: Yes     Within the past 12 months, did the food you bought just not last and you didn t have money to get more?: Yes   Transportation Needs: Low Risk  (5/31/2024)    Transportation Needs     Within the past 12 months, has lack of transportation kept you from medical appointments, getting  "your medicines, non-medical meetings or appointments, work, or from getting things that you need?: No   Physical Activity: Insufficiently Active (5/31/2024)    Exercise Vital Sign     Days of Exercise per Week: 3 days     Minutes of Exercise per Session: 20 min   Stress: No Stress Concern Present (5/31/2024)    [employer] [place] [employer] - Occupational Stress Questionnaire     Feeling of Stress : Not at all   Social Connections: Socially Isolated (5/31/2024)    Social Connection and Isolation Panel [NHANES]     Frequency of Communication with Friends and Family: Once a week     Frequency of Social Gatherings with Friends and Family: More than three times a week     Attends Yarsanism Services: Never     Active Member of Clubs or Organizations: No     Attends Club or Organization Meetings: Never     Marital Status:    Interpersonal Safety: Low Risk  (6/5/2024)    Interpersonal Safety     Do you feel physically and emotionally safe where you currently live?: Yes     Within the past 12 months, have you been hit, slapped, kicked or otherwise physically hurt by someone?: No     Within the past 12 months, have you been humiliated or emotionally abused in other ways by your partner or ex-partner?: No   Housing Stability: High Risk (5/31/2024)    Housing Stability     Do you have housing? : Yes     Are you worried about losing your housing?: Yes     FAMILY MEDICAL HISTORY:   Family History   Problem Relation Age of Onset    Heart Disease Mother     Diabetes Mother     Asthma Mother     Alzheimer Disease Father     No Known Problems Brother      PHYSICAL EXAM:   /86 (BP Location: Right arm, Patient Position: Sitting, Cuff Size: Adult Regular)   Pulse 87   Temp 98.1  F (36.7  C) (Oral)   Resp 18   Ht 1.88 m (6' 2.02\")   Wt 86.8 kg (191 lb 6.4 oz)   SpO2 97%   BMI 24.56 kg/m    GENERAL APPEARANCE: alert and no distress  EYES: nonicteric  NECK: supple, no adenopathy  Carotids 2+ bilat without " bruits  RESP: lungs clear to auscultation no wheezing heard.  CV: regular rhythm, normal rate, no rub  Extremities: No edema.  NEURO: mentation intact and speech normal.    PSYCH: affect normal/bright   LABS:   Recent Results (from the past 4 weeks)   Renal panel    Collection Time: 01/13/25 10:50 AM   Result Value Ref Range    Sodium 141 135 - 145 mmol/L    Potassium 5.0 3.4 - 5.3 mmol/L    Chloride 101 98 - 107 mmol/L    Carbon Dioxide (CO2) 26 22 - 29 mmol/L    Anion Gap 14 7 - 15 mmol/L    Glucose 131 (H) 70 - 99 mg/dL    Urea Nitrogen 36.4 (H) 8.0 - 23.0 mg/dL    Creatinine 1.92 (H) 0.67 - 1.17 mg/dL    GFR Estimate 38 (L) >60 mL/min/1.73m2    Calcium 10.0 8.8 - 10.4 mg/dL    Albumin 4.7 3.5 - 5.2 g/dL    Phosphorus 3.8 2.5 - 4.5 mg/dL   Myeloperoxidase and Proteinase 3 Panel    Collection Time: 01/13/25 10:50 AM   Result Value Ref Range    MPO Janice IgG Instrument Value <0.3 <3.5 U/mL    Myeloperoxidase Antibody IgG Negative Negative    Proteinase 3 Janice IgG Instrument Value <1.0 <2.0 U/mL    Proteinase 3 Antibody IgG Negative Negative   CBC with platelets and differential    Collection Time: 01/13/25 10:50 AM   Result Value Ref Range    WBC Count 11.0 4.0 - 11.0 10e3/uL    RBC Count 5.47 4.40 - 5.90 10e6/uL    Hemoglobin 16.3 13.3 - 17.7 g/dL    Hematocrit 48.2 40.0 - 53.0 %    MCV 88 78 - 100 fL    MCH 29.8 26.5 - 33.0 pg    MCHC 33.8 31.5 - 36.5 g/dL    RDW 11.7 10.0 - 15.0 %    Platelet Count 238 150 - 450 10e3/uL    % Neutrophils 69 %    % Lymphocytes 23 %    % Monocytes 7 %    % Eosinophils 1 %    % Basophils 1 %    % Immature Granulocytes 0 %    Absolute Neutrophils 7.6 1.6 - 8.3 10e3/uL    Absolute Lymphocytes 2.5 0.8 - 5.3 10e3/uL    Absolute Monocytes 0.7 0.0 - 1.3 10e3/uL    Absolute Eosinophils 0.1 0.0 - 0.7 10e3/uL    Absolute Basophils 0.1 0.0 - 0.2 10e3/uL    Absolute Immature Granulocytes 0.0 <=0.4 10e3/uL   UA with Microscopic    Collection Time: 01/13/25 10:54 AM   Result Value Ref Range     Color Urine Yellow Colorless, Straw, Light Yellow, Yellow    Appearance Urine Clear Clear    Glucose Urine 100 (A) Negative mg/dL    Bilirubin Urine Negative Negative    Ketones Urine Trace (A) Negative mg/dL    Specific Gravity Urine >=1.030 1.005 - 1.030    Blood Urine Trace (A) Negative    pH Urine 5.5 5.0 - 8.0    Protein Albumin Urine 100 (A) Negative mg/dL    Urobilinogen Urine 0.2 0.2, 1.0 E.U./dL    Nitrite Urine Negative Negative    Leukocyte Esterase Urine Small (A) Negative   Protein  random urine    Collection Time: 01/13/25 10:54 AM   Result Value Ref Range    Total Protein Urine mg/dL 67.5   mg/dL    Total Protein Urine mg/mg Creat 0.26 (H) 0.00 - 0.20 mg/mg Cr    Creatinine Urine mg/dL 257.0 mg/dL   Albumin Random Urine Quantitative with Creat Ratio    Collection Time: 01/13/25 10:54 AM   Result Value Ref Range    Creatinine Urine mg/dL 257.0 mg/dL    Albumin Urine mg/L 140.0 mg/L    Albumin Urine mg/g Cr 54.47 (H) 0.00 - 17.00 mg/g Cr         CMP  Recent Labs   Lab Test 01/13/25  1050 09/05/24  1511 05/02/24  1419 02/16/24  1518 01/25/24  1533 10/26/23  1149 10/26/23  1147 04/26/23  0824 07/05/22  0933 03/21/22  1112 08/23/21  1152 01/27/21  1047 10/30/19  0923 07/29/19  0851 04/15/19  1126    141 139 137 140   < > 133* 139   < > 140   < > 140 139 138 138   POTASSIUM 5.0 5.2 5.0 4.7 5.0   < > 4.6 4.9   < > 4.5   < > 4.5 4.3 4.6 4.4   CHLORIDE 101 103 101 101 103   < > 94* 99   < > 102   < > 102 102 102 100   CO2 26 26 27 23 28   < > 22 27   < > 25   < > 27 29 24 28   ANIONGAP 14 12 11 13 9   < > 17* 13   < > 13   < > 11 8 12 10   * 153* 208* 187* 183*   < > 255* 173*   < > 193*   < > 118 139* 152* 163*   BUN 36.4* 34.2* 37.1* 37.7* 23.1*   < > 24.9* 22.5   < > 18   < > 24* 21 24* 21   CR 1.92* 1.90* 1.73* 1.75* 1.69*   < > 1.65* 1.35*   < > 1.51*   < > 1.41* 1.39* 1.37* 1.44*   GFRESTIMATED 38* 39* 43* 43* 45*   < > 46* 59*   < > 52*   < > 51* 52* 53* 50*   GFRESTBLACK  --   --   --   --    --   --   --   --   --   --   --  >60 >60 >60 >60   ANAI 10.0 10.0 9.4 9.3 9.4   < > 9.9 9.4   < > 9.9   < > 9.4 9.6 9.7 10.0   MAG  --   --   --  2.5*  --   --   --   --   --   --   --   --   --   --   --    PHOS 3.8 3.6 3.8  --  3.4   < >  --   --    < >  --   --   --   --   --   --    PROTTOTAL  --   --   --  7.1  --   --  7.9 7.2  --  7.1  --  7.6  --   --  7.5   ALBUMIN 4.7 4.6 4.3 4.3 4.4   < > 4.2 4.1   < > 3.9  --  4.6  --   --  4.5   BILITOTAL  --   --   --  0.3  --   --  0.3 0.3  --  0.5  --  0.7  --   --  0.6   ALKPHOS  --   --   --  88  --   --  246* 103  --  98  --  75  --   --  75   AST  --   --   --  24  --   --  20 24  --  17  --  22  --   --  29   ALT  --   --   --  22  --   --  22 23  --  19  --  31  --   --  38    < > = values in this interval not displayed.     CBC  Recent Labs   Lab Test 01/13/25  1050 09/05/24  1511 06/03/24  1336 05/02/24  1419 01/25/24  1533   HGB 16.3 15.0 13.6 13.6 12.1*   WBC 11.0 8.0  --  7.5 5.9   RBC 5.47 5.16  --  4.78 4.73   HCT 48.2 44.7  --  40.2 38.5*   MCV 88 87  --  84 81    196  --  251 237      URINE STUDIES  Recent Labs   Lab Test 01/13/25  1054 09/05/24  1518 05/02/24  1435 01/25/24  1537 11/30/23  1213   APPEARANCE Clear Slightly Cloudy* Clear Clear Clear   URINEGLC 100* 100* 200* 150* Negative   URINEBILI Negative Negative Negative Negative Negative   URINEKETONE Trace* Negative Negative Negative Negative   SG >=1.030 1.029 1.025 1.028 1.015   UBLD Trace* Trace* Negative Negative Negative   URINEPH 5.5 5.0 5.0 5.0 5.5   PROTEIN 100* 30* Negative 10* 20*   UROBILINOGEN 0.2  --   --   --   --    NITRITE Negative Negative Negative Negative Negative   LEUKEST Small* Large* Moderate* Moderate* Trace*   RBCU  --  8* 2 3* 2   WBCU  --  86* 8* 16* 3     ASSESSMENT AND PLAN:   Mr Moore is seen to establish EGPA care  at the [facility].  He has had a complex course since Dec 2022, and a severe complicated illness starting in May 2023.  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 He was treated successfully with high dose corticosteroids and mepolizumab -> with excellent control of his pulmonary symptoms. His ANCA test has been negative (only about 50% of EGPA is ANCA pos).      January 16, 2025  He presents no symptoms or signs of active EGPA at the present time, on  the current therapy with mepolizumab only -> continue with current regimen.  His kidney function remains stable.  His blood pressure is higher than desired.  I increased the lisinopril to 5 mg daily.  He has not had recurrent UTI, but has not completed the urologic work up.  Will reconsider adding an SGLT2i at the next visit if there is no pyuria.     I will plan for seeing him again in about 5 months for follow up      Josue Tripp MD  Division of Renal Disease and Hypertension

## 2025-01-16 NOTE — PATIENT INSTRUCTIONS
Please increase the lisinopril to 5 mg daily.  You can take 2 x 2.5 mg tablets until you run out then switch to the 5 mg tabs

## 2025-01-16 NOTE — LETTER
1/16/2025       RE: Ned Moore  [address]  [place] [address]     Dear Colleague,    Thank you for referring your patient, Ned Moore, to the [facility] NEPHROLOGY [facility]INIC [place] [facility]. Please see a copy of my visit note below.    Nephrology Clinic    Ned Moore MRN:[id number] YOB: 1960  Date of Service: 01/16/2025  Primary care provider: Mark Briggs  Requesting physician: Mark Briggs   Pulmonologist: Dr Biswas at [facility]  Cardiologist: Dr Efren Farias       REASON FOR CONSULT: establish care for EGPA at [facility]    HISTORY OF PRESENT ILLNESS:   Ned Moore is a 63 year old male who presents establish care for EGPA at [facility] and have all of his care within the same system.  Mr Moore's history of present illness started in Dec 2022, at which time he developed a new onset of respiratory illness consistent with asthma with wheezing and HAYNES.  He had never had any respiratory disease prior to that, and has never smoked.  He was treated with a short course of prednisone with marked improvement , but the symptoms kept recurring after the prednisone was discontinued.  Inhalers did not seem to keep the asthma under control.   He was able to work only on and off in the winter for 2023  In May 2023, he suffered from a severe epistaxis episode (requiring blood transfusion, and associated with hypotension) that prompted him to go to the ED.  He was admitted to [facility] and had a prolonged hospitalization during which he had , where he was initially found to have severe eosinophilia, hyponatremia and troponin elevation,  myocarditis, multifocal acute strokes, liver lesions concerning for abscesses, highest suspicion for EPGA. He underwent an extensive work up with cardiology, rheumatology, GI, neurology infectious disease, pulmonary and hematology. 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 Marked improvment with steroids and meprolizumab (5/26).   - completed 3 days 500 mg solumedrol; decreased to 60 mg prednisone started 5/25.  Eosinophilic myocarditis confirmed with biopsy. Presenting with ST depressions and elevated troponin. Cardiology evaluation, elevated troponin more consistent with demand ischemia. TTE w/o WMA. Cardiac MRI obtained with findings consistent with myocarditis.     With respect to multifocal acute cerebral infarcts, stroke code was called on 05/20 with acute gaze deviation, worsening right-sided weakness, seen by Neuro Critical Care, felt possibly secondary to an episode of orthostatic hypotension.   He describes amnesia related to the events.  He experienced slurred speech, weakness of the R arm and leg, and left lower quadrantanopsia.  He was transferred to rehab on 6/8/2023 until June 30.   In rehab and since then, he reports regaining significant amount of strength in the right arm/.  The R leg is also improved, but remains weak.  He is now able to drive, walk without a walker, but still appears to have RLExt proximal muscle weakness.  He remains on mepolizumab monthly.  He was started on Azathioprine on 9/8/23 -> but this resulted in recurrent episodes of sever N/V and associated dehydration until he stopped the azathioprine.    Today, Mr Moore reports feeling generally well.   He denies SOB, HAYNES, Cough.  No CP.   Denies GI or  symptoms.  Has nocturia 1x/night  No melena or hematochezia.  No gross hematuria  Reports nasal congestion on the R side , without purulent nasal discharge.  Residual R leg weakness.     January 25, 2024  IN PT.  L Ext strength is improving.  No wheezing, SOB, CP  Cardiac rehab.  Reports good O2 sats HR in low 100's  Saw ENT.  Had CT. Had severe congestion.   Has a polyp    On topical steroid and daily steroid rinses.    Sense of smell in improving  No GI spms.  Diarrhea resolved after  stopping aza  On Fe supplement.  "-> gets some N with it,  No  spms.  Nocturia once a night.  No hesitancy, straining or frequency.    BP was 110 at cardiac rehab a week ago.  Reports \"dehydration\" and SBP in 80's a few weeks ago.    Is scheduled to have EGD and Colonoscopy    May 2, 2024  FEELS generally well.  No epistaxis, oral ulcers, SOB, CP.  Gaining strength in l ext  Able to walk at least 30 min.   Can climb a flight of stair with HAYNES, but needs to hold to railing bec of weakness.  No GI,  spms   nocturia usually at 3 am.  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    September 5, 2024  Mr Moore is here for routine follow up.   He feels generally well.  No change in SOB.  He climbs stairs at home twice a day and walks around the block.   His insurance dropped coverage for PT, so he is mostly doing this on his own at home (weakness post CVA)  He has chronic L Ext neuropathy with tingling - long standing and appears related to underlying diabetes.  He reports no recent change.  No GI symptoms.     Mr Moore denies dysuria, frequency, gross hematuria or nocturia.  He had 1 prior episode of \"uti\" for which he received antibiotics.    January 16, 2025  Feels well.  Still on mepolizumab.  Has had \"very faint end-expiratory whistle\" (his words) over the last 4 days.   Has seen a urologist.  A CT urogram was planned, but Mr Moore canceled it because he had been sick.  No numbness or tingling. Does get occasional tingling in the L Ext - but it doesn't interfere with his work.  No GI spms.  No dysuria or difficulty voiding.    PAST MEDICAL HISTORY:  Past Medical History:   Diagnosis Date     Chronic obstructive pulmonary disease, unspecified COPD type (H) 3/15/2023     Chronic systolic heart failure (H) 11/15/2023     Diabetes (H)      History of CVA (cerebrovascular accident) 7/20/2023   Diabetes diagnosed about 8 years ago, but this was already associated with L ext neuropathy suggesting that onset of disease was probably a decade " before. He was previously on empagliflozin from Feb 2023 until Sept/Oct 2023, after a UTI requiring antibiotic treatment.   ?PSVT during hospitalization   He never had a colonoscopy, but has been tested with cologuard -> reportedly neg.       PAST SURGICAL HISTORY:  No past surgical history on file.  MEDICATIONS:    Current Outpatient Medications  reviewed with patient January 16, 2025       Current Outpatient Medications   Medication Sig Dispense Refill     ASPIRIN LOW DOSE 81 MG chewable tablet Take 81 mg by mouth three times a week. 2-3 days a week.       atorvastatin (LIPITOR) 20 MG tablet Take 1 tablet (20 mg) by mouth every evening 90 tablet 3     blood glucose monitoring (ACCU-CHEK FASTCLIX) lancets Use to test blood sugar once daily or as directed. 300 each 3     blood glucose test (ACCU-CHEK SMARTVIEW TEST STRIP) strips [BLOOD GLUCOSE TEST (ACCU-CHEK SMARTVIEW TEST STRIP) STRIPS] USE TO CHECK BLOOD SUGARS TWICE DAILY. 200 strip 3     Continuous Glucose Sensor (DEXCOM G7 SENSOR) MISC 1 Device every 10 days. 3 each 5     fluticasone-salmeterol (AIRDUO RESPICLICK) 232-14 MCG/ACT inhaler Inhale 1 puff into the lungs 2 times daily. 1 each 11     gabapentin (NEURONTIN) 300 MG capsule TAKE 1 CAPSULE BY MOUTH AT BEDTIME 30 capsule 5     glipiZIDE (GLUCOTROL XL) 10 MG 24 hr tablet Take 1 tablet (10 mg) by mouth daily 90 tablet 3     lisinopril (ZESTRIL) 2.5 MG tablet Take 1 tablet (2.5 mg) by mouth daily 90 tablet 3     mepolizumab (NUCALA) 100 MG/ML injection (prefilled syringe) Inject 3 mLs (300 mg) Subcutaneous every 30 days 3 mL 11     metFORMIN (GLUCOPHAGE XR) 500 MG 24 hr tablet Take 1 tablet (500 mg) by mouth daily (with dinner). 90 tablet 3     metFORMIN (GLUCOPHAGE) 1000 MG tablet TAKE 1 TABLET BY MOUTH EVERY DAY (1000 MG) WITH BREAKFAST 90 tablet 3     metoprolol succinate ER (TOPROL XL) 25 MG 24 hr tablet TAKE 0.5 TABLETS BY MOUTH EVERY MORNING 45 tablet 3     mometasone (NASONEX) 50 MCG/ACT nasal spray  Spray 2 sprays into both nostrils daily. 17 g 3     Multiple Vitamins-Minerals (MULTIVITAMIN ADULTS PO) Take 1 tablet by mouth daily.       semaglutide (OZEMPIC, 0.25 OR 0.5 MG/DOSE,) 2 MG/1.5ML SOPN pen Inject 0.5 mg subcutaneously every 7 days.       tiotropium (SPIRIVA) 18 MCG inhaled capsule Inhale 1 capsule (18 mcg) into the lungs daily. .Please dispense a generic form of Spiriva if you can order it 30 capsule 3     Insulin Glargine-yfgn (SEMGLEE, YFGN,) 100 UNIT/ML SOPN Inject 5 Units subcutaneously daily. (Patient not taking: Reported on 1/16/2025) 15 mL 1       ALLERGIES:    Allergies   Allergen Reactions     Azathioprine Nausea and Vomiting     Dulaglutide Nausea     REVIEW OF SYSTEMS:  A comprehensive review of systems was performed and found to be negative except as described here or above.  SOCIAL HISTORY:   Social History     Socioeconomic History     Marital status:      Spouse name: Not on file     Number of children: Not on file     Years of education: Not on file     Highest education level: Not on file   Occupational History     Not on file   Tobacco Use     Smoking status: Never     Passive exposure: Never     Smokeless tobacco: Never   Vaping Use     Vaping status: Never Used   Substance and Sexual Activity     Alcohol use: Not Currently     Comment: a beer or wine every few months non-alcohol     Drug use: Never     Sexual activity: Not Currently     Partners: Female   Other Topics Concern     Parent/sibling w/ CABG, MI or angioplasty before 65F 55M? No   Social History Narrative     Not on file     Social Drivers of Health     Financial Resource Strain: Low Risk  (5/31/2024)    Financial Resource Strain      Within the past 12 months, have you or your family members you live with been unable to get utilities (heat, electricity) when it was really needed?: No   Food Insecurity: High Risk (5/31/2024)    Food Insecurity      Within the past 12 months, did you worry that your food would run  out before you got money to buy more?: Yes      Within the past 12 months, did the food you bought just not last and you didn t have money to get more?: Yes   Transportation Needs: Low Risk  (5/31/2024)    Transportation Needs      Within the past 12 months, has lack of transportation kept you from medical appointments, getting your medicines, non-medical meetings or appointments, work, or from getting things that you need?: No   Physical Activity: Insufficiently Active (5/31/2024)    Exercise Vital Sign      Days of Exercise per Week: 3 days      Minutes of Exercise per Session: 20 min   Stress: No Stress Concern Present (5/31/2024)    [place] [employer] - Occupational Stress Questionnaire      Feeling of Stress : Not at all   Social Connections: Socially Isolated (5/31/2024)    Social Connection and Isolation Panel [NHANES]      Frequency of Communication with Friends and Family: Once a week      Frequency of Social Gatherings with Friends and Family: More than three times a week      Attends Mosque Services: Never      Active Member of Clubs or Organizations: No      Attends Club or Organization Meetings: Never      Marital Status:    Interpersonal Safety: Low Risk  (6/5/2024)    Interpersonal Safety      Do you feel physically and emotionally safe where you currently live?: Yes      Within the past 12 months, have you been hit, slapped, kicked or otherwise physically hurt by someone?: No      Within the past 12 months, have you been humiliated or emotionally abused in other ways by your partner or ex-partner?: No   Housing Stability: High Risk (5/31/2024)    Housing Stability      Do you have housing? : Yes      Are you worried about losing your housing?: Yes     FAMILY MEDICAL HISTORY:   Family History   Problem Relation Age of Onset     Heart Disease Mother      Diabetes Mother      Asthma Mother      Alzheimer Disease Father      No Known Problems Brother      PHYSICAL EXAM:  "  /86 (BP Location: Right arm, Patient Position: Sitting, Cuff Size: Adult Regular)   Pulse 87   Temp 98.1  F (36.7  C) (Oral)   Resp 18   Ht 1.88 m (6' 2.02\")   Wt 86.8 kg (191 lb 6.4 oz)   SpO2 97%   BMI 24.56 kg/m    GENERAL APPEARANCE: alert and no distress  EYES: nonicteric  NECK: supple, no adenopathy  Carotids 2+ bilat without bruits  RESP: lungs clear to auscultation no wheezing heard.  CV: regular rhythm, normal rate, no rub  Extremities: No edema.  NEURO: mentation intact and speech normal.    PSYCH: affect normal/bright   LABS:   Recent Results (from the past 4 weeks)   Renal panel    Collection Time: 01/13/25 10:50 AM   Result Value Ref Range    Sodium 141 135 - 145 mmol/L    Potassium 5.0 3.4 - 5.3 mmol/L    Chloride 101 98 - 107 mmol/L    Carbon Dioxide (CO2) 26 22 - 29 mmol/L    Anion Gap 14 7 - 15 mmol/L    Glucose 131 (H) 70 - 99 mg/dL    Urea Nitrogen 36.4 (H) 8.0 - 23.0 mg/dL    Creatinine 1.92 (H) 0.67 - 1.17 mg/dL    GFR Estimate 38 (L) >60 mL/min/1.73m2    Calcium 10.0 8.8 - 10.4 mg/dL    Albumin 4.7 3.5 - 5.2 g/dL    Phosphorus 3.8 2.5 - 4.5 mg/dL   Myeloperoxidase and Proteinase 3 Panel    Collection Time: 01/13/25 10:50 AM   Result Value Ref Range    MPO Janice IgG Instrument Value <0.3 <3.5 U/mL    Myeloperoxidase Antibody IgG Negative Negative    Proteinase 3 Janice IgG Instrument Value <1.0 <2.0 U/mL    Proteinase 3 Antibody IgG Negative Negative   CBC with platelets and differential    Collection Time: 01/13/25 10:50 AM   Result Value Ref Range    WBC Count 11.0 4.0 - 11.0 10e3/uL    RBC Count 5.47 4.40 - 5.90 10e6/uL    Hemoglobin 16.3 13.3 - 17.7 g/dL    Hematocrit 48.2 40.0 - 53.0 %    MCV 88 78 - 100 fL    MCH 29.8 26.5 - 33.0 pg    MCHC 33.8 31.5 - 36.5 g/dL    RDW 11.7 10.0 - 15.0 %    Platelet Count 238 150 - 450 10e3/uL    % Neutrophils 69 %    % Lymphocytes 23 %    % Monocytes 7 %    % Eosinophils 1 %    % Basophils 1 %    % Immature Granulocytes 0 %    Absolute " Neutrophils 7.6 1.6 - 8.3 10e3/uL    Absolute Lymphocytes 2.5 0.8 - 5.3 10e3/uL    Absolute Monocytes 0.7 0.0 - 1.3 10e3/uL    Absolute Eosinophils 0.1 0.0 - 0.7 10e3/uL    Absolute Basophils 0.1 0.0 - 0.2 10e3/uL    Absolute Immature Granulocytes 0.0 <=0.4 10e3/uL   UA with Microscopic    Collection Time: 01/13/25 10:54 AM   Result Value Ref Range    Color Urine Yellow Colorless, Straw, Light Yellow, Yellow    Appearance Urine Clear Clear    Glucose Urine 100 (A) Negative mg/dL    Bilirubin Urine Negative Negative    Ketones Urine Trace (A) Negative mg/dL    Specific Gravity Urine >=1.030 1.005 - 1.030    Blood Urine Trace (A) Negative    pH Urine 5.5 5.0 - 8.0    Protein Albumin Urine 100 (A) Negative mg/dL    Urobilinogen Urine 0.2 0.2, 1.0 E.U./dL    Nitrite Urine Negative Negative    Leukocyte Esterase Urine Small (A) Negative   Protein  random urine    Collection Time: 01/13/25 10:54 AM   Result Value Ref Range    Total Protein Urine mg/dL 67.5   mg/dL    Total Protein Urine mg/mg Creat 0.26 (H) 0.00 - 0.20 mg/mg Cr    Creatinine Urine mg/dL 257.0 mg/dL   Albumin Random Urine Quantitative with Creat Ratio    Collection Time: 01/13/25 10:54 AM   Result Value Ref Range    Creatinine Urine mg/dL 257.0 mg/dL    Albumin Urine mg/L 140.0 mg/L    Albumin Urine mg/g Cr 54.47 (H) 0.00 - 17.00 mg/g Cr         CMP  Recent Labs   Lab Test 01/13/25  1050 09/05/24  1511 05/02/24  1419 02/16/24  1518 01/25/24  1533 10/26/23  1149 10/26/23  1147 04/26/23  0824 07/05/22  0933 03/21/22  1112 08/23/21  1152 01/27/21  1047 10/30/19  0923 07/29/19  0851 04/15/19  1126    141 139 137 140   < > 133* 139   < > 140   < > 140 139 138 138   POTASSIUM 5.0 5.2 5.0 4.7 5.0   < > 4.6 4.9   < > 4.5   < > 4.5 4.3 4.6 4.4   CHLORIDE 101 103 101 101 103   < > 94* 99   < > 102   < > 102 102 102 100   CO2 26 26 27 23 28   < > 22 27   < > 25   < > 27 29 24 28   ANIONGAP 14 12 11 13 9   < > 17* 13   < > 13   < > 11 8 12 10   * 153*  208* 187* 183*   < > 255* 173*   < > 193*   < > 118 139* 152* 163*   BUN 36.4* 34.2* 37.1* 37.7* 23.1*   < > 24.9* 22.5   < > 18   < > 24* 21 24* 21   CR 1.92* 1.90* 1.73* 1.75* 1.69*   < > 1.65* 1.35*   < > 1.51*   < > 1.41* 1.39* 1.37* 1.44*   GFRESTIMATED 38* 39* 43* 43* 45*   < > 46* 59*   < > 52*   < > 51* 52* 53* 50*   GFRESTBLACK  --   --   --   --   --   --   --   --   --   --   --  >60 >60 >60 >60   ANAI 10.0 10.0 9.4 9.3 9.4   < > 9.9 9.4   < > 9.9   < > 9.4 9.6 9.7 10.0   MAG  --   --   --  2.5*  --   --   --   --   --   --   --   --   --   --   --    PHOS 3.8 3.6 3.8  --  3.4   < >  --   --    < >  --   --   --   --   --   --    PROTTOTAL  --   --   --  7.1  --   --  7.9 7.2  --  7.1  --  7.6  --   --  7.5   ALBUMIN 4.7 4.6 4.3 4.3 4.4   < > 4.2 4.1   < > 3.9  --  4.6  --   --  4.5   BILITOTAL  --   --   --  0.3  --   --  0.3 0.3  --  0.5  --  0.7  --   --  0.6   ALKPHOS  --   --   --  88  --   --  246* 103  --  98  --  75  --   --  75   AST  --   --   --  24  --   --  20 24  --  17  --  22  --   --  29   ALT  --   --   --  22  --   --  22 23  --  19  --  31  --   --  38    < > = values in this interval not displayed.     CBC  Recent Labs   Lab Test 01/13/25  1050 09/05/24  1511 06/03/24  1336 05/02/24  1419 01/25/24  1533   HGB 16.3 15.0 13.6 13.6 12.1*   WBC 11.0 8.0  --  7.5 5.9   RBC 5.47 5.16  --  4.78 4.73   HCT 48.2 44.7  --  40.2 38.5*   MCV 88 87  --  84 81    196  --  251 237      URINE STUDIES  Recent Labs   Lab Test 01/13/25  1054 09/05/24  1518 05/02/24  1435 01/25/24  1537 11/30/23  1213   APPEARANCE Clear Slightly Cloudy* Clear Clear Clear   URINEGLC 100* 100* 200* 150* Negative   URINEBILI Negative Negative Negative Negative Negative   URINEKETONE Trace* Negative Negative Negative Negative   SG >=1.030 1.029 1.025 1.028 1.015   UBLD Trace* Trace* Negative Negative Negative   URINEPH 5.5 5.0 5.0 5.0 5.5   PROTEIN 100* 30* Negative 10* 20*   UROBILINOGEN 0.2  --   --   --   --     NITRITE Negative Negative Negative Negative Negative   LEUKEST Small* Large* Moderate* Moderate* Trace*   RBCU  --  8* 2 3* 2   WBCU  --  86* 8* 16* 3     ASSESSMENT AND PLAN:   Mr Moore is seen to establish EGPA care  at the [facility].  He has had a complex course since Dec 2022, and a severe complicated illness starting in May 2023.  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 He was treated successfully with high dose corticosteroids and mepolizumab -> with excellent control of his pulmonary symptoms. His ANCA test has been negative (only about 50% of EGPA is ANCA pos).      January 16, 2025  He presents no symptoms or signs of active EGPA at the present time, on  the current therapy with mepolizumab only -> continue with current regimen.  His kidney function remains stable.  His blood pressure is higher than desired.  I increased the lisinopril to 5 mg daily.  He has not had recurrent UTI, but has not completed the urologic work up.  Will reconsider adding an SGLT2i at the next visit if there is no pyuria.     I will plan for seeing him again in about 5 months for follow up      Josue Tripp MD  Division of Renal Disease and Hypertension            Again, thank you for allowing me to participate in the care of your patient.      Sincerely,    Josue Tripp MD

## 2025-01-18 PROBLEM — N18.4 CHRONIC KIDNEY DISEASE, STAGE IV (SEVERE) (H): Status: ACTIVE | Noted: 2025-01-18

## 2025-01-21 ENCOUNTER — TELEPHONE (OUTPATIENT)
Dept: OTOLARYNGOLOGY | Facility: CLINIC | Age: 65
End: 2025-01-21
Payer: MEDICARE

## 2025-01-21 ENCOUNTER — MYC MEDICAL ADVICE (OUTPATIENT)
Dept: PHARMACY | Facility: CLINIC | Age: 65
End: 2025-01-21
Payer: MEDICARE

## 2025-01-21 DIAGNOSIS — J33.9 NASAL POLYP: Primary | ICD-10-CM

## 2025-01-21 DIAGNOSIS — E11.40 TYPE 2 DIABETES MELLITUS WITH DIABETIC NEUROPATHY, UNSPECIFIED WHETHER LONG TERM INSULIN USE (H): Primary | ICD-10-CM

## 2025-01-21 DIAGNOSIS — E11.40 TYPE 2 DIABETES MELLITUS WITH DIABETIC NEUROPATHY, UNSPECIFIED WHETHER LONG TERM INSULIN USE (H): ICD-10-CM

## 2025-01-21 DIAGNOSIS — J34.2 DEVIATED NASAL SEPTUM: ICD-10-CM

## 2025-01-21 RX ORDER — HYDROCHLOROTHIAZIDE 12.5 MG/1
CAPSULE ORAL
Qty: 6 EACH | Refills: 11 | Status: SHIPPED | OUTPATIENT
Start: 2025-01-21 | End: 2025-01-22

## 2025-01-21 RX ORDER — FLUTICASONE PROPIONATE 50 MCG
2 SPRAY, SUSPENSION (ML) NASAL DAILY
Qty: 16 ML | Refills: 6 | Status: SHIPPED | OUTPATIENT
Start: 2025-01-21

## 2025-01-21 NOTE — TELEPHONE ENCOUNTER
Insurance won't cover Mometasone Furoate spray, pharmacy asking for alternative.    Radha Auguste LPN

## 2025-01-22 ENCOUNTER — TELEPHONE (OUTPATIENT)
Dept: INTERNAL MEDICINE | Facility: CLINIC | Age: 65
End: 2025-01-22
Payer: MEDICARE

## 2025-01-22 RX ORDER — ACYCLOVIR 800 MG/1
TABLET ORAL
Qty: 6 EACH | Refills: 11 | Status: SHIPPED | OUTPATIENT
Start: 2025-01-22

## 2025-01-24 ENCOUNTER — MYC MEDICAL ADVICE (OUTPATIENT)
Dept: PULMONOLOGY | Facility: CLINIC | Age: 65
End: 2025-01-24
Payer: MEDICARE

## 2025-01-24 DIAGNOSIS — M30.1 EOSINOPHILIC GRANULOMATOSIS WITH POLYANGIITIS (EGPA) (H): Primary | ICD-10-CM

## 2025-01-24 DIAGNOSIS — D72.18 EOSINOPHILIC GRANULOMATOSIS WITH POLYANGIITIS (EGPA) (H): Primary | ICD-10-CM

## 2025-01-25 ENCOUNTER — MYC MEDICAL ADVICE (OUTPATIENT)
Dept: INTERNAL MEDICINE | Facility: CLINIC | Age: 65
End: 2025-01-25
Payer: MEDICARE

## 2025-01-25 DIAGNOSIS — J45.41 MODERATE PERSISTENT REACTIVE AIRWAY DISEASE WITH ACUTE EXACERBATION: ICD-10-CM

## 2025-01-25 DIAGNOSIS — R06.02 SOB (SHORTNESS OF BREATH): ICD-10-CM

## 2025-01-27 ENCOUNTER — PATIENT OUTREACH (OUTPATIENT)
Dept: NEPHROLOGY | Facility: CLINIC | Age: 65
End: 2025-01-27
Payer: MEDICARE

## 2025-01-27 DIAGNOSIS — D72.18 EOSINOPHILIC GRANULOMATOSIS WITH POLYANGIITIS (EGPA) (H): Primary | ICD-10-CM

## 2025-01-27 DIAGNOSIS — M30.1 EOSINOPHILIC GRANULOMATOSIS WITH POLYANGIITIS (EGPA) (H): Primary | ICD-10-CM

## 2025-01-27 RX ORDER — ALBUTEROL SULFATE 90 UG/1
2 AEROSOL, METERED RESPIRATORY (INHALATION) EVERY 6 HOURS PRN
Qty: 18 G | Refills: 5 | Status: SHIPPED | OUTPATIENT
Start: 2025-01-27

## 2025-01-27 RX ORDER — PREDNISONE 20 MG/1
TABLET ORAL
Qty: 10 TABLET | Refills: 0 | Status: SHIPPED | OUTPATIENT
Start: 2025-01-27

## 2025-01-27 RX ORDER — ALBUTEROL SULFATE 90 UG/1
2 INHALANT RESPIRATORY (INHALATION) EVERY 6 HOURS
Qty: 8.5 G | Refills: 1 | Status: SHIPPED | OUTPATIENT
Start: 2025-01-27

## 2025-01-28 NOTE — PROGRESS NOTES
Update from Dr. Tripp:  Please increase the lisinopril to 5 mg daily. You can take 2 x 2.5 mg tablets until you run out then switch to the 5 mg tabs   Reached out to patient via [employer] to ensure increase Lisinopril and follow up on any side effects.  Vasculitis labs placed and linked to follow up appt.per AIDA Tripp.    Holly Kirkland RN, BSN, PHN  Vasculitis [facility] Lupus Program Nephrology Nurse Navigator  [phone]

## 2025-02-02 DIAGNOSIS — E11.40 TYPE 2 DIABETES MELLITUS WITH DIABETIC NEUROPATHY, UNSPECIFIED WHETHER LONG TERM INSULIN USE (H): ICD-10-CM

## 2025-02-03 RX ORDER — ACYCLOVIR 800 MG/1
TABLET ORAL
Qty: 6 EACH | Refills: 11 | Status: SHIPPED | OUTPATIENT
Start: 2025-02-03

## 2025-02-07 ENCOUNTER — VIRTUAL VISIT (OUTPATIENT)
Dept: PHARMACY | Facility: CLINIC | Age: 65
End: 2025-02-07

## 2025-02-07 DIAGNOSIS — E11.40 TYPE 2 DIABETES MELLITUS WITH DIABETIC NEUROPATHY, UNSPECIFIED WHETHER LONG TERM INSULIN USE (H): ICD-10-CM

## 2025-02-07 PROCEDURE — 99207 PR NO CHARGE LOS: CPT | Mod: 93

## 2025-02-07 RX ORDER — INSULIN GLARGINE-YFGN 100 [IU]/ML
5 INJECTION, SOLUTION SUBCUTANEOUS DAILY
Qty: 15 ML | Refills: 1 | Status: SHIPPED | OUTPATIENT
Start: 2025-02-07

## 2025-02-07 RX ORDER — HYDROCHLOROTHIAZIDE 12.5 MG/1
CAPSULE ORAL
Qty: 6 EACH | Refills: 11 | Status: SHIPPED | OUTPATIENT
Start: 2025-02-07

## 2025-02-07 NOTE — PATIENT INSTRUCTIONS
"Recommendations from today's MTM visit:                                                      MTM (medication therapy management) is a service provided by a clinical pharmacist designed to help you get the most of out of your medicines.   Today we reviewed what your medicines are for, how to know if they are working, that your medicines are safe and how to make your medicine regimen as easy as possible.      Re-start taking Semglee 5 units daily   Continue taking current medications as prescribed     Follow-up: Due on 03/07/2025 @ 10 AM    It was great speaking with you today.  I value your experience and would be very thankful for your time in providing feedback in our clinic survey. In the next few days, you may receive an email or text message from [facility] with a link to a survey related to your  clinical pharmacist.\"     To schedule another MTM appointment, please call the clinic directly or you may call the MTM scheduling line at [phone].    My Clinical Pharmacist's contact information:                                                      Please feel free to contact me with any questions or concerns you have.        Syed Borjas, PharmD     Medication Therapy Management (MTM) Pharmacist     [phone]     [email or web address]    "

## 2025-02-07 NOTE — PROGRESS NOTES
Medication Therapy Management (MTM) Encounter    ASSESSMENT:                            Medication Adherence/Access: No issues identified    Type 2 Diabetes: Patient is not meeting A1c goal of < 7%. Fasting sugar readings within goal of < 80 - 130. Patient could benefit from CGM; will send maría 3 sensor to  mail order pharmacy that is contracted with Medicare part B. We might also consider sending order to [facility]. Considering sugar readings are slightly higher reasonable to re-start insulin. Will follow up closely.     PLAN:                            Re-start taking Semglee 5 units daily   Continue taking current medications as prescribed     Follow-up: Due on 03/07/2025 @ 10 AM    SUBJECTIVE/OBJECTIVE:                          Ned Moore is a 64 year old male called for a follow up visit    Reason for visit: Medication Review Initial.    Allergies/ADRs: Reviewed in chart  Past Medical History: Reviewed in chart  Tobacco: He reports that he has never smoked. He has never been exposed to tobacco smoke. He has never used smokeless tobacco.    Medication Adherence/Access: no issues reported    Type 2 Diabetes:    Metformin 500 mg XR with supper and metformin 1000 mg with breakfast   Semglee 5 units daily    Ozempic 0.25 mg weekly   Aspirin 81mg daily - Has not been taking for sometime now  Blood sugar monitoring: Continuous Glucose Monitor. Started using Dexcom G7, but it was expensive for him.     CGM readings: 111, 98, and 121. Patient noted most of the sugar readings are below 125.  When his sugar is high he feels it on his legs.     Denies any low sugar symptoms  Current diabetes symptoms: Fatigue.   Diet/Exercise: He is eating three times a day and he does not have the appetite he used to have. This might be due to his stuffed nose.   Eye exam: up to date  Foot exam: due  Urine Albumin:         Lab Results   Component Value Date     UMALCR 39.06 (H) 11/15/2023     Lab Results   Component Value Date    A1C  7.8 10/31/2024    A1C 7.5 06/05/2024    A1C 8.0 11/15/2023    A1C 8.2 04/26/2023    A1C 8.0 10/10/2022           Wt Readings from Last 2 Encounters:   12/06/23 198 lb (89.8 kg)   11/30/23 189 lb (85.7 kg)        CGM G7 : Not Covered Under Part D Law, May be Covered Under Part B G7 Sensors: Not Covered Under Part D Law, May be Covered Under Part B Agustín 3 G[place]man: Not Covered Under Part D Law, May be Covered Under Part B Agustín 3 *Plus* Sensor: Not Covered Under Part D Law, May be Covered Under Part B    Today's Vitals: There were no vitals taken for this visit.  ----------------      I spent 28 minutes with this patient today. All changes were made via collaborative practice agreement with Mark Briggs MD.     A summary of these recommendations was sent via [employer].        Telemedicine Visit Details  The patient's medications can be safely assessed via a telemedicine encounter.  Type of service:  Telephone visit  Originating Location (pt. Location): Home    Distant Location (provider location):  On-site  Start Time:  10:30 AM   End Time:   10:58 AM     Medication Therapy Recommendations  Type 2 diabetes mellitus with diabetic neuropathy, unspecified whether long term insulin use (H)   1 Current Medication: Insulin Glargine-yfgn (SEMGLEE, YFGN,) 100 UNIT/ML SOPN   Current Medication Sig: Inject 5 Units subcutaneously daily.   Rationale: Synergistic therapy - Needs additional medication therapy - Indication   Recommendation: Start Medication - Start taking semglee 5 units daily   Status: Accepted per CPA   Identified Date: 2/7/2025 Completed Date: 2/9/2025              Syed Borjas, PharmD     Medication Therapy Management (MTM) Pharmacist     [phone]     [email or web address]

## 2025-02-14 ENCOUNTER — MYC MEDICAL ADVICE (OUTPATIENT)
Dept: PULMONOLOGY | Facility: CLINIC | Age: 65
End: 2025-02-14
Payer: MEDICARE

## 2025-02-14 DIAGNOSIS — J44.9 CHRONIC OBSTRUCTIVE PULMONARY DISEASE, UNSPECIFIED COPD TYPE (H): Primary | ICD-10-CM

## 2025-02-17 RX ORDER — FLUTICASONE FUROATE AND VILANTEROL 100; 25 UG/1; UG/1
1 POWDER RESPIRATORY (INHALATION) DAILY
Qty: 60 EACH | Refills: 5 | Status: SHIPPED | OUTPATIENT
Start: 2025-02-17

## 2025-02-17 NOTE — TELEPHONE ENCOUNTER
Spoke with pharmacy .  Breo or Symbicort formulary options this year.    Patient has previously used Breo with success.  Will send new Rx for Breo ellipta

## 2025-03-11 ENCOUNTER — TELEPHONE (OUTPATIENT)
Dept: INTERNAL MEDICINE | Facility: CLINIC | Age: 65
End: 2025-03-11
Payer: MEDICARE

## 2025-03-11 NOTE — TELEPHONE ENCOUNTER
March 11, 2025    [employer] Clarification Work Capacity was received via fax for Dr. Briggs.  Patient label was attached to paperwork and placed in provider's inbox to be signed.    Melanie Rawls

## 2025-03-18 ENCOUNTER — DOCUMENTATION ONLY (OUTPATIENT)
Dept: PULMONOLOGY | Facility: CLINIC | Age: 65
End: 2025-03-18
Payer: MEDICARE

## 2025-03-18 ENCOUNTER — TELEPHONE (OUTPATIENT)
Dept: INTERNAL MEDICINE | Facility: CLINIC | Age: 65
End: 2025-03-18
Payer: MEDICARE

## 2025-03-18 NOTE — PROGRESS NOTES
[facility] disability paperwork completed by Dr. Biswas and faxed to [facility]  Fax: [phone]    Copy scanned to patient chart and copy mailed to patient at the address listed on his chart.

## 2025-04-01 ENCOUNTER — MYC MEDICAL ADVICE (OUTPATIENT)
Dept: FAMILY MEDICINE | Facility: CLINIC | Age: 65
End: 2025-04-01
Payer: MEDICARE

## 2025-04-01 DIAGNOSIS — E11.40 TYPE 2 DIABETES MELLITUS WITH DIABETIC NEUROPATHY, WITHOUT LONG-TERM CURRENT USE OF INSULIN (H): Primary | ICD-10-CM

## 2025-04-10 ENCOUNTER — LAB (OUTPATIENT)
Dept: LAB | Facility: CLINIC | Age: 65
End: 2025-04-10
Payer: MEDICARE

## 2025-04-10 ENCOUNTER — VIRTUAL VISIT (OUTPATIENT)
Dept: PHARMACY | Facility: CLINIC | Age: 65
End: 2025-04-10

## 2025-04-10 DIAGNOSIS — E11.40 TYPE 2 DIABETES MELLITUS WITH DIABETIC NEUROPATHY, UNSPECIFIED WHETHER LONG TERM INSULIN USE (H): ICD-10-CM

## 2025-04-10 DIAGNOSIS — N18.31 TYPE 2 DIABETES MELLITUS WITH STAGE 3A CHRONIC KIDNEY DISEASE, WITHOUT LONG-TERM CURRENT USE OF INSULIN (H): Primary | ICD-10-CM

## 2025-04-10 DIAGNOSIS — E11.22 TYPE 2 DIABETES MELLITUS WITH STAGE 3A CHRONIC KIDNEY DISEASE, WITHOUT LONG-TERM CURRENT USE OF INSULIN (H): ICD-10-CM

## 2025-04-10 DIAGNOSIS — N18.31 TYPE 2 DIABETES MELLITUS WITH STAGE 3A CHRONIC KIDNEY DISEASE, WITHOUT LONG-TERM CURRENT USE OF INSULIN (H): ICD-10-CM

## 2025-04-10 DIAGNOSIS — E11.22 TYPE 2 DIABETES MELLITUS WITH STAGE 3A CHRONIC KIDNEY DISEASE, WITHOUT LONG-TERM CURRENT USE OF INSULIN (H): Primary | ICD-10-CM

## 2025-04-10 LAB
EST. AVERAGE GLUCOSE BLD GHB EST-MCNC: 163 MG/DL
HBA1C MFR BLD: 7.3 % (ref 0–5.6)

## 2025-04-10 NOTE — PATIENT INSTRUCTIONS
"Recommendations from today's MTM visit:                                                      MTM (medication therapy management) is a service provided by a clinical pharmacist designed to help you get the most of out of your medicines.   Today we reviewed what your medicines are for, how to know if they are working, that your medicines are safe and how to make your medicine regimen as easy as possible.      Please do lab work for A1c and BMP; PharmD to order labs   Please consider eating healthy; more veggies and proteins and less carbohydrates     Follow-up: Due on 05/12/2025 @ 10:30 AM    It was great speaking with you today.  I value your experience and would be very thankful for your time in providing feedback in our clinic survey. In the next few days, you may receive an email or text message from [employer] with a link to a survey related to your  clinical pharmacist.\"     To schedule another MTM appointment, please call the clinic directly or you may call the MTM scheduling line at [phone].    My Clinical Pharmacist's contact information:                                                      Please feel free to contact me with any questions or concerns you have.        Syed Borjas, PharmD     Medication Therapy Management (MTM) Pharmacist     [phone]     [email or web address]     [facility]  "

## 2025-04-10 NOTE — PROGRESS NOTES
Medication Therapy Management (MTM) Encounter    ASSESSMENT:                            Medication Adherence/Access: No issues identified    Type 2 Diabetes: Patient is not meeting A1c goal of < 7%. Most of the CGM readings are within the target range (70 - 180) 61% below goal of > 70%;however, CGM readings increased likely due to diet. Patient is due for A1C and BMP. Recently ozempic dose was increased. Advised patient to eat healthy; will follow up closely.     PLAN:                            Please do lab work for A1c and BMP; PharmD to order labs   Please consider eating healthy; more veggies and proteins and less carbohydrates     Follow-up: Due on 05/12/2025 @ 10:30 AM    SUBJECTIVE/OBJECTIVE:                          Ned Moore is a 64 year old male called for a follow up visit    Reason for visit: Medication Review Initial.    Allergies/ADRs: Reviewed in chart  Past Medical History: Reviewed in chart  Tobacco: He reports that he has never smoked. He has never been exposed to tobacco smoke. He has never used smokeless tobacco.    Medication Adherence/Access: no issues reported    Type 2 Diabetes:    Metformin 500 mg XR with supper and metformin 1000 mg with breakfast   Semglee 5 units daily    Ozempic 0. 5 mg weekly   Aspirin 81mg daily - Has not been taking for sometime now  Blood sugar monitoring: Continuous Glucose Monitor: check below  Denies any low sugar symptoms  Current diabetes symptoms: Fatigue.   Diet/Exercise: He is eating three times a day   Eye exam: up to date  Foot exam: due  Urine Albumin:         Lab Results   Component Value Date     UMALCR 39.06 (H) 11/15/2023     Lab Results   Component Value Date    A1C 7.8 10/31/2024    A1C 7.5 06/05/2024           Wt Readings from Last 2 Encounters:   12/06/23 198 lb (89.8 kg)   11/30/23 189 lb (85.7 kg)      CGM G7 : Not Covered Under Part D Law, May be Covered Under Part B G7 Sensors: Not Covered Under Part D Law, May be Covered Under Part  B Agustín 3 [place]onson: Not Covered Under Part D Law, May be Covered Under Part B Agustín 3 *Plus* Sensor: Not Covered Under Part D Law, May be Covered Under Part B                Today's Vitals: There were no vitals taken for this visit.  ----------------      I spent 14 minutes with this patient today. All changes were made via collaborative practice agreement with Mark Briggs MD.     A summary of these recommendations was sent via [employer].      Telemedicine Visit Details  The patient's medications can be safely assessed via a telemedicine encounter.  Type of service:  Telephone visit  Originating Location (pt. Location): Home    Distant Location (provider location):  On-site  Start Time:  10:13 AM  End Time:   10:27 AM     Medication Therapy Recommendations  No medication therapy recommendations to display       Syed Borjas PharmD     Medication Therapy Management (MTM) Pharmacist     [phone]     [email or web address]     [facility]

## 2025-04-15 ENCOUNTER — PATIENT OUTREACH (OUTPATIENT)
Dept: NEPHROLOGY | Facility: CLINIC | Age: 65
End: 2025-04-15
Payer: MEDICARE

## 2025-04-16 ENCOUNTER — TELEPHONE (OUTPATIENT)
Dept: INTERNAL MEDICINE | Facility: CLINIC | Age: 65
End: 2025-04-16
Payer: MEDICARE

## 2025-04-16 NOTE — PROGRESS NOTES
Reached out to patient to support work forms via [employer].  Holly Kirkland RN, BSN, PHN  [facility] Nephrology Nurse Navigator  [phone]

## 2025-04-16 NOTE — TELEPHONE ENCOUNTER
April 16, 2025    [facility] [facility] Notification approval FYI letter sent to Dr Briggs for review    Melanie Rawls

## 2025-04-23 ENCOUNTER — TELEPHONE (OUTPATIENT)
Dept: INTERNAL MEDICINE | Facility: CLINIC | Age: 65
End: 2025-04-23
Payer: MEDICARE

## 2025-04-23 NOTE — TELEPHONE ENCOUNTER
Patient will need appointment to complete these forms. I did comment on the paperwork earlier today and returned to the TC team    Mark Briggs MD on 4/23/2025 at 5:31 PM

## 2025-04-23 NOTE — TELEPHONE ENCOUNTER
April 23, 2025    [facility] [employer] Disability Request for Restrictions & Limitations was received via fax for Dr. Briggs.  Patient label was attached to paperwork and placed in provider's inbox to be signed.    Melanie Rawls

## 2025-04-23 NOTE — TELEPHONE ENCOUNTER
General Call    Contacts       Contact Date/Time Type Contact Phone/Fax    04/23/2025 01:44 PM CDT Phone (Incoming) Dr Tere Olvera [phone]     [facility]          Reason for Call:  letter from [facility]    What are your questions or concerns:  Dr Waters from [facility] calling and will be faxing a letter to Dr Briggs to relook at the return to work.  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      Will watch for letter

## 2025-04-24 ENCOUNTER — PATIENT OUTREACH (OUTPATIENT)
Dept: NEPHROLOGY | Facility: CLINIC | Age: 65
End: 2025-04-24
Payer: MEDICARE

## 2025-04-24 NOTE — PROGRESS NOTES
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    Per provider instruction, writer to follow up on work accommodations forms request    ASSESSMENT:   Circled back to patient to confirm he is not needing any additional work accommodations letter from Dr. 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  Patient confirmed [employer] confirmed they have everything they need, no additional letter from Dr. Tripp is needed at this time.  Patient is tolerating Lisinopril 5mg daily without issues.  -130/60-70.  Denies further concerns.    Nurse Assessments:   Denies any flare symptoms at this time.  Medications  Nephology medication reconciliation completed: Yes  Any barriers to taking medication(s) as prescribed?  No  Taking over the counter medication(s?) No    Current Outpatient Medications (Antihypertensive, Cardiovascular, Diuretics, Beta blockers, Calcium blockers, Anticoagulants)   Medication Sig    lisinopril (ZESTRIL) 5 MG tablet Take 1 tablet (5 mg) by mouth daily.    metoprolol succinate ER (TOPROL XL) 25 MG 24 hr tablet TAKE 0.5 TABLETS BY MOUTH EVERY MORNING      Multiple Vitamins-Minerals (MULTIVITAMIN ADULTS PO) Take 1 tablet by mouth daily.     Current Outpatient Medications (Other)   Medication Sig    albuterol (PROAIR HFA/PROVENTIL HFA/VENTOLIN HFA) 108 (90 Base) MCG/ACT inhaler Inhale 2 puffs into the lungs every 6 hours.    albuterol (VENTOLIN HFA) 108 (90 Base) MCG/ACT inhaler Inhale 2 puffs into the lungs every 6 hours as needed for shortness of breath, wheezing or cough.    ASPIRIN LOW DOSE 81 MG chewable tablet Take 81 mg by mouth three times a week. 2-3 days a week.    atorvastatin (LIPITOR) 20 MG tablet Take 1 tablet (20 mg) by mouth every evening     blood glucose monitoring (ACCU-CHEK FASTCLIX) lancets Use to test blood sugar once daily or as directed.    blood glucose test (ACCU-CHEK SMARTVIEW TEST STRIP) strips [BLOOD GLUCOSE TEST (ACCU-CHEK SMARTVIEW TEST STRIP) STRIPS] USE TO CHECK BLOOD SUGARS TWICE DAILY.    Continuous Glucose Sensor (DEXCOM G7 SENSOR) MISC 1 Device every 10 days.    Continuous Glucose Sensor (FREESTYLE LIVIA 3 PLUS SENSOR) MISC Change every 15 days.    Continuous Glucose Sensor (FREESTYLE LIVIA 3 SENSOR) MISC CHANGE EVERY 15 DAYS.    fluticasone (FLONASE) 50 MCG/ACT nasal spray Spray 2 sprays into both nostrils daily.    fluticasone-vilanterol (BREO ELLIPTA) 100-25 MCG/ACT inhaler Inhale 1 puff into the lungs daily.    gabapentin (NEURONTIN) 300 MG capsule TAKE 1 CAPSULE BY MOUTH AT BEDTIME    glipiZIDE (GLUCOTROL XL) 10 MG 24 hr tablet Take 1 tablet (10 mg) by mouth daily    Insulin Glargine-yfgn (SEMGLEE, YFGN,) 100 UNIT/ML SOPN Inject 5 Units subcutaneously daily.    mepolizumab (NUCALA) 100 MG/ML injection (prefilled syringe) Inject 3 mLs (300 mg) Subcutaneous every 30 days    metFORMIN (GLUCOPHAGE XR) 500 MG 24 hr tablet Take 1 tablet (500 mg) by mouth daily (with dinner).    metFORMIN (GLUCOPHAGE) 1000 MG tablet TAKE 1 TABLET BY MOUTH EVERY DAY (1000 MG) WITH BREAKFAST    mometasone (NASONEX) 50 MCG/ACT nasal spray Spray 2 sprays into both nostrils daily.         Semaglutide, 1 MG/DOSE, (OZEMPIC) 4 MG/3ML pen Inject 1 mg subcutaneously every 7 days.    tiotropium (SPIRIVA) 18 MCG inhaled capsule Inhale 1 capsule (18 mcg) into the lungs daily. .Please dispense a generic form of Spiriva if you can order it     Last Renal Panel:  Sodium   Date Value Ref Range Status   04/10/2025 137 135 - 145 mmol/L Final     Potassium   Date Value Ref Range Status   04/10/2025 4.6 3.4 - 5.3 mmol/L Final   03/21/2022 4.5 3.5 - 5.0 mmol/L Final     Chloride   Date Value Ref Range Status   04/10/2025 100 98 - 107 mmol/L Final   03/21/2022 102 98 -  107 mmol/L Final     Carbon Dioxide (CO2)   Date Value Ref Range Status   04/10/2025 23 22 - 29 mmol/L Final   03/21/2022 25 22 - 31 mmol/L Final     Anion Gap   Date Value Ref Range Status   04/10/2025 14 7 - 15 mmol/L Final   03/21/2022 13 5 - 18 mmol/L Final     Glucose   Date Value Ref Range Status   04/10/2025 123 (H) 70 - 99 mg/dL Final   03/21/2022 193 (H) 70 - 125 mg/dL Final     GLUCOSE BY METER POCT   Date Value Ref Range Status   10/26/2023 247 (H) 70 - 99 mg/dL Final     Urea Nitrogen   Date Value Ref Range Status   04/10/2025 32.3 (H) 8.0 - 23.0 mg/dL Final   03/21/2022 18 8 - 22 mg/dL Final     Creatinine   Date Value Ref Range Status   04/10/2025 1.91 (H) 0.67 - 1.17 mg/dL Final     GFR Estimate   Date Value Ref Range Status   04/10/2025 38 (L) >60 mL/min/1.73m2 Final     Comment:     eGFR calculated using 2021 CKD-EPI equation.   01/27/2021 51 (L) >60 mL/min/1.73m2 Final     GFR, ESTIMATED POCT   Date Value Ref Range Status   02/07/2023 52 (L) >60 mL/min/1.73m2 Final     Calcium   Date Value Ref Range Status   04/10/2025 9.4 8.8 - 10.4 mg/dL Final     Phosphorus   Date Value Ref Range Status   01/13/2025 3.8 2.5 - 4.5 mg/dL Final     Albumin   Date Value Ref Range Status   01/13/2025 4.7 3.5 - 5.2 g/dL Final   03/21/2022 3.9 3.5 - 5.0 g/dL Final       Liver Function Studies:  Recent Labs   Lab Test 01/13/25  1050 05/02/24  1419 02/16/24  1518   PROTTOTAL  --   --  7.1   ALBUMIN 4.7   < > 4.3   BILITOTAL  --   --  0.3   ALKPHOS  --   --  88   AST  --   --  24   ALT  --   --  22    < > = values in this interval not displayed.     CBC Results:  Recent Labs   Lab Test 01/13/25  1050   WBC 11.0   RBC 5.47   HGB 16.3   HCT 48.2   MCV 88   MCH 29.8   MCHC 33.8   RDW 11.7        Urinalysis:  Color Urine (no units)   Date Value   01/13/2025 Yellow     Appearance Urine (no units)   Date Value   01/13/2025 Clear     Glucose Urine (mg/dL)   Date Value   01/13/2025 100 (A)     Bilirubin Urine (no units)    Date Value   01/13/2025 Negative     Ketones Urine (mg/dL)   Date Value   01/13/2025 Trace (A)     Specific Gravity Urine (no units)   Date Value   01/13/2025 >=1.030     pH Urine (no units)   Date Value   01/13/2025 5.5     Protein Albumin Urine (mg/dL)   Date Value   01/13/2025 100 (A)     Urobilinogen Urine (E.U./dL)   Date Value   01/13/2025 0.2     Nitrite Urine (no units)   Date Value   01/13/2025 Negative     Leukocyte Esterase Urine (no units)   Date Value   01/13/2025 Small (A)       PLAN:     Follow Up:   Patient to follow up at next scheduled appointment with provider.   Patient to call/[employer] message with updates to [employer]/work accommodations support or flare concerns.    Patient verbalized understanding and will follow up as recommended.    Holly Kirkland RN  Vasculitis and Lupus Program: [phone]

## 2025-04-30 ENCOUNTER — OFFICE VISIT (OUTPATIENT)
Dept: INTERNAL MEDICINE | Facility: CLINIC | Age: 65
End: 2025-04-30
Payer: MEDICARE

## 2025-04-30 VITALS
HEART RATE: 72 BPM | SYSTOLIC BLOOD PRESSURE: 136 MMHG | HEIGHT: 74 IN | BODY MASS INDEX: 23.51 KG/M2 | OXYGEN SATURATION: 95 % | DIASTOLIC BLOOD PRESSURE: 85 MMHG | WEIGHT: 183.2 LBS | TEMPERATURE: 97.2 F | RESPIRATION RATE: 14 BRPM

## 2025-04-30 DIAGNOSIS — E11.22 TYPE 2 DIABETES MELLITUS WITH STAGE 3A CHRONIC KIDNEY DISEASE, WITHOUT LONG-TERM CURRENT USE OF INSULIN (H): Primary | ICD-10-CM

## 2025-04-30 DIAGNOSIS — D72.18 EOSINOPHILIC GRANULOMATOSIS WITH POLYANGIITIS (EGPA) (H): ICD-10-CM

## 2025-04-30 DIAGNOSIS — N18.31 TYPE 2 DIABETES MELLITUS WITH STAGE 3A CHRONIC KIDNEY DISEASE, WITHOUT LONG-TERM CURRENT USE OF INSULIN (H): Primary | ICD-10-CM

## 2025-04-30 DIAGNOSIS — N18.4 CHRONIC KIDNEY DISEASE, STAGE IV (SEVERE) (H): ICD-10-CM

## 2025-04-30 DIAGNOSIS — M30.1 EOSINOPHILIC GRANULOMATOSIS WITH POLYANGIITIS (EGPA) (H): ICD-10-CM

## 2025-04-30 PROBLEM — I50.22 CHRONIC SYSTOLIC HEART FAILURE (H): Status: RESOLVED | Noted: 2023-11-15 | Resolved: 2025-04-30

## 2025-04-30 PROCEDURE — 3079F DIAST BP 80-89 MM HG: CPT | Performed by: INTERNAL MEDICINE

## 2025-04-30 PROCEDURE — 1126F AMNT PAIN NOTED NONE PRSNT: CPT | Performed by: INTERNAL MEDICINE

## 2025-04-30 PROCEDURE — G2211 COMPLEX E/M VISIT ADD ON: HCPCS | Performed by: INTERNAL MEDICINE

## 2025-04-30 PROCEDURE — 3075F SYST BP GE 130 - 139MM HG: CPT | Performed by: INTERNAL MEDICINE

## 2025-04-30 PROCEDURE — 99214 OFFICE O/P EST MOD 30 MIN: CPT | Performed by: INTERNAL MEDICINE

## 2025-04-30 ASSESSMENT — PAIN SCALES - GENERAL: PAINLEVEL_OUTOF10: NO PAIN (0)

## 2025-04-30 NOTE — PROGRESS NOTES
Assessment & Plan     (E11.22,  N18.31) Type 2 diabetes mellitus with stage 3a chronic kidney disease, without long-term current use of insulin (H)  (primary encounter diagnosis)  Comment: A1c earlier in month was 7.3%,his ozempic dose is being titrated up through MTM   Plan: recheck later this month  Recommend eye exam this summer as well, I gave him my card as he typically goes to [facility].  See me in 4-6 months.    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 Paperwork sent to me, discussed with patient in detail today.  Plan: plan to confirm within the paperwork about his workability as noted above.    (N18.4) Chronic kidney disease, stage IV (severe) (H)  Comment: multifactorial, EGPA/DM/HTN  Plan: sees nephrology, has been stable lately      See me again in 4-6 months    The longitudinal plan of care for the diagnosis(es)/condition(s) as documented were addressed during this visit. Due to the added complexity in care, I will continue to support Ned in the subsequent management and with ongoing continuity of care.           Lupe Marino is a 65 year old, presenting for the following health issues:  Paperwork        4/30/2025     3:36 PM   Additional Questions   Roomed by RICHARD Carranza         4/30/2025   Forms   Any forms needing to be completed Yes     History of Present Illness       Reason for visit:  Stroke recovery and return to work    He eats 0-1 servings of fruits and vegetables daily.He consumes 0 sweetened beverage(s) daily.He exercises with enough effort to increase his heart rate 10 to 19 minutes per day.  He exercises with enough effort to increase his heart rate 4 days per week.   He is taking  "medications regularly.                      Objective    /85 (BP Location: Left arm, Patient Position: Sitting, Cuff Size: Adult Regular)   Pulse 72   Temp 97.2  F (36.2  C) (Tympanic)   Resp 14   Ht 1.88 m (6' 2.02\")   Wt 83.1 kg (183 lb 3.2 oz)   SpO2 95%   BMI 23.51 kg/m    Body mass index is 23.51 kg/m .  Physical Exam               Signed Electronically by: Mark Briggs MD    "

## 2025-05-07 ENCOUNTER — TELEPHONE (OUTPATIENT)
Dept: PULMONOLOGY | Facility: CLINIC | Age: 65
End: 2025-05-07
Payer: MEDICARE

## 2025-05-07 NOTE — TELEPHONE ENCOUNTER
Dr. Biswas filled out paperwork for [employer] per patient request. Faxed to [employer] at [phone]. Called and informed Ned. Mailed copy to his address listed in chart and sent copy to scanning.

## 2025-05-12 ENCOUNTER — VIRTUAL VISIT (OUTPATIENT)
Dept: PHARMACY | Facility: CLINIC | Age: 65
End: 2025-05-12

## 2025-05-12 DIAGNOSIS — N18.31 TYPE 2 DIABETES MELLITUS WITH STAGE 3A CHRONIC KIDNEY DISEASE, WITHOUT LONG-TERM CURRENT USE OF INSULIN (H): Primary | ICD-10-CM

## 2025-05-12 DIAGNOSIS — E11.22 TYPE 2 DIABETES MELLITUS WITH STAGE 3A CHRONIC KIDNEY DISEASE, WITHOUT LONG-TERM CURRENT USE OF INSULIN (H): Primary | ICD-10-CM

## 2025-05-12 PROCEDURE — 99207 PR NO CHARGE LOS: CPT | Mod: 93

## 2025-05-12 RX ORDER — ASPIRIN 81 MG/1
81 TABLET ORAL DAILY
COMMUNITY

## 2025-05-12 NOTE — PROGRESS NOTES
Medication Therapy Management (MTM) Encounter    ASSESSMENT:                            Medication Adherence/Access: No issues identified    Type 2 Diabetes: Patient is not meeting A1c goal of < 7%. Most of the CGM readings are within the target range (70 - 180) 70 % within goal of > 70%;however, CGM readings improved  likely due to patient started taking ozempic.  Advised patient start taking ozempic 1 mg weekly and this will last you for two months and then increase the dose to 2 mg. Will follow up closely.       PLAN:                            Consider re-starting aspirin 81 mg daily for stroke prevention   Start taking ozempic 1 mg weekly and this will last you for two months and then increase the dose to 2 mg     Follow-up: Due on 07/14/2025 @ 10 AM     SUBJECTIVE/OBJECTIVE:                          Ned Moore is a 64 year old male called for a follow up visit    Reason for visit: Medication Review Initial.    Allergies/ADRs: Reviewed in chart  Past Medical History: Reviewed in chart  Tobacco: He reports that he has never smoked. He has never been exposed to tobacco smoke. He has never used smokeless tobacco.    Medication Adherence/Access: no issues reported    Type 2 Diabetes:    Metformin 500 mg XR with supper and metformin 1000 mg with breakfast    Semglee 5 units daily- not taking     Ozempic 1 mg weekly    Aspirin 81mg daily - Has not been taking for sometime now  Blood sugar monitoring: Continuous Glucose Monitor: check below  Denies any low sugar symptoms  Current diabetes symptoms: Fatigue.   Diet/Exercise: He is eating three times a day   Eye exam: up to date  Foot exam: due  Urine Albumin:         Lab Results   Component Value Date     UMALCR 39.06 (H) 11/15/2023     Lab Results   Component Value Date    A1C 7.3 04/10/2025    A1C 7.8 10/31/2024               Wt Readings from Last 2 Encounters:   12/06/23 198 lb (89.8 kg)   11/30/23 189 lb (85.7 kg)      CGM G7 : Not Covered Under Part D  Law, May be Covered Under Part B G7 Sensors: Not Covered Under Part D Law, May be Covered Under Part B Agustín 3 Alliance: Not Covered Under Part D Law, May be Covered Under Part B Agustín 3 *Plus* Sensor: Not Covered Under Part D Law, May be Covered Under Part B            Today's Vitals: There were no vitals taken for this visit.  ----------------      I spent 22 minutes with this patient today. All changes were made via collaborative practice agreement with Mark Briggs MD.     A summary of these recommendations was sent via [employer].      Telemedicine Visit Details  The patient's medications can be safely assessed via a telemedicine encounter.  Type of service:  Telephone visit  Originating Location (pt. Location): Home    Distant Location (provider location):  On-site  Start Time: 10:00 AM  End Time:   10:22 AM     Medication Therapy Recommendations  No medication therapy recommendations to display     Syed Borjas PharmD     Medication Therapy Management (MTM) Pharmacist     [phone]     [email or web address]

## 2025-05-12 NOTE — PATIENT INSTRUCTIONS
"Recommendations from today's MTM visit:                                                      MTM (medication therapy management) is a service provided by a clinical pharmacist designed to help you get the most of out of your medicines.   Today we reviewed what your medicines are for, how to know if they are working, that your medicines are safe and how to make your medicine regimen as easy as possible.      Consider re-starting aspirin 81 mg daily for stroke prevention   Start taking ozempic 1 mg weekly and this will last you for two months and then increase the dose to 2 mg     Follow-up: Due on 07/14/2025 @ 10 AM     It was great speaking with you today.  I value your experience and would be very thankful for your time in providing feedback in our clinic survey. In the next few days, you may receive an email or text message from [employer] with a link to a survey related to your  clinical pharmacist.\"     To schedule another MTM appointment, please call the clinic directly or you may call the MTM scheduling line at [phone].    My Clinical Pharmacist's contact information:                                                      Please feel free to contact me with any questions or concerns you have.        Syed Borjas, PharmD     Medication Therapy Management (MTM) Pharmacist     [phone]     [email or web address]     [facility]    "

## 2025-05-24 DIAGNOSIS — E11.22 TYPE 2 DIABETES MELLITUS WITH STAGE 3A CHRONIC KIDNEY DISEASE, WITHOUT LONG-TERM CURRENT USE OF INSULIN (H): ICD-10-CM

## 2025-05-24 DIAGNOSIS — N18.31 TYPE 2 DIABETES MELLITUS WITH STAGE 3A CHRONIC KIDNEY DISEASE, WITHOUT LONG-TERM CURRENT USE OF INSULIN (H): ICD-10-CM

## 2025-05-26 RX ORDER — GABAPENTIN 300 MG/1
300 CAPSULE ORAL AT BEDTIME
Qty: 30 CAPSULE | Refills: 5 | Status: SHIPPED | OUTPATIENT
Start: 2025-05-26

## 2025-06-02 ENCOUNTER — MYC MEDICAL ADVICE (OUTPATIENT)
Dept: INTERNAL MEDICINE | Facility: CLINIC | Age: 65
End: 2025-06-02
Payer: MEDICARE

## 2025-06-02 DIAGNOSIS — E78.5 HYPERLIPIDEMIA LDL GOAL <70: ICD-10-CM

## 2025-06-03 RX ORDER — ATORVASTATIN CALCIUM 20 MG/1
20 TABLET, FILM COATED ORAL EVERY EVENING
Qty: 90 TABLET | Refills: 3 | Status: SHIPPED | OUTPATIENT
Start: 2025-06-03

## 2025-06-05 DIAGNOSIS — M30.1 EOSINOPHILIC GRANULOMATOSIS WITH POLYANGIITIS (EGPA) (H): ICD-10-CM

## 2025-06-05 DIAGNOSIS — D72.18 EOSINOPHILIC GRANULOMATOSIS WITH POLYANGIITIS (EGPA) (H): ICD-10-CM

## 2025-06-11 ENCOUNTER — OFFICE VISIT (OUTPATIENT)
Dept: PULMONOLOGY | Facility: CLINIC | Age: 65
End: 2025-06-11
Attending: INTERNAL MEDICINE
Payer: MEDICARE

## 2025-06-11 VITALS
WEIGHT: 187 LBS | SYSTOLIC BLOOD PRESSURE: 108 MMHG | OXYGEN SATURATION: 95 % | BODY MASS INDEX: 25.33 KG/M2 | HEART RATE: 82 BPM | HEIGHT: 72 IN | DIASTOLIC BLOOD PRESSURE: 72 MMHG

## 2025-06-11 DIAGNOSIS — D72.18 EOSINOPHILIC GRANULOMATOSIS WITH POLYANGIITIS (EGPA) (H): Primary | ICD-10-CM

## 2025-06-11 DIAGNOSIS — D72.18 EOSINOPHILIC GRANULOMATOSIS WITH POLYANGIITIS (EGPA) (H): ICD-10-CM

## 2025-06-11 DIAGNOSIS — M30.1 EOSINOPHILIC GRANULOMATOSIS WITH POLYANGIITIS (EGPA) (H): Primary | ICD-10-CM

## 2025-06-11 DIAGNOSIS — M30.1 EOSINOPHILIC GRANULOMATOSIS WITH POLYANGIITIS (EGPA) (H): ICD-10-CM

## 2025-06-11 PROCEDURE — 3078F DIAST BP <80 MM HG: CPT | Performed by: INTERNAL MEDICINE

## 2025-06-11 PROCEDURE — 99214 OFFICE O/P EST MOD 30 MIN: CPT | Performed by: INTERNAL MEDICINE

## 2025-06-11 PROCEDURE — 94375 RESPIRATORY FLOW VOLUME LOOP: CPT | Performed by: INTERNAL MEDICINE

## 2025-06-11 PROCEDURE — 94729 DIFFUSING CAPACITY: CPT | Performed by: INTERNAL MEDICINE

## 2025-06-11 PROCEDURE — G2211 COMPLEX E/M VISIT ADD ON: HCPCS | Performed by: INTERNAL MEDICINE

## 2025-06-11 PROCEDURE — 3074F SYST BP LT 130 MM HG: CPT | Performed by: INTERNAL MEDICINE

## 2025-06-11 NOTE — PROGRESS NOTES
Pulmonary Clinic Follow-up Visit    Assessment and Plan:   65 year old male never smoker with a history of EGPA with pulmonary and cardiac involvement, multifocal strokes, DM2, CKD3, dyslipidemia, presenting for follow-up.     Eosinophilic granulomatosis with polyangiitis: Ned has no breathing concerns today, feels that he is well-controlled on mepolizumab and low-dose fluticasone-vilanterol. He stopped tioptropium handihaler on his own several months ago. When I noted that his FEV1 today is down to 1.88 L (56%, z -2.62) from 2.66 L in June 2024, he notes that he does feel some limitation in exertional capacity. We discussed the option of restarting the tiotroipum in 6 months, which he is open to. He also is interested in a referral to pulmonary rehabilitation. Recall that I met Ned for initial consultation in January 2023. At that time he had marked obstructive physiology with frequent exacerbations. 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 He was started on LAMA-LABA. He had prominent upper airway symptoms. He was subsequently hospitalized at [facility] for almost a month from May-June 2023 with myocarditis with heart failure, multifocal ischemic strokes, anemia, found to have severe hypereosinophilia and was diagnosed with EGPA. He was seen by multiple consulting teams and underwent myocardial biopsy showing eosinophilic myocarditis. ANCA negative. He improved markedly with steroids, and was started on mepolizumab. In January 2023 he had severe fixed obstructive physiology on pulmonary function testing, with FEV1 1.26 L (z -3.80), air trapping without hyperinflation, and mild DLCO reduction; PFT in June 2024 shows FEV1 2.66 L (z -1.35), normal TLC and DLCO. 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     Plan:  - continue mepolizumab  - continue fluticasone-vilanterol 100-25 mcg one  inhalation daily; rinse/gargle/spit water after use  - restart tiotropium handihaler 18 mcg daily  - completed pulmonary rehabilitation at [facility] and stays active with exercise as able; will refer back to pulmonary rehabilitation  - ongoing follow-up with rheumatology  - follow up in 6 months with spirometry/DLCO  - recommend remaining up to date with respiratory vaccinations  - encouraged him to contact us with questions or concerning symptoms    Jose Biswas MD  Pulmonary and Critical Care Medicine  [facility]  Office [phone]  he/him    CCx: EGPA    HPI: 65 year old male never smoker with a history of EGPA with pulmonary and cardiac involvement, multifocal strokes, DM2, CKD3, dyslipidemia, presenting for follow-up. Ned has no breathing concerns today, feels that he is well-controlled on mepolizumab and low-dose fluticasone-vilanterol. He stopped tioptropium handihaler on his own several months ago. When I noted that his FEV1 today is down to 1.88 L (56%, z -2.62) from 2.66 L in June 2024, he notes that he does feel some limitation in exertional capacity. We discussed the option of restarting the tiotroipum in 6 months, which he is open to. He also is interested in a referral to pulmonary rehabilitation. Recall that I met Ned for initial consultation in January 2023. At that time he had marked obstructive physiology with frequent exacerbations. 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 He was started on LAMA-LABA. He had prominent upper airway symptoms. He was subsequently hospitalized at [facility] for almost a month from May-June 2023 with myocarditis with heart failure, multifocal ischemic strokes, anemia, found to have severe hypereosinophilia and was diagnosed with EGPA. He was seen by multiple consulting teams and underwent myocardial biopsy showing  eosinophilic myocarditis. ANCA negative. He improved markedly with steroids, and was started on mepolizumab. In January 2023 he had severe fixed obstructive physiology on pulmonary function testing, with FEV1 1.26 L (z -3.80), air trapping without hyperinflation, and mild DLCO reduction; PFT in June 2024 shows FEV1 2.66 L (z -1.35), normal TLC and DLCO. 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    ROS:  A 12-system review was obtained and was negative with the exception of the symptoms endorsed in the history of present illness.    PMH:  never smoker with a history of EGPA with pulmonary and cardiac involvement, multifocal strokes, DM2, CKD3, dyslipidemia    PSH:  No past surgical history on file.    Allergies:  Allergies   Allergen Reactions    Azathioprine Nausea and Vomiting    Dulaglutide Nausea       Family HX:  Family History   Problem Relation Age of Onset    Heart Disease Mother     Diabetes Mother     Asthma Mother     Alzheimer Disease Father     No Known Problems Brother        Social Hx:  Social History     Socioeconomic History    Marital status:      Spouse name: Not on file    Number of children: Not on file    Years of education: Not on file    Highest education level: Not on file   Occupational History    Not on file   Tobacco Use    Smoking status: Never     Passive exposure: Never    Smokeless tobacco: Never   Vaping Use    Vaping status: Never Used   Substance and Sexual Activity    Alcohol use: Not Currently     Comment: a beer or wine every few months non-alcohol    Drug use: Never    Sexual activity: Not Currently     Partners: Female   Other Topics Concern    Parent/sibling w/ CABG, MI or angioplasty before 65F 55M? No   Social History Narrative    Not on file     Social Drivers of Health     Financial Resource Strain: Low Risk  (5/31/2024)    Financial Resource Strain     Within the past 12 months, have you or your family members you live with been unable to get utilities  (heat, electricity) when it was really needed?: No   Food Insecurity: High Risk (5/31/2024)    Food Insecurity     Within the past 12 months, did you worry that your food would run out before you got money to buy more?: Yes     Within the past 12 months, did the food you bought just not last and you didn t have money to get more?: Yes   Transportation Needs: Low Risk  (5/31/2024)    Transportation Needs     Within the past 12 months, has lack of transportation kept you from medical appointments, getting your medicines, non-medical meetings or appointments, work, or from getting things that you need?: No   Physical Activity: Insufficiently Active (5/31/2024)    Exercise Vital Sign     Days of Exercise per Week: 3 days     Minutes of Exercise per Session: 20 min   Stress: No Stress Concern Present (5/31/2024)    [employer] - Occupational Stress Questionnaire     Feeling of Stress : Not at all   Social Connections: Unknown (5/31/2024)    Social Connection and Isolation Panel [NHANES]     Frequency of Communication with Friends and Family: Not on file     Frequency of Social Gatherings with Friends and Family: More than three times a week     Attends Taoist Services: Not on file     Active Member of Clubs or Organizations: Not on file     Attends Club or Organization Meetings: Not on file     Marital Status: Not on file   Recent Concern: Social Connections - Socially Isolated (5/31/2024)    Social Connection and Isolation Panel [NHANES]     Frequency of Communication with Friends and Family: Once a week     Frequency of Social Gatherings with Friends and Family: More than three times a week     Attends Taoist Services: Never     Active Member of Clubs or Organizations: No     Attends Club or Organization Meetings: Never     Marital Status:    Interpersonal Safety: Low Risk  (4/30/2025)    Interpersonal Safety     Do you feel physically and emotionally safe where you currently  live?: Yes     Within the past 12 months, have you been hit, slapped, kicked or otherwise physically hurt by someone?: No     Within the past 12 months, have you been humiliated or emotionally abused in other ways by your partner or ex-partner?: No   Housing Stability: High Risk (5/31/2024)    Housing Stability     Do you have housing? : Yes     Are you worried about losing your housing?: Yes       Current Meds:  Current Outpatient Medications   Medication Sig Dispense Refill    albuterol (PROAIR HFA/PROVENTIL HFA/VENTOLIN HFA) 108 (90 Base) MCG/ACT inhaler Inhale 2 puffs into the lungs every 6 hours. 8.5 g 1    albuterol (VENTOLIN HFA) 108 (90 Base) MCG/ACT inhaler Inhale 2 puffs into the lungs every 6 hours as needed for shortness of breath, wheezing or cough. 18 g 5    aspirin 81 MG EC tablet Take 81 mg by mouth daily.      atorvastatin (LIPITOR) 20 MG tablet Take 1 tablet (20 mg) by mouth every evening. 90 tablet 3    blood glucose monitoring (ACCU-CHEK FASTCLIX) lancets Use to test blood sugar once daily or as directed. 300 each 3    blood glucose test (ACCU-CHEK SMARTVIEW TEST STRIP) strips [BLOOD GLUCOSE TEST (ACCU-CHEK SMARTVIEW TEST STRIP) STRIPS] USE TO CHECK BLOOD SUGARS TWICE DAILY. 200 strip 3    Continuous Glucose Sensor (DEXCOM G7 SENSOR) MISC 1 Device every 10 days. 3 each 5    Continuous Glucose Sensor (FREESTYLE LIVIA 3 PLUS SENSOR) MISC Change every 15 days. 6 each 11    Continuous Glucose Sensor (FREESTYLE LIVIA 3 SENSOR) MISC CHANGE EVERY 15 DAYS. 6 each 11    fluticasone-vilanterol (BREO ELLIPTA) 100-25 MCG/ACT inhaler Inhale 1 puff into the lungs daily. 60 each 5    gabapentin (NEURONTIN) 300 MG capsule TAKE 1 CAPSULE BY MOUTH AT BEDTIME 30 capsule 5    lisinopril (ZESTRIL) 5 MG tablet Take 1 tablet (5 mg) by mouth daily. 90 tablet 3    metFORMIN (GLUCOPHAGE XR) 500 MG 24 hr tablet Take 1 tablet (500 mg) by mouth daily (with dinner). 90 tablet 3    metFORMIN (GLUCOPHAGE) 1000 MG tablet TAKE 1  "TABLET BY MOUTH EVERY DAY (1000 MG) WITH BREAKFAST 90 tablet 3    metoprolol succinate ER (TOPROL XL) 25 MG 24 hr tablet TAKE 0.5 TABLETS BY MOUTH EVERY MORNING 45 tablet 3    mometasone (NASONEX) 50 MCG/ACT nasal spray Spray 2 sprays into both nostrils daily. 17 g 3    Multiple Vitamins-Minerals (MULTIVITAMIN ADULTS PO) Take 1 tablet by mouth daily.      Semaglutide, 1 MG/DOSE, (OZEMPIC) 4 MG/3ML pen Inject 1 mg subcutaneously every 7 days. 3 mL 3    fluticasone (FLONASE) 50 MCG/ACT nasal spray Spray 2 sprays into both nostrils daily. (Patient taking differently: Spray 2 sprays into both nostrils daily as needed.) 16 mL 6    Insulin Glargine-yfgn (SEMGLEE, YFGN,) 100 UNIT/ML SOPN Inject 5 Units subcutaneously daily. (Patient not taking: Reported on 6/11/2025) 15 mL 1    mepolizumab (NUCALA) 100 mg/mL injection (prefilled syringe) Inject 3 mLs (300 mg) subcutaneously every 30 days. 3 mL 11    tiotropium (SPIRIVA) 18 MCG inhaled capsule Inhale 1 capsule (18 mcg) into the lungs daily. .Please dispense a generic form of Spiriva if you can order it 30 capsule 3       Physical Exam:  /72 (BP Location: Right arm, Patient Position: Sitting, Cuff Size: Adult Large)   Pulse 82   Ht 1.834 m (6' 0.2\")   Wt 84.8 kg (187 lb)   SpO2 95%   BMI 25.22 kg/m    Gen: alert, oriented, no distress  HEENT: no cervical or supraclavicular lymphadenopathy  CV: RRR, no M/G/R  Resp: CTAB, no focal crackles or wheezes  Skin: no apparent rashes  Ext: no cyanosis, clubbing or edema  Neuro: alert, nonfocal    Labs:  reviewed    Imaging studies:  Chest CTA (May 2023):  - images directly reviewed, formal interpretation follows:  FINDINGS:   ANGIOGRAM CHEST: Normal caliber thoracic aorta. Conventional arch anatomy. Normal caliber pulmonary artery. No pulmonary artery filling defects. No vessel wall thickening. No findings to suggest acute aortic syndrome.     LUNGS AND PLEURA: There are symmetric patchy peripheral centrilobular " distribution nodules with indistinct borders. In the lateral left lower lobe opacities are slightly bandlike and confluent consistent with a mixture of inflammation and atelectasis. Trachea and central airways are patent and normal caliber. The subsegmental airways in the peripheral 3 cm of the lung have wall thickening and/or visible endoluminal material. No pleural effusion or thickening.     MEDIASTINUM: Cardiac chambers are normal in size. Physiologic pericardial fluid. Symmetric mildly enlarged hilar and subcarinal lymph nodes are likely reactive. Prevascular, paratracheal, and paraesophageal nodes are normal in size. Esophagus is decompressed. Imaged thyroid gland is normal.     CORONARY ARTERY CALCIFICATION: Moderate.     HEPATOBILIARY: The liver is normal in size. There are scattered low attenuations randomly distributed in the liver without appreciable enhancement incompletely characterized. Normal contrast opacification of the portal and hepatic veins. Smooth liver capsule. Normal gallbladder and bile ducts.     PANCREAS: Normal.     SPLEEN: Normal.     ADRENAL GLANDS: Normal.     KIDNEYS/BLADDER: Kidneys are normal in size with symmetric enhancement and normal cortex thickness. No nephrolithiasis or hydronephrosis. Urinary bladder is distended but has a smooth wall of uniform thickness.     BOWEL: Normal.     LYMPH NODES: Normal.     VASCULATURE: Minimal mixed attenuation atheroma in the infrarenal abdominal aorta. No aneurysm.     PELVIC ORGANS: Normal.     MUSCULOSKELETAL: Small thoracic and lumbar degenerative osteophytes. No aggressive or destructive bone lesions.     IMPRESSION:     1. No acute pulmonary embolism or aortopathy.   2.  Peripheral airway centric lung opacities in both lungs consistent with bronchopneumonia.   3.  Reactive lymph nodes in the angelica and subcarinal mediastinum.   4.  No focal inflammatory process in the abdomen or pelvis.   5.  Scattered hypoattenuating liver lesions,  "incompletely characterized. These most likely represent benign cysts or small hemangioma.     CPET (April 2023):    A cardiopulmonary stress test was performed following a modified Linda protocol with the patient exercising for 9 minutes and 0 seconds under the supervision of Dr. Radha Oconnor.  Blood pressure and heart rate demonstrated a normal response to exercise.    The stress electrocardiogram is negative for inducible ischemic EKG changes.    There is no prior study for comparison.    The patient's exercise capacity is moderately impaired.    The patient technically gave a \"submaximal effort.\"  There is likely a pulmonary limitation to exercise with the breathing reserve being negative at peak, the lack of doubling ot the tidal volume at max and abnormal baseline spirometry. A cardiac limitation to exercise cannot be ruled out.  The peak VO2 and RPP were lower than expected.  The Ve/VCO2 was also elevated at 39.  The blood pressure at rest revealed an orthostatic response to change in positions (no symptoms).  In recovery the blood pressure remained elevated.     TTE (June 2023):  Summary    1. Normal LV size with normal wall thickness. Calculated bi-planes LVEF   64%.   No regional wall motion abnormalities. (Normal function). Normal diastolic   function.    2. Normal RV size with normal systolic function.     3. Trace to mild valvular regurgitations only.     4. The estimated pulmonary arterial systolic pressure is 23 mmHg,   consistent   with normal pulmonary pressures.     5. Compared to the prior study from 5/27/2023, LV function has improved (was 45-50% in May 2023)     Pulmonary Function Testing  January 2023:  FVC 3.60 (z -1.40)  FEV1 1.26 (z -3.80)  FEV1/FVC 0.35  No significant bronchodilator response  RV 4.80 (z +5.47)  TLC 8.29 (z +1.02)  DLCOc (z -1.92)  Most inspiratory efforts lead to flattening of the inspiratory limb; one does not.     June 2024  FVC 3.80 (z -0.95)  FEV1 2.66 (z " -1.35)  FEV1/FVC 0.70  RV 3.54 (z +2.28)  TLC 7.51 (z -0.10)  DLCOc (z -1.40)    June 2025  FVC 3.42 (78%, z -1.50)  FEV1 1.88 (56%, z -2.62)  FEV1/FVC 0.55  DLCOc 73%, z -1.69    Time spent on chart and image review, meeting with the patient to obtain history, perform physical exam, discuss test results, diagnostic possibilities, further testing options, treatment plan options, and care coordination: 32 minutes    The longitudinal plan of care for the diagnosis(es)/condition(s) as documented were addressed during this visit. Due to the added complexity in care, I will continue to support Ned in the subsequent management and with ongoing continuity of care.

## 2025-06-11 NOTE — PATIENT INSTRUCTIONS
It was good to see you in clinic today. This is what we discussed:    Continue Nucala.  Continue Breo one inhalation daily. Rinse, gargle, and spit water after use.  Restart Spiriva one inhalation daily and see if your breathing improves.  Try to increase your exercise. We will get you into pulmonary rehabilitation.  I will see you in 6 months with repeat pulmonary function testing.  Contact me with questions or concerns.    Jose Biswas MD  Pulmonary and Critical Care Medicine  [facility]  Office [phone]

## 2025-06-11 NOTE — LETTER
6/11/2025      Ned Moore  [address]  [place] [address]      Dear Colleague,    Thank you for referring your patient, Ned Moore, to the [facility]. Please see a copy of my visit note below.    Pulmonary Clinic Follow-up Visit    Assessment and Plan:   65 year old male never smoker with a history of EGPA with pulmonary and cardiac involvement, multifocal strokes, DM2, CKD3, dyslipidemia, presenting for follow-up.     Eosinophilic granulomatosis with polyangiitis: Ned has no breathing concerns today, feels that he is well-controlled on mepolizumab and low-dose fluticasone-vilanterol. He stopped tioptropium handihaler on his own several months ago. When I noted that his FEV1 today is down to 1.88 L (56%, z -2.62) from 2.66 L in June 2024, he notes that he does feel some limitation in exertional capacity. We discussed the option of restarting the tiotroipum in 6 months, which he is open to. He also is interested in a referral to pulmonary rehabilitation. Recall that I met Ned for initial consultation in January 2023. At that time he had marked obstructive physiology with frequent exacerbations. 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 He was started on LAMA-LABA. He had prominent upper airway symptoms. He was subsequently hospitalized at [facility] for almost a month from May-June 2023 with myocarditis with heart failure, multifocal ischemic strokes, anemia, found to have severe hypereosinophilia and was diagnosed with EGPA. He was seen by multiple consulting teams and underwent myocardial biopsy showing eosinophilic myocarditis. ANCA negative. He improved markedly with steroids, and was started on mepolizumab. In January 2023 he had severe fixed obstructive physiology on pulmonary function testing, with FEV1 1.26 L (z -3.80), air trapping without  hyperinflation, and mild DLCO reduction; PFT in June 2024 shows FEV1 2.66 L (z -1.35), normal TLC and DLCO. 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     Plan:  - continue mepolizumab  - continue fluticasone-vilanterol 100-25 mcg one inhalation daily; rinse/gargle/spit water after use  - restart tiotropium handihaler 18 mcg daily  - completed pulmonary rehabilitation at [facility] and stays active with exercise as able; will refer back to pulmonary rehabilitation  - ongoing follow-up with rheumatology  - follow up in 6 months with spirometry/DLCO  - recommend remaining up to date with respiratory vaccinations  - encouraged him to contact us with questions or concerning symptoms    Jose Biswas MD  Pulmonary and Critical Care Medicine  [facility]  Office [phone]  he/him    CCx: EGPA    HPI: 65 year old male never smoker with a history of EGPA with pulmonary and cardiac involvement, multifocal strokes, DM2, CKD3, dyslipidemia, presenting for follow-up. Ned has no breathing concerns today, feels that he is well-controlled on mepolizumab and low-dose fluticasone-vilanterol. He stopped tioptropium handihaler on his own several months ago. When I noted that his FEV1 today is down to 1.88 L (56%, z -2.62) from 2.66 L in June 2024, he notes that he does feel some limitation in exertional capacity. We discussed the option of restarting the tiotroipum in 6 months, which he is open to. He also is interested in a referral to pulmonary rehabilitation. Recall that I met Ned for initial consultation in January 2023. At that time he had marked obstructive physiology with frequent exacerbations. 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 He was started on LAMA-LABA. He had prominent upper airway symptoms. He was subsequently hospitalized at [facility] for almost a month from  May-June 2023 with myocarditis with heart failure, multifocal ischemic strokes, anemia, found to have severe hypereosinophilia and was diagnosed with EGPA. He was seen by multiple consulting teams and underwent myocardial biopsy showing eosinophilic myocarditis. ANCA negative. He improved markedly with steroids, and was started on mepolizumab. In January 2023 he had severe fixed obstructive physiology on pulmonary function testing, with FEV1 1.26 L (z -3.80), air trapping without hyperinflation, and mild DLCO reduction; PFT in June 2024 shows FEV1 2.66 L (z -1.35), normal TLC and DLCO. 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.    ROS:  A 12-system review was obtained and was negative with the exception of the symptoms endorsed in the history of present illness.    PMH:  never smoker with a history of EGPA with pulmonary and cardiac involvement, multifocal strokes, DM2, CKD3, dyslipidemia    PSH:  No past surgical history on file.    Allergies:  Allergies   Allergen Reactions     Azathioprine Nausea and Vomiting     Dulaglutide Nausea       Family HX:  Family History   Problem Relation Age of Onset     Heart Disease Mother      Diabetes Mother      Asthma Mother      Alzheimer Disease Father      No Known Problems Brother        Social Hx:  Social History     Socioeconomic History     Marital status:      Spouse name: Not on file     Number of children: Not on file     Years of education: Not on file     Highest education level: Not on file   Occupational History     Not on file   Tobacco Use     Smoking status: Never     Passive exposure: Never     Smokeless tobacco: Never   Vaping Use     Vaping status: Never Used   Substance and Sexual Activity     Alcohol use: Not Currently     Comment: a beer or wine every few months non-alcohol     Drug use: Never     Sexual activity: Not Currently     Partners: Female   Other Topics Concern     Parent/sibling w/ CABG, MI or angioplasty before 65F 55M? No    Social History Narrative     Not on file     Social Drivers of Health     Financial Resource Strain: Low Risk  (5/31/2024)    Financial Resource Strain      Within the past 12 months, have you or your family members you live with been unable to get utilities (heat, electricity) when it was really needed?: No   Food Insecurity: High Risk (5/31/2024)    Food Insecurity      Within the past 12 months, did you worry that your food would run out before you got money to buy more?: Yes      Within the past 12 months, did the food you bought just not last and you didn t have money to get more?: Yes   Transportation Needs: Low Risk  (5/31/2024)    Transportation Needs      Within the past 12 months, has lack of transportation kept you from medical appointments, getting your medicines, non-medical meetings or appointments, work, or from getting things that you need?: No   Physical Activity: Insufficiently Active (5/31/2024)    Exercise Vital Sign      Days of Exercise per Week: 3 days      Minutes of Exercise per Session: 20 min   Stress: No Stress Concern Present (5/31/2024)    [employer] [place] [employer] - Occupational Stress Questionnaire      Feeling of Stress : Not at all   Social Connections: Unknown (5/31/2024)    Social Connection and Isolation Panel [NHANES]      Frequency of Communication with Friends and Family: Not on file      Frequency of Social Gatherings with Friends and Family: More than three times a week      Attends Mormon Services: Not on file      Active Member of Clubs or Organizations: Not on file      Attends Club or Organization Meetings: Not on file      Marital Status: Not on file   Recent Concern: Social Connections - Socially Isolated (5/31/2024)    Social Connection and Isolation Panel [NHANES]      Frequency of Communication with Friends and Family: Once a week      Frequency of Social Gatherings with Friends and Family: More than three times a week      Attends Mormon  Services: Never      Active Member of Clubs or Organizations: No      Attends Club or Organization Meetings: Never      Marital Status:    Interpersonal Safety: Low Risk  (4/30/2025)    Interpersonal Safety      Do you feel physically and emotionally safe where you currently live?: Yes      Within the past 12 months, have you been hit, slapped, kicked or otherwise physically hurt by someone?: No      Within the past 12 months, have you been humiliated or emotionally abused in other ways by your partner or ex-partner?: No   Housing Stability: High Risk (5/31/2024)    Housing Stability      Do you have housing? : Yes      Are you worried about losing your housing?: Yes       Current Meds:  Current Outpatient Medications   Medication Sig Dispense Refill     albuterol (PROAIR HFA/PROVENTIL HFA/VENTOLIN HFA) 108 (90 Base) MCG/ACT inhaler Inhale 2 puffs into the lungs every 6 hours. 8.5 g 1     albuterol (VENTOLIN HFA) 108 (90 Base) MCG/ACT inhaler Inhale 2 puffs into the lungs every 6 hours as needed for shortness of breath, wheezing or cough. 18 g 5     aspirin 81 MG EC tablet Take 81 mg by mouth daily.       atorvastatin (LIPITOR) 20 MG tablet Take 1 tablet (20 mg) by mouth every evening. 90 tablet 3     blood glucose monitoring (ACCU-CHEK FASTCLIX) lancets Use to test blood sugar once daily or as directed. 300 each 3     blood glucose test (ACCU-CHEK SMARTVIEW TEST STRIP) strips [BLOOD GLUCOSE TEST (ACCU-CHEK SMARTVIEW TEST STRIP) STRIPS] USE TO CHECK BLOOD SUGARS TWICE DAILY. 200 strip 3     Continuous Glucose Sensor (DEXCOM G7 SENSOR) MISC 1 Device every 10 days. 3 each 5     Continuous Glucose Sensor (FREESTYLE LIVIA 3 PLUS SENSOR) MISC Change every 15 days. 6 each 11     Continuous Glucose Sensor (FREESTYLE LIVIA 3 SENSOR) MISC CHANGE EVERY 15 DAYS. 6 each 11     fluticasone-vilanterol (BREO ELLIPTA) 100-25 MCG/ACT inhaler Inhale 1 puff into the lungs daily. 60 each 5     gabapentin (NEURONTIN) 300 MG  "capsule TAKE 1 CAPSULE BY MOUTH AT BEDTIME 30 capsule 5     lisinopril (ZESTRIL) 5 MG tablet Take 1 tablet (5 mg) by mouth daily. 90 tablet 3     metFORMIN (GLUCOPHAGE XR) 500 MG 24 hr tablet Take 1 tablet (500 mg) by mouth daily (with dinner). 90 tablet 3     metFORMIN (GLUCOPHAGE) 1000 MG tablet TAKE 1 TABLET BY MOUTH EVERY DAY (1000 MG) WITH BREAKFAST 90 tablet 3     metoprolol succinate ER (TOPROL XL) 25 MG 24 hr tablet TAKE 0.5 TABLETS BY MOUTH EVERY MORNING 45 tablet 3     mometasone (NASONEX) 50 MCG/ACT nasal spray Spray 2 sprays into both nostrils daily. 17 g 3     Multiple Vitamins-Minerals (MULTIVITAMIN ADULTS PO) Take 1 tablet by mouth daily.       Semaglutide, 1 MG/DOSE, (OZEMPIC) 4 MG/3ML pen Inject 1 mg subcutaneously every 7 days. 3 mL 3     fluticasone (FLONASE) 50 MCG/ACT nasal spray Spray 2 sprays into both nostrils daily. (Patient taking differently: Spray 2 sprays into both nostrils daily as needed.) 16 mL 6     Insulin Glargine-yfgn (SEMGLEE, YFGN,) 100 UNIT/ML SOPN Inject 5 Units subcutaneously daily. (Patient not taking: Reported on 6/11/2025) 15 mL 1     mepolizumab (NUCALA) 100 mg/mL injection (prefilled syringe) Inject 3 mLs (300 mg) subcutaneously every 30 days. 3 mL 11     tiotropium (SPIRIVA) 18 MCG inhaled capsule Inhale 1 capsule (18 mcg) into the lungs daily. .Please dispense a generic form of Spiriva if you can order it 30 capsule 3       Physical Exam:  /72 (BP Location: Right arm, Patient Position: Sitting, Cuff Size: Adult Large)   Pulse 82   Ht 1.834 m (6' 0.2\")   Wt 84.8 kg (187 lb)   SpO2 95%   BMI 25.22 kg/m    Gen: alert, oriented, no distress  HEENT: no cervical or supraclavicular lymphadenopathy  CV: RRR, no M/G/R  Resp: CTAB, no focal crackles or wheezes  Skin: no apparent rashes  Ext: no cyanosis, clubbing or edema  Neuro: alert, nonfocal    Labs:  reviewed    Imaging studies:  Chest CTA (May 2023):  - images directly reviewed, formal interpretation " follows:  FINDINGS:   ANGIOGRAM CHEST: Normal caliber thoracic aorta. Conventional arch anatomy. Normal caliber pulmonary artery. No pulmonary artery filling defects. No vessel wall thickening. No findings to suggest acute aortic syndrome.     LUNGS AND PLEURA: There are symmetric patchy peripheral centrilobular distribution nodules with indistinct borders. In the lateral left lower lobe opacities are slightly bandlike and confluent consistent with a mixture of inflammation and atelectasis. Trachea and central airways are patent and normal caliber. The subsegmental airways in the peripheral 3 cm of the lung have wall thickening and/or visible endoluminal material. No pleural effusion or thickening.     MEDIASTINUM: Cardiac chambers are normal in size. Physiologic pericardial fluid. Symmetric mildly enlarged hilar and subcarinal lymph nodes are likely reactive. Prevascular, paratracheal, and paraesophageal nodes are normal in size. Esophagus is decompressed. Imaged thyroid gland is normal.     CORONARY ARTERY CALCIFICATION: Moderate.     HEPATOBILIARY: The liver is normal in size. There are scattered low attenuations randomly distributed in the liver without appreciable enhancement incompletely characterized. Normal contrast opacification of the portal and hepatic veins. Smooth liver capsule. Normal gallbladder and bile ducts.     PANCREAS: Normal.     SPLEEN: Normal.     ADRENAL GLANDS: Normal.     KIDNEYS/BLADDER: Kidneys are normal in size with symmetric enhancement and normal cortex thickness. No nephrolithiasis or hydronephrosis. Urinary bladder is distended but has a smooth wall of uniform thickness.     BOWEL: Normal.     LYMPH NODES: Normal.     VASCULATURE: Minimal mixed attenuation atheroma in the infrarenal abdominal aorta. No aneurysm.     PELVIC ORGANS: Normal.     MUSCULOSKELETAL: Small thoracic and lumbar degenerative osteophytes. No aggressive or destructive bone lesions.     IMPRESSION:     1. No  "acute pulmonary embolism or aortopathy.   2.  Peripheral airway centric lung opacities in both lungs consistent with bronchopneumonia.   3.  Reactive lymph nodes in the angelica and subcarinal mediastinum.   4.  No focal inflammatory process in the abdomen or pelvis.   5.  Scattered hypoattenuating liver lesions, incompletely characterized. These most likely represent benign cysts or small hemangioma.     CPET (April 2023):     A cardiopulmonary stress test was performed following a modified Linda protocol with the patient exercising for 9 minutes and 0 seconds under the supervision of Dr. Radha Oconnor.  Blood pressure and heart rate demonstrated a normal response to exercise.     The stress electrocardiogram is negative for inducible ischemic EKG changes.     There is no prior study for comparison.     The patient's exercise capacity is moderately impaired.     The patient technically gave a \"submaximal effort.\"  There is likely a pulmonary limitation to exercise with the breathing reserve being negative at peak, the lack of doubling ot the tidal volume at max and abnormal baseline spirometry. A cardiac limitation to exercise cannot be ruled out.  The peak VO2 and RPP were lower than expected.  The Ve/VCO2 was also elevated at 39.  The blood pressure at rest revealed an orthostatic response to change in positions (no symptoms).  In recovery the blood pressure remained elevated.     TTE (June 2023):  Summary    1. Normal LV size with normal wall thickness. Calculated bi-planes LVEF   64%.   No regional wall motion abnormalities. (Normal function). Normal diastolic   function.    2. Normal RV size with normal systolic function.     3. Trace to mild valvular regurgitations only.     4. The estimated pulmonary arterial systolic pressure is 23 mmHg,   consistent   with normal pulmonary pressures.     5. Compared to the prior study from 5/27/2023, LV function has improved (was 45-50% in May 2023)     Pulmonary Function " Testing  January 2023:  FVC 3.60 (z -1.40)  FEV1 1.26 (z -3.80)  FEV1/FVC 0.35  No significant bronchodilator response  RV 4.80 (z +5.47)  TLC 8.29 (z +1.02)  DLCOc (z -1.92)  Most inspiratory efforts lead to flattening of the inspiratory limb; one does not.     June 2024  FVC 3.80 (z -0.95)  FEV1 2.66 (z -1.35)  FEV1/FVC 0.70  RV 3.54 (z +2.28)  TLC 7.51 (z -0.10)  DLCOc (z -1.40)    June 2025  FVC 3.42 (78%, z -1.50)  FEV1 1.88 (56%, z -2.62)  FEV1/FVC 0.55  DLCOc 73%, z -1.69    Time spent on chart and image review, meeting with the patient to obtain history, perform physical exam, discuss test results, diagnostic possibilities, further testing options, treatment plan options, and care coordination: 32 minutes    The longitudinal plan of care for the diagnosis(es)/condition(s) as documented were addressed during this visit. Due to the added complexity in care, I will continue to support Ned in the subsequent management and with ongoing continuity of care.      Again, thank you for allowing me to participate in the care of your patient.        Sincerely,        Jose Biswas MD    Electronically signed

## 2025-06-12 ENCOUNTER — LAB (OUTPATIENT)
Dept: LAB | Facility: CLINIC | Age: 65
End: 2025-06-12
Attending: INTERNAL MEDICINE
Payer: MEDICARE

## 2025-06-12 DIAGNOSIS — M30.1 EOSINOPHILIC GRANULOMATOSIS WITH POLYANGIITIS (EGPA) (H): ICD-10-CM

## 2025-06-12 DIAGNOSIS — D72.18 EOSINOPHILIC GRANULOMATOSIS WITH POLYANGIITIS (EGPA) (H): ICD-10-CM

## 2025-06-12 LAB
ALBUMIN MFR UR ELPH: 20.7 MG/DL
ALBUMIN SERPL BCG-MCNC: 4.6 G/DL (ref 3.5–5.2)
ALBUMIN UR-MCNC: 10 MG/DL
ANION GAP SERPL CALCULATED.3IONS-SCNC: 12 MMOL/L (ref 7–15)
APPEARANCE UR: CLEAR
BASOPHILS # BLD AUTO: 0.1 10E3/UL (ref 0–0.2)
BASOPHILS NFR BLD AUTO: 1 %
BILIRUB UR QL STRIP: NEGATIVE
BUN SERPL-MCNC: 28.2 MG/DL (ref 8–23)
CALCIUM SERPL-MCNC: 10 MG/DL (ref 8.8–10.4)
CHLORIDE SERPL-SCNC: 102 MMOL/L (ref 98–107)
COLOR UR AUTO: YELLOW
CREAT SERPL-MCNC: 1.82 MG/DL (ref 0.67–1.17)
CREAT UR-MCNC: 171 MG/DL
CREAT UR-MCNC: 172 MG/DL
DLCOCOR-%PRED-PRE: 73 %
DLCOCOR-PRE: 20.75 ML/MIN/MMHG
DLCOUNC-%PRED-PRE: 75 %
DLCOUNC-PRE: 21.42 ML/MIN/MMHG
DLCOUNC-PRED: 28.24 ML/MIN/MMHG
EGFRCR SERPLBLD CKD-EPI 2021: 40 ML/MIN/1.73M2
EOSINOPHIL # BLD AUTO: 0.1 10E3/UL (ref 0–0.7)
EOSINOPHIL NFR BLD AUTO: 2 %
ERV-%PRED-PRE: 13 %
ERV-PRE: 0.23 L
ERV-PRED: 1.63 L
ERYTHROCYTE [DISTWIDTH] IN BLOOD BY AUTOMATED COUNT: 12 % (ref 10–15)
EXPTIME-PRE: 10.31 SEC
FEF2575-%PRED-PRE: 28 %
FEF2575-PRE: 0.76 L/SEC
FEF2575-PRED: 2.64 L/SEC
FEFMAX-%PRED-PRE: 53 %
FEFMAX-PRE: 4.98 L/SEC
FEFMAX-PRED: 9.23 L/SEC
FEV1-%PRED-PRE: 56 %
FEV1-PRE: 1.88 L
FEV1FEV6-PRE: 60 %
FEV1FEV6-PRED: 78 %
FEV1FVC-PRE: 55 %
FEV1FVC-PRED: 77 %
FEV1SVC-PRE: 58 %
FEV1SVC-PRED: 74 %
FIFMAX-PRE: 5.74 L/SEC
FVC-%PRED-PRE: 78 %
FVC-PRE: 3.42 L
FVC-PRED: 4.37 L
GLUCOSE SERPL-MCNC: 134 MG/DL (ref 70–99)
GLUCOSE UR STRIP-MCNC: 150 MG/DL
HCO3 SERPL-SCNC: 27 MMOL/L (ref 22–29)
HCT VFR BLD AUTO: 43.5 % (ref 40–53)
HGB BLD-MCNC: 14.9 G/DL (ref 13.3–17.7)
HGB UR QL STRIP: NEGATIVE
HYALINE CASTS: 1 /LPF
IC-%PRED-PRE: 92 %
IC-PRE: 3.02 L
IC-PRED: 3.26 L
IMM GRANULOCYTES # BLD: 0 10E3/UL
IMM GRANULOCYTES NFR BLD: 0 %
KETONES UR STRIP-MCNC: NEGATIVE MG/DL
LEUKOCYTE ESTERASE UR QL STRIP: ABNORMAL
LYMPHOCYTES # BLD AUTO: 1.8 10E3/UL (ref 0.8–5.3)
LYMPHOCYTES NFR BLD AUTO: 25 %
MCH RBC QN AUTO: 29.7 PG (ref 26.5–33)
MCHC RBC AUTO-ENTMCNC: 34.3 G/DL (ref 31.5–36.5)
MCV RBC AUTO: 87 FL (ref 78–100)
MICROALBUMIN UR-MCNC: 25.8 MG/L
MICROALBUMIN/CREAT UR: 15 MG/G CR (ref 0–17)
MONOCYTES # BLD AUTO: 0.6 10E3/UL (ref 0–1.3)
MONOCYTES NFR BLD AUTO: 9 %
MUCOUS THREADS #/AREA URNS LPF: PRESENT /LPF
NEUTROPHILS # BLD AUTO: 4.6 10E3/UL (ref 1.6–8.3)
NEUTROPHILS NFR BLD AUTO: 63 %
NITRATE UR QL: NEGATIVE
NRBC # BLD AUTO: 0 10E3/UL
NRBC BLD AUTO-RTO: 0 /100
PH UR STRIP: 5.5 [PH] (ref 5–7)
PHOSPHATE SERPL-MCNC: 3.6 MG/DL (ref 2.5–4.5)
PLATELET # BLD AUTO: 256 10E3/UL (ref 150–450)
POTASSIUM SERPL-SCNC: 4.9 MMOL/L (ref 3.4–5.3)
PROT/CREAT 24H UR: 0.12 MG/MG CR (ref 0–0.2)
RBC # BLD AUTO: 5.02 10E6/UL (ref 4.4–5.9)
RBC URINE: 2 /HPF
SODIUM SERPL-SCNC: 141 MMOL/L (ref 135–145)
SP GR UR STRIP: 1.03 (ref 1–1.03)
SPERM #/AREA URNS HPF: PRESENT /HPF
UROBILINOGEN UR STRIP-MCNC: NORMAL MG/DL
VA-%PRED-PRE: 71 %
VA-PRE: 4.99 L
VC-%PRED-PRE: 71 %
VC-PRE: 3.25 L
VC-PRED: 4.54 L
WBC # BLD AUTO: 7.2 10E3/UL (ref 4–11)
WBC URINE: 7 /HPF

## 2025-06-12 PROCEDURE — 86160 COMPLEMENT ANTIGEN: CPT | Performed by: INTERNAL MEDICINE

## 2025-06-12 PROCEDURE — 82043 UR ALBUMIN QUANTITATIVE: CPT | Performed by: INTERNAL MEDICINE

## 2025-06-12 PROCEDURE — 99000 SPECIMEN HANDLING OFFICE-LAB: CPT | Performed by: PATHOLOGY

## 2025-06-12 PROCEDURE — 83516 IMMUNOASSAY NONANTIBODY: CPT | Performed by: INTERNAL MEDICINE

## 2025-06-19 ENCOUNTER — PATIENT OUTREACH (OUTPATIENT)
Dept: NEPHROLOGY | Facility: CLINIC | Age: 65
End: 2025-06-19
Payer: MEDICARE

## 2025-06-19 ENCOUNTER — VIRTUAL VISIT (OUTPATIENT)
Dept: PHARMACY | Facility: CLINIC | Age: 65
End: 2025-06-19
Attending: INTERNAL MEDICINE
Payer: MEDICARE

## 2025-06-19 DIAGNOSIS — D72.18 EOSINOPHILIC GRANULOMATOSIS WITH POLYANGIITIS (EGPA) (H): Primary | ICD-10-CM

## 2025-06-19 DIAGNOSIS — Z71.85 VACCINE COUNSELING: ICD-10-CM

## 2025-06-19 DIAGNOSIS — M30.1 EOSINOPHILIC GRANULOMATOSIS WITH POLYANGIITIS (EGPA) (H): Primary | ICD-10-CM

## 2025-06-19 NOTE — Clinical Note
MELVINI - Educated on rituximab and sending patient infusion phone numbers to reschedule appointments. Up to date on vaccines aside from tetanus booster - will wait to receive due to proximity to infusion. Last pre-biologic screening completed in 2023. Thanks!

## 2025-06-19 NOTE — PROGRESS NOTES
Update received from Dr. Tripp requesting to get patient scheduled and authed for Rituximab induction 1gm every 2 weeks x 2 doses.  Therapy plan reviewed and signed by Dr. Tripp, updated to correct IAR and Ritux x 2 doses per T.O. Dr. Tripp.  Reached out to patient and reviewed the updates to plan of care.  Ned agrees to updates and ok with [place], , or [facility] depending on ability to get in urgently.  MTM PharmD referral placed and scheduled with Moses MERA PharmD for this am.  Linked referral and warm handoff send to Moses for continuity of care.  Staff message sent to [facility] to see what capacity and spoke to Charge who will overbook for next Tuesday 8am.  Reached out to patient who is not able to do that day, [facility] infusion center number shared and instructed to call them back to schedule another day next week if they have capacity.  [phone].  Ned verbalized understanding and will follow up as instructed.  Plan: follow up as needed. Confirmed follow up with Dr. Tripp is scheduled.  Holly Kirkland RN, BSN, PHN  [facility] Nephrology Nurse Navigator  [phone]

## 2025-06-19 NOTE — PROGRESS NOTES
Medication Therapy Management (MTM) Encounter    ASSESSMENT:                            Medication Adherence/Access: No issues identified.    Eosinophilic granulomatosis with polyangiitis (EGPA): Provided education on rituximab infusions today including dosing, general administration, side effects (both common/serious), precautions, monitoring and time to efficacy. Discussed data on risk of serious infection in depth. Advised patient to call his preferred infusion center ASAP to schedule the infusion appointments. Reviewed process with the infusion center and answered patient questions. MTM pharmacist to assist with infusion coverage, if needed.      Vaccinations: Reviewed indicated non-live vaccines and avoidance of live vaccines. Per ACIP guidelines, patient is eligible for VACCINATION: TDAP. Patient is scheduled to receive the rituximab infusions within 2 weeks, so recommended vaccines should be deferred for 6 months. Advised patient to receive vaccines 2 weeks prior to his (potential) next course of rituximab infusions.    PLAN:                            Call your preferred infusion center to schedule the rituximab infusion appointments.   [facility]  Phone: [phone]  [facility]  Phone: [phone]   [facility]  Phone: [phone]    I am confirming whether additional labs are needed before the infusion.   I will send an update via [employer].      Complete the following vaccines in about 6 months. These should be completed at least 2 weeks before future rituximab courses.   Tetanus (TDaP)  Recommended every 10 years.  COVID (will be due in 6 months)  Annual flu (will be due in 6 months)    Follow-up: with Dr. Tripp on 9/11/25.    SUBJECTIVE/OBJECTIVE:                          Ned Moore is a 65 year old male seen for an initial visit. He was referred to me from Josue Tripp MD.    Reason for visit: Rituximab education and vaccine  recommendations.    Allergies/ADRs: Reviewed in chart  Past Medical History: Reviewed in chart  Tobacco: He reports that he has never smoked. He has never been exposed to tobacco smoke. He has never used smokeless tobacco.  Alcohol: not currently using    Medication Adherence/Access: no issues reported.    Eosinophilic granulomatosis with polyangiitis (EGPA)   Nucala 300 mg subcutaneous injections every 30 days     Patient presents to discuss starting rituximab infusions. He reports he is doing well symptom-wise - no issues with shortness of breath, cough, or chest pain. Completed labs recently which indicated need for additional therapy. Patient reports receiving infusions in the past and his main concerns are regarding infusion coverage and scheduling the appointments. Willing to go to the [facility] [place], [facility], or [facility] infusion centers. Works on Tuesday's but can make any other day work.     Last lab monitoring completed: 6/12/25    Pre-Biologic Screening:   Hep B Core Antibody  Non-reactive (5/15/23)    Hep B Surface Antigen Non-reactive (5/15/23)    Hep C Antibody  Non-reactive (5/15/23)    HIV Antigen Antibody Non-reactive (5/15/23)    Quantiferon TB Gold Negative (9/8/23)      Vaccinations  Immunization History   Covid-19 vaccine ([id number] version)  Up-to-date   Influenza (annual) Up-to-date, avoid live FluMist   Pneumococcal Up-to-date   Tetanus/Tdap  Due to receive -   Shingrix Complete   RSV (only for >= 60 years old)  Complete   All patients on biologics should avoid live vaccines (varicella/VZV, intranasal influenza, MMR, or yellow fever vaccine (if traveling))       Patient follows with primary care MTM to manage his other conditions. Only the above was assessed today.      Today's Vitals: There were no vitals taken for this visit.  ----------------    I spent 35 minutes with this patient today. All changes were made via collaborative practice agreement with Josue Tripp MD.      A summary of these recommendations was sent via [employer].    Preet BarreraD  Medication Therapy Management Pharmacist  [facility]  Phone: [phone]     Telemedicine Visit Details  The patient's medications can be safely assessed via a telemedicine encounter.  Type of service:  Telephone visit  Originating Location (pt. Location): Home    Distant Location (provider location):  Off-site  Start Time: 11:00 AM  End Time: 11:35 AM     Medication Therapy Recommendations  Eosinophilic granulomatosis with polyangiitis (EGPA) (H)   1 Rationale: Synergistic therapy - Needs additional medication therapy - Indication   Recommendation: Provide Education - RITUXAN IV   Status: Patient Agreed - Adherence/Education   Identified Date: 6/19/2025 Completed Date: 6/19/2025         Vaccine counseling   1 Rationale: Preventive therapy - Needs additional medication therapy - Indication   Recommendation: Provide Education   Status: Patient Agreed - Adherence/Education   Identified Date: 6/19/2025 Completed Date: 6/19/2025

## 2025-06-19 NOTE — PATIENT INSTRUCTIONS
"Recommendations from today's MTM visit:                                                    MTM (medication therapy management) is a service provided by a clinical pharmacist designed to help you get the most of out of your medicines.      Call your preferred infusion center to schedule the rituximab infusion appointments.   [facility]  Phone: [phone]  [facility]  Phone: [phone]   [facility]  Phone: [phone]    I am confirming whether additional labs are needed before the infusion.   I will send an update via [employer] / [employer].      Complete the following vaccines in about 6 months. These should be completed at least 2 weeks before future rituximab courses.   Tetanus (TDaP)  Recommended every 10 years.  COVID (will be due in 6 months)  Annual flu (will be due in 6 months)    Follow-up: with Dr. Tripp on 9/11/25.    It was great speaking with you today.  I value your experience and would be very thankful for your time in providing feedback in our clinic survey. In the next few days, you may receive an email or text message from [employer] with a link to a survey related to your  clinical pharmacist.\"     To schedule another MTM appointment, please call the clinic directly or you may call the MTM scheduling line at [phone].    My Clinical Pharmacist's contact information:                                                      Please feel free to contact me with any questions or concerns you have.      Moses Rouse, Antionette  Medication Therapy Management Pharmacist  [facility]  Phone: [phone]    "

## 2025-07-08 ENCOUNTER — INFUSION THERAPY VISIT (OUTPATIENT)
Dept: INFUSION THERAPY | Facility: HOSPITAL | Age: 65
End: 2025-07-08
Attending: INTERNAL MEDICINE
Payer: MEDICARE

## 2025-07-08 VITALS
SYSTOLIC BLOOD PRESSURE: 137 MMHG | OXYGEN SATURATION: 95 % | TEMPERATURE: 98 F | RESPIRATION RATE: 16 BRPM | HEART RATE: 94 BPM | DIASTOLIC BLOOD PRESSURE: 68 MMHG

## 2025-07-08 DIAGNOSIS — M30.1 EOSINOPHILIC GRANULOMATOSIS WITH POLYANGIITIS (EGPA) (H): Primary | ICD-10-CM

## 2025-07-08 DIAGNOSIS — D72.18 EOSINOPHILIC GRANULOMATOSIS WITH POLYANGIITIS (EGPA) (H): Primary | ICD-10-CM

## 2025-07-08 LAB
ALBUMIN SERPL BCG-MCNC: 4.1 G/DL (ref 3.5–5.2)
ANION GAP SERPL CALCULATED.3IONS-SCNC: 9 MMOL/L (ref 7–15)
BASOPHILS # BLD AUTO: 0.1 10E3/UL (ref 0–0.2)
BASOPHILS NFR BLD AUTO: 1 %
BUN SERPL-MCNC: 25.1 MG/DL (ref 8–23)
CALCIUM SERPL-MCNC: 9.5 MG/DL (ref 8.8–10.4)
CHLORIDE SERPL-SCNC: 102 MMOL/L (ref 98–107)
CREAT SERPL-MCNC: 1.81 MG/DL (ref 0.67–1.17)
EGFRCR SERPLBLD CKD-EPI 2021: 41 ML/MIN/1.73M2
EOSINOPHIL # BLD AUTO: 0.1 10E3/UL (ref 0–0.7)
EOSINOPHIL NFR BLD AUTO: 2 %
ERYTHROCYTE [DISTWIDTH] IN BLOOD BY AUTOMATED COUNT: 12.2 % (ref 10–15)
GLUCOSE SERPL-MCNC: 163 MG/DL (ref 70–99)
HCO3 SERPL-SCNC: 27 MMOL/L (ref 22–29)
HCT VFR BLD AUTO: 43 % (ref 40–53)
HGB BLD-MCNC: 14.6 G/DL (ref 13.3–17.7)
IMM GRANULOCYTES # BLD: 0 10E3/UL
IMM GRANULOCYTES NFR BLD: 1 %
LYMPHOCYTES # BLD AUTO: 1.5 10E3/UL (ref 0.8–5.3)
LYMPHOCYTES NFR BLD AUTO: 23 %
MCH RBC QN AUTO: 29.4 PG (ref 26.5–33)
MCHC RBC AUTO-ENTMCNC: 34 G/DL (ref 31.5–36.5)
MCV RBC AUTO: 87 FL (ref 78–100)
MONOCYTES # BLD AUTO: 0.6 10E3/UL (ref 0–1.3)
MONOCYTES NFR BLD AUTO: 9 %
NEUTROPHILS # BLD AUTO: 4.3 10E3/UL (ref 1.6–8.3)
NEUTROPHILS NFR BLD AUTO: 65 %
NRBC # BLD AUTO: 0 10E3/UL
NRBC BLD AUTO-RTO: 0 /100
PHOSPHATE SERPL-MCNC: 3.7 MG/DL (ref 2.5–4.5)
PLATELET # BLD AUTO: 197 10E3/UL (ref 150–450)
POTASSIUM SERPL-SCNC: 5.5 MMOL/L (ref 3.4–5.3)
RBC # BLD AUTO: 4.97 10E6/UL (ref 4.4–5.9)
SODIUM SERPL-SCNC: 138 MMOL/L (ref 135–145)
WBC # BLD AUTO: 6.6 10E3/UL (ref 4–11)

## 2025-07-08 PROCEDURE — 250N000013 HC RX MED GY IP 250 OP 250 PS 637: Performed by: INTERNAL MEDICINE

## 2025-07-08 PROCEDURE — 96375 TX/PRO/DX INJ NEW DRUG ADDON: CPT

## 2025-07-08 PROCEDURE — 36415 COLL VENOUS BLD VENIPUNCTURE: CPT | Performed by: INTERNAL MEDICINE

## 2025-07-08 PROCEDURE — 96413 CHEMO IV INFUSION 1 HR: CPT

## 2025-07-08 PROCEDURE — 250N000011 HC RX IP 250 OP 636: Mod: JW | Performed by: INTERNAL MEDICINE

## 2025-07-08 PROCEDURE — 82565 ASSAY OF CREATININE: CPT | Performed by: INTERNAL MEDICINE

## 2025-07-08 PROCEDURE — 85018 HEMOGLOBIN: CPT | Performed by: INTERNAL MEDICINE

## 2025-07-08 PROCEDURE — 258N000003 HC RX IP 258 OP 636: Performed by: INTERNAL MEDICINE

## 2025-07-08 PROCEDURE — 96415 CHEMO IV INFUSION ADDL HR: CPT

## 2025-07-08 PROCEDURE — 96367 TX/PROPH/DG ADDL SEQ IV INF: CPT

## 2025-07-08 RX ORDER — ALBUTEROL SULFATE 0.83 MG/ML
2.5 SOLUTION RESPIRATORY (INHALATION)
Status: DISCONTINUED | OUTPATIENT
Start: 2025-07-08 | End: 2025-07-08 | Stop reason: HOSPADM

## 2025-07-08 RX ORDER — METHYLPREDNISOLONE SODIUM SUCCINATE 40 MG/ML
40 INJECTION INTRAMUSCULAR; INTRAVENOUS
Status: DISCONTINUED | OUTPATIENT
Start: 2025-07-08 | End: 2025-07-08 | Stop reason: HOSPADM

## 2025-07-08 RX ORDER — DIPHENHYDRAMINE HYDROCHLORIDE 50 MG/ML
25 INJECTION, SOLUTION INTRAMUSCULAR; INTRAVENOUS
Status: DISCONTINUED | OUTPATIENT
Start: 2025-07-08 | End: 2025-07-08 | Stop reason: HOSPADM

## 2025-07-08 RX ORDER — METHYLPREDNISOLONE SODIUM SUCCINATE 125 MG/2ML
100 INJECTION INTRAMUSCULAR; INTRAVENOUS ONCE
Status: COMPLETED | OUTPATIENT
Start: 2025-07-08 | End: 2025-07-08

## 2025-07-08 RX ORDER — ALBUTEROL SULFATE 0.83 MG/ML
2.5 SOLUTION RESPIRATORY (INHALATION)
OUTPATIENT
Start: 2025-07-22

## 2025-07-08 RX ORDER — ALBUTEROL SULFATE 90 UG/1
1-2 INHALANT RESPIRATORY (INHALATION)
Status: DISCONTINUED | OUTPATIENT
Start: 2025-07-08 | End: 2025-07-08 | Stop reason: HOSPADM

## 2025-07-08 RX ORDER — EPINEPHRINE 1 MG/ML
0.3 INJECTION, SOLUTION INTRAMUSCULAR; SUBCUTANEOUS EVERY 5 MIN PRN
OUTPATIENT
Start: 2025-07-22

## 2025-07-08 RX ORDER — METHYLPREDNISOLONE SODIUM SUCCINATE 40 MG/ML
40 INJECTION INTRAMUSCULAR; INTRAVENOUS
Start: 2025-07-22

## 2025-07-08 RX ORDER — DIPHENHYDRAMINE HCL 50 MG
50 CAPSULE ORAL ONCE
Start: 2025-07-22

## 2025-07-08 RX ORDER — METHYLPREDNISOLONE SODIUM SUCCINATE 125 MG/2ML
100 INJECTION INTRAMUSCULAR; INTRAVENOUS ONCE
OUTPATIENT
Start: 2025-07-22

## 2025-07-08 RX ORDER — DIPHENHYDRAMINE HYDROCHLORIDE 50 MG/ML
50 INJECTION, SOLUTION INTRAMUSCULAR; INTRAVENOUS
Status: DISCONTINUED | OUTPATIENT
Start: 2025-07-08 | End: 2025-07-08 | Stop reason: HOSPADM

## 2025-07-08 RX ORDER — ALBUTEROL SULFATE 90 UG/1
1-2 INHALANT RESPIRATORY (INHALATION)
Start: 2025-07-22

## 2025-07-08 RX ORDER — DIPHENHYDRAMINE HYDROCHLORIDE 50 MG/ML
50 INJECTION, SOLUTION INTRAMUSCULAR; INTRAVENOUS
Start: 2025-07-22

## 2025-07-08 RX ORDER — DIPHENHYDRAMINE HYDROCHLORIDE 50 MG/ML
25 INJECTION, SOLUTION INTRAMUSCULAR; INTRAVENOUS
Start: 2025-07-22

## 2025-07-08 RX ORDER — EPINEPHRINE 1 MG/ML
0.3 INJECTION, SOLUTION INTRAMUSCULAR; SUBCUTANEOUS EVERY 5 MIN PRN
Status: DISCONTINUED | OUTPATIENT
Start: 2025-07-08 | End: 2025-07-08 | Stop reason: HOSPADM

## 2025-07-08 RX ORDER — ACETAMINOPHEN 325 MG/1
650 TABLET ORAL ONCE
Status: COMPLETED | OUTPATIENT
Start: 2025-07-08 | End: 2025-07-08

## 2025-07-08 RX ORDER — ACETAMINOPHEN 325 MG/1
650 TABLET ORAL ONCE
Start: 2025-07-22

## 2025-07-08 RX ADMIN — RITUXIMAB-ABBS 1000 MG: 10 INJECTION, SOLUTION INTRAVENOUS at 09:47

## 2025-07-08 RX ADMIN — SODIUM CHLORIDE 250 ML: 0.9 INJECTION, SOLUTION INTRAVENOUS at 08:40

## 2025-07-08 RX ADMIN — ACETAMINOPHEN 650 MG: 325 TABLET ORAL at 08:48

## 2025-07-08 RX ADMIN — DIPHENHYDRAMINE HYDROCHLORIDE 50 MG: 50 INJECTION, SOLUTION INTRAMUSCULAR; INTRAVENOUS at 08:53

## 2025-07-08 RX ADMIN — METHYLPREDNISOLONE SODIUM SUCCINATE 100 MG: 125 INJECTION, POWDER, FOR SOLUTION INTRAMUSCULAR; INTRAVENOUS at 08:49

## 2025-07-08 NOTE — PROGRESS NOTES
Infusion Nursing Note:  Ned Moore presents today for first dose of Rituximab.    Patient seen by provider today: No   present during visit today: Not Applicable.    Note: Ned arrived ambulatory in stable condition. Labs drawn with PIV insertion. Reviewed medication information and signs of reaction with patient. He verbalized understanding plan of care. Rituximab infusion started at 50 mg/hr and increased by 50 mg/hr every 30 minutes to max of 400 mg/hr. Vital signs remained stable throughout infusion. Ned denies any signs of reaction. He is scheduled to return for his second dose of Rituximab 7/22/25.      Intravenous Access:  Labs drawn without difficulty.  Peripheral IV placed.    Treatment Conditions:  Biological Infusion Checklist:  ~~~ NOTE: If the patient answers yes to any of the questions below, hold the infusion and contact ordering provider or on-call provider.    Have you recently had an elevated temperature, fever, chills, productive cough, coughing for 3 weeks or longer or hemoptysis,  abnormal vital signs, night sweats,  chest pain or have you noticed a decrease in your appetite, unexplained weight loss or fatigue? No  Do you have any open wounds or new incisions? No  Do you have any upcoming hospitalizations or surgeries? Does not include esophagogastroduodenoscopy, colonoscopy, endoscopic retrograde cholangiopancreatography (ERCP), endoscopic ultrasound (EUS), dental procedures or joint aspiration/steroid injections No  Do you currently have any signs of illness or infection or are you on any antibiotics? No  Have you had any new, sudden or worsening abdominal pain? No  Have you or anyone in your household received a live vaccination in the past 4 weeks? Please note: No live vaccines while on biologic/chemotherapy until 6 months after the last treatment. Patient can receive the flu vaccine (shot only), pneumovax and the Covid vaccine. It is optimal for the patient to get these  vaccines mid cycle, but they can be given at any time as long as it is not on the day of the infusion. No  Have you recently been diagnosed with any new nervous system diseases (ie. Multiple sclerosis, Guillain [place], seizures, neurological changes) or cancer diagnosis? Are you on any form of radiation or chemotherapy? No  Are you pregnant or breast feeding or do you have plans of pregnancy in the future? No  Have you been having any signs of worsening depression or suicidal ideations?  (benlysta only) No  Have there been any other new onset medical symptoms? No  Have you had any new blood clots? (IVIG only) No      Post Infusion Assessment:  Patient tolerated infusion without incident.  Site patent and intact, free from redness, edema or discomfort.  Access discontinued per protocol.       Discharge Plan:   Patient and/or family verbalized understanding of discharge instructions and all questions answered.  Patient discharged in stable condition accompanied by: self.  Departure Mode: Ambulatory.      Ирина Ennis RN

## 2025-07-22 ENCOUNTER — INFUSION THERAPY VISIT (OUTPATIENT)
Dept: INFUSION THERAPY | Facility: HOSPITAL | Age: 65
End: 2025-07-22
Payer: MEDICARE

## 2025-07-22 VITALS
OXYGEN SATURATION: 98 % | SYSTOLIC BLOOD PRESSURE: 145 MMHG | RESPIRATION RATE: 16 BRPM | DIASTOLIC BLOOD PRESSURE: 64 MMHG | BODY MASS INDEX: 24.31 KG/M2 | TEMPERATURE: 97.9 F | HEIGHT: 74 IN | WEIGHT: 189.44 LBS | HEART RATE: 72 BPM

## 2025-07-22 DIAGNOSIS — M30.1 EOSINOPHILIC GRANULOMATOSIS WITH POLYANGIITIS (EGPA) (H): Primary | ICD-10-CM

## 2025-07-22 DIAGNOSIS — D72.18 EOSINOPHILIC GRANULOMATOSIS WITH POLYANGIITIS (EGPA) (H): Primary | ICD-10-CM

## 2025-07-22 LAB
ALBUMIN SERPL BCG-MCNC: 4.3 G/DL (ref 3.5–5.2)
ANION GAP SERPL CALCULATED.3IONS-SCNC: 11 MMOL/L (ref 7–15)
BASOPHILS # BLD AUTO: 0.1 10E3/UL (ref 0–0.2)
BASOPHILS NFR BLD AUTO: 1 %
BUN SERPL-MCNC: 31 MG/DL (ref 8–23)
CALCIUM SERPL-MCNC: 9.5 MG/DL (ref 8.8–10.4)
CHLORIDE SERPL-SCNC: 102 MMOL/L (ref 98–107)
CREAT SERPL-MCNC: 1.92 MG/DL (ref 0.67–1.17)
EGFRCR SERPLBLD CKD-EPI 2021: 38 ML/MIN/1.73M2
EOSINOPHIL # BLD AUTO: 0.1 10E3/UL (ref 0–0.7)
EOSINOPHIL NFR BLD AUTO: 1 %
ERYTHROCYTE [DISTWIDTH] IN BLOOD BY AUTOMATED COUNT: 12.3 % (ref 10–15)
GLUCOSE SERPL-MCNC: 191 MG/DL (ref 70–99)
HCO3 SERPL-SCNC: 28 MMOL/L (ref 22–29)
HCT VFR BLD AUTO: 42.2 % (ref 40–53)
HGB BLD-MCNC: 14.2 G/DL (ref 13.3–17.7)
IMM GRANULOCYTES # BLD: 0 10E3/UL
IMM GRANULOCYTES NFR BLD: 1 %
LYMPHOCYTES # BLD AUTO: 1.4 10E3/UL (ref 0.8–5.3)
LYMPHOCYTES NFR BLD AUTO: 19 %
MCH RBC QN AUTO: 28.8 PG (ref 26.5–33)
MCHC RBC AUTO-ENTMCNC: 33.6 G/DL (ref 31.5–36.5)
MCV RBC AUTO: 86 FL (ref 78–100)
MONOCYTES # BLD AUTO: 0.6 10E3/UL (ref 0–1.3)
MONOCYTES NFR BLD AUTO: 8 %
NEUTROPHILS # BLD AUTO: 5.1 10E3/UL (ref 1.6–8.3)
NEUTROPHILS NFR BLD AUTO: 71 %
NRBC # BLD AUTO: 0 10E3/UL
NRBC BLD AUTO-RTO: 0 /100
PHOSPHATE SERPL-MCNC: 3.4 MG/DL (ref 2.5–4.5)
PLATELET # BLD AUTO: 212 10E3/UL (ref 150–450)
POTASSIUM SERPL-SCNC: 4.7 MMOL/L (ref 3.4–5.3)
RBC # BLD AUTO: 4.93 10E6/UL (ref 4.4–5.9)
SODIUM SERPL-SCNC: 141 MMOL/L (ref 135–145)
WBC # BLD AUTO: 7.2 10E3/UL (ref 4–11)

## 2025-07-22 PROCEDURE — 96415 CHEMO IV INFUSION ADDL HR: CPT

## 2025-07-22 PROCEDURE — 250N000013 HC RX MED GY IP 250 OP 250 PS 637: Performed by: INTERNAL MEDICINE

## 2025-07-22 PROCEDURE — 250N000011 HC RX IP 250 OP 636: Mod: JZ | Performed by: INTERNAL MEDICINE

## 2025-07-22 PROCEDURE — 96413 CHEMO IV INFUSION 1 HR: CPT

## 2025-07-22 PROCEDURE — 96375 TX/PRO/DX INJ NEW DRUG ADDON: CPT

## 2025-07-22 PROCEDURE — 85025 COMPLETE CBC W/AUTO DIFF WBC: CPT | Performed by: INTERNAL MEDICINE

## 2025-07-22 PROCEDURE — 36415 COLL VENOUS BLD VENIPUNCTURE: CPT | Performed by: INTERNAL MEDICINE

## 2025-07-22 PROCEDURE — 82310 ASSAY OF CALCIUM: CPT | Performed by: INTERNAL MEDICINE

## 2025-07-22 PROCEDURE — 258N000003 HC RX IP 258 OP 636: Performed by: INTERNAL MEDICINE

## 2025-07-22 RX ORDER — DIPHENHYDRAMINE HCL 50 MG
50 CAPSULE ORAL ONCE
Status: CANCELLED
Start: 2025-07-22

## 2025-07-22 RX ORDER — DIPHENHYDRAMINE HCL 50 MG
50 CAPSULE ORAL ONCE
Status: COMPLETED | OUTPATIENT
Start: 2025-07-22 | End: 2025-07-22

## 2025-07-22 RX ORDER — ALBUTEROL SULFATE 0.83 MG/ML
2.5 SOLUTION RESPIRATORY (INHALATION)
Status: DISCONTINUED | OUTPATIENT
Start: 2025-07-22 | End: 2025-07-22

## 2025-07-22 RX ORDER — DIPHENHYDRAMINE HYDROCHLORIDE 50 MG/ML
50 INJECTION, SOLUTION INTRAMUSCULAR; INTRAVENOUS
Status: DISCONTINUED | OUTPATIENT
Start: 2025-07-22 | End: 2025-07-22

## 2025-07-22 RX ORDER — METHYLPREDNISOLONE SODIUM SUCCINATE 125 MG/2ML
100 INJECTION INTRAMUSCULAR; INTRAVENOUS ONCE
Status: COMPLETED | OUTPATIENT
Start: 2025-07-22 | End: 2025-07-22

## 2025-07-22 RX ORDER — METHYLPREDNISOLONE SODIUM SUCCINATE 125 MG/2ML
100 INJECTION INTRAMUSCULAR; INTRAVENOUS ONCE
Status: CANCELLED | OUTPATIENT
Start: 2025-07-22

## 2025-07-22 RX ORDER — METHYLPREDNISOLONE SODIUM SUCCINATE 40 MG/ML
40 INJECTION INTRAMUSCULAR; INTRAVENOUS
Status: CANCELLED
Start: 2025-07-22

## 2025-07-22 RX ORDER — METHYLPREDNISOLONE SODIUM SUCCINATE 40 MG/ML
40 INJECTION INTRAMUSCULAR; INTRAVENOUS
Status: DISCONTINUED | OUTPATIENT
Start: 2025-07-22 | End: 2025-07-22

## 2025-07-22 RX ORDER — DIPHENHYDRAMINE HYDROCHLORIDE 50 MG/ML
25 INJECTION, SOLUTION INTRAMUSCULAR; INTRAVENOUS
Status: CANCELLED
Start: 2025-07-22

## 2025-07-22 RX ORDER — DIPHENHYDRAMINE HYDROCHLORIDE 50 MG/ML
25 INJECTION, SOLUTION INTRAMUSCULAR; INTRAVENOUS
Status: DISCONTINUED | OUTPATIENT
Start: 2025-07-22 | End: 2025-07-22

## 2025-07-22 RX ORDER — ALBUTEROL SULFATE 0.83 MG/ML
2.5 SOLUTION RESPIRATORY (INHALATION)
Status: CANCELLED | OUTPATIENT
Start: 2025-07-22

## 2025-07-22 RX ORDER — ACETAMINOPHEN 325 MG/1
650 TABLET ORAL ONCE
Status: CANCELLED
Start: 2025-07-22

## 2025-07-22 RX ORDER — ACETAMINOPHEN 325 MG/1
650 TABLET ORAL ONCE
Status: COMPLETED | OUTPATIENT
Start: 2025-07-22 | End: 2025-07-22

## 2025-07-22 RX ORDER — EPINEPHRINE 1 MG/ML
0.3 INJECTION, SOLUTION INTRAMUSCULAR; SUBCUTANEOUS EVERY 5 MIN PRN
Status: CANCELLED | OUTPATIENT
Start: 2025-07-22

## 2025-07-22 RX ORDER — DIPHENHYDRAMINE HYDROCHLORIDE 50 MG/ML
50 INJECTION, SOLUTION INTRAMUSCULAR; INTRAVENOUS
Status: CANCELLED
Start: 2025-07-22

## 2025-07-22 RX ORDER — ALBUTEROL SULFATE 90 UG/1
1-2 INHALANT RESPIRATORY (INHALATION)
Status: CANCELLED
Start: 2025-07-22

## 2025-07-22 RX ORDER — EPINEPHRINE 1 MG/ML
0.3 INJECTION, SOLUTION INTRAMUSCULAR; SUBCUTANEOUS EVERY 5 MIN PRN
Status: DISCONTINUED | OUTPATIENT
Start: 2025-07-22 | End: 2025-07-22

## 2025-07-22 RX ORDER — ALBUTEROL SULFATE 90 UG/1
1-2 INHALANT RESPIRATORY (INHALATION)
Status: DISCONTINUED | OUTPATIENT
Start: 2025-07-22 | End: 2025-07-22

## 2025-07-22 RX ADMIN — SODIUM CHLORIDE 1000 ML: 0.9 INJECTION, SOLUTION INTRAVENOUS at 09:29

## 2025-07-22 RX ADMIN — RITUXIMAB-ABBS 1000 MG: 10 INJECTION, SOLUTION INTRAVENOUS at 10:50

## 2025-07-22 RX ADMIN — ACETAMINOPHEN 650 MG: 325 TABLET ORAL at 09:29

## 2025-07-22 RX ADMIN — DIPHENHYDRAMINE HYDROCHLORIDE 50 MG: 50 CAPSULE ORAL at 09:29

## 2025-07-22 RX ADMIN — METHYLPREDNISOLONE SODIUM SUCCINATE 100 MG: 125 INJECTION INTRAMUSCULAR; INTRAVENOUS at 09:29

## 2025-07-22 RX ADMIN — SODIUM CHLORIDE 250 ML: 0.9 INJECTION, SOLUTION INTRAVENOUS at 10:10

## 2025-07-22 ASSESSMENT — PAIN SCALES - GENERAL: PAINLEVEL_OUTOF10: NO PAIN (0)

## 2025-07-22 NOTE — PROGRESS NOTES
Infusion Nursing Note:  Ned Moore presents today for Rituxima.    Patient seen by provider today: No   present during visit today: Not Applicable.    Note: Ned arrived into the infusion center ambulatory and in stable condition for 2nd  dose of Rituximab, VSS. Plan of care reviewed with him, he verbalized understanding of his plan of care and stated  he tolerated the first dose with no reaction or ill effect.  2nd dose of  Rituximab administered rapid per order, and monitored, no reaction or ill effect noted. He requested and received one liter of NS stated he was dehydrated.      Intravenous Access:  Labs drawn without difficulty.  Peripheral IV placed.    Treatment Conditions:  Lab Results   Component Value Date    HGB 14.2 07/22/2025    WBC 7.2 07/22/2025    ANEU 5.1 07/22/2025     07/22/2025        Lab Results   Component Value Date     07/22/2025    POTASSIUM 4.7 07/22/2025    MAG 2.5 (H) 02/16/2024    CR 1.92 (H) 07/22/2025    ANAI 9.5 07/22/2025    BILITOTAL 0.3 02/16/2024    ALBUMIN 4.3 07/22/2025    ALT 22 02/16/2024    AST 24 02/16/2024       Biological Infusion Checklist:  ~~~ NOTE: If the patient answers yes to any of the questions below, hold the infusion and contact ordering provider or on-call provider.    Have you recently had an elevated temperature, fever, chills, productive cough, coughing for 3 weeks or longer or hemoptysis,  abnormal vital signs, night sweats,  chest pain or have you noticed a decrease in your appetite, unexplained weight loss or fatigue? No  Do you have any open wounds or new incisions? No  Do you have any upcoming hospitalizations or surgeries? Does not include esophagogastroduodenoscopy, colonoscopy, endoscopic retrograde cholangiopancreatography (ERCP), endoscopic ultrasound (EUS), dental procedures or joint aspiration/steroid injections No  Do you currently have any signs of illness or infection or are you on any antibiotics? No  Have you had  any new, sudden or worsening abdominal pain? No  Have you or anyone in your household received a live vaccination in the past 4 weeks? Please note: No live vaccines while on biologic/chemotherapy until 6 months after the last treatment. Patient can receive the flu vaccine (shot only), pneumovax and the Covid vaccine. It is optimal for the patient to get these vaccines mid cycle, but they can be given at any time as long as it is not on the day of the infusion. No  Have you recently been diagnosed with any new nervous system diseases (ie. Multiple sclerosis, Guillain [place], seizures, neurological changes) or cancer diagnosis? Are you on any form of radiation or chemotherapy? No  Are you pregnant or breast feeding or do you have plans of pregnancy in the future? No  Have you been having any signs of worsening depression or suicidal ideations?  (benlysta only) No  Have there been any other new onset medical symptoms? No  Have you had any new blood clots? (IVIG only) No      Post Infusion Assessment:  Patient tolerated infusion without incident.  Site patent and intact, free from redness, edema or discomfort.  No evidence of extravasations.  Access discontinued per protocol.  Biologic Infusion Post Education: Call the triage nurse at your clinic or seek medical attention if you have chills and/or temperature greater than or equal to 100.5, uncontrolled nausea/vomiting, diarrhea, constipation, dizziness, shortness of breath, chest pain, heart palpitations, weakness or any other new or concerning symptoms, questions or concerns.  You cannot have any live virus vaccines prior to or during treatment or up to 6 months post infusion.  If you have an upcoming surgery, medical procedure or dental procedure during treatment, this should be discussed with your ordering physician and your surgeon/dentist.  If you are having any concerning symptom, if you are unsure if you should get your next infusion or wish to speak to a  provider before your next infusion, please call your care coordinator or triage nurse at your clinic to notify them so we can adequately serve you.     Discharge Plan:   Discharge instructions reviewed with: Patient.  Patient and/or family verbalized understanding of discharge instructions and all questions answered.  Patient discharged in stable condition accompanied by: self.  Departure Mode: Ambulatory.    Tori Frias RN

## 2025-07-28 ENCOUNTER — TELEPHONE (OUTPATIENT)
Dept: PHARMACY | Facility: CLINIC | Age: 65
End: 2025-07-28
Payer: MEDICARE

## 2025-07-28 NOTE — TELEPHONE ENCOUNTER
MTM referral from: [facility] visit (referral by provider)    MTM referral outreach attempt #1 on July 28, 2025 at 10:50 AM      Outcome: Spoke with patient and will call patient back to schedule an appointment.         Syed Borjas, PharmD     Medication Therapy Management (MTM) Pharmacist     [phone]     [email or web address]

## 2025-08-05 DIAGNOSIS — J44.9 CHRONIC OBSTRUCTIVE PULMONARY DISEASE, UNSPECIFIED COPD TYPE (H): ICD-10-CM

## 2025-08-05 RX ORDER — FLUTICASONE FUROATE AND VILANTEROL 100; 25 UG/1; UG/1
1 POWDER RESPIRATORY (INHALATION) DAILY
Qty: 60 EACH | Refills: 11 | Status: SHIPPED | OUTPATIENT
Start: 2025-08-05

## 2025-08-15 ENCOUNTER — MYC MEDICAL ADVICE (OUTPATIENT)
Dept: NEPHROLOGY | Facility: CLINIC | Age: 65
End: 2025-08-15
Payer: MEDICARE

## 2025-08-18 DIAGNOSIS — E11.40 TYPE 2 DIABETES MELLITUS WITH DIABETIC NEUROPATHY, WITHOUT LONG-TERM CURRENT USE OF INSULIN (H): ICD-10-CM

## 2025-08-18 RX ORDER — SEMAGLUTIDE 1.34 MG/ML
INJECTION, SOLUTION SUBCUTANEOUS
Qty: 3 ML | Refills: 3 | Status: SHIPPED | OUTPATIENT
Start: 2025-08-18

## 2025-08-23 ENCOUNTER — MYC MEDICAL ADVICE (OUTPATIENT)
Dept: INTERNAL MEDICINE | Facility: CLINIC | Age: 65
End: 2025-08-23
Payer: MEDICARE

## 2025-08-23 DIAGNOSIS — N18.31 TYPE 2 DIABETES MELLITUS WITH STAGE 3A CHRONIC KIDNEY DISEASE, WITHOUT LONG-TERM CURRENT USE OF INSULIN (H): ICD-10-CM

## 2025-08-23 DIAGNOSIS — E11.22 TYPE 2 DIABETES MELLITUS WITH STAGE 3A CHRONIC KIDNEY DISEASE, WITHOUT LONG-TERM CURRENT USE OF INSULIN (H): ICD-10-CM

## 2025-08-25 RX ORDER — GLIPIZIDE 10 MG/1
10 TABLET, FILM COATED, EXTENDED RELEASE ORAL DAILY
Qty: 90 TABLET | Refills: 3 | Status: SHIPPED | OUTPATIENT
Start: 2025-08-25

## 2025-08-31 ENCOUNTER — MYC MEDICAL ADVICE (OUTPATIENT)
Dept: INTERNAL MEDICINE | Facility: CLINIC | Age: 65
End: 2025-08-31
Payer: MEDICARE

## 2025-09-02 ENCOUNTER — VIRTUAL VISIT (OUTPATIENT)
Dept: PHARMACY | Facility: CLINIC | Age: 65
End: 2025-09-02

## 2025-09-02 DIAGNOSIS — E11.22 TYPE 2 DIABETES MELLITUS WITH STAGE 3A CHRONIC KIDNEY DISEASE, WITHOUT LONG-TERM CURRENT USE OF INSULIN (H): ICD-10-CM

## 2025-09-02 DIAGNOSIS — N18.31 TYPE 2 DIABETES MELLITUS WITH STAGE 3A CHRONIC KIDNEY DISEASE, WITHOUT LONG-TERM CURRENT USE OF INSULIN (H): ICD-10-CM

## 2025-09-02 DIAGNOSIS — N18.31 TYPE 2 DIABETES MELLITUS WITH STAGE 3A CHRONIC KIDNEY DISEASE, WITHOUT LONG-TERM CURRENT USE OF INSULIN (H): Primary | ICD-10-CM

## 2025-09-02 DIAGNOSIS — E11.22 TYPE 2 DIABETES MELLITUS WITH STAGE 3A CHRONIC KIDNEY DISEASE, WITHOUT LONG-TERM CURRENT USE OF INSULIN (H): Primary | ICD-10-CM

## 2025-09-02 PROCEDURE — 99207 PR NO CHARGE LOS: CPT | Mod: 93

## 2025-09-02 RX ORDER — SEMAGLUTIDE 2.68 MG/ML
2 INJECTION, SOLUTION SUBCUTANEOUS
Qty: 9 ML | Refills: 3 | Status: SHIPPED | OUTPATIENT
Start: 2025-09-02 | End: 2025-09-02

## 2025-09-02 RX ORDER — TIRZEPATIDE 5 MG/.5ML
INJECTION, SOLUTION SUBCUTANEOUS
Qty: 2 ML | Refills: 3 | Status: SHIPPED | OUTPATIENT
Start: 2025-09-02